# Patient Record
Sex: FEMALE | Race: WHITE | NOT HISPANIC OR LATINO | Employment: OTHER | URBAN - METROPOLITAN AREA
[De-identification: names, ages, dates, MRNs, and addresses within clinical notes are randomized per-mention and may not be internally consistent; named-entity substitution may affect disease eponyms.]

---

## 2017-01-11 DIAGNOSIS — E03.9 HYPOTHYROIDISM, UNSPECIFIED TYPE: ICD-10-CM

## 2017-01-11 RX ORDER — LEVOTHYROXINE SODIUM 100 UG/1
TABLET ORAL
Qty: 30 TABLET | Refills: 0 | Status: SHIPPED | OUTPATIENT
Start: 2017-01-11 | End: 2017-03-09 | Stop reason: SDUPTHER

## 2017-01-23 ENCOUNTER — TELEPHONE (OUTPATIENT)
Dept: OPHTHALMOLOGY | Facility: CLINIC | Age: 82
End: 2017-01-23

## 2017-02-25 ENCOUNTER — HOSPITAL ENCOUNTER (EMERGENCY)
Facility: HOSPITAL | Age: 82
Discharge: HOME | End: 2017-02-25
Attending: EMERGENCY MEDICINE | Admitting: EMERGENCY MEDICINE
Payer: MEDICARE

## 2017-02-25 VITALS
WEIGHT: 116 LBS | OXYGEN SATURATION: 100 % | RESPIRATION RATE: 18 BRPM | DIASTOLIC BLOOD PRESSURE: 81 MMHG | HEART RATE: 80 BPM | HEIGHT: 60 IN | SYSTOLIC BLOOD PRESSURE: 207 MMHG | TEMPERATURE: 98 F | BODY MASS INDEX: 22.78 KG/M2

## 2017-02-25 DIAGNOSIS — M25.571 ACUTE RIGHT ANKLE PAIN: Primary | ICD-10-CM
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
## 2017-02-25 DIAGNOSIS — Z91.81 HISTORY OF FALL: ICD-10-CM
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
## 2017-02-25 PROCEDURE — 99284 EMERGENCY DEPT VISIT MOD MDM: CPT | Mod: ,,, | Performed by: EMERGENCY MEDICINE

## 2017-02-25 PROCEDURE — 99283 EMERGENCY DEPT VISIT LOW MDM: CPT

## 2017-02-25 PROCEDURE — 25000003 PHARM REV CODE 250: Performed by: PHYSICIAN ASSISTANT

## 2017-02-25 RX ORDER — ACETAMINOPHEN 325 MG/1
650 TABLET ORAL
Status: COMPLETED | OUTPATIENT
Start: 2017-02-25 | End: 2017-02-25

## 2017-02-25 RX ADMIN — ACETAMINOPHEN 650 MG: 325 TABLET ORAL at 08:02

## 2017-02-25 NOTE — ED AVS SNAPSHOT
[facility]  [address]  [place] [address]               Suzan Villarreal   2017  8:17 PM   ED    Description:  Female : 1919   Department:  [facility]           Your Care was Coordinated By:     Provider Role From To    Armin Valdez III, MD Attending Provider 17 --    Jaja Cooper PA-C Physician Assistant 17 --      Reason for Visit     Ankle Pain           Diagnoses this Visit        Comments    Acute right ankle pain    -  Primary     Fall           ED Disposition     ED Disposition Condition Comment    Discharge             To Do List           Follow-up Information     Schedule an appointment as soon as possible for a visit with Aly Rivas MD.    Specialty:  Family Medicine    Contact information:     [facility]  [place] [address] [phone]        [facility]     [facility] -  Assistance  Registered nurses in the [facility] provide clinical advisement, health education, appointment booking, and other advisory services.  Call for this free service at [phone].             Medications           Message regarding Medications     Verify the changes and/or additions to your medication regime listed below are the same as discussed with your clinician today.  If any of these changes or additions are incorrect, please notify your healthcare provider.        These medications were administered today        Dose Freq    acetaminophen tablet 650 mg 650 mg ED 1 Time    Sig: Take 2 tablets (650 mg total) by mouth ED 1 Time.    Class: Normal    Route: Oral    Cosign for Ordering: Accepted by Armin Valdez III, MD on 2017  8:50 PM           Verify that the below list of medications is an accurate representation of the medications you are currently taking.  If none reported, the list may be blank. If incorrect, please contact your healthcare provider.  Carry this list with you in case of emergency.           Current Medications     acetaminophen (TYLENOL) 325 MG tablet Take 2 tablets (650 mg total) by mouth every 6 (six) hours as needed.    amlodipine (NORVASC) 10 MG tablet Take 1 tablet (10 mg total) by mouth once daily.    ANTIOX #11/OM3/DHA/EPA/LUT/BARBER (OCUVITE ADULT 50+ ORAL) Take 1 tablet by mouth once daily.    benazepril (LOTENSIN) 20 MG tablet Take 1 tablet (20 mg total) by mouth 2 (two) times daily.    calcium carbonate (OS-UZMA) 600 mg (1,500 mg) Tab Take 600 mg by mouth 2 (two) times daily with meals.    esomeprazole (NEXIUM) 40 MG capsule Take 1 capsule (40 mg total) by mouth before breakfast.    levothyroxine (SYNTHROID) 100 MCG tablet TAKE ONE TABLET BY MOUTH EVERY DAY BEFORE breakfast    metoprolol succinate (TOPROL-XL) 25 MG 24 hr tablet Take 1 tablet (25 mg total) by mouth once daily.    multivitamin capsule Take 1 capsule by mouth once daily.    SEREVENT DISKUS 50 mcg/dose diskus inhaler USE ONE PUFF BY MOUTH TWICE DAILY           Clinical Reference Information           Your Vitals Were     BP                   207/81 (BP Location: Right arm, Patient Position: Sitting)           Allergies as of 2/25/2017        Reactions    Sulfa (Sulfonamide Antibiotics) Swelling    Asa [Aspirin] Itching      Immunizations Administered on Date of Encounter - 2/25/2017     None      ED Micro, Lab, POCT     None      ED Imaging Orders     Start Ordered       Status Ordering Provider    02/25/17 2035 02/25/17 2034  X-Ray Foot Complete Right  1 time imaging      Final result     02/25/17 2035 02/25/17 2034  X-Ray Tibia Fibula 2 View Right  1 time imaging      Final result     02/25/17 2034 02/25/17 2034  X-Ray Ankle Complete Right  1 time imaging      Final result         Discharge Instructions       Follow up with your PCP. Take OTC tylenol as needed. Return to the ED for any new or worsening symptoms, including those listed below.      Ankle Sprain (Adult)  An  ankle sprain is a stretching or tearing of the ligaments that hold the ankle joint together. There are no broken bones.  An ankle sprain is a common injury for both children and adults. It happens when the ankle turns, twists, or rolls in an awkward way. This can be caused by a sports injury. Or it can happen from doing something as simple as stepping on an uneven surface.  Ligaments are made of tough connective tissue. Normally, ligaments stretch a certain amount and then go back to their normal place. A sprain happens when a ligament is forced to stretch more than the normal amount. A severe sprain can actually tear the ligaments. If you have a severe sprain, you may have felt or heard something like a pop when you were injured.  Ankle sprains are given a grade depending on whether they are mild, moderate, or severe:  · Grade 1 sprain. A mild sprain with minor stretching and damage to the ligament.  · Grade 2 sprain. A moderate sprain where the ligament is partly torn.  · Grade 3 sprain. The most severe kind of sprain. The ligament is completely torn.  Most sprains take about 4 to 6 weeks to heal. A severe sprain can take several months to recover.  Your healthcare provider may order X-rays to be sure you dont have a fracture, or broken bone.  The injured area will feel sore.  Swelling and pain may make it hard to walk. You may need crutches if walking is painful. Or your provider may have you use a cast boot or air splint. This will depend on the grade of ankle sprain that you have.    Home care  · For a Grade 1 sprain, use RICE (Rest, Ice, Compression, and Elevation):  · Rest your ankle. Dont walk on it.  · Ice should be used right away to help control swelling. Place an ice pack over the injured area for 20 minutes. Do this every 3 to 6 hours for the first 24 to 48 hours. Keep using ice packs to ease pain and swelling as needed. To make an ice pack, put ice cubes in a plastic bag that seals at the top. Wrap  the bag in a clean, thin towel or cloth. Never put ice or an ice pack directly on the skin. The ice pack can be put right on the cast, bandage, or splint. As the ice melts, be careful that the cast, bandage, or splint doesnt get wet. If you have a boot, open it to apply an ice pack, unless told otherwise by your provider.  · Compression devices help to control swelling. They also keep the ankle from moving and support your injured ankle. These devices include dressings, bandages, and wraps.  · Elevate or raise your ankle above the level of your heart when sitting or lying down. This is very important for the first 48 hours.  · Follow the RICE guidelines for a Grade 2 sprain. This type of sprain will take longer to heal. Your provider may have you wear a splint, cast, or brace to keep your ankle from moving.  ·  If you have a Grade 3 sprain, you are at risk for long-term ankle instability. In rare cases, surgery may be needed. Your provider may have you wear a short leg cast or a walking boot for 2 to 3 weeks.  · After 48 hours, it may be helpful to apply heat for 20 minutes several times a day. You can do this with a heating pad or warm compress. Or you may want to go back and forth between using ice and heat. Never apply heat directly to the skin. Always wrap the heating pad or warm compress in a clean, thin towel or cloth.  · You may use over-the-counter pain medicine (NSAIDS or nonsteroidal anti-inflammatory drugs) to control pain, unless another pain medicine was prescribed. Talk with your provider beforeusing these medicines if you have chronic liver or kidney disease, or have ever had a stomach ulcer or GI (gastrointestinal) bleeding.  · Follow any rehabilitation exercises your provider gives you. These can help you be more flexible and improve your balance and coordination. This is helpful in preventing long-term ankle problems.  Prevention  To help prevent ankle sprains, its important to have good  strength, balance, and flexibility. Be sure to:  · Always warm up before you exercise or do something very active  · Be careful when walking or running on uneven or cracked surfaces  · Wear shoes that are in good condition and fit well  · Listen to your bodys signals to slow down when you are in pain or tired  Follow-up care  Follow up with your healthcare provider, or as advised. Check for any warning signs listed below.  If your symptoms dont improve or they get worse, talk with your provider. You may need an X-ray.  When to seek medical advice  Call your healthcare provider right away if any of these occur:  · Fever of 100.4 F (38 C) or higher, or as directed  · The injury doesnt seem to be healing  · The swelling comes back  · The cast has a bad smell  · The plaster cast or splint gets wet or soft  · The fiberglass cast or splint gets wet and does not dry for 24 hours  · The pain or swelling increases, or redness appears  · Your toes become cold, blue, numb, or tingly  · The skin is discolored (looks blue, purple, or gray), has blisters, or is irritated  · You re-injure your ankle  Date Last Reviewed: 11/20/2015  © 0574-8196 [employer]. [address] [place] [address], [place] [address]. All rights reserved. This information is not intended as a substitute for professional medical care. Always follow your healthcare professional's instructions.          Your Scheduled Appointments     Mar 02, 2017  2:00 PM CST   Established Patient Visit with AFSANEH Rodriguez MD   [place] [facility] ([place])    [address]  [place] [address] [phone]            Mar 09, 2017  1:00 PM CST   Follow Up/Office Visit with Aly Rivas MD   [place] [facility] ([place])    [address]  [place] [address] [phone]            Apr 28, 2017  3:00 PM CDT   Established Patient Visit with MD Billy Vasquez - Rheumatology ([place] )    [address]  [facility] [address]   [phone]              [facility] Sign-Up     Activating your [facility] account is as easy as 1-2-3!     1) Visit [email or web address], select Sign Up Now, enter this activation code and your date of birth, then select Next.  Activation code not generated  Current Patient Portal Status: Account disabled      2) Create a username and password to use when you visit [facility] in the future and select a security question in case you lose your password and select Next.    3) Enter your e-mail address and click Sign Up!    Additional Information  If you have questions, please e-mail [email or web address] or call [phone] to talk to our [facility] staff. Remember, [facility] is NOT to be used for urgent needs. For medical emergencies, dial 911.          [facility] complies with applicable Federal civil rights laws and does not discriminate on the basis of race, color, national origin, age, disability, or sex.        Language Assistance Services     ATTENTION: Language assistance services are available, free of charge. Please call [phone].      ATENCIÓN: Si habla español, tiene a nava disposición servicios gratuitos de asistencia lingüística. Llame al [phone].     CHÚ Ý: N?u b?n nói Ti?ng Vi?t, có các d?ch v? h? tr? ngôn ng? mi?n phí dành cho b?n. G?i s? [phone].

## 2017-02-26 NOTE — ED PROVIDER NOTES
Encounter Date: 2/25/2017    SCRIBE #1 NOTE: I, Torrey Matt, am scribing for, and in the presence of,  Armin Valdez MD. I have scribed the following portions of the note - the APC attestation.       History     Chief Complaint   Patient presents with    Ankle Pain     fell, trip and fall.  C/O R ankle pain and swelling     Review of patient's allergies indicates:   Allergen Reactions    Sulfa (sulfonamide antibiotics) Swelling    Asa [aspirin] Itching     HPI  Past Medical History:   Diagnosis Date    Allergy     Cholelithiasis without obstruction     Chronic insomnia     COPD (chronic obstructive pulmonary disease)     DJD (degenerative joint disease), lumbar     GERD (gastroesophageal reflux disease)     HLD (hyperlipidemia)     HTN (hypertension)     Hypothyroid     OP (osteoporosis)     Osteoarthritis     S/P hysterectomy      Past Surgical History:   Procedure Laterality Date    CHOLECYSTECTOMY      HIP SURGERY  8-2013    right    HYSTERECTOMY      KNEE ARTHROSCOPY Right 7-6-15    TK     History reviewed. No pertinent family history.  Social History   Substance Use Topics    Smoking status: Never Smoker    Smokeless tobacco: Never Used    Alcohol use No     Review of Systems    Physical Exam   Initial Vitals   BP Pulse Resp Temp SpO2   02/25/17 1756 02/25/17 1756 02/25/17 1756 02/25/17 1756 02/25/17 1756   207/81 80 18 97.6 °F (36.4 °C) 100 %     Physical Exam    ED Course   Procedures  Labs Reviewed - No data to display          Medical Decision Making:   History:   Old Medical Records: I decided to obtain old medical records.  Clinical Tests:   Radiological Study: Ordered and Reviewed            Scribe Attestation:   Scribe #1: I performed the above scribed service and the documentation accurately describes the services I performed. I attest to the accuracy of the note.    Attending Attestation:     Physician Attestation Statement for NP/PA:   I discussed this assessment and plan of  this patient with the NP/PA, but I did not personally examine the patient. The face to face encounter was performed by the NP/PA.    Other NP/PA Attestation Additions:    History of Present Illness: Ankle and leg pain         Physician Attestation for Scribe:  Physician Attestation Statement for Scribe #1: I, Armin Valdez MD, reviewed documentation, as scribed by Torrey Matt in my presence, and it is both accurate and complete.                 ED Course     Clinical Impression:   Diagnoses of Fall, Fall, and Fall were pertinent to this visit.          Armin Valdez III, MD  02/26/17 0893

## 2017-02-26 NOTE — ED TRIAGE NOTES
Suzan Villarreal, a 89 y.o. female presents to the ED  with right ankle pain. Pt states that she fell today while walking with walker and feels that her ankle may be broken or sprained. Pt denies SOB, chest pain, NDVF.       Chief Complaint   Patient presents with    Ankle Pain     fell, trip and fall.  C/O R ankle pain and swelling     Review of patient's allergies indicates:   Allergen Reactions    Sulfa (sulfonamide antibiotics) Swelling    Asa [aspirin] Itching     Past Medical History:   Diagnosis Date    Allergy     Cholelithiasis without obstruction     Chronic insomnia     COPD (chronic obstructive pulmonary disease)     DJD (degenerative joint disease), lumbar     GERD (gastroesophageal reflux disease)     HLD (hyperlipidemia)     HTN (hypertension)     Hypothyroid     OP (osteoporosis)     Osteoarthritis     S/P hysterectomy        LOC: Patient name and date of birth verified.  The patient is awake, alert and aware of environment with an appropriate affect, the patient is oriented x 3 and speaking appropriately.  Pt in NAD.    APPEARANCE: Patient resting comfortably and in no acute distress, patient is clean and well groomed, patient's clothing is properly fastened.  SKIN: The skin is warm and dry, color consistent with ethnicity, patient has normal skin turgor and moist mucus membranes, skin intact, no breakdown or brusing noted.  MUSCULOSKELETAL: Patient c/o pain to right ankle, swelling to medial aspect of ankle.   RESPIRATORY: Airway is open and patent, respirations are spontaneous, patient has a normal effort and rate, no accessory muscle use noted.  CARDIAC: Patient has a normal rate and rhythm, no periphreal edema noted, capillary refill < 3 seconds.  ABDOMEN: Soft and non tender to palpation, no distention noted. Bowel sounds present in all four quadrants.  NEUROLOGIC: Eyes open spontaneously, behavior appropriate to situation, follows commands, facial expression symmetrical, bilateral  hand grasp equal and even, purposeful motor response noted, normal sensation in all extremities when touched with a finger.

## 2017-02-26 NOTE — DISCHARGE INSTRUCTIONS
Follow up with your PCP. Take OTC tylenol as needed. Return to the ED for any new or worsening symptoms, including those listed below.      Ankle Sprain (Adult)  An ankle sprain is a stretching or tearing of the ligaments that hold the ankle joint together. There are no broken bones.  An ankle sprain is a common injury for both children and adults. It happens when the ankle turns, twists, or rolls in an awkward way. This can be caused by a sports injury. Or it can happen from doing something as simple as stepping on an uneven surface.  Ligaments are made of tough connective tissue. Normally, ligaments stretch a certain amount and then go back to their normal place. A sprain happens when a ligament is forced to stretch more than the normal amount. A severe sprain can actually tear the ligaments. If you have a severe sprain, you may have felt or heard something like a pop when you were injured.  Ankle sprains are given a grade depending on whether they are mild, moderate, or severe:  · Grade 1 sprain. A mild sprain with minor stretching and damage to the ligament.  · Grade 2 sprain. A moderate sprain where the ligament is partly torn.  · Grade 3 sprain. The most severe kind of sprain. The ligament is completely torn.  Most sprains take about 4 to 6 weeks to heal. A severe sprain can take several months to recover.  Your healthcare provider may order X-rays to be sure you dont have a fracture, or broken bone.  The injured area will feel sore.  Swelling and pain may make it hard to walk. You may need crutches if walking is painful. Or your provider may have you use a cast boot or air splint. This will depend on the grade of ankle sprain that you have.    Home care  · For a Grade 1 sprain, use RICE (Rest, Ice, Compression, and Elevation):  · Rest your ankle. Dont walk on it.  · Ice should be used right away to help control swelling. Place an ice pack over the injured area for 20 minutes. Do this every 3 to 6 hours for  the first 24 to 48 hours. Keep using ice packs to ease pain and swelling as needed. To make an ice pack, put ice cubes in a plastic bag that seals at the top. Wrap the bag in a clean, thin towel or cloth. Never put ice or an ice pack directly on the skin. The ice pack can be put right on the cast, bandage, or splint. As the ice melts, be careful that the cast, bandage, or splint doesnt get wet. If you have a boot, open it to apply an ice pack, unless told otherwise by your provider.  · Compression devices help to control swelling. They also keep the ankle from moving and support your injured ankle. These devices include dressings, bandages, and wraps.  · Elevate or raise your ankle above the level of your heart when sitting or lying down. This is very important for the first 48 hours.  · Follow the RICE guidelines for a Grade 2 sprain. This type of sprain will take longer to heal. Your provider may have you wear a splint, cast, or brace to keep your ankle from moving.  ·  If you have a Grade 3 sprain, you are at risk for long-term ankle instability. In rare cases, surgery may be needed. Your provider may have you wear a short leg cast or a walking boot for 2 to 3 weeks.  · After 48 hours, it may be helpful to apply heat for 20 minutes several times a day. You can do this with a heating pad or warm compress. Or you may want to go back and forth between using ice and heat. Never apply heat directly to the skin. Always wrap the heating pad or warm compress in a clean, thin towel or cloth.  · You may use over-the-counter pain medicine (NSAIDS or nonsteroidal anti-inflammatory drugs) to control pain, unless another pain medicine was prescribed. Talk with your provider beforeusing these medicines if you have chronic liver or kidney disease, or have ever had a stomach ulcer or GI (gastrointestinal) bleeding.  · Follow any rehabilitation exercises your provider gives you. These can help you be more flexible and improve  your balance and coordination. This is helpful in preventing long-term ankle problems.  Prevention  To help prevent ankle sprains, its important to have good strength, balance, and flexibility. Be sure to:  · Always warm up before you exercise or do something very active  · Be careful when walking or running on uneven or cracked surfaces  · Wear shoes that are in good condition and fit well  · Listen to your bodys signals to slow down when you are in pain or tired  Follow-up care  Follow up with your healthcare provider, or as advised. Check for any warning signs listed below.  If your symptoms dont improve or they get worse, talk with your provider. You may need an X-ray.  When to seek medical advice  Call your healthcare provider right away if any of these occur:  · Fever of 100.4 F (38 C) or higher, or as directed  · The injury doesnt seem to be healing  · The swelling comes back  · The cast has a bad smell  · The plaster cast or splint gets wet or soft  · The fiberglass cast or splint gets wet and does not dry for 24 hours  · The pain or swelling increases, or redness appears  · Your toes become cold, blue, numb, or tingly  · The skin is discolored (looks blue, purple, or gray), has blisters, or is irritated  · You re-injure your ankle  Date Last Reviewed: 11/20/2015  © 5716-5549 [employer]. [address] [place] [address]. All rights reserved. This information is not intended as a substitute for professional medical care. Always follow your healthcare professional's instructions.

## 2017-02-26 NOTE — ED PROVIDER NOTES
"Encounter Date: 2/25/2017       History     Chief Complaint   Patient presents with    Ankle Pain     fell, trip and fall.  C/O R ankle pain and swelling     Review of patient's allergies indicates:   Allergen Reactions    Sulfa (sulfonamide antibiotics) Swelling    Asa [aspirin] Itching     HPI Comments: 89-year-old white female with past medical history of hypertension, hyperlipidemia, hypothyroidism, COPD, osteoporosis, osteoarthritis, GERD presents to the ED complaining of right ankle pain after a fall around 5:00 this afternoon.  She was using her walker in her house when her right foot got stuck on the carpet causing her to trip and fall forward twisting her right ankle.  She denies any head trauma or loss of consciousness.  Fall was witnessed by the patient's daughter.  At rest, she reports her ankle pain is 2/10 that will worsen to 10/10 "sharp" with any weightbearing.  She is not on any blood thinners.  She has not taken any over-the-counter pain medication prior to arrival.  She has had a right total hip replacement and a right knee replacement.  She denies fever, chills, chest pain, shortness breath, abdominal pain, numbness, weakness, paresthesias, dysuria, hematuria, back pain, neck pain, headache.  She denies tobacco, alcohol, or drug use.    The history is provided by the patient.     Past Medical History:   Diagnosis Date    Allergy     Cholelithiasis without obstruction     Chronic insomnia     COPD (chronic obstructive pulmonary disease)     DJD (degenerative joint disease), lumbar     GERD (gastroesophageal reflux disease)     HLD (hyperlipidemia)     HTN (hypertension)     Hypothyroid     OP (osteoporosis)     Osteoarthritis     S/P hysterectomy      Past Surgical History:   Procedure Laterality Date    CHOLECYSTECTOMY      HIP SURGERY  8-2013    right    HYSTERECTOMY      KNEE ARTHROSCOPY Right 7-6-15    TK     History reviewed. No pertinent family history.  Social History "   Substance Use Topics    Smoking status: Never Smoker    Smokeless tobacco: Never Used    Alcohol use No     Review of Systems   Constitutional: Negative for chills and fever.   HENT: Negative for congestion, rhinorrhea and sore throat.    Eyes: Negative for photophobia and visual disturbance.   Respiratory: Negative for shortness of breath.    Cardiovascular: Negative for chest pain.   Gastrointestinal: Negative for abdominal pain, nausea and vomiting.   Genitourinary: Negative for dysuria and hematuria.   Musculoskeletal: Positive for arthralgias, gait problem and joint swelling. Negative for back pain, neck pain and neck stiffness.   Skin: Negative for rash and wound.   Neurological: Negative for dizziness, syncope, weakness, light-headedness, numbness and headaches.   Psychiatric/Behavioral: Negative for confusion.       Physical Exam   Initial Vitals   BP Pulse Resp Temp SpO2   02/25/17 1756 02/25/17 1756 02/25/17 1756 02/25/17 1756 02/25/17 1756   207/81 80 18 97.6 °F (36.4 °C) 100 %     Physical Exam    Nursing note and vitals reviewed.  Constitutional: She appears well-developed and well-nourished. She is not diaphoretic. No distress.   HENT:   Head: Normocephalic and atraumatic.   Neck: Normal range of motion. Neck supple.   Cardiovascular: Normal rate, regular rhythm and normal heart sounds. Exam reveals no gallop and no friction rub.    No murmur heard.  Pulmonary/Chest: Breath sounds normal. She has no wheezes. She has no rhonchi. She has no rales.   Abdominal: Soft. Bowel sounds are normal. There is no tenderness. There is no rebound and no guarding.   Musculoskeletal:        Right hip: She exhibits normal range of motion, normal strength, no tenderness and no bony tenderness.        Right knee: She exhibits normal range of motion, no swelling and no bony tenderness. No tenderness found.        Right ankle: She exhibits decreased range of motion and swelling. She exhibits no ecchymosis, no  deformity, no laceration and normal pulse. Tenderness. Lateral malleolus and medial malleolus tenderness found.        Cervical back: She exhibits no tenderness and no bony tenderness.        Thoracic back: She exhibits no tenderness and no bony tenderness.        Lumbar back: She exhibits no tenderness and no bony tenderness.        Right upper leg: She exhibits no tenderness and no bony tenderness.        Right lower leg: She exhibits no tenderness and no bony tenderness.        Right foot: There is tenderness and bony tenderness. There is no swelling and normal capillary refill.   R pedal pulse 1+   Neurological: She is alert and oriented to person, place, and time. No sensory deficit.   Skin: Skin is warm and dry. No rash noted. No erythema.   Psychiatric: She has a normal mood and affect.         ED Course   Procedures  Labs Reviewed - No data to display     Imaging Results         X-Ray Ankle Complete Right (Final result) Result time:  02/25/17 21:11:25    Final result by Fatoumata Hill MD (02/25/17 21:11:25)    Impression:      1.  No acute displaced fracture or dislocation of the ankle.        Electronically signed by: FATOUMATA HILL MD  Date:     02/25/17  Time:    21:11     Narrative:    Ankle complete 3 view right    Clinical history: Fall    Comparison: 1/12/2016    Findings:    There is osteopenia.  Degenerative changes are noted of the ankle however the ankle mortise is maintained.  No acute displaced fracture or dislocation of the ankle.  Degenerative changes are noted of the hindfoot.  There is vascular calcification.  No radiopaque foreign body.  No significant edema.            X-Ray Foot Complete Right (Final result) Result time:  02/25/17 21:12:56    Final result by Fatoumata Hill MD (02/25/17 21:12:56)    Impression:      1.  No convincing evidence of acute displaced fracture or dislocation of the foot noting limitations above.      Electronically signed by: FATOUMATA HILL MD  Date:      02/25/17  Time:    21:12     Narrative:    Foot complete 3 view right    Clinical history: Fall    Comparison: 1/7/2013    Findings:  There is diffuse osteopenia.  Degenerative changes are noted of the foot, primarily involving the first metatarsophalangeal joint.  Allowing for osteopenia and degenerative changes, no convincing evidence of acute displaced fracture or dislocation of the foot.  No radiopaque foreign body.  There is vascular calcification.            X-Ray Tibia Fibula 2 View Right (Final result) Result time:  02/25/17 21:14:19    Final result by Fatoumata Hill MD (02/25/17 21:14:19)    Impression:      1.  No acute displaced fracture or dislocation of the tibia or fibula as visualized.      Electronically signed by: FATOUMATA HILL MD  Date:     02/25/17  Time:    21:14     Narrative:    Tib-fib 2 view right    Clinical history: Fall    Comparison: 8/20/2013    Findings:  2 views.    Partially visualized right knee arthroplasty noted without loosening or failure, placed in the interval since the previous comparative examination however present on the femur radiograph 12/29/2015.  There is generalized osteopenia.  No convincing evidence of acute displaced fracture or dislocation of the visualized portions of the tibia or fibula.  No radiopaque foreign body.  There is vascular calcification.                 Medical Decision Making:   History:   Old Medical Records: I decided to obtain old medical records.  Independently Interpreted Test(s):   I have ordered and independently interpreted X-rays - see summary below.       <> Summary of X-Ray Reading(s): Ankle, foot, and tib/fib xray: nad  Clinical Tests:   Radiological Study: Ordered       APC / Resident Notes:   89-year-old white female with past medical history of hypertension, hyperlipidemia, hypothyroidism, COPD, osteoporosis, osteoarthritis, GERD presents to the ED complaining of right ankle pain after a fall around 5:00 this afternoon.  Vital  signs stable.  Regular rate and rhythm without murmurs.  Lungs are clear to auscultation bilaterally without wheezes.  Abdomen is soft and nontender to palpation.  There is no midline C, T, or L-spine tenderness.  No bony tenderness of the right hip or right knee.  Decreased active and passive range of motion of the right ankle secondary to pain.  Minimal swelling.  No deformities.  There is bony tenderness noted to the medial and lateral right ankle.  Right pedal pulse 1+.  Differential diagnosis includes but isn't limited to fracture, dislocation, sprain.  Will obtain x-ray for further evaluation.    Xray of the R ankle, foot, and tib/fib do not show any acute fractures or dislocations.    I do not feel that she needs any further labs or imaging at this time.  Stable for discharge.    R ankle placed in ACE wrap prior to discharge. She was instructed on RICE therapy.  She was discharged without any new prescriptions.  She will take OTC tylenol as needed for pain.  She will follow up with her PCP.  All of the patient's questions were answered.  I reviewed the patient's chart and imaging and discussed the case with my supervising physician.            Attending Attestation:     Physician Attestation Statement for NP/PA:   I discussed this assessment and plan of this patient with the NP/PA, but I did not personally examine the patient. The face to face encounter was performed by the NP/PA.    Other NP/PA Attestation Additions:    History of Present Illness: Fall, leg pain                   ED Course     Clinical Impression:   The primary encounter diagnosis was Acute right ankle pain. A diagnosis of Fall was also pertinent to this visit.    Disposition:   Disposition: Discharged  Condition: Stable       Jaja Cooper PA-C  02/26/17 0039       Armin Valdez III, MD  02/26/17 0849

## 2017-03-09 ENCOUNTER — OFFICE VISIT (OUTPATIENT)
Dept: FAMILY MEDICINE | Facility: CLINIC | Age: 82
End: 2017-03-09
Payer: MEDICARE

## 2017-03-09 VITALS
HEIGHT: 60 IN | DIASTOLIC BLOOD PRESSURE: 64 MMHG | BODY MASS INDEX: 22.76 KG/M2 | OXYGEN SATURATION: 95 % | SYSTOLIC BLOOD PRESSURE: 102 MMHG | WEIGHT: 115.94 LBS | HEART RATE: 81 BPM

## 2017-03-09 DIAGNOSIS — J44.9 CHRONIC OBSTRUCTIVE PULMONARY DISEASE, UNSPECIFIED COPD TYPE: ICD-10-CM

## 2017-03-09 DIAGNOSIS — I10 ESSENTIAL HYPERTENSION: ICD-10-CM

## 2017-03-09 DIAGNOSIS — M79.671 RIGHT FOOT PAIN: Primary | ICD-10-CM

## 2017-03-09 DIAGNOSIS — E03.9 HYPOTHYROIDISM, UNSPECIFIED TYPE: ICD-10-CM

## 2017-03-09 DIAGNOSIS — Z23 NEED FOR INFLUENZA VACCINATION: ICD-10-CM

## 2017-03-09 PROCEDURE — 1126F AMNT PAIN NOTED NONE PRSNT: CPT | Mod: S$GLB,,, | Performed by: FAMILY MEDICINE

## 2017-03-09 PROCEDURE — 90662 IIV NO PRSV INCREASED AG IM: CPT | Mod: S$GLB,,, | Performed by: FAMILY MEDICINE

## 2017-03-09 PROCEDURE — 1159F MED LIST DOCD IN RCRD: CPT | Mod: S$GLB,,, | Performed by: FAMILY MEDICINE

## 2017-03-09 PROCEDURE — 1160F RVW MEDS BY RX/DR IN RCRD: CPT | Mod: S$GLB,,, | Performed by: FAMILY MEDICINE

## 2017-03-09 PROCEDURE — G0008 ADMIN INFLUENZA VIRUS VAC: HCPCS | Mod: S$GLB,,, | Performed by: FAMILY MEDICINE

## 2017-03-09 PROCEDURE — 99999 PR PBB SHADOW E&M-EST. PATIENT-LVL III: CPT | Mod: PBBFAC,,, | Performed by: FAMILY MEDICINE

## 2017-03-09 PROCEDURE — 99214 OFFICE O/P EST MOD 30 MIN: CPT | Mod: 25,S$GLB,, | Performed by: FAMILY MEDICINE

## 2017-03-09 PROCEDURE — 1157F ADVNC CARE PLAN IN RCRD: CPT | Mod: S$GLB,,, | Performed by: FAMILY MEDICINE

## 2017-03-09 RX ORDER — LEVOTHYROXINE SODIUM 100 UG/1
100 TABLET ORAL
Qty: 30 TABLET | Refills: 0 | Status: SHIPPED | OUTPATIENT
Start: 2017-03-09 | End: 2017-05-12 | Stop reason: SDUPTHER

## 2017-03-09 RX ORDER — HYDROCODONE BITARTRATE AND ACETAMINOPHEN 5; 300 MG/1; MG/1
1 TABLET ORAL
COMMUNITY
End: 2017-03-28

## 2017-03-09 NOTE — MR AVS SNAPSHOT
[facility]   [place]  [place] [address]  Phone: [phone]  Fax: [phone]                  Suzan Villarreal   3/9/2017 1:00 PM   Office Visit    Description:  Female : 1919   Provider:  Aly Rivas MD   Department:  [facility]           Reason for Visit     Follow-up     Foot Pain           Diagnoses this Visit        Comments    Right foot pain    -  Primary     Essential hypertension         Need for influenza vaccination         Hypothyroidism, unspecified type         Chronic obstructive pulmonary disease, unspecified COPD type                To Do List           Future Appointments        Provider Department Dept Phone    3/18/2017 8:45 AM [facility], [facility] [phone]    3/30/2017 9:30 AM AFSANEH Rodriguez MD [place] - Ophthalmology [phone]    2017 3:00 PM Brice Rodriguez MD [facility] - Rheumatology [phone]      Goals (5 Years of Data)     None      Follow-Up and Disposition     Return in about 4 months (around 2017), or if symptoms worsen or fail to improve.       These Medications        Disp Refills Start End    salmeterol (SEREVENT DISKUS) 50 mcg/dose diskus inhaler 60 each 8 3/9/2017     Inhale 1 puff into the lungs 2 (two) times daily. Controller - Inhalation    Pharmacy: [facility] [address] Ph #: [phone]       levothyroxine (SYNTHROID) 100 MCG tablet 30 tablet 0 3/9/2017     Take 1 tablet (100 mcg total) by mouth before breakfast. - Oral    Pharmacy: [facility] [address] Ph #: [phone]       Notes to Pharmacy: This prescription was filled today(2017). Any refills authorized will be placed on file.      [facility]     [facility] -  Assistance  Registered nurses in the [facility] provide clinical advisement, health education, appointment booking, and other  advisory services.  Call for this free service at [phone].             Medications           Message regarding Medications     Verify the changes and/or additions to your medication regime listed below are the same as discussed with your clinician today.  If any of these changes or additions are incorrect, please notify your healthcare provider.        CHANGE how you are taking these medications     Start Taking Instead of    salmeterol (SEREVENT DISKUS) 50 mcg/dose diskus inhaler SEREVENT DISKUS 50 mcg/dose diskus inhaler    Dosage:  Inhale 1 puff into the lungs 2 (two) times daily. Controller Dosage:  USE ONE PUFF BY MOUTH TWICE DAILY    Reason for Change:  Reorder     levothyroxine (SYNTHROID) 100 MCG tablet levothyroxine (SYNTHROID) 100 MCG tablet    Dosage:  Take 1 tablet (100 mcg total) by mouth before breakfast. Dosage:  TAKE ONE TABLET BY MOUTH EVERY DAY BEFORE breakfast    Reason for Change:  Reorder            Verify that the below list of medications is an accurate representation of the medications you are currently taking.  If none reported, the list may be blank. If incorrect, please contact your healthcare provider. Carry this list with you in case of emergency.           Current Medications     acetaminophen (TYLENOL) 325 MG tablet Take 2 tablets (650 mg total) by mouth every 6 (six) hours as needed.    amlodipine (NORVASC) 10 MG tablet Take 1 tablet (10 mg total) by mouth once daily.    ANTIOX #11/OM3/DHA/EPA/LUT/BARBER (OCUVITE ADULT 50+ ORAL) Take 1 tablet by mouth once daily.    benazepril (LOTENSIN) 20 MG tablet Take 1 tablet (20 mg total) by mouth 2 (two) times daily.    calcium carbonate (OS-UZMA) 600 mg (1,500 mg) Tab Take 600 mg by mouth 2 (two) times daily with meals.    esomeprazole (NEXIUM) 40 MG capsule Take 1 capsule (40 mg total) by mouth before breakfast.    hydrocodone-acetaminophen (VICODIN) 5-300 mg Tab Take 1 tablet by mouth every 4 to 6 hours as needed.    levothyroxine  (SYNTHROID) 100 MCG tablet Take 1 tablet (100 mcg total) by mouth before breakfast.    metoprolol succinate (TOPROL-XL) 25 MG 24 hr tablet Take 1 tablet (25 mg total) by mouth once daily.    multivitamin capsule Take 1 capsule by mouth once daily.    salmeterol (SEREVENT DISKUS) 50 mcg/dose diskus inhaler Inhale 1 puff into the lungs 2 (two) times daily. Controller           Clinical Reference Information           Your Vitals Were     BP Pulse Height Weight SpO2 BMI    102/64 (BP Location: Right arm, Patient Position: Sitting, BP Method: Manual) 81 5' (1.524 m) 52.6 kg (115 lb 15.4 oz) 95% 22.65 kg/m2      Blood Pressure          Most Recent Value    BP  102/64      Allergies as of 3/9/2017     Sulfa (Sulfonamide Antibiotics)    Asa [Aspirin]      Immunizations Administered on Date of Encounter - 3/9/2017     Name Date Dose VIS Date Route    Influenza - High Dose  Incomplete 0.5 mL 8/7/2015 Intramuscular      Orders Placed During Today's Visit      Normal Orders This Visit    Influenza - High Dose (65+) (PF) (IM)     Future Labs/Procedures Expected by Expires    CBC auto differential  3/9/2017 3/9/2018    Comprehensive metabolic panel  3/9/2017 6/7/2017    TSH  3/9/2017 6/7/2017      Language Assistance Services     ATTENTION: Language assistance services are available, free of charge. Please call [phone].      ATENCIÓN: Si habla español, tiene a nava disposición servicios gratuitos de asistencia lingüística. Llame al [phone].     CHÚ Ý: N?u b?n nói Ti?ng Vi?t, có các d?ch v? h? tr? ngôn ng? mi?n phí dành cho b?n. G?i s? [phone].         [facility] complies with applicable Federal civil rights laws and does not discriminate on the basis of race, color, national origin, age, disability, or sex.

## 2017-03-09 NOTE — PROGRESS NOTES
Subjective:       Patient ID: Suzan Villarreal is a 89 y.o. female.    Chief Complaint: Follow-up and Foot Pain    HPI Comments: 89 years old female who came to the clinic with right foot swelling after foot sprain for the last 2 weeks.  Patient was evaluated by podiatric service who recommends immobilization.  Blood pressure today is stable.  No chest pain palpitations orthopnea or PND.  Last TSH was slightly low we are planning to repeat thyroid testing for next week.  Patient denies any recent flareups for COPD.      Foot Pain   Associated symptoms include arthralgias. Pertinent negatives include no chest pain.     Review of Systems   Constitutional: Negative.    HENT: Negative.    Eyes: Negative.    Respiratory: Negative.    Cardiovascular: Negative.  Negative for chest pain, palpitations and leg swelling.   Gastrointestinal: Negative.    Endocrine: Negative for cold intolerance, heat intolerance, polydipsia, polyphagia and polyuria.   Genitourinary: Negative.    Musculoskeletal: Positive for arthralgias.   Skin: Negative.    Neurological: Negative.    Psychiatric/Behavioral: Negative.        Objective:      Physical Exam   Constitutional: She is oriented to person, place, and time. She appears well-developed and well-nourished. No distress.   HENT:   Head: Normocephalic and atraumatic.   Right Ear: External ear normal.   Left Ear: External ear normal.   Nose: Nose normal.   Mouth/Throat: Oropharynx is clear and moist. No oropharyngeal exudate.   Eyes: Conjunctivae and EOM are normal. Pupils are equal, round, and reactive to light. Right eye exhibits no discharge. Left eye exhibits no discharge. No scleral icterus.   Neck: Normal range of motion. Neck supple. No JVD present. No tracheal deviation present. No thyromegaly present.   Cardiovascular: Normal rate, regular rhythm, normal heart sounds and intact distal pulses.  Exam reveals no gallop and no friction rub.    No murmur heard.  Pulmonary/Chest: Effort normal  and breath sounds normal. No stridor. No respiratory distress. She has no wheezes. She has no rales. She exhibits no tenderness.   Abdominal: Soft. Bowel sounds are normal. She exhibits no distension and no mass. There is no tenderness. There is no rebound and no guarding.   Musculoskeletal: She exhibits no edema.        Right foot: There is decreased range of motion, tenderness and swelling.   Lymphadenopathy:     She has no cervical adenopathy.   Neurological: She is alert and oriented to person, place, and time. She has normal reflexes. No cranial nerve deficit. She exhibits normal muscle tone. Coordination abnormal.   Skin: Skin is warm and dry. No rash noted. She is not diaphoretic. No erythema. No pallor.   Psychiatric: She has a normal mood and affect. Her behavior is normal. Judgment and thought content normal.       Assessment:       1. Right foot pain    2. Essential hypertension    3. Need for influenza vaccination    4. Hypothyroidism, unspecified type    5. Chronic obstructive pulmonary disease, unspecified COPD type        Plan:         Suzan was seen today for follow-up and foot pain.    Diagnoses and all orders for this visit:    Right foot pain    Essential hypertension  -     Comprehensive metabolic panel; Future  -     TSH; Future  -     CBC auto differential; Future    Need for influenza vaccination  -     Influenza - High Dose (65+) (PF) (IM)    Hypothyroidism, unspecified type  -     TSH; Future  -     levothyroxine (SYNTHROID) 100 MCG tablet; Take 1 tablet (100 mcg total) by mouth before breakfast.    Chronic obstructive pulmonary disease, unspecified COPD type  -     salmeterol (SEREVENT DISKUS) 50 mcg/dose diskus inhaler; Inhale 1 puff into the lungs 2 (two) times daily. Controller    Continue monitoring blood pressure at home, low sodium diet.

## 2017-03-13 DIAGNOSIS — I10 ESSENTIAL HYPERTENSION: ICD-10-CM

## 2017-03-13 RX ORDER — BENAZEPRIL HYDROCHLORIDE 20 MG/1
TABLET ORAL
Qty: 60 TABLET | Refills: 0 | Status: SHIPPED | OUTPATIENT
Start: 2017-03-13 | End: 2017-05-12 | Stop reason: SDUPTHER

## 2017-03-13 RX ORDER — AMLODIPINE BESYLATE 10 MG/1
TABLET ORAL
Qty: 30 TABLET | Refills: 0 | Status: SHIPPED | OUTPATIENT
Start: 2017-03-13 | End: 2017-05-12 | Stop reason: SDUPTHER

## 2017-03-25 ENCOUNTER — LAB VISIT (OUTPATIENT)
Dept: LAB | Facility: HOSPITAL | Age: 82
End: 2017-03-25
Attending: FAMILY MEDICINE
Payer: MEDICARE

## 2017-03-25 DIAGNOSIS — E03.9 HYPOTHYROIDISM, UNSPECIFIED TYPE: ICD-10-CM

## 2017-03-25 DIAGNOSIS — I10 ESSENTIAL HYPERTENSION: ICD-10-CM

## 2017-03-25 LAB
ALBUMIN SERPL BCP-MCNC: 3.7 G/DL
ALP SERPL-CCNC: 91 U/L
ALT SERPL W/O P-5'-P-CCNC: 12 U/L
ANION GAP SERPL CALC-SCNC: 12 MMOL/L
AST SERPL-CCNC: 24 U/L
BASOPHILS # BLD AUTO: 0.03 K/UL
BASOPHILS NFR BLD: 0.4 %
BILIRUB SERPL-MCNC: 0.7 MG/DL
BUN SERPL-MCNC: 11 MG/DL
CALCIUM SERPL-MCNC: 9.3 MG/DL
CHLORIDE SERPL-SCNC: 100 MMOL/L
CO2 SERPL-SCNC: 24 MMOL/L
CREAT SERPL-MCNC: 0.7 MG/DL
DIFFERENTIAL METHOD: NORMAL
EOSINOPHIL # BLD AUTO: 0.2 K/UL
EOSINOPHIL NFR BLD: 2.2 %
ERYTHROCYTE [DISTWIDTH] IN BLOOD BY AUTOMATED COUNT: 13.5 %
EST. GFR  (AFRICAN AMERICAN): >60 ML/MIN/1.73 M^2
EST. GFR  (NON AFRICAN AMERICAN): >60 ML/MIN/1.73 M^2
GLUCOSE SERPL-MCNC: 85 MG/DL
HCT VFR BLD AUTO: 41.5 %
HGB BLD-MCNC: 13.8 G/DL
LYMPHOCYTES # BLD AUTO: 1.7 K/UL
LYMPHOCYTES NFR BLD: 25.1 %
MCH RBC QN AUTO: 31 PG
MCHC RBC AUTO-ENTMCNC: 33.3 %
MCV RBC AUTO: 93 FL
MONOCYTES # BLD AUTO: 0.6 K/UL
MONOCYTES NFR BLD: 8.7 %
NEUTROPHILS # BLD AUTO: 4.4 K/UL
NEUTROPHILS NFR BLD: 63.5 %
PLATELET # BLD AUTO: 297 K/UL
PMV BLD AUTO: 9.9 FL
POTASSIUM SERPL-SCNC: 4.7 MMOL/L
PROT SERPL-MCNC: 6.9 G/DL
RBC # BLD AUTO: 4.45 M/UL
SODIUM SERPL-SCNC: 136 MMOL/L
TSH SERPL DL<=0.005 MIU/L-ACNC: 0.54 UIU/ML
WBC # BLD AUTO: 6.93 K/UL

## 2017-03-25 PROCEDURE — 84443 ASSAY THYROID STIM HORMONE: CPT

## 2017-03-25 PROCEDURE — 80053 COMPREHEN METABOLIC PANEL: CPT

## 2017-03-25 PROCEDURE — 36415 COLL VENOUS BLD VENIPUNCTURE: CPT | Mod: PO

## 2017-03-25 PROCEDURE — 85025 COMPLETE CBC W/AUTO DIFF WBC: CPT

## 2017-03-28 ENCOUNTER — OFFICE VISIT (OUTPATIENT)
Dept: RHEUMATOLOGY | Facility: CLINIC | Age: 82
End: 2017-03-28
Payer: MEDICARE

## 2017-03-28 VITALS
BODY MASS INDEX: 23.16 KG/M2 | HEIGHT: 60 IN | WEIGHT: 118 LBS | DIASTOLIC BLOOD PRESSURE: 68 MMHG | HEART RATE: 87 BPM | SYSTOLIC BLOOD PRESSURE: 111 MMHG

## 2017-03-28 DIAGNOSIS — M85.80 OSTEOPENIA: ICD-10-CM

## 2017-03-28 DIAGNOSIS — M12.811 ROTATOR CUFF TEAR ARTHROPATHY, RIGHT: Primary | ICD-10-CM

## 2017-03-28 DIAGNOSIS — M75.101 ROTATOR CUFF TEAR ARTHROPATHY, RIGHT: Primary | ICD-10-CM

## 2017-03-28 PROCEDURE — 1160F RVW MEDS BY RX/DR IN RCRD: CPT | Mod: S$GLB,,, | Performed by: INTERNAL MEDICINE

## 2017-03-28 PROCEDURE — 99999 PR PBB SHADOW E&M-EST. PATIENT-LVL III: CPT | Mod: PBBFAC,,, | Performed by: INTERNAL MEDICINE

## 2017-03-28 PROCEDURE — 1157F ADVNC CARE PLAN IN RCRD: CPT | Mod: S$GLB,,, | Performed by: INTERNAL MEDICINE

## 2017-03-28 PROCEDURE — 20610 DRAIN/INJ JOINT/BURSA W/O US: CPT | Mod: RT,S$GLB,, | Performed by: INTERNAL MEDICINE

## 2017-03-28 PROCEDURE — 1125F AMNT PAIN NOTED PAIN PRSNT: CPT | Mod: S$GLB,,, | Performed by: INTERNAL MEDICINE

## 2017-03-28 PROCEDURE — 99214 OFFICE O/P EST MOD 30 MIN: CPT | Mod: 25,S$GLB,, | Performed by: INTERNAL MEDICINE

## 2017-03-28 PROCEDURE — 1159F MED LIST DOCD IN RCRD: CPT | Mod: S$GLB,,, | Performed by: INTERNAL MEDICINE

## 2017-03-28 RX ORDER — TRIAMCINOLONE ACETONIDE 40 MG/ML
40 INJECTION, SUSPENSION INTRA-ARTICULAR; INTRAMUSCULAR
Status: DISCONTINUED | OUTPATIENT
Start: 2017-03-28 | End: 2017-03-28 | Stop reason: HOSPADM

## 2017-03-28 RX ADMIN — TRIAMCINOLONE ACETONIDE 40 MG: 40 INJECTION, SUSPENSION INTRA-ARTICULAR; INTRAMUSCULAR at 12:03

## 2017-03-28 ASSESSMENT — ROUTINE ASSESSMENT OF PATIENT INDEX DATA (RAPID3)
TOTAL RAPID3 SCORE: 4.89
FATIGUE SCORE: 1
WHEN YOU AWAKENED IN THE MORNING OVER THE LAST WEEK, PLEASE INDICATE THE AMOUNT OF TIME IT TAKES UNTIL YOU ARE AS LIMBER AS YOU WILL BE FOR THE DAY: 10 MINS
PAIN SCORE: 5
AM STIFFNESS SCORE: 1, YES
MDHAQ FUNCTION SCORE: 1.1
PATIENT GLOBAL ASSESSMENT SCORE: 6
PSYCHOLOGICAL DISTRESS SCORE: 1.1

## 2017-03-28 NOTE — PROCEDURES
Large Joint Aspiration/Injection  Date/Time: 3/28/2017 12:05 PM  Performed by: REID SANZ  Authorized by: REID SANZ     Consent Done?:  Yes (Verbal)  Indications:  Pain  Procedure site marked: Yes    Timeout: Prior to procedure the correct patient, procedure, and site was verified      Location:  Shoulder  Site:  R glenohumeral  Ultrasonic Guidance for needle placement: No  Needle size:  25 G  Approach:  Posterior  Medications:  40 mg triamcinolone acetonide 40 mg/mL    Additional Comments: Patient tolerated the procedure well without complications.

## 2017-03-28 NOTE — PROGRESS NOTES
"I have personally taken the history and examined the patient and agree with the resident,s note as stated above  Right shoulder pain, only brief relief with previous injections. No help with diclofenac gel. Last zolendronic acid 4/28/16. Last DXA 5/4/15    Exam; limited rom right shoulder no effusion    Right rotator cuff tear arthropathy, "Allred shoulder"  Osteopenia/osteoporosis    * After verbal consent and cleansing with Chloraprep the right glenohumeral joint injected via the posterior approach with Kenalog 40mg with 1 ml 1 % lidocaine. Patient tolerated procedure well.  DXA > 5/4/17 then decide on zolendronic acid  RTC 6 months prn  "

## 2017-03-28 NOTE — PROGRESS NOTES
Subjective:       Patient ID: Suzan Villarreal is a 89 y.o. female.    Chief Complaint: No chief complaint on file.    HPI     Suzan Villarreal is a 89 y.o. female with a PMHx of osteoporosis and osteopenia who presents for follow up.  She reports that she is doing well at the moment.  She primarily complains about her R shoulder pain, which her previous shot of kenalog provided comfort for approximately one month, but returned back to its baseline.  In addition, she notes that the voltaren gel that she was prescribed also provided minimal to no affect for her shoulder pain.  She otherwise reports no significant changes in regards to her chronic conditions.  She notes exacerbations of her pain especially during wet weather, and would have diffuse body stiffness in the AM especially during inclement weather, which would last for approximately 30 minutes until she felt to her baseline.  She did suffer a mechanical fall in 2/2017, where she went to the ED.  Images at the time revealed no significant fractures, and she was placed in a walking boot.      Review of Systems   Constitutional: Negative for diaphoresis and fatigue.   Respiratory: Negative for cough, shortness of breath and wheezing.    Cardiovascular: Negative for chest pain and palpitations.   Gastrointestinal: Negative for abdominal distention, abdominal pain, constipation, diarrhea, nausea and vomiting.   Genitourinary: Negative for difficulty urinating and dysuria.   Musculoskeletal: Positive for arthralgias (mostly R shoulder ). Negative for myalgias.   Skin: Negative for pallor and rash.   Neurological: Negative for weakness and headaches.   Psychiatric/Behavioral: Negative for confusion and decreased concentration.         Objective:   /68  Pulse 87  Ht 5' (1.524 m)  Wt 53.5 kg (118 lb)  BMI 23.05 kg/m2     Physical Exam   Vitals reviewed.  Constitutional: She is oriented to person, place, and time and well-developed, well-nourished, and in no  distress. No distress.   HENT:   Head: Normocephalic and atraumatic.   Mouth/Throat: No oropharyngeal exudate.   Eyes: Pupils are equal, round, and reactive to light.   Cardiovascular: Normal rate, regular rhythm and normal heart sounds.    No murmur heard.  Pulmonary/Chest: Effort normal and breath sounds normal. No respiratory distress.   Abdominal: Soft. Bowel sounds are normal.   Neurological: She is alert and oriented to person, place, and time. No cranial nerve deficit.   Skin: Skin is warm and dry. She is not diaphoretic. No erythema.     Psychiatric: Affect normal.       The patient is tender to palpation of the shoulder    She has swelling of the shoulder and knee    The patient has an enlarged shoulder and knee     Shoulder Exam   Tenderness Location: glenohumeral, subacromion and acromioclavicular joint     Range of Motion   Active Abduction: 30 abnormal   Passive Abduction: 30 abnormal   Extension: abnormal   Forward Flexion: 20 abnormal   Forward Elevation: 10 abnormal  Adduction: 10 abnormal  External Rotation 0 degrees: 20 abnormal   Internal Rotation 0 degrees: L5     Crepitus: positive  Swelling: positive      Assessment:       1. Rotator cuff tear arthropathy, right    2. Osteopenia            Plan:       Diagnoses and all orders for this visit:    Rotator cuff tear arthropathy, right  -     Large Joint Aspiration/Injection  - Discussed with patient treatment options.  With obtained verbal consent, patient opted for joint injection.  Please see procedure note for further details.  - Will discontinue Voltaren gel if it does not provide relief     Osteopenia        - Previous imaging was performed in 2015, is due to DEXA this year.  If DEXA reveals significant abnormalities from recent changes, will consider zoledronic acid.      Hua Kessler MD  Internal Medicine PGY-1  [phone]

## 2017-03-30 ENCOUNTER — OFFICE VISIT (OUTPATIENT)
Dept: OPHTHALMOLOGY | Facility: CLINIC | Age: 82
End: 2017-03-30
Payer: MEDICARE

## 2017-03-30 DIAGNOSIS — H35.3134 NONEXUDATIVE AGE-RELATED MACULAR DEGENERATION, BILATERAL, ADVANCED ATROPHIC WITH SUBFOVEAL INVOLVEMENT: Primary | ICD-10-CM

## 2017-03-30 DIAGNOSIS — H43.813 POSTERIOR VITREOUS DETACHMENT, BOTH EYES: ICD-10-CM

## 2017-03-30 DIAGNOSIS — H35.033 HYPERTENSIVE RETINOPATHY OF BOTH EYES: ICD-10-CM

## 2017-03-30 DIAGNOSIS — H35.3130 BILATERAL NONEXUDATIVE AGE-RELATED MACULAR DEGENERATION: ICD-10-CM

## 2017-03-30 PROCEDURE — 99999 PR PBB SHADOW E&M-EST. PATIENT-LVL III: CPT | Mod: PBBFAC,,, | Performed by: OPHTHALMOLOGY

## 2017-03-30 PROCEDURE — 92226 PR SPECIAL EYE EXAM, SUBSEQUENT: CPT | Mod: 50,S$GLB,, | Performed by: OPHTHALMOLOGY

## 2017-03-30 PROCEDURE — 92014 COMPRE OPH EXAM EST PT 1/>: CPT | Mod: S$GLB,,, | Performed by: OPHTHALMOLOGY

## 2017-03-30 NOTE — PROGRESS NOTES
Pt sts reading vision decline since last visit last Low vision appointment   was a waste could not get her to see any clearer.Use to see black clouds in eyes but does not see them anymore. Has little improvement in glasses. Wondering if coming in to see eye  In necessary any more since she can not see anyways and no hope to regain vision.    OCT - RPE atrophy OU    A/P    1. Dry AMD OU  AREDS/AG    2. PCIOL OU    3. PVD OU    4. HTN Ret OU - BP control      Low vision/lighthouse      Return PRN

## 2017-03-30 NOTE — MR AVS SNAPSHOT
[facility]   [facility]  [place] [address]  Phone: [phone]  Fax: [phone]                  Suzan Villarreal   3/30/2017 9:30 AM   Office Visit    Description:  Female : 1919   Provider:  AFSANEH Rodriguez MD   Department:  [facility]           Reason for Visit     Macular Degeneration           Diagnoses this Visit        Comments    Nonexudative age-related macular degeneration, bilateral, advanced atrophic with subfoveal involvement    -  Primary     Bilateral nonexudative age-related macular degeneration         Hypertensive retinopathy of both eyes         Posterior vitreous detachment, both eyes                To Do List           Goals (5 Years of Data)     None      Follow-Up and Disposition     Return if symptoms worsen or fail to improve.      [facility] On Call     [facility] On Call Nurse Care Line -  Assistance  Unless otherwise directed by your provider, please contact [facility], our nurse care line that is available for  assistance.     Registered nurses in the [facility] On Call Center provide: appointment scheduling, clinical advisement, health education, and other advisory services.  Call: [phone] (toll free)               Medications           Message regarding Medications     Verify the changes and/or additions to your medication regime listed below are the same as discussed with your clinician today.  If any of these changes or additions are incorrect, please notify your healthcare provider.             Verify that the below list of medications is an accurate representation of the medications you are currently taking.  If none reported, the list may be blank. If incorrect, please contact your healthcare provider. Carry this list with you in case of emergency.           Current Medications     acetaminophen (TYLENOL) 325 MG tablet Take 2 tablets (650 mg total) by mouth every 6 (six) hours as needed.    amlodipine  (NORVASC) 10 MG tablet TAKE ONE TABLET BY MOUTH EVERY DAY    ANTIOX #11/OM3/DHA/EPA/LUT/BARBER (OCUVITE ADULT 50+ ORAL) Take 1 tablet by mouth once daily.    benazepril (LOTENSIN) 20 MG tablet TAKE ONE TABLET BY MOUTH TWICE DAILY    calcium carbonate (OS-UZMA) 600 mg (1,500 mg) Tab Take 600 mg by mouth 2 (two) times daily with meals.    esomeprazole (NEXIUM) 40 MG capsule Take 1 capsule (40 mg total) by mouth before breakfast.    levothyroxine (SYNTHROID) 100 MCG tablet Take 1 tablet (100 mcg total) by mouth before breakfast.    metoprolol succinate (TOPROL-XL) 25 MG 24 hr tablet Take 1 tablet (25 mg total) by mouth once daily.    multivitamin capsule Take 1 capsule by mouth once daily.    salmeterol (SEREVENT DISKUS) 50 mcg/dose diskus inhaler Inhale 1 puff into the lungs 2 (two) times daily. Controller           Clinical Reference Information           Allergies as of 3/30/2017     Sulfa (Sulfonamide Antibiotics)    Asa [Aspirin]      Immunizations Administered on Date of Encounter - 3/30/2017     None      Language Assistance Services     ATTENTION: Language assistance services are available, free of charge. Please call [phone].      ATENCIÓN: Si graham bear, tiene a nava disposición servicios gratuitos de asistencia lingüística. Llame al [phone].     CHÚ Ý: N?u b?n nói Ti?ng Vi?t, có các d?ch v? h? tr? ngôn ng? mi?n phí dành cho b?n. G?i s? [phone].         [facility] complies with applicable Federal civil rights laws and does not discriminate on the basis of race, color, national origin, age, disability, or sex.

## 2017-04-12 DIAGNOSIS — I10 ESSENTIAL HYPERTENSION: ICD-10-CM

## 2017-04-12 RX ORDER — METOPROLOL SUCCINATE 25 MG/1
TABLET, EXTENDED RELEASE ORAL
Qty: 30 TABLET | Refills: 2 | Status: SHIPPED | OUTPATIENT
Start: 2017-04-12 | End: 2017-07-14 | Stop reason: SDUPTHER

## 2017-05-12 DIAGNOSIS — E03.9 HYPOTHYROIDISM, UNSPECIFIED TYPE: ICD-10-CM

## 2017-05-12 DIAGNOSIS — I10 ESSENTIAL HYPERTENSION: ICD-10-CM

## 2017-05-12 RX ORDER — LEVOTHYROXINE SODIUM 100 UG/1
TABLET ORAL
Qty: 30 TABLET | Refills: 0 | Status: SHIPPED | OUTPATIENT
Start: 2017-05-12 | End: 2017-06-14 | Stop reason: SDUPTHER

## 2017-05-12 RX ORDER — BENAZEPRIL HYDROCHLORIDE 20 MG/1
TABLET ORAL
Qty: 60 TABLET | Refills: 0 | Status: SHIPPED | OUTPATIENT
Start: 2017-05-12 | End: 2017-06-07 | Stop reason: SDUPTHER

## 2017-05-12 RX ORDER — AMLODIPINE BESYLATE 10 MG/1
TABLET ORAL
Qty: 30 TABLET | Refills: 0 | Status: SHIPPED | OUTPATIENT
Start: 2017-05-12 | End: 2017-06-07 | Stop reason: SDUPTHER

## 2017-06-07 DIAGNOSIS — I10 ESSENTIAL HYPERTENSION: ICD-10-CM

## 2017-06-08 RX ORDER — BENAZEPRIL HYDROCHLORIDE 20 MG/1
TABLET ORAL
Qty: 60 TABLET | Refills: 0 | Status: SHIPPED | OUTPATIENT
Start: 2017-06-08 | End: 2017-07-13 | Stop reason: SDUPTHER

## 2017-06-08 RX ORDER — AMLODIPINE BESYLATE 10 MG/1
TABLET ORAL
Qty: 30 TABLET | Refills: 0 | Status: SHIPPED | OUTPATIENT
Start: 2017-06-08 | End: 2017-07-13 | Stop reason: SDUPTHER

## 2017-06-14 DIAGNOSIS — E03.9 HYPOTHYROIDISM, UNSPECIFIED TYPE: ICD-10-CM

## 2017-06-14 RX ORDER — LEVOTHYROXINE SODIUM 100 UG/1
TABLET ORAL
Qty: 30 TABLET | Refills: 1 | Status: SHIPPED | OUTPATIENT
Start: 2017-06-14 | End: 2017-07-14 | Stop reason: SDUPTHER

## 2017-07-13 DIAGNOSIS — I10 ESSENTIAL HYPERTENSION: ICD-10-CM

## 2017-07-13 RX ORDER — AMLODIPINE BESYLATE 10 MG/1
TABLET ORAL
Qty: 30 TABLET | Refills: 1 | Status: SHIPPED | OUTPATIENT
Start: 2017-07-13 | End: 2017-08-16 | Stop reason: SDUPTHER

## 2017-07-13 RX ORDER — BENAZEPRIL HYDROCHLORIDE 20 MG/1
TABLET ORAL
Qty: 60 TABLET | Refills: 1 | Status: SHIPPED | OUTPATIENT
Start: 2017-07-13 | End: 2017-08-16 | Stop reason: SDUPTHER

## 2017-07-14 DIAGNOSIS — E03.9 HYPOTHYROIDISM, UNSPECIFIED TYPE: ICD-10-CM

## 2017-07-14 DIAGNOSIS — I10 ESSENTIAL HYPERTENSION: ICD-10-CM

## 2017-07-14 RX ORDER — METOPROLOL SUCCINATE 25 MG/1
TABLET, EXTENDED RELEASE ORAL
Qty: 30 TABLET | Refills: 1 | Status: SHIPPED | OUTPATIENT
Start: 2017-07-14 | End: 2017-09-15 | Stop reason: SDUPTHER

## 2017-07-14 RX ORDER — LEVOTHYROXINE SODIUM 100 UG/1
TABLET ORAL
Qty: 30 TABLET | Refills: 1 | Status: SHIPPED | OUTPATIENT
Start: 2017-07-14 | End: 2017-09-15 | Stop reason: SDUPTHER

## 2017-08-16 DIAGNOSIS — I10 ESSENTIAL HYPERTENSION: ICD-10-CM

## 2017-08-16 RX ORDER — AMLODIPINE BESYLATE 10 MG/1
TABLET ORAL
Qty: 30 TABLET | Refills: 0 | Status: SHIPPED | OUTPATIENT
Start: 2017-08-16 | End: 2017-10-17 | Stop reason: SDUPTHER

## 2017-08-16 RX ORDER — BENAZEPRIL HYDROCHLORIDE 20 MG/1
TABLET ORAL
Qty: 60 TABLET | Refills: 0 | Status: SHIPPED | OUTPATIENT
Start: 2017-08-16 | End: 2017-10-17 | Stop reason: SDUPTHER

## 2017-09-07 ENCOUNTER — OFFICE VISIT (OUTPATIENT)
Dept: FAMILY MEDICINE | Facility: CLINIC | Age: 82
End: 2017-09-07
Payer: MEDICARE

## 2017-09-07 VITALS
SYSTOLIC BLOOD PRESSURE: 110 MMHG | HEIGHT: 61 IN | WEIGHT: 126.56 LBS | DIASTOLIC BLOOD PRESSURE: 70 MMHG | BODY MASS INDEX: 23.9 KG/M2 | HEART RATE: 90 BPM | OXYGEN SATURATION: 95 %

## 2017-09-07 DIAGNOSIS — Z23 NEED FOR VACCINATION AGAINST STREPTOCOCCUS PNEUMONIAE: ICD-10-CM

## 2017-09-07 DIAGNOSIS — R26.9 ABNORMALITY OF GAIT: ICD-10-CM

## 2017-09-07 DIAGNOSIS — L98.9 SKIN LESION: ICD-10-CM

## 2017-09-07 DIAGNOSIS — M85.80 OSTEOPENIA, UNSPECIFIED LOCATION: ICD-10-CM

## 2017-09-07 DIAGNOSIS — Z78.0 ASYMPTOMATIC MENOPAUSE: ICD-10-CM

## 2017-09-07 DIAGNOSIS — I10 ESSENTIAL HYPERTENSION: Primary | ICD-10-CM

## 2017-09-07 PROCEDURE — 99499 UNLISTED E&M SERVICE: CPT | Mod: S$GLB,,, | Performed by: FAMILY MEDICINE

## 2017-09-07 PROCEDURE — 99214 OFFICE O/P EST MOD 30 MIN: CPT | Mod: S$GLB,,, | Performed by: FAMILY MEDICINE

## 2017-09-07 PROCEDURE — 90670 PCV13 VACCINE IM: CPT | Mod: S$GLB,,, | Performed by: FAMILY MEDICINE

## 2017-09-07 PROCEDURE — 3008F BODY MASS INDEX DOCD: CPT | Mod: S$GLB,,, | Performed by: FAMILY MEDICINE

## 2017-09-07 PROCEDURE — 1157F ADVNC CARE PLAN IN RCRD: CPT | Mod: S$GLB,,, | Performed by: FAMILY MEDICINE

## 2017-09-07 PROCEDURE — 99999 PR PBB SHADOW E&M-EST. PATIENT-LVL IV: CPT | Mod: PBBFAC,,, | Performed by: FAMILY MEDICINE

## 2017-09-07 PROCEDURE — 1159F MED LIST DOCD IN RCRD: CPT | Mod: S$GLB,,, | Performed by: FAMILY MEDICINE

## 2017-09-07 PROCEDURE — 1126F AMNT PAIN NOTED NONE PRSNT: CPT | Mod: S$GLB,,, | Performed by: FAMILY MEDICINE

## 2017-09-07 PROCEDURE — G0009 ADMIN PNEUMOCOCCAL VACCINE: HCPCS | Mod: S$GLB,,, | Performed by: FAMILY MEDICINE

## 2017-09-07 RX ORDER — AMOXICILLIN 500 MG/1
500 CAPSULE ORAL 4 TIMES DAILY
COMMUNITY
End: 2017-10-30

## 2017-09-07 NOTE — PROGRESS NOTES
Subjective:       Patient ID: Suzan Villarreal is a 89 y.o. female.    Chief Complaint: Follow-up; Hypertension; and Thyroid Problem    89 years old female came to the clinic for blood pressure check.  Blood pressure today stable.  No chest pain palpitations orthopnea or PND.  Patient was not recently checked for hypothyroidism.  Patient with nose lesion for the last couple of months.  Patient to for her bone density.  She is also requesting evaluation for possible motorized wheelchair.  Patient with increased recent falls associated with unsteady gait.  Patient currently using a walker.      Hypertension   Pertinent negatives include no chest pain or palpitations. Hypertensive end-organ damage includes a thyroid problem.   Thyroid Problem   Patient reports no palpitations.     Review of Systems   Constitutional: Negative.    HENT: Positive for hearing loss.    Eyes: Negative.    Respiratory: Negative.    Cardiovascular: Negative.  Negative for chest pain, palpitations and leg swelling.   Gastrointestinal: Negative.    Genitourinary: Negative.    Musculoskeletal: Positive for gait problem.   Skin: Negative.    Psychiatric/Behavioral: Negative.        Objective:      Physical Exam   Constitutional: She is oriented to person, place, and time. She appears well-developed and well-nourished. No distress.   HENT:   Head: Normocephalic and atraumatic.   Right Ear: External ear normal.   Left Ear: External ear normal.   Nose: Nose normal.   Mouth/Throat: Oropharynx is clear and moist. No oropharyngeal exudate.   Eyes: Conjunctivae and EOM are normal. Pupils are equal, round, and reactive to light. Right eye exhibits no discharge. Left eye exhibits no discharge. No scleral icterus.   Neck: Normal range of motion. Neck supple. No JVD present. No tracheal deviation present. No thyromegaly present.   Cardiovascular: Normal rate, regular rhythm, normal heart sounds and intact distal pulses.  Exam reveals no gallop and no friction  rub.    No murmur heard.  Pulmonary/Chest: Effort normal and breath sounds normal. No stridor. No respiratory distress. She has no wheezes. She has no rales. She exhibits no tenderness.   Abdominal: Soft. Bowel sounds are normal. She exhibits no distension and no mass. There is no tenderness. There is no rebound and no guarding.   Musculoskeletal: Normal range of motion. She exhibits no edema or tenderness.   Lymphadenopathy:     She has no cervical adenopathy.   Neurological: She is alert and oriented to person, place, and time. She has normal reflexes. No cranial nerve deficit. She exhibits normal muscle tone. Coordination and gait abnormal.   Skin: Skin is warm and dry. Lesion (Nose area) noted. No rash noted. She is not diaphoretic. No erythema. No pallor.   Psychiatric: She has a normal mood and affect. Her behavior is normal. Judgment and thought content normal.       Assessment:       1. Essential hypertension    2. Need for vaccination against Streptococcus pneumoniae    3. Asymptomatic menopause    4. Osteopenia, unspecified location    5. Skin lesion    6. Abnormality of gait        Plan:         Suzan was seen today for follow-up, hypertension and thyroid problem.    Diagnoses and all orders for this visit:    Essential hypertension  -     Comprehensive metabolic panel; Future  -     TSH; Future  -     Lipid panel; Future  -     CBC auto differential; Future    Need for vaccination against Streptococcus pneumoniae  -     Pneumococcal Conjugate Vaccine (13 Valent) (IM)    Asymptomatic menopause  -     DXA Bone Density Spine And Hip; Future    Osteopenia, unspecified location  -     DXA Bone Density Spine And Hip; Future    Skin lesion  -     Ambulatory referral to Dermatology    Abnormality of gait  -     Ambulatory referral to Physical Medicine Rehab    Continue monitoring blood pressure at home, low sodium diet.   Fall precautions.  Physical medicine evaluation for possible motorized wheelchair.

## 2017-09-09 ENCOUNTER — LAB VISIT (OUTPATIENT)
Dept: LAB | Facility: HOSPITAL | Age: 82
End: 2017-09-09
Attending: FAMILY MEDICINE
Payer: MEDICARE

## 2017-09-09 DIAGNOSIS — I10 ESSENTIAL HYPERTENSION: ICD-10-CM

## 2017-09-09 LAB
ALBUMIN SERPL BCP-MCNC: 3.6 G/DL
ALP SERPL-CCNC: 64 U/L
ALT SERPL W/O P-5'-P-CCNC: 10 U/L
ANION GAP SERPL CALC-SCNC: 9 MMOL/L
AST SERPL-CCNC: 21 U/L
BASOPHILS # BLD AUTO: 0.03 K/UL
BASOPHILS NFR BLD: 0.4 %
BILIRUB SERPL-MCNC: 0.5 MG/DL
BUN SERPL-MCNC: 9 MG/DL
CALCIUM SERPL-MCNC: 9.2 MG/DL
CHLORIDE SERPL-SCNC: 103 MMOL/L
CHOLEST SERPL-MCNC: 211 MG/DL
CHOLEST/HDLC SERPL: 4.2 {RATIO}
CO2 SERPL-SCNC: 24 MMOL/L
CREAT SERPL-MCNC: 0.7 MG/DL
DIFFERENTIAL METHOD: ABNORMAL
EOSINOPHIL # BLD AUTO: 0.1 K/UL
EOSINOPHIL NFR BLD: 1.2 %
ERYTHROCYTE [DISTWIDTH] IN BLOOD BY AUTOMATED COUNT: 13 %
EST. GFR  (AFRICAN AMERICAN): >60 ML/MIN/1.73 M^2
EST. GFR  (NON AFRICAN AMERICAN): >60 ML/MIN/1.73 M^2
GLUCOSE SERPL-MCNC: 87 MG/DL
HCT VFR BLD AUTO: 40.3 %
HDLC SERPL-MCNC: 50 MG/DL
HDLC SERPL: 23.7 %
HGB BLD-MCNC: 13.6 G/DL
LDLC SERPL CALC-MCNC: 140.8 MG/DL
LYMPHOCYTES # BLD AUTO: 1.5 K/UL
LYMPHOCYTES NFR BLD: 22.4 %
MCH RBC QN AUTO: 31.1 PG
MCHC RBC AUTO-ENTMCNC: 33.7 G/DL
MCV RBC AUTO: 92 FL
MONOCYTES # BLD AUTO: 0.7 K/UL
MONOCYTES NFR BLD: 9.6 %
NEUTROPHILS # BLD AUTO: 4.5 K/UL
NEUTROPHILS NFR BLD: 66.3 %
NONHDLC SERPL-MCNC: 161 MG/DL
PLATELET # BLD AUTO: 276 K/UL
PMV BLD AUTO: 9.3 FL
POTASSIUM SERPL-SCNC: 4.3 MMOL/L
PROT SERPL-MCNC: 6.8 G/DL
RBC # BLD AUTO: 4.38 M/UL
SODIUM SERPL-SCNC: 136 MMOL/L
TRIGL SERPL-MCNC: 101 MG/DL
TSH SERPL DL<=0.005 MIU/L-ACNC: 1.13 UIU/ML
WBC # BLD AUTO: 6.86 K/UL

## 2017-09-09 PROCEDURE — 85025 COMPLETE CBC W/AUTO DIFF WBC: CPT

## 2017-09-09 PROCEDURE — 80053 COMPREHEN METABOLIC PANEL: CPT

## 2017-09-09 PROCEDURE — 84443 ASSAY THYROID STIM HORMONE: CPT

## 2017-09-09 PROCEDURE — 36415 COLL VENOUS BLD VENIPUNCTURE: CPT | Mod: PO

## 2017-09-09 PROCEDURE — 80061 LIPID PANEL: CPT

## 2017-09-14 ENCOUNTER — INITIAL CONSULT (OUTPATIENT)
Dept: DERMATOLOGY | Facility: CLINIC | Age: 82
End: 2017-09-14
Payer: MEDICARE

## 2017-09-14 DIAGNOSIS — D48.5 NEOPLASM OF UNCERTAIN BEHAVIOR OF SKIN: Primary | ICD-10-CM

## 2017-09-14 DIAGNOSIS — Z85.820 PERSONAL HISTORY OF MALIGNANT MELANOMA OF SKIN: ICD-10-CM

## 2017-09-14 DIAGNOSIS — L82.1 SEBORRHEIC KERATOSES: ICD-10-CM

## 2017-09-14 DIAGNOSIS — L82.0 INFLAMED SEBORRHEIC KERATOSIS: ICD-10-CM

## 2017-09-14 PROCEDURE — 99202 OFFICE O/P NEW SF 15 MIN: CPT | Mod: 25,S$GLB,, | Performed by: DERMATOLOGY

## 2017-09-14 PROCEDURE — 1157F ADVNC CARE PLAN IN RCRD: CPT | Mod: S$GLB,,, | Performed by: DERMATOLOGY

## 2017-09-14 PROCEDURE — 1159F MED LIST DOCD IN RCRD: CPT | Mod: S$GLB,,, | Performed by: DERMATOLOGY

## 2017-09-14 PROCEDURE — 1126F AMNT PAIN NOTED NONE PRSNT: CPT | Mod: S$GLB,,, | Performed by: DERMATOLOGY

## 2017-09-14 PROCEDURE — 3008F BODY MASS INDEX DOCD: CPT | Mod: S$GLB,,, | Performed by: DERMATOLOGY

## 2017-09-14 PROCEDURE — 99999 PR PBB SHADOW E&M-EST. PATIENT-LVL III: CPT | Mod: PBBFAC,,, | Performed by: DERMATOLOGY

## 2017-09-14 PROCEDURE — 11100 PR BIOPSY OF SKIN LESION: CPT | Mod: S$GLB,,, | Performed by: DERMATOLOGY

## 2017-09-14 PROCEDURE — 88305 TISSUE EXAM BY PATHOLOGIST: CPT | Performed by: PATHOLOGY

## 2017-09-14 NOTE — LETTER
September 17, 2017      Aly Rivas MD  [address] [facility] [address]           [facility]  [address]  [place] [address]  Phone: [phone]  Fax: [phone]          Patient: Suzan Villarreal   MR Number: [id number]   YOB: 1919   Date of Visit: 9/14/2017       Dear Dr. Aly Rivas:    Thank you for referring Suzan Villarreal to me for evaluation. Attached you will find relevant portions of my assessment and plan of care.    If you have questions, please do not hesitate to call me. I look forward to following Suzan Villarreal along with you.    Sincerely,        Enclosure  CC:  No Recipients    If you would like to receive this communication electronically, please contact [email or web address] or [phone] to request more information on [employer] [employer] access.    For providers and/or their staff who would like to refer a patient to [facility], please contact us through our one-stop-shop provider referral line, [facility], at [phone].    If you feel you have received this communication in error or would no longer like to receive these types of communications, please e-mail [email or web address]

## 2017-09-14 NOTE — PROGRESS NOTES
Subjective:       Patient ID:  Suzan Villarreal is a 89 y.o. female who presents for   Chief Complaint   Patient presents with    Lesion     on nose x unk not healing      Lesion  - Initial  Affected locations: nose  Duration: unsure exactly how long the lesion has been present.  Signs / symptoms: asymptomatic  Timing: constant  Aggravated by: nothing  Relieving factors/Treatments tried: OTC antibiotic cream      Derm Hx: melanoma on arms? 25 y ago   Past Medical History:   Diagnosis Date    Allergy     Cholelithiasis without obstruction     Chronic insomnia     COPD (chronic obstructive pulmonary disease)     DJD (degenerative joint disease), lumbar     GERD (gastroesophageal reflux disease)     HLD (hyperlipidemia)     HTN (hypertension)     Hypothyroid     OP (osteoporosis)     Osteoarthritis     S/P hysterectomy      Review of Systems   Constitutional: Negative for fever, chills and fatigue.   Skin: Negative for tendency to form keloidal scars.   Hematologic/Lymphatic: Does not bruise/bleed easily.        Objective:    Physical Exam   Constitutional: She appears well-developed and well-nourished. No distress.   HENT:   Nose:       Neurological: She is alert and oriented to person, place, and time. She is not disoriented.   Psychiatric: She has a normal mood and affect.   Skin:   Areas Examined (abnormalities noted in diagram):   Head / Face Inspection Performed  Neck Inspection Performed  RUE Inspected  LUE Inspection Performed                  Diagram Legend     Erythematous scaling macule/papule c/w actinic keratosis       Vascular papule c/w angioma      Pigmented verrucoid papule/plaque c/w seborrheic keratosis      Yellow umbilicated papule c/w sebaceous hyperplasia      Irregularly shaped tan macule c/w lentigo     1-2 mm smooth white papules consistent with Milia      Movable subcutaneous cyst with punctum c/w epidermal inclusion cyst      Subcutaneous movable cyst c/w pilar cyst      Firm pink  to brown papule c/w dermatofibroma      Pedunculated fleshy papule(s) c/w skin tag(s)      Evenly pigmented macule c/w junctional nevus     Mildly variegated pigmented, slightly irregular-bordered macule c/w mildly atypical nevus      Flesh colored to evenly pigmented papule c/w intradermal nevus       Pink pearly papule/plaque c/w basal cell carcinoma      Erythematous hyperkeratotic cursted plaque c/w SCC      Surgical scar with no sign of skin cancer recurrence      Open and closed comedones      Inflammatory papules and pustules      Verrucoid papule consistent consistent with wart     Erythematous eczematous patches and plaques     Dystrophic onycholytic nail with subungual debris c/w onychomycosis     Umbilicated papule    Erythematous-base heme-crusted tan verrucoid plaque consistent with inflamed seborrheic keratosis     Erythematous Silvery Scaling Plaque c/w Psoriasis     See annotation      Assessment / Plan:      Pathology Orders:      Normal Orders This Visit    Tissue Specimen To Pathology, Dermatology     Questions:    Directional Terms:  Other(comment)    Clinical information:  r/o BCC vs SCC vs other    Specific Site:  L nasal tip        Neoplasm of uncertain behavior of skin  -     Tissue Specimen To Pathology, Dermatology  Shave biopsy procedure note:    Shave biopsy performed after verbal consent including risk of infection, scar, recurrence, need for additional treatment of site. Area prepped with alcohol, anesthetized with approximately 1.0cc of 1% lidocaine with epinephrine. Lesional tissue shaved with razor blade. Hemostasis achieved with application of aluminum chloride followed by hyfrecation. No complications. Dressing applied. Wound care explained.    Seborrheic keratoses/ISK  These are benign inherited growths without a malignant potential. Reassurance given to patient. No treatment is necessary.     Personal history of malignant melanoma of skin  Will plan on more thorough skin exam at  follow up since pt has h/o melanoma on R arm           Return for pending Pathology.

## 2017-09-15 DIAGNOSIS — I10 ESSENTIAL HYPERTENSION: ICD-10-CM

## 2017-09-15 DIAGNOSIS — E03.9 HYPOTHYROIDISM, UNSPECIFIED TYPE: ICD-10-CM

## 2017-09-15 RX ORDER — LEVOTHYROXINE SODIUM 100 UG/1
TABLET ORAL
Qty: 30 TABLET | Refills: 0 | Status: SHIPPED | OUTPATIENT
Start: 2017-09-15 | End: 2017-11-16 | Stop reason: SDUPTHER

## 2017-09-15 RX ORDER — METOPROLOL SUCCINATE 25 MG/1
TABLET, EXTENDED RELEASE ORAL
Qty: 30 TABLET | Refills: 0 | Status: SHIPPED | OUTPATIENT
Start: 2017-09-15 | End: 2017-11-16 | Stop reason: SDUPTHER

## 2017-10-02 ENCOUNTER — TELEPHONE (OUTPATIENT)
Dept: DERMATOLOGY | Facility: CLINIC | Age: 82
End: 2017-10-02

## 2017-10-02 NOTE — TELEPHONE ENCOUNTER
Returned daughter's call. Left message to return call.    ----- Message from Rubén Montez MA sent at 9/29/2017  4:45 PM CDT -----  Thuy NIELSEN Staff  Caller: Unspecified (Today, 11:12 AM)         Call with results of biop. Call michaela De Leon at [phone].

## 2017-10-03 ENCOUNTER — TELEPHONE (OUTPATIENT)
Dept: DERMATOLOGY | Facility: CLINIC | Age: 82
End: 2017-10-03

## 2017-10-03 NOTE — TELEPHONE ENCOUNTER
Spoke with pt's daughter gave bx results stated she will give us a call back to scheduled an appt has to check work schedule.      ----- Message from Rubén Montez MA sent at 10/3/2017  3:39 PM CDT -----  Obie NIELSEN Staff  Caller: Sandra Muniz (Today,  1:56 PM)         Pt's daughter returning missed staff call, please follow up at extension [phone] or [phone]

## 2017-10-04 ENCOUNTER — TELEPHONE (OUTPATIENT)
Dept: DERMATOLOGY | Facility: CLINIC | Age: 82
End: 2017-10-04

## 2017-10-17 DIAGNOSIS — I10 ESSENTIAL HYPERTENSION: ICD-10-CM

## 2017-10-17 RX ORDER — BENAZEPRIL HYDROCHLORIDE 20 MG/1
TABLET ORAL
Qty: 60 TABLET | Refills: 0 | Status: SHIPPED | OUTPATIENT
Start: 2017-10-17 | End: 2017-11-16 | Stop reason: SDUPTHER

## 2017-10-17 RX ORDER — AMLODIPINE BESYLATE 10 MG/1
TABLET ORAL
Qty: 30 TABLET | Refills: 0 | Status: SHIPPED | OUTPATIENT
Start: 2017-10-17 | End: 2017-11-16 | Stop reason: SDUPTHER

## 2017-10-30 ENCOUNTER — OFFICE VISIT (OUTPATIENT)
Dept: FAMILY MEDICINE | Facility: CLINIC | Age: 82
End: 2017-10-30
Payer: MEDICARE

## 2017-10-30 VITALS
WEIGHT: 130.75 LBS | DIASTOLIC BLOOD PRESSURE: 70 MMHG | HEIGHT: 61 IN | HEART RATE: 75 BPM | SYSTOLIC BLOOD PRESSURE: 158 MMHG | BODY MASS INDEX: 24.69 KG/M2 | OXYGEN SATURATION: 95 %

## 2017-10-30 DIAGNOSIS — J32.9 SINOBRONCHITIS: Primary | ICD-10-CM

## 2017-10-30 DIAGNOSIS — J40 SINOBRONCHITIS: Primary | ICD-10-CM

## 2017-10-30 PROCEDURE — 99999 PR PBB SHADOW E&M-EST. PATIENT-LVL III: CPT | Mod: PBBFAC,,, | Performed by: NURSE PRACTITIONER

## 2017-10-30 PROCEDURE — 99213 OFFICE O/P EST LOW 20 MIN: CPT | Mod: S$GLB,,, | Performed by: NURSE PRACTITIONER

## 2017-10-30 RX ORDER — AZITHROMYCIN 250 MG/1
TABLET, FILM COATED ORAL
Qty: 6 TABLET | Refills: 0 | Status: SHIPPED | OUTPATIENT
Start: 2017-10-30 | End: 2017-11-04

## 2017-10-30 RX ORDER — HYDROCODONE BITARTRATE AND HOMATROPINE METHYLBROMIDE ORAL SOLUTION 5; 1.5 MG/5ML; MG/5ML
5 LIQUID ORAL EVERY 6 HOURS PRN
Qty: 120 ML | Refills: 0 | Status: SHIPPED | OUTPATIENT
Start: 2017-10-30 | End: 2017-12-18

## 2017-11-02 ENCOUNTER — HOSPITAL ENCOUNTER (OUTPATIENT)
Dept: RADIOLOGY | Facility: CLINIC | Age: 82
Discharge: HOME | End: 2017-11-02
Attending: FAMILY MEDICINE
Payer: MEDICARE

## 2017-11-02 DIAGNOSIS — M85.80 OSTEOPENIA, UNSPECIFIED LOCATION: ICD-10-CM

## 2017-11-02 DIAGNOSIS — Z78.0 ASYMPTOMATIC MENOPAUSE: ICD-10-CM

## 2017-11-02 PROCEDURE — 77080 DXA BONE DENSITY AXIAL: CPT | Mod: TC

## 2017-11-02 PROCEDURE — 77080 DXA BONE DENSITY AXIAL: CPT | Mod: 26,,, | Performed by: INTERNAL MEDICINE

## 2017-11-10 ENCOUNTER — PROCEDURE VISIT (OUTPATIENT)
Dept: DERMATOLOGY | Facility: CLINIC | Age: 82
End: 2017-11-10
Payer: MEDICARE

## 2017-11-10 DIAGNOSIS — C44.311 BASAL CELL CARCINOMA OF NOSE: Primary | ICD-10-CM

## 2017-11-10 PROCEDURE — 99212 OFFICE O/P EST SF 10 MIN: CPT | Mod: S$GLB,,, | Performed by: DERMATOLOGY

## 2017-11-10 RX ORDER — FLUOROURACIL 5 MG/G
CREAM TOPICAL DAILY
Qty: 30 G | Refills: 1 | Status: SHIPPED | OUTPATIENT
Start: 2017-11-10

## 2017-11-10 NOTE — PROGRESS NOTES
Subjective:       Patient ID:  Suzan Villarreal is a 89 y.o. female who presents for   Chief Complaint   Patient presents with    Skin Cancer     HPI  89 yo F presents today with family member for discussion of results and mgmt options    Derm Hx: melanoma on arms? 25 years ago  Past Medical History:   Diagnosis Date    Allergy     Cholelithiasis without obstruction     Chronic insomnia     COPD (chronic obstructive pulmonary disease)     DJD (degenerative joint disease), lumbar     GERD (gastroesophageal reflux disease)     HLD (hyperlipidemia)     HTN (hypertension)     Hypothyroid     OP (osteoporosis)     Osteoarthritis     S/P hysterectomy      Review of Systems   Constitutional: Negative for fever and chills.   Skin: Negative for tendency to form keloidal scars.   Hematologic/Lymphatic: Bruises/bleeds easily.        Objective:    Physical Exam   Skin:   Areas Examined (abnormalities noted in diagram):   Head / Face Inspection Performed  Neck Inspection Performed           Photo prior to biopsy in Sept 2017         Diagram Legend      See annotation      Assessment / Plan:        Basal cell carcinoma of nose  Discussed mgmt options with patient and family member including observation, Mohs, topical chemotherapy  They would like to try Carac or Efudex   -     fluoruracil (CARAC) 0.5 % cream; Apply topically once daily. Nasal tip  Dispense: 30 g; Refill: 1  If Carac not covered, will try Efudex  They will send photo 2 weeks after starting topical treatment           Return for Pt will send msg via [facility] 2 weeks after starting treatment.

## 2017-11-16 DIAGNOSIS — E03.9 HYPOTHYROIDISM, UNSPECIFIED TYPE: ICD-10-CM

## 2017-11-16 DIAGNOSIS — I10 ESSENTIAL HYPERTENSION: ICD-10-CM

## 2017-11-16 RX ORDER — AMLODIPINE BESYLATE 10 MG/1
TABLET ORAL
Qty: 30 TABLET | Refills: 0 | Status: SHIPPED | OUTPATIENT
Start: 2017-11-16 | End: 2017-12-19 | Stop reason: SDUPTHER

## 2017-11-16 RX ORDER — BENAZEPRIL HYDROCHLORIDE 20 MG/1
TABLET ORAL
Qty: 60 TABLET | Refills: 0 | Status: SHIPPED | OUTPATIENT
Start: 2017-11-16 | End: 2017-12-19 | Stop reason: SDUPTHER

## 2017-11-17 ENCOUNTER — PATIENT MESSAGE (OUTPATIENT)
Dept: FAMILY MEDICINE | Facility: CLINIC | Age: 82
End: 2017-11-17

## 2017-11-17 RX ORDER — METOPROLOL SUCCINATE 25 MG/1
TABLET, EXTENDED RELEASE ORAL
Qty: 30 TABLET | Refills: 0 | Status: SHIPPED | OUTPATIENT
Start: 2017-11-17 | End: 2017-12-19 | Stop reason: SDUPTHER

## 2017-11-17 RX ORDER — LEVOTHYROXINE SODIUM 100 UG/1
TABLET ORAL
Qty: 30 TABLET | Refills: 0 | Status: SHIPPED | OUTPATIENT
Start: 2017-11-17 | End: 2017-12-19 | Stop reason: SDUPTHER

## 2017-11-27 ENCOUNTER — PATIENT MESSAGE (OUTPATIENT)
Dept: DERMATOLOGY | Facility: CLINIC | Age: 82
End: 2017-11-27

## 2017-11-29 ENCOUNTER — PATIENT MESSAGE (OUTPATIENT)
Dept: DERMATOLOGY | Facility: CLINIC | Age: 82
End: 2017-11-29

## 2017-12-12 ENCOUNTER — PATIENT MESSAGE (OUTPATIENT)
Dept: FAMILY MEDICINE | Facility: CLINIC | Age: 82
End: 2017-12-12

## 2017-12-12 ENCOUNTER — PATIENT MESSAGE (OUTPATIENT)
Dept: DERMATOLOGY | Facility: CLINIC | Age: 82
End: 2017-12-12

## 2017-12-13 ENCOUNTER — PATIENT MESSAGE (OUTPATIENT)
Dept: FAMILY MEDICINE | Facility: CLINIC | Age: 82
End: 2017-12-13

## 2017-12-15 ENCOUNTER — PATIENT MESSAGE (OUTPATIENT)
Dept: FAMILY MEDICINE | Facility: CLINIC | Age: 82
End: 2017-12-15

## 2017-12-18 ENCOUNTER — HOSPITAL ENCOUNTER (OUTPATIENT)
Dept: RADIOLOGY | Facility: HOSPITAL | Age: 82
Discharge: HOME | End: 2017-12-18
Attending: FAMILY MEDICINE
Payer: MEDICARE

## 2017-12-18 ENCOUNTER — OFFICE VISIT (OUTPATIENT)
Dept: FAMILY MEDICINE | Facility: CLINIC | Age: 82
End: 2017-12-18
Payer: MEDICARE

## 2017-12-18 VITALS
WEIGHT: 130.31 LBS | HEART RATE: 81 BPM | DIASTOLIC BLOOD PRESSURE: 70 MMHG | TEMPERATURE: 99 F | SYSTOLIC BLOOD PRESSURE: 140 MMHG | BODY MASS INDEX: 24.6 KG/M2 | OXYGEN SATURATION: 98 % | HEIGHT: 61 IN

## 2017-12-18 DIAGNOSIS — K21.9 GASTROESOPHAGEAL REFLUX DISEASE, ESOPHAGITIS PRESENCE NOT SPECIFIED: ICD-10-CM

## 2017-12-18 DIAGNOSIS — M79.671 RIGHT FOOT PAIN: Primary | ICD-10-CM

## 2017-12-18 DIAGNOSIS — K58.0 IRRITABLE BOWEL SYNDROME WITH DIARRHEA: ICD-10-CM

## 2017-12-18 DIAGNOSIS — M79.671 RIGHT FOOT PAIN: ICD-10-CM

## 2017-12-18 PROCEDURE — 73630 X-RAY EXAM OF FOOT: CPT | Mod: TC,RT

## 2017-12-18 PROCEDURE — 73610 X-RAY EXAM OF ANKLE: CPT | Mod: TC,RT

## 2017-12-18 PROCEDURE — 99999 PR PBB SHADOW E&M-EST. PATIENT-LVL IV: CPT | Mod: PBBFAC,,, | Performed by: FAMILY MEDICINE

## 2017-12-18 PROCEDURE — 99214 OFFICE O/P EST MOD 30 MIN: CPT | Mod: S$GLB,,, | Performed by: FAMILY MEDICINE

## 2017-12-18 PROCEDURE — 73610 X-RAY EXAM OF ANKLE: CPT | Mod: 26,RT,, | Performed by: RADIOLOGY

## 2017-12-18 PROCEDURE — 99499 UNLISTED E&M SERVICE: CPT | Mod: S$GLB,,, | Performed by: FAMILY MEDICINE

## 2017-12-18 PROCEDURE — 73630 X-RAY EXAM OF FOOT: CPT | Mod: 26,RT,, | Performed by: RADIOLOGY

## 2017-12-18 RX ORDER — ESOMEPRAZOLE MAGNESIUM 40 MG/1
40 CAPSULE, DELAYED RELEASE ORAL
Qty: 90 CAPSULE | Refills: 1 | Status: SHIPPED | OUTPATIENT
Start: 2017-12-18 | End: 2018-12-18

## 2017-12-18 NOTE — PROGRESS NOTES
Subjective:       Patient ID: Suzan Villarreal is a 89 y.o. female.    Chief Complaint: Abdominal Pain (started on 12/15/17) and Foot Pain (Right foot)    Suzan is a 89 y.o. female who presents today with abdominal pain and falls. She was sent to [facility] by her PCP in September but apparently refused at that time. Her abdominal pain was apparently epigastric and started last week. Her daughter gave her nexium over the weekend and that appeared to help her symptoms over the weekend. She reports that her symptoms have since resolved. She normally has a BM every morning. She reports a bristol stool scale type 4 stool. She has diarrhea that seems to be associated with her food. She also reports occasionally that she has some abdominal pain followed by diarrhea.     She fell one week ago and hurt her right foot. She denies any LOC. No loss of bowel or bladder. She tripped while trying to move two of her walkers at the same time.       Abdominal Pain   This is a new problem. The current episode started in the past 7 days. The onset quality is undetermined. The problem occurs daily. The problem has been gradually improving. The pain is located in the epigastric region. The abdominal pain does not radiate. Associated symptoms include nausea. Pertinent negatives include no constipation, diarrhea, fever, frequency, hematochezia, melena or vomiting. Exacerbated by: uncertain. She has tried proton pump inhibitors for the symptoms. The treatment provided moderate relief.   Fall   The accident occurred 5 to 7 days ago. The fall occurred while walking. She fell from a height of 3 to 5 ft. She landed on concrete. The point of impact was the buttocks. The pain is present in the right foot. Associated symptoms include abdominal pain and nausea. Pertinent negatives include no fever, loss of consciousness, numbness, tingling, visual change or vomiting. She has tried nothing for the symptoms.     Review of Systems   Constitutional: Negative for  fever.   Gastrointestinal: Positive for abdominal pain and nausea. Negative for constipation, diarrhea, hematochezia, melena and vomiting.   Genitourinary: Negative for frequency.   Neurological: Negative for tingling, loss of consciousness and numbness.       Objective:     Vitals:    12/18/17 1606   BP: (!) 140/70   Pulse: 81   Temp: 98.6 °F (37 °C)        Physical Exam   Constitutional: She appears well-developed.   HENT:   Head: Normocephalic and atraumatic.   Cardiovascular: Normal rate and regular rhythm.    Pulmonary/Chest: Effort normal and breath sounds normal.   Abdominal: Soft. Bowel sounds are normal. She exhibits no distension. There is no tenderness. There is no rebound and no guarding.   Musculoskeletal: She exhibits edema and tenderness.        Feet:    Right foot with TTP and swelling noted  ROM appears to be intact.  Capillary refill is <2  Pulses intact   Neurological: She is alert.   Skin: Skin is warm. No erythema.   Psychiatric: She has a normal mood and affect. Her behavior is normal.   Nursing note and vitals reviewed.      Assessment:       1. Right foot pain    2. Gastroesophageal reflux disease, esophagitis presence not specified    3. Irritable bowel syndrome with diarrhea        Plan:       Patient appears to have a history of falls, previous referral by PCP was reviewed, patient apparently initially refused but daughter has now scheduled an appointment with PMR for unstable gait/falls. This appointment is in January. XR foot ordered. Nexium refilled. Probiotic given.     Right foot pain  XR foot and ankle  R.I.C.E  -     X-Ray Foot Complete Right; Future; Expected date: 12/18/2017  -     X-Ray Ankle Complete 3 View Right; Future; Expected date: 12/18/2017    Gastroesophageal reflux disease, esophagitis presence not specified  Abdominal pain likely related to reflux  Food diary and BM diary counseled for patient  Handout given  Daughter is very worried about an ulcer, will check h.  Pylori  Refill nexium today  Long-term risks of PPI therapy briefly reviewed with the patient, including increased risk of pneumonia, osteoporosis, low Vitamin B12, low Vitamin D, low magnesium, and C-diff infection. GERD precautions reviewed and educational material given.   -     H. PYLORI ANTIBODY, IGG; Future; Expected date: 12/18/2017  -     esomeprazole (NEXIUM) 40 MG capsule; Take 1 capsule (40 mg total) by mouth before breakfast.  Dispense: 90 capsule; Refill: 1    Irritable bowel syndrome with diarrhea  Appears to have bouts of abdominal pain with diarrhea that then resolves the abdominal pain. Given age, will try daily probiotic initially and then PCP can reassess response to treatment and desire to try other medications.   -     Bifidobacterium animalis 6 mg (5 billion cell) Cap; Take 1 capsule by mouth once daily.  Dispense: 30 capsule; Refill: 11      25 minutes spent with patient, of which >50% was spent on counseling and coordination of care.     Warning signs discussed, patient to call with any further issues or worsening of symptoms.

## 2017-12-18 NOTE — PATIENT INSTRUCTIONS
Keep a Food and BM diary  Try OTC probiotic  Follow up as needed with Dr. Sams  Keep appointment with PMR physician    Tips to Control Acid Reflux    To control acid reflux, youll need to make some basic diet and lifestyle changes. The simple steps outlined below may be all youll need to ease discomfort.  Watch what you eat  · Avoid fatty foods and spicy foods.  · Eat fewer acidic foods, such as citrus and tomato-based foods. These can increase symptoms.  · Limit drinking alcohol, caffeine, and fizzy beverages. All increase acid reflux.  · Try limiting chocolate, peppermint, and spearmint. These can worsen acid reflux in some people.  Watch when you eat  · Avoid lying down for 3 hours after eating.  · Do not snack before going to bed.  Raise your head  Raising your head and upper body by 4 to 6 inches helps limit reflux when youre lying down. Put blocks under the head of your bed frame to raise it.  Other changes  · Lose weight, if you need to  · Dont exercise near bedtime  · Avoid tight-fitting clothes  · Limit aspirin and ibuprofen  · Stop smoking   Date Last Reviewed: 7/1/2016  © 2379-6495 [employer]. [address] [place] [address]. All rights reserved. This information is not intended as a substitute for professional medical care. Always follow your healthcare professional's instructions.        R.I.C.E.    R.I.C.E. stands for Rest, Ice, Compression, and Elevation. Doing these things helps limit pain and swelling after an injury. R.I.C.E. also helps injuries heal faster. Use R.I.C.E. for sprains, strains, and severe bruises or bumps. Follow the tips on this handout and begin R.I.C.E. as soon as possible after an injury.  ? Rest  Pain is your bodys way of telling you to rest an injured area. Whether you have hurt an elbow, hand, foot, or knee, limiting its use will prevent further injury and help you heal.  ? Ice  Applying ice right after an injury helps prevent swelling and  reduce pain. Dont place ice directly on your skin.  · Wrap a cold pack or bag of ice in a thin cloth. Place it over the injured area.  · Ice for 10 minutes every 3 hours. Dont ice for more than 20 minutes at a time.  ? Compression  Putting pressure (compression) on an injury helps prevent swelling and provides support.  · Wrap the injured area firmly with an elastic bandage. If your hand or foot tingles, becomes discolored, or feels cold to the touch, the bandage may be too tight. Rewrap it more loosely.  · If your bandage becomes too loose, rewrap it.  · Do not wear an elastic bandage overnight.  ? Elevation  Keeping an injury elevated helps reduce swelling, pain, and throbbing. Elevation is most effective when the injury is kept elevated higher than the heart.     Call your healthcare provider if you notice any of the following:  · Fingers or toes feel numb, are cold to the touch, or change color  · Skin looks shiny or tight  · Pain, swelling, or bruising worsens and is not improved with elevation   Date Last Reviewed: 9/3/2015  © 3827-7623 [employer]. [address] [place] [address]. All rights reserved. This information is not intended as a substitute for professional medical care. Always follow your healthcare professional's instructions.      Long-term risks of PPI therapy have been reviewed with the patient, including increased risk of pneumonia, osteoporosis, low Vitamin B12, low Vitamin D, low magnesium, and C-diff infection. GERD precautions reviewed and educational material given

## 2017-12-19 DIAGNOSIS — E03.9 HYPOTHYROIDISM, UNSPECIFIED TYPE: ICD-10-CM

## 2017-12-19 DIAGNOSIS — I10 ESSENTIAL HYPERTENSION: ICD-10-CM

## 2017-12-19 RX ORDER — LEVOTHYROXINE SODIUM 100 UG/1
TABLET ORAL
Qty: 30 TABLET | Refills: 0 | Status: SHIPPED | OUTPATIENT
Start: 2017-12-19 | End: 2018-01-22 | Stop reason: SDUPTHER

## 2017-12-19 RX ORDER — AMLODIPINE BESYLATE 10 MG/1
TABLET ORAL
Qty: 30 TABLET | Refills: 0 | Status: SHIPPED | OUTPATIENT
Start: 2017-12-19 | End: 2018-01-22 | Stop reason: SDUPTHER

## 2017-12-19 RX ORDER — BENAZEPRIL HYDROCHLORIDE 20 MG/1
TABLET ORAL
Qty: 60 TABLET | Refills: 0 | Status: SHIPPED | OUTPATIENT
Start: 2017-12-19 | End: 2018-01-22 | Stop reason: SDUPTHER

## 2017-12-19 RX ORDER — METOPROLOL SUCCINATE 25 MG/1
TABLET, EXTENDED RELEASE ORAL
Qty: 30 TABLET | Refills: 0 | Status: SHIPPED | OUTPATIENT
Start: 2017-12-19 | End: 2018-01-22 | Stop reason: SDUPTHER

## 2018-01-03 ENCOUNTER — PATIENT MESSAGE (OUTPATIENT)
Dept: DERMATOLOGY | Facility: CLINIC | Age: 83
End: 2018-01-03

## 2018-01-03 ENCOUNTER — PATIENT MESSAGE (OUTPATIENT)
Dept: FAMILY MEDICINE | Facility: CLINIC | Age: 83
End: 2018-01-03

## 2018-01-22 DIAGNOSIS — E03.9 HYPOTHYROIDISM, UNSPECIFIED TYPE: ICD-10-CM

## 2018-01-22 DIAGNOSIS — I10 ESSENTIAL HYPERTENSION: ICD-10-CM

## 2018-01-22 RX ORDER — METOPROLOL SUCCINATE 25 MG/1
TABLET, EXTENDED RELEASE ORAL
Qty: 30 TABLET | Refills: 0 | Status: SHIPPED | OUTPATIENT
Start: 2018-01-22 | End: 2018-02-22 | Stop reason: SDUPTHER

## 2018-01-22 RX ORDER — LEVOTHYROXINE SODIUM 100 UG/1
TABLET ORAL
Qty: 30 TABLET | Refills: 0 | Status: SHIPPED | OUTPATIENT
Start: 2018-01-22 | End: 2018-02-22 | Stop reason: SDUPTHER

## 2018-01-22 RX ORDER — BENAZEPRIL HYDROCHLORIDE 20 MG/1
TABLET ORAL
Qty: 60 TABLET | Refills: 0 | Status: SHIPPED | OUTPATIENT
Start: 2018-01-22 | End: 2018-02-22 | Stop reason: SDUPTHER

## 2018-01-22 RX ORDER — AMLODIPINE BESYLATE 10 MG/1
TABLET ORAL
Qty: 30 TABLET | Refills: 0 | Status: SHIPPED | OUTPATIENT
Start: 2018-01-22 | End: 2018-02-22 | Stop reason: SDUPTHER

## 2018-02-16 ENCOUNTER — INITIAL CONSULT (OUTPATIENT)
Dept: PHYSICAL MEDICINE AND REHAB | Facility: CLINIC | Age: 83
End: 2018-02-16
Payer: MEDICARE

## 2018-02-16 VITALS
DIASTOLIC BLOOD PRESSURE: 72 MMHG | HEIGHT: 61 IN | HEART RATE: 81 BPM | SYSTOLIC BLOOD PRESSURE: 135 MMHG | WEIGHT: 130 LBS | BODY MASS INDEX: 24.55 KG/M2

## 2018-02-16 DIAGNOSIS — M12.811 ROTATOR CUFF TEAR ARTHROPATHY OF RIGHT SHOULDER: ICD-10-CM

## 2018-02-16 DIAGNOSIS — Z96.651 STATUS POST TOTAL RIGHT KNEE REPLACEMENT: ICD-10-CM

## 2018-02-16 DIAGNOSIS — I70.211 ATHEROSCLER OF NATIVE ARTERY OF RIGHT LEG WITH INTERMIT CLAUDICATION: ICD-10-CM

## 2018-02-16 DIAGNOSIS — R26.89 BALANCE PROBLEM: ICD-10-CM

## 2018-02-16 DIAGNOSIS — M75.101 ROTATOR CUFF TEAR ARTHROPATHY OF RIGHT SHOULDER: ICD-10-CM

## 2018-02-16 DIAGNOSIS — I73.9 PVD (PERIPHERAL VASCULAR DISEASE): ICD-10-CM

## 2018-02-16 DIAGNOSIS — R26.81 GAIT INSTABILITY: Primary | ICD-10-CM

## 2018-02-16 DIAGNOSIS — G62.9 NEUROPATHY: ICD-10-CM
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
## 2018-02-16 DIAGNOSIS — J44.9 CHRONIC OBSTRUCTIVE PULMONARY DISEASE, UNSPECIFIED COPD TYPE: Chronic | ICD-10-CM

## 2018-02-16 DIAGNOSIS — M47.26 OSTEOARTHRITIS OF SPINE WITH RADICULOPATHY, LUMBAR REGION: ICD-10-CM

## 2018-02-16 DIAGNOSIS — M19.011 PRIMARY OSTEOARTHRITIS OF RIGHT SHOULDER: ICD-10-CM

## 2018-02-16 DIAGNOSIS — R06.02 SOBOE (SHORTNESS OF BREATH ON EXERTION): ICD-10-CM

## 2018-02-16 DIAGNOSIS — Z87.81 HISTORY OF FRACTURE OF HIP: ICD-10-CM

## 2018-02-16 DIAGNOSIS — R20.0 RIGHT LEG NUMBNESS: ICD-10-CM
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
## 2018-02-16 PROCEDURE — 99204 OFFICE O/P NEW MOD 45 MIN: CPT | Mod: S$GLB,,, | Performed by: PHYSICAL MEDICINE & REHABILITATION

## 2018-02-16 PROCEDURE — 3008F BODY MASS INDEX DOCD: CPT | Mod: S$GLB,,, | Performed by: PHYSICAL MEDICINE & REHABILITATION

## 2018-02-16 PROCEDURE — 1159F MED LIST DOCD IN RCRD: CPT | Mod: S$GLB,,, | Performed by: PHYSICAL MEDICINE & REHABILITATION

## 2018-02-16 PROCEDURE — 1126F AMNT PAIN NOTED NONE PRSNT: CPT | Mod: S$GLB,,, | Performed by: PHYSICAL MEDICINE & REHABILITATION

## 2018-02-16 PROCEDURE — 99499 UNLISTED E&M SERVICE: CPT | Mod: S$GLB,,, | Performed by: PHYSICAL MEDICINE & REHABILITATION

## 2018-02-16 PROCEDURE — 99999 PR PBB SHADOW E&M-EST. PATIENT-LVL III: CPT | Mod: PBBFAC,,, | Performed by: PHYSICAL MEDICINE & REHABILITATION

## 2018-02-16 NOTE — PROGRESS NOTES
"Subjective:       Patient ID: Suzan Villarreal is a 89 y.o. female.    Chief Complaint: mobility evaluation    HPI   Mrs. Villarreal, is 89 y/o  Rt female who is coming first time for face to face clinic visit for mobility evaluation.  She is requesting a Power wheel chair.   Refer by PCP, Dr.Cortez Renae.   She is coming with her daughter who is her primary caregiver and is giving most of the history.   Patient has a significant gait instability, walks with RT ( aluminum one),   falls all the time, at least once a week, or 3-4x/month. She states that once starts walking backwards, she walks/crawling " as shrimp", she will fall down.   She has a balance problem, and leg weakness therefore she falls all the time.  No recent injury, but she has h/o Rt Hip fracture after a fall.   She has severe DJD of lumbar spine  With radiculopathy, and Rt leg numbness and weakness.   She has PVD that contributes to vascular claudications in RT leg, causing pain, numbness, and weakness.  She is using RW for walking, but her legs get weak, and she falls down.   She also has Rt shoulder pain, secondary to RTC and DJD.   In addition she has COPD, using inhalers but dose not uses oxygen, gets easily SOB/MOORE, therefore needs to stop walking, sit down.  She is able to walk  ft before she needs to sit down, sec. To SOB/MOORE.  She can stand >15 minutes, secondary to leg weakness.  She cannot write any longer, has poor ,secondary to severe hands arthritis.  Cannot propel manual WC, nor she can reach, handle scooter.  Therefore she would like to get power wheel chair.  She is here for face to face clinic visit for mobility evaluation.    Past Medical History:   Diagnosis Date    Allergy     Cholelithiasis without obstruction     Chronic insomnia     COPD (chronic obstructive pulmonary disease)     DJD (degenerative joint disease), lumbar     GERD (gastroesophageal reflux disease)     HLD (hyperlipidemia)     HTN (hypertension)     " Hypothyroid     OP (osteoporosis)     Osteoarthritis     S/P hysterectomy        Past Surgical History:   Procedure Laterality Date    CHOLECYSTECTOMY      HIP SURGERY  8-2013    right    HYSTERECTOMY      KNEE ARTHROSCOPY Right 7-6-15    TK       Family History   Problem Relation Age of Onset    Melanoma Neg Hx        Social History     Social History    Marital status:      Spouse name: N/A    Number of children: N/A    Years of education: N/A     Social History Main Topics    Smoking status: Never Smoker    Smokeless tobacco: Never Used    Alcohol use No    Drug use: No    Sexual activity: Not Currently     Other Topics Concern    None     Social History Narrative    None       Current Outpatient Prescriptions   Medication Sig Dispense Refill    acetaminophen (TYLENOL) 325 MG tablet Take 2 tablets (650 mg total) by mouth every 6 (six) hours as needed.      amLODIPine (NORVASC) 10 MG tablet TAKE 1 TABLET BY MOUTH EVERY DAY 30 tablet 0    ANTIOX #11/OM3/DHA/EPA/LUT/BARBER (OCUVITE ADULT 50+ ORAL) Take 1 tablet by mouth once daily.      benazepril (LOTENSIN) 20 MG tablet TAKE 1 TABLET BY MOUTH TWICE DAILY 60 tablet 0    Bifidobacterium animalis 6 mg (5 billion cell) Cap Take 1 capsule by mouth once daily. 30 capsule 11    calcium carbonate (OS-UZMA) 600 mg (1,500 mg) Tab Take 600 mg by mouth 2 (two) times daily with meals.      esomeprazole (NEXIUM) 40 MG capsule Take 1 capsule (40 mg total) by mouth before breakfast. 90 capsule 1    fluoruracil (CARAC) 0.5 % cream Apply topically once daily. Nasal tip 30 g 1    levothyroxine (SYNTHROID) 100 MCG tablet TAKE 1 TABLET BY MOUTH EVERY MORNING BEFORE BREAKFAST 30 tablet 0    metoprolol succinate (TOPROL-XL) 25 MG 24 hr tablet TAKE 1 TABLET BY MOUTH EVERY DAY 30 tablet 0    multivitamin capsule Take 1 capsule by mouth once daily.      salmeterol (SEREVENT DISKUS) 50 mcg/dose diskus inhaler Inhale 1 puff into the lungs 2 (two) times daily.  Controller 60 each 8     No current facility-administered medications for this visit.        Review of patient's allergies indicates:   Allergen Reactions    Sulfa (sulfonamide antibiotics) Swelling    Asa [aspirin] Itching         Review of Systems   Constitutional: Negative for appetite change, chills, fatigue, fever and unexpected weight change.   HENT: Negative for drooling, trouble swallowing and voice change.    Eyes: Negative for pain and visual disturbance.   Respiratory: Positive for shortness of breath. Negative for wheezing.    Cardiovascular: Negative for chest pain and palpitations.   Gastrointestinal: Negative for abdominal distention, abdominal pain, constipation and diarrhea.   Genitourinary: Negative for difficulty urinating.   Musculoskeletal: Positive for gait problem. Negative for arthralgias, back pain, joint swelling, myalgias and neck stiffness.               Skin: Negative for color change and rash.   Neurological: Positive for weakness and numbness (rt foot numbness ). Negative for dizziness, facial asymmetry, speech difficulty and light-headedness. Headaches:     Hematological: Negative for adenopathy.   Psychiatric/Behavioral: Negative for behavioral problems, confusion and sleep disturbance. The patient is not nervous/anxious.          Objective:      Physical Exam    GENERAL: The patient is alert, oriented, pleasant.  HEENT: PERRLA, hard of hearing.  NECK: supple, no masses,    CV: S1S2, RRR, no murmurs, rales.  LUNGS: CTA bilateral.   ABDOMEN: soft, non-tender, non distended, bowel sounds nl.  EXTREMITIES: no edema, +2 pulses DP, b/l  SKIN: no skin rash, no skin breakdowns.  MUSCULOSKELETAL:   Gait : NT, in manual WC, no RW.  Cervical spine: mildly decereased AROM in cervical spine,   Thoracic spine, + kyphosis ( sitting on WC).  Lumbar spine : NT 9 n manual WC).  Decreased active range of motion in all joints x4 extremities.   Cannot raise RT shoulder sec. To RTC tear,   Muscle  "strength 4/5 throughout x4 extremities, except in b/l  that is 3+/5.   All her bones "cruckle" with  MMT.  B.l hands with DJD deformities with Bouchards nodes.  No obvious  joint laxity throughout x4 extremities ( but was done carefully).  NEUROLOGIC: Cranial nerves II through XII intact.   Deep tendon reflexes is normal, +2 in the upper and decreased in lower extremities bilaterally.   Muscle tone is normal. + Muscle atrophy t/o in all 4 extr, including hands.  Sensory is intact to light touch and pinprick throughout x4 extremities.       Assessment:           1. Right leg numbness    2. Fall, sequela    3. Balance problem    4. Gait instability    5. History of fracture of hip    6. Status post total right knee replacement 7/6/2015    7. Rotator cuff tear arthropathy of right shoulder    8. Primary osteoarthritis of right shoulder    9. Osteoarthritis of spine with radiculopathy, lumbar region    10. Chronic obstructive pulmonary disease, unspecified COPD type    11. Neuropathy    12. SOBOE (shortness of breath on exertion)      Plan:       Gait instability  -     HME - OTHER    Fall, sequela  -     HME - OTHER    Balance problem  -     HME - OTHER    Right leg numbness  -     HME - OTHER    History of fracture of hip  -     HME - OTHER    Status post total right knee replacement 7/6/2015  -     HME - OTHER    Rotator cuff tear arthropathy of right shoulder  -     HME - OTHER    Primary osteoarthritis of right shoulder  -     HME - OTHER    Osteoarthritis of spine with radiculopathy, lumbar region  -     HME - OTHER    Chronic obstructive pulmonary disease, unspecified COPD type  -     HME - OTHER    Neuropathy  -     HME - OTHER    SOBOE (shortness of breath on exertion)  -     HME - OTHER    PVD (peripheral vascular disease)  -     HME - OTHER    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  - The patient has multifactorial gait impairment/ instability, due to advanced age, balance problem, severe DJD of Lumbar spine, knees, b/l hands, complicated with peripheral neuropathy, SOB/MOORE secondary to COPD.    - The patient is not able to ambulate safely to the kitchen or living room.  - The patient is unable to use a walker functional distances due to balance problem, b/l lower and upper extremity weakness ( secondary to severe DJD hands, cannot write, cannot dress and button shirts, has a poor ),  And SOB/MOORE, secondary to COPD.  - The patient is unable to use a manual wheelchair functional distances due to BUE weakness from RT RTC/ shoulder pain, and weakness, severe DJD hands, cannot write, cannot dress and button shirts, has a poor ), complicated by SOB/MOORE, secondary to COPD,and generalized weakness.    - The patient's cognition is intact & she should be able to use a power mobility device well at home.  - A scooter would not be appropriate due the patient's trouble clearing the ledge, UE weakness, and to maneuverability restrictions at home.   - The patient was given a prescription for a power wheelchair.  - This will allow the patient to go safely to the kitchen, dining room or living room for feeding & socialization.  - The patient is to return the Physical medicine/mobility clinic prn.     Total time spent face to face with patient was 45 minutes.   More than 50% of that time was spent in counseling on diagnosis , prognosis and treatment options.   I reviewed Primary care , and other specialty's notes to better coordinate patient's  care.   All questions were answered, and patient voiced understanding.

## 2018-02-19 ENCOUNTER — PES CALL (OUTPATIENT)
Dept: ADMINISTRATIVE | Facility: CLINIC | Age: 83
End: 2018-02-19

## 2018-02-22 DIAGNOSIS — I10 ESSENTIAL HYPERTENSION: ICD-10-CM

## 2018-02-22 DIAGNOSIS — E03.9 HYPOTHYROIDISM, UNSPECIFIED TYPE: ICD-10-CM

## 2018-02-22 RX ORDER — LEVOTHYROXINE SODIUM 100 UG/1
TABLET ORAL
Qty: 30 TABLET | Refills: 11 | Status: SHIPPED | OUTPATIENT
Start: 2018-02-22 | End: 2018-03-22 | Stop reason: SDUPTHER

## 2018-02-22 RX ORDER — METOPROLOL SUCCINATE 25 MG/1
TABLET, EXTENDED RELEASE ORAL
Qty: 30 TABLET | Refills: 11 | Status: SHIPPED | OUTPATIENT
Start: 2018-02-22 | End: 2018-03-22 | Stop reason: SDUPTHER

## 2018-02-22 RX ORDER — AMLODIPINE BESYLATE 10 MG/1
TABLET ORAL
Qty: 30 TABLET | Refills: 11 | Status: SHIPPED | OUTPATIENT
Start: 2018-02-22 | End: 2018-03-22 | Stop reason: SDUPTHER

## 2018-02-22 RX ORDER — BENAZEPRIL HYDROCHLORIDE 20 MG/1
TABLET ORAL
Qty: 60 TABLET | Refills: 11 | Status: SHIPPED | OUTPATIENT
Start: 2018-02-22 | End: 2018-03-22 | Stop reason: SDUPTHER

## 2018-03-14 ENCOUNTER — PATIENT MESSAGE (OUTPATIENT)
Dept: PHYSICAL MEDICINE AND REHAB | Facility: CLINIC | Age: 83
End: 2018-03-14

## 2018-03-22 ENCOUNTER — PATIENT MESSAGE (OUTPATIENT)
Dept: FAMILY MEDICINE | Facility: CLINIC | Age: 83
End: 2018-03-22

## 2018-03-22 DIAGNOSIS — E03.9 HYPOTHYROIDISM, UNSPECIFIED TYPE: ICD-10-CM

## 2018-03-22 DIAGNOSIS — I10 ESSENTIAL HYPERTENSION: ICD-10-CM

## 2018-03-23 RX ORDER — AMLODIPINE BESYLATE 10 MG/1
10 TABLET ORAL DAILY
Qty: 30 TABLET | Refills: 11 | Status: SHIPPED | OUTPATIENT
Start: 2018-03-23

## 2018-03-23 RX ORDER — BENAZEPRIL HYDROCHLORIDE 20 MG/1
20 TABLET ORAL 2 TIMES DAILY
Qty: 60 TABLET | Refills: 11 | Status: SHIPPED | OUTPATIENT
Start: 2018-03-23

## 2018-03-23 RX ORDER — METOPROLOL SUCCINATE 25 MG/1
25 TABLET, EXTENDED RELEASE ORAL DAILY
Qty: 30 TABLET | Refills: 11 | Status: SHIPPED | OUTPATIENT
Start: 2018-03-23

## 2018-03-23 RX ORDER — LEVOTHYROXINE SODIUM 100 UG/1
100 TABLET ORAL EVERY MORNING
Qty: 30 TABLET | Refills: 11 | Status: SHIPPED | OUTPATIENT
Start: 2018-03-23

## 2018-04-27 ENCOUNTER — PATIENT MESSAGE (OUTPATIENT)
Dept: FAMILY MEDICINE | Facility: CLINIC | Age: 83
End: 2018-04-27

## 2018-04-28 ENCOUNTER — HOSPITAL ENCOUNTER (INPATIENT)
Facility: HOSPITAL | Age: 83
LOS: 3 days | Discharge: HOME | DRG: 389 | End: 2018-05-01
Attending: EMERGENCY MEDICINE | Admitting: HOSPITALIST
Payer: MEDICARE

## 2018-04-28 DIAGNOSIS — R10.9 ABDOMINAL PAIN: ICD-10-CM

## 2018-04-28 DIAGNOSIS — E87.1 HYPONATREMIA: ICD-10-CM

## 2018-04-28 DIAGNOSIS — K56.7 ILEUS: Primary | ICD-10-CM

## 2018-04-28 DIAGNOSIS — R10.10 UPPER ABDOMINAL PAIN: ICD-10-CM

## 2018-04-28 DIAGNOSIS — E03.9 HYPOTHYROIDISM, UNSPECIFIED TYPE: Chronic | ICD-10-CM

## 2018-04-28 DIAGNOSIS — R10.13 EPIGASTRIC ABDOMINAL PAIN: ICD-10-CM

## 2018-04-28 DIAGNOSIS — I10 ESSENTIAL HYPERTENSION: Chronic | ICD-10-CM

## 2018-04-28 LAB
ALBUMIN SERPL BCP-MCNC: 3.2 G/DL
ALP SERPL-CCNC: 54 U/L
ALT SERPL W/O P-5'-P-CCNC: 10 U/L
ANION GAP SERPL CALC-SCNC: 10 MMOL/L
AST SERPL-CCNC: 20 U/L
BASOPHILS # BLD AUTO: 0.02 K/UL
BASOPHILS NFR BLD: 0.3 %
BILIRUB SERPL-MCNC: 0.5 MG/DL
BUN SERPL-MCNC: 14 MG/DL
CALCIUM SERPL-MCNC: 8.6 MG/DL
CHLORIDE SERPL-SCNC: 95 MMOL/L
CO2 SERPL-SCNC: 22 MMOL/L
CREAT SERPL-MCNC: 0.8 MG/DL
DIFFERENTIAL METHOD: ABNORMAL
EOSINOPHIL # BLD AUTO: 0 K/UL
EOSINOPHIL NFR BLD: 0.5 %
ERYTHROCYTE [DISTWIDTH] IN BLOOD BY AUTOMATED COUNT: 14 %
EST. GFR  (AFRICAN AMERICAN): >60 ML/MIN/1.73 M^2
EST. GFR  (NON AFRICAN AMERICAN): >60 ML/MIN/1.73 M^2
GLUCOSE SERPL-MCNC: 103 MG/DL
HCT VFR BLD AUTO: 38.3 %
HGB BLD-MCNC: 13.4 G/DL
IMM GRANULOCYTES # BLD AUTO: 0.03 K/UL
IMM GRANULOCYTES NFR BLD AUTO: 0.5 %
INR PPP: 1
LIPASE SERPL-CCNC: 31 U/L
LYMPHOCYTES # BLD AUTO: 1.4 K/UL
LYMPHOCYTES NFR BLD: 23.1 %
MAGNESIUM SERPL-MCNC: 1.9 MG/DL
MCH RBC QN AUTO: 30.5 PG
MCHC RBC AUTO-ENTMCNC: 35 G/DL
MCV RBC AUTO: 87 FL
MONOCYTES # BLD AUTO: 1 K/UL
MONOCYTES NFR BLD: 16.7 %
NEUTROPHILS # BLD AUTO: 3.6 K/UL
NEUTROPHILS NFR BLD: 58.9 %
NRBC BLD-RTO: 0 /100 WBC
PLATELET # BLD AUTO: 326 K/UL
PMV BLD AUTO: 9.1 FL
POTASSIUM SERPL-SCNC: 4.2 MMOL/L
PROT SERPL-MCNC: 5.9 G/DL
PROTHROMBIN TIME: 10.9 SEC
RBC # BLD AUTO: 4.39 M/UL
SODIUM SERPL-SCNC: 127 MMOL/L
TROPONIN I SERPL DL<=0.01 NG/ML-MCNC: 0.01 NG/ML
WBC # BLD AUTO: 6.11 K/UL

## 2018-04-28 PROCEDURE — 96361 HYDRATE IV INFUSION ADD-ON: CPT

## 2018-04-28 PROCEDURE — 96374 THER/PROPH/DIAG INJ IV PUSH: CPT

## 2018-04-28 PROCEDURE — 80053 COMPREHEN METABOLIC PANEL: CPT

## 2018-04-28 PROCEDURE — 99285 EMERGENCY DEPT VISIT HI MDM: CPT | Mod: 25

## 2018-04-28 PROCEDURE — 93005 ELECTROCARDIOGRAM TRACING: CPT

## 2018-04-28 PROCEDURE — S0028 INJECTION, FAMOTIDINE, 20 MG: HCPCS | Performed by: EMERGENCY MEDICINE

## 2018-04-28 PROCEDURE — 83690 ASSAY OF LIPASE: CPT

## 2018-04-28 PROCEDURE — 85025 COMPLETE CBC W/AUTO DIFF WBC: CPT

## 2018-04-28 PROCEDURE — 83735 ASSAY OF MAGNESIUM: CPT

## 2018-04-28 PROCEDURE — 99285 EMERGENCY DEPT VISIT HI MDM: CPT | Mod: ,,, | Performed by: EMERGENCY MEDICINE

## 2018-04-28 PROCEDURE — 25000003 PHARM REV CODE 250: Performed by: EMERGENCY MEDICINE

## 2018-04-28 PROCEDURE — 85610 PROTHROMBIN TIME: CPT

## 2018-04-28 PROCEDURE — 84484 ASSAY OF TROPONIN QUANT: CPT

## 2018-04-28 PROCEDURE — 12000002 HC ACUTE/MED SURGE SEMI-PRIVATE ROOM

## 2018-04-28 PROCEDURE — 93010 ELECTROCARDIOGRAM REPORT: CPT | Mod: ,,, | Performed by: INTERNAL MEDICINE

## 2018-04-28 RX ORDER — FAMOTIDINE 10 MG/ML
20 INJECTION INTRAVENOUS
Status: COMPLETED | OUTPATIENT
Start: 2018-04-28 | End: 2018-04-28

## 2018-04-28 RX ADMIN — SODIUM CHLORIDE 1000 ML: 0.9 INJECTION, SOLUTION INTRAVENOUS at 11:04

## 2018-04-28 RX ADMIN — FAMOTIDINE 20 MG: 10 INJECTION INTRAVENOUS at 11:04

## 2018-04-29 PROBLEM — K56.7 ILEUS: Status: ACTIVE | Noted: 2018-04-29

## 2018-04-29 LAB
BACTERIA #/AREA URNS AUTO: NORMAL /HPF
BILIRUB UR QL STRIP: NEGATIVE
CLARITY UR REFRACT.AUTO: CLEAR
COLOR UR AUTO: YELLOW
GLUCOSE UR QL STRIP: NEGATIVE
HGB UR QL STRIP: ABNORMAL
KETONES UR QL STRIP: NEGATIVE
LEUKOCYTE ESTERASE UR QL STRIP: ABNORMAL
MICROSCOPIC COMMENT: NORMAL
NITRITE UR QL STRIP: NEGATIVE
PH UR STRIP: 5 [PH] (ref 5–8)
PROT UR QL STRIP: NEGATIVE
RBC #/AREA URNS AUTO: 0 /HPF (ref 0–4)
SP GR UR STRIP: 1.01 (ref 1–1.03)
URN SPEC COLLECT METH UR: ABNORMAL
UROBILINOGEN UR STRIP-ACNC: NEGATIVE EU/DL
WBC #/AREA URNS AUTO: 2 /HPF (ref 0–5)

## 2018-04-29 PROCEDURE — G0378 HOSPITAL OBSERVATION PER HR: HCPCS

## 2018-04-29 PROCEDURE — 63600175 PHARM REV CODE 636 W HCPCS: Performed by: PHYSICIAN ASSISTANT

## 2018-04-29 PROCEDURE — 25500020 PHARM REV CODE 255: Performed by: EMERGENCY MEDICINE

## 2018-04-29 PROCEDURE — 81001 URINALYSIS AUTO W/SCOPE: CPT

## 2018-04-29 PROCEDURE — 25000003 PHARM REV CODE 250: Performed by: PHYSICIAN ASSISTANT

## 2018-04-29 PROCEDURE — 11000001 HC ACUTE MED/SURG PRIVATE ROOM

## 2018-04-29 PROCEDURE — 94761 N-INVAS EAR/PLS OXIMETRY MLT: CPT

## 2018-04-29 PROCEDURE — 99220 PR INITIAL OBSERVATION CARE,LEVL III: CPT | Mod: ,,, | Performed by: PHYSICIAN ASSISTANT

## 2018-04-29 RX ORDER — SODIUM CHLORIDE 9 MG/ML
INJECTION, SOLUTION INTRAVENOUS CONTINUOUS
Status: DISCONTINUED | OUTPATIENT
Start: 2018-04-29 | End: 2018-04-29

## 2018-04-29 RX ORDER — DICYCLOMINE HYDROCHLORIDE 20 MG/1
20 TABLET ORAL 4 TIMES DAILY PRN
Status: DISCONTINUED | OUTPATIENT
Start: 2018-04-29 | End: 2018-05-01 | Stop reason: HOSPADM

## 2018-04-29 RX ORDER — L. ACIDOPHILUS/L.BULGARICUS 100MM CELL
1 GRANULES IN PACKET (EA) ORAL 2 TIMES DAILY
Status: DISCONTINUED | OUTPATIENT
Start: 2018-04-29 | End: 2018-05-01 | Stop reason: HOSPADM

## 2018-04-29 RX ORDER — KETOROLAC TROMETHAMINE 30 MG/ML
15 INJECTION, SOLUTION INTRAMUSCULAR; INTRAVENOUS EVERY 6 HOURS PRN
Status: DISCONTINUED | OUTPATIENT
Start: 2018-04-29 | End: 2018-05-01 | Stop reason: HOSPADM

## 2018-04-29 RX ORDER — METOPROLOL SUCCINATE 25 MG/1
25 TABLET, EXTENDED RELEASE ORAL DAILY
Status: DISCONTINUED | OUTPATIENT
Start: 2018-04-29 | End: 2018-05-01 | Stop reason: HOSPADM

## 2018-04-29 RX ORDER — POLYETHYLENE GLYCOL 3350 17 G/17G
17 POWDER, FOR SOLUTION ORAL 2 TIMES DAILY
Status: DISCONTINUED | OUTPATIENT
Start: 2018-04-29 | End: 2018-05-01

## 2018-04-29 RX ORDER — PROMETHAZINE HYDROCHLORIDE 12.5 MG/1
25 TABLET ORAL EVERY 6 HOURS PRN
Status: DISCONTINUED | OUTPATIENT
Start: 2018-04-29 | End: 2018-05-01 | Stop reason: HOSPADM

## 2018-04-29 RX ORDER — GLUCAGON 1 MG
1 KIT INJECTION
Status: DISCONTINUED | OUTPATIENT
Start: 2018-04-29 | End: 2018-05-01 | Stop reason: HOSPADM

## 2018-04-29 RX ORDER — AMOXICILLIN 250 MG
1 CAPSULE ORAL 2 TIMES DAILY
Status: DISCONTINUED | OUTPATIENT
Start: 2018-04-29 | End: 2018-05-01 | Stop reason: HOSPADM

## 2018-04-29 RX ORDER — ACETAMINOPHEN 325 MG/1
650 TABLET ORAL EVERY 4 HOURS PRN
Status: DISCONTINUED | OUTPATIENT
Start: 2018-04-29 | End: 2018-05-01 | Stop reason: HOSPADM

## 2018-04-29 RX ORDER — IBUPROFEN 200 MG
24 TABLET ORAL
Status: DISCONTINUED | OUTPATIENT
Start: 2018-04-29 | End: 2018-05-01 | Stop reason: HOSPADM

## 2018-04-29 RX ORDER — AMLODIPINE BESYLATE 10 MG/1
10 TABLET ORAL DAILY
Status: DISCONTINUED | OUTPATIENT
Start: 2018-04-29 | End: 2018-05-01 | Stop reason: HOSPADM

## 2018-04-29 RX ORDER — SODIUM CHLORIDE 0.9 % (FLUSH) 0.9 %
5 SYRINGE (ML) INJECTION
Status: DISCONTINUED | OUTPATIENT
Start: 2018-04-29 | End: 2018-05-01 | Stop reason: HOSPADM

## 2018-04-29 RX ORDER — ONDANSETRON 8 MG/1
8 TABLET, ORALLY DISINTEGRATING ORAL EVERY 8 HOURS PRN
Status: DISCONTINUED | OUTPATIENT
Start: 2018-04-29 | End: 2018-05-01 | Stop reason: HOSPADM

## 2018-04-29 RX ORDER — BENAZEPRIL HYDROCHLORIDE 20 MG/1
20 TABLET ORAL 2 TIMES DAILY
Status: DISCONTINUED | OUTPATIENT
Start: 2018-04-29 | End: 2018-05-01 | Stop reason: HOSPADM

## 2018-04-29 RX ORDER — IBUPROFEN 200 MG
16 TABLET ORAL
Status: DISCONTINUED | OUTPATIENT
Start: 2018-04-29 | End: 2018-05-01 | Stop reason: HOSPADM

## 2018-04-29 RX ORDER — RAMELTEON 8 MG/1
8 TABLET ORAL NIGHTLY PRN
Status: DISCONTINUED | OUTPATIENT
Start: 2018-04-29 | End: 2018-05-01 | Stop reason: HOSPADM

## 2018-04-29 RX ORDER — IPRATROPIUM BROMIDE AND ALBUTEROL SULFATE 2.5; .5 MG/3ML; MG/3ML
3 SOLUTION RESPIRATORY (INHALATION) EVERY 4 HOURS PRN
Status: DISCONTINUED | OUTPATIENT
Start: 2018-04-29 | End: 2018-05-01 | Stop reason: HOSPADM

## 2018-04-29 RX ORDER — LEVOTHYROXINE SODIUM 100 UG/1
100 TABLET ORAL EVERY MORNING
Status: DISCONTINUED | OUTPATIENT
Start: 2018-04-29 | End: 2018-05-01 | Stop reason: HOSPADM

## 2018-04-29 RX ORDER — PANTOPRAZOLE SODIUM 40 MG/1
40 TABLET, DELAYED RELEASE ORAL DAILY
Status: DISCONTINUED | OUTPATIENT
Start: 2018-04-29 | End: 2018-05-01 | Stop reason: HOSPADM

## 2018-04-29 RX ADMIN — STANDARDIZED SENNA CONCENTRATE AND DOCUSATE SODIUM 1 TABLET: 8.6; 5 TABLET, FILM COATED ORAL at 10:04

## 2018-04-29 RX ADMIN — POLYETHYLENE GLYCOL 3350 17 G: 17 POWDER, FOR SOLUTION ORAL at 06:04

## 2018-04-29 RX ADMIN — BENAZEPRIL HYDROCHLORIDE 20 MG: 20 TABLET, FILM COATED ORAL at 08:04

## 2018-04-29 RX ADMIN — LACTOBACILLUS ACIDOPHILUS / LACTOBACILLUS BULGARICUS 1 EACH: 100 MILLION CFU STRENGTH GRANULES at 08:04

## 2018-04-29 RX ADMIN — LEVOTHYROXINE SODIUM 100 MCG: 100 TABLET ORAL at 06:04

## 2018-04-29 RX ADMIN — IOHEXOL 75 ML: 350 INJECTION, SOLUTION INTRAVENOUS at 12:04

## 2018-04-29 RX ADMIN — KETOROLAC TROMETHAMINE 15 MG: 30 INJECTION, SOLUTION INTRAMUSCULAR at 02:04

## 2018-04-29 RX ADMIN — PANTOPRAZOLE SODIUM 40 MG: 40 TABLET, DELAYED RELEASE ORAL at 10:04

## 2018-04-29 RX ADMIN — BENAZEPRIL HYDROCHLORIDE 20 MG: 20 TABLET, FILM COATED ORAL at 10:04

## 2018-04-29 RX ADMIN — AMLODIPINE BESYLATE 10 MG: 10 TABLET ORAL at 10:04

## 2018-04-29 RX ADMIN — SODIUM CHLORIDE: 0.9 INJECTION, SOLUTION INTRAVENOUS at 06:04

## 2018-04-29 RX ADMIN — KETOROLAC TROMETHAMINE 15 MG: 30 INJECTION, SOLUTION INTRAMUSCULAR at 08:04

## 2018-04-29 RX ADMIN — METOPROLOL SUCCINATE 25 MG: 25 TABLET, EXTENDED RELEASE ORAL at 10:04

## 2018-04-29 RX ADMIN — STANDARDIZED SENNA CONCENTRATE AND DOCUSATE SODIUM 1 TABLET: 8.6; 5 TABLET, FILM COATED ORAL at 08:04

## 2018-04-29 NOTE — NURSING
Pt arrived to unit via stretcher from ED.  Pt aaox3, vss, no s/s of distress noted.  Pt denies pain at this time.  Bed locked and in lowest position.  Call light within reach.

## 2018-04-29 NOTE — HPI
Patient is a 89 year old lady with a h/o HTN, HLD, GERD, and COPD.  Family at bedside assists with HPI.  She presents with abdominal pain x5 days.  She has a h/o cholecystectomy and appendectomy.  Pain starts in her mid-epigastrium and radiates downward and across her abdomen.  Pain comes in waves and she is currently pain free.  She states passing gas helps the pain.  She has been constipated lately, but did have a small, hard BM this morning.  Denies blood in stool, melena.  Daughter states that her appetite has been poor and she has been weaker.  She complains of some nausea and one episode of emesis on Tuesday.  Patient denies chest pain, SOB, dizziness, palpitations, fever/chills.

## 2018-04-29 NOTE — ED TRIAGE NOTES
Pt states that she has been having abdominal pain for the past week. Per daughter pt was visiting family in [place] the past week and has been having these symptoms since last Monday. Pt states that she has epigastric pain and pain that radiates all over her stomach. Pt denies N/V/D. PT with small BM this AM but none other this week. Pt also states she has decrease in appetite and feels weak.

## 2018-04-29 NOTE — ASSESSMENT & PLAN NOTE
- CT abdomen/pelvis showed multiple dilated loops of small bowel throughout the abdomen and pelvis without definite transition point, suggestive of partial small bowel obstruction or ileus.  - Appreciated general surgery's assistance.  They do not feel history, physical, and CT scan are consistent with SBO.  No surgical intervention at this time.  - Will start patient on clear liquid diet and bowel regimen.  - Supportive care.

## 2018-04-29 NOTE — H&P
[facility] Medicine  History & Physical    Patient Name: Suzan Villarreal  MRN: [id number]  Admission Date: 4/28/2018  Attending Physician: Jeffery Spivey Jr., *   Primary Care Provider: Aly Rivas MD    [facility] Medicine Team: [facility] MED D Kassy Jasso PA-C     Patient information was obtained from patient, relative(s), past medical records and ER records.     Subjective:     Principal Problem:Ileus    Chief Complaint:   Chief Complaint   Patient presents with    Abdominal Pain     pt reports abd pain since monday which was also her last regular BM; today patient states she had a small loose one, denies bloody or black in color    Extremity Weakness     pt reports for the past week        HPI: Patient is a 89 year old lady with a h/o HTN, HLD, GERD, and COPD.  Family at bedside assists with HPI.  She presents with abdominal pain x5 days.  She has a h/o cholecystectomy and appendectomy.  Pain starts in her mid-epigastrium and radiates downward and across her abdomen.  Pain comes in waves and she is currently pain free.  She states passing gas helps the pain.  She has been constipated lately, but did have a small, hard BM this morning.  Denies blood in stool, melena.  Daughter states that her appetite has been poor and she has been weaker.  She complains of some nausea and one episode of emesis on Tuesday.  Patient denies chest pain, SOB, dizziness, palpitations, fever/chills.    Past Medical History:   Diagnosis Date    Allergy     Cholelithiasis without obstruction     Chronic insomnia     COPD (chronic obstructive pulmonary disease)     DJD (degenerative joint disease), lumbar     GERD (gastroesophageal reflux disease)     HLD (hyperlipidemia)     HTN (hypertension)     Hypothyroid     OP (osteoporosis)     Osteoarthritis     S/P hysterectomy        Past Surgical History:   Procedure Laterality Date    APPENDECTOMY      CHOLECYSTECTOMY      HIP  SURGERY  8-2013    right    HYSTERECTOMY      KNEE ARTHROSCOPY Right 7-6-15    TK       Review of patient's allergies indicates:   Allergen Reactions    Sulfa (sulfonamide antibiotics) Swelling    Asa [aspirin] Itching       No current facility-administered medications on file prior to encounter.      Current Outpatient Prescriptions on File Prior to Encounter   Medication Sig    acetaminophen (TYLENOL) 325 MG tablet Take 2 tablets (650 mg total) by mouth every 6 (six) hours as needed.    amLODIPine (NORVASC) 10 MG tablet Take 1 tablet (10 mg total) by mouth once daily.    ANTIOX #11/OM3/DHA/EPA/LUT/BARBER (OCUVITE ADULT 50+ ORAL) Take 1 tablet by mouth once daily.    benazepril (LOTENSIN) 20 MG tablet Take 1 tablet (20 mg total) by mouth 2 (two) times daily.    Bifidobacterium animalis 6 mg (5 billion cell) Cap Take 1 capsule by mouth once daily.    calcium carbonate (OS-UZMA) 600 mg (1,500 mg) Tab Take 600 mg by mouth 2 (two) times daily with meals.    esomeprazole (NEXIUM) 40 MG capsule Take 1 capsule (40 mg total) by mouth before breakfast.    fluoruracil (CARAC) 0.5 % cream Apply topically once daily. Nasal tip    levothyroxine (SYNTHROID) 100 MCG tablet Take 1 tablet (100 mcg total) by mouth every morning.    metoprolol succinate (TOPROL-XL) 25 MG 24 hr tablet Take 1 tablet (25 mg total) by mouth once daily.    multivitamin capsule Take 1 capsule by mouth once daily.    salmeterol (SEREVENT DISKUS) 50 mcg/dose diskus inhaler Inhale 1 puff into the lungs 2 (two) times daily. Controller     Family History     None        Social History Main Topics    Smoking status: Never Smoker    Smokeless tobacco: Never Used    Alcohol use No    Drug use: No    Sexual activity: Not Currently     Review of Systems   Constitutional: Positive for appetite change. Negative for activity change, chills, fatigue, fever and unexpected weight change.   HENT: Negative for congestion, rhinorrhea, sore throat, trouble  swallowing and voice change.    Eyes: Negative for visual disturbance.   Respiratory: Negative for cough, choking, chest tightness, shortness of breath and wheezing.    Cardiovascular: Negative for chest pain, palpitations and leg swelling.   Gastrointestinal: Positive for abdominal pain, constipation and vomiting. Negative for abdominal distention, anal bleeding, blood in stool, diarrhea and nausea.   Endocrine: Negative for cold intolerance, heat intolerance, polydipsia and polyuria.   Genitourinary: Negative for dysuria, flank pain, frequency, hematuria and urgency.   Musculoskeletal: Negative for arthralgias, back pain, joint swelling and myalgias.   Skin: Negative for color change and rash.   Neurological: Positive for weakness. Negative for dizziness, seizures, syncope, facial asymmetry, speech difficulty, light-headedness, numbness and headaches.   Hematological: Negative for adenopathy. Does not bruise/bleed easily.   Psychiatric/Behavioral: Negative for agitation, confusion, hallucinations and suicidal ideas.     Objective:     Vital Signs (Most Recent):  Temp: 97.6 °F (36.4 °C) (04/28/18 2128)  Pulse: 76 (04/29/18 0501)  Resp: 17 (04/29/18 0501)  BP: (!) 155/87 (04/29/18 0501)  SpO2: (!) 94 % (04/29/18 0501) Vital Signs (24h Range):  Temp:  [97.6 °F (36.4 °C)] 97.6 °F (36.4 °C)  Pulse:  [74-86] 76  Resp:  [14-20] 17  SpO2:  [93 %-97 %] 94 %  BP: (119-155)/(60-87) 155/87     Weight: 56.2 kg (123 lb 14 oz)  Body mass index is 22.66 kg/m².    Physical Exam   Constitutional: She is oriented to person, place, and time. She appears well-developed and well-nourished. No distress.   HENT:   Head: Normocephalic and atraumatic.   Neck: Neck supple. Carotid bruit is not present. No thyromegaly present.   Cardiovascular: Normal rate and regular rhythm.  Exam reveals no gallop.    No murmur heard.  Pulmonary/Chest: Effort normal and breath sounds normal. No respiratory distress. She has no wheezes.   Abdominal: Soft.  Bowel sounds are normal. She exhibits distension (Mild). She exhibits no mass. There is no splenomegaly or hepatomegaly. There is no tenderness.   Musculoskeletal: Normal range of motion. She exhibits no edema or deformity.   Neurological: She is alert and oriented to person, place, and time. No cranial nerve deficit or sensory deficit.   Skin: Skin is warm and dry. No rash noted.   Psychiatric: She has a normal mood and affect.           Significant Labs:   CBC:   Recent Labs  Lab 04/28/18 2252   WBC 6.11   HGB 13.4   HCT 38.3        CMP:   Recent Labs  Lab 04/28/18 2252   *   K 4.2   CL 95   CO2 22*      BUN 14   CREATININE 0.8   CALCIUM 8.6*   PROT 5.9*   ALBUMIN 3.2*   BILITOT 0.5   ALKPHOS 54*   AST 20   ALT 10   ANIONGAP 10   EGFRNONAA >60.0     Lactic Acid: No results for input(s): LACTATE in the last 48 hours.  Lipase:   Recent Labs  Lab 04/28/18  2252   LIPASE 31     Urine Studies:   Recent Labs  Lab 04/29/18  0144   COLORU Yellow   APPEARANCEUA Clear   PHUR 5.0   SPECGRAV 1.015   PROTEINUA Negative   GLUCUA Negative   KETONESU Negative   BILIRUBINUA Negative   OCCULTUA 1+*   NITRITE Negative   UROBILINOGEN Negative   LEUKOCYTESUR 1+*   RBCUA 0   WBCUA 2   BACTERIA Occasional       Significant Imaging: I have reviewed all pertinent imaging results/findings within the past 24 hours.    Assessment/Plan:     * Ileus    - CT abdomen/pelvis showed multiple dilated loops of small bowel throughout the abdomen and pelvis without definite transition point, suggestive of partial small bowel obstruction or ileus.  - Appreciated general surgery's assistance.  They do not feel history, physical, and CT scan are consistent with SBO.  No surgical intervention at this time.  - Will start patient on clear liquid diet and bowel regimen.  - Supportive care.        Hyponatremia    - Na 127, down from 136 in September 2017.  - IVFs.  Will check osmolality.          Hypothyroidism    - Continue  levothyroxine 100mcg daily.          GERD (gastroesophageal reflux disease)    - Protonix.          COPD (chronic obstructive pulmonary disease)    - Continue salmeterol diskus, PRN Duonebs.          HTN (hypertension)    - Continue amlodipine 10mg daily, benazepril 20mg BID, metoprolol succinate 25mg daily.            VTE Risk Mitigation         Ordered     Place sequential compression device  Until discontinued      04/29/18 0528     Place LINWOOD hose  Until discontinued      04/29/18 0528     IP VTE HIGH RISK PATIENT  Once      04/29/18 0528             Kassy Jasso PA-C  Department of Hospital Medicine   [facility]

## 2018-04-29 NOTE — PROGRESS NOTES
Dr. Heredia notified of pt's systolic blood pressure in 180's after retaking manually. IV fluids discontinued. Will continue to monitor closely.

## 2018-04-29 NOTE — CONSULTS
Surgery [facility] and P  Attending: Justin  Resident: Alvarado   CC: Abdominal pain    HPI: Suzan Villarreal is a pleasant 89 y.o. with intermittent abdominal pain since Monday.    She had been worked up for abdominal pain in December by PCP, who felt it represented IBS.    These episodes are now more intense and frequent.  She and her daughter describe epigastric pain, with radiation to chest or lower abdomen.  Pain lasts about 10 seconds at a time, and then subsides.    She has a poor appetite.  Had emesis x1 last Tuesday, and some nausea for the next day, but none since then.  Has been passing gas this whole time, which alleviates her pain.  Had 2 small BM this morning.       Past Medical History:   Diagnosis Date    Allergy     Cholelithiasis without obstruction     Chronic insomnia     COPD (chronic obstructive pulmonary disease)     DJD (degenerative joint disease), lumbar     GERD (gastroesophageal reflux disease)     HLD (hyperlipidemia)     HTN (hypertension)     Hypothyroid     OP (osteoporosis)     Osteoarthritis     S/P hysterectomy        Past Surgical History:   Procedure Laterality Date    APPENDECTOMY      CHOLECYSTECTOMY      HIP SURGERY  8-2013    right    HYSTERECTOMY      KNEE ARTHROSCOPY Right 7-6-15    TK       Family History   Problem Relation Age of Onset    Melanoma Neg Hx        Social History     Social History    Marital status:      Spouse name: N/A    Number of children: N/A    Years of education: N/A     Occupational History    Not on file.     Social History Main Topics    Smoking status: Never Smoker    Smokeless tobacco: Never Used    Alcohol use No    Drug use: No    Sexual activity: Not Currently     Other Topics Concern    Not on file     Social History Narrative    No narrative on file       No current facility-administered medications on file prior to encounter.      Current Outpatient Prescriptions on File Prior to Encounter   Medication Sig Dispense  Refill    acetaminophen (TYLENOL) 325 MG tablet Take 2 tablets (650 mg total) by mouth every 6 (six) hours as needed.      amLODIPine (NORVASC) 10 MG tablet Take 1 tablet (10 mg total) by mouth once daily. 30 tablet 11    ANTIOX #11/OM3/DHA/EPA/LUT/BARBER (OCUVITE ADULT 50+ ORAL) Take 1 tablet by mouth once daily.      benazepril (LOTENSIN) 20 MG tablet Take 1 tablet (20 mg total) by mouth 2 (two) times daily. 60 tablet 11    Bifidobacterium animalis 6 mg (5 billion cell) Cap Take 1 capsule by mouth once daily. 30 capsule 11    calcium carbonate (OS-UZMA) 600 mg (1,500 mg) Tab Take 600 mg by mouth 2 (two) times daily with meals.      esomeprazole (NEXIUM) 40 MG capsule Take 1 capsule (40 mg total) by mouth before breakfast. 90 capsule 1    fluoruracil (CARAC) 0.5 % cream Apply topically once daily. Nasal tip 30 g 1    levothyroxine (SYNTHROID) 100 MCG tablet Take 1 tablet (100 mcg total) by mouth every morning. 30 tablet 11    metoprolol succinate (TOPROL-XL) 25 MG 24 hr tablet Take 1 tablet (25 mg total) by mouth once daily. 30 tablet 11    multivitamin capsule Take 1 capsule by mouth once daily.      salmeterol (SEREVENT DISKUS) 50 mcg/dose diskus inhaler Inhale 1 puff into the lungs 2 (two) times daily. Controller 60 each 8       Review of patient's allergies indicates:   Allergen Reactions    Sulfa (sulfonamide antibiotics) Swelling    Asa [aspirin] Itching       ROS: Constitutional: no fever or chills, pain controlled   Respiratory: no cough or shortness of breath   Cardiovascular: no chest pain or palpitations   Genitourinary: no hematuria or dysuria   Hematologic/Lymphatic: no easy bruising or lymphadenopathy   Musculoskeletal: no arthralgias or myalgias   Neurological: no seizures or tremors     Phys:  Vitals:    04/28/18 2128 04/28/18 2244 04/28/18 2301 04/28/18 2321   BP: 120/61 136/86 119/60 (!) 141/61   Pulse: 85 86 78 74   Resp: 18   14   Temp: 97.6 °F (36.4 °C)      TempSrc: Oral      SpO2:  "96% 96% 95% 96%   Weight: 56.2 kg (123 lb 14 oz)      Height: 5' 2" (1.575 m)        Phys:   Gen: NAD   HEENT: NCAT, trachea midline  CV: RRR, no m/r/g   Pulm: Unlabored  Abd: Soft, mildly distended, nttp. No rebound, guarding.  Well healed hysterectomy scar  Extremities: no cyanosis or edema, or clubbing  Skin: Skin color, texture, turgor normal. No rashes or lesions    CT: Somewhat dilated loops of small bowel, but no gastric distension.  Stool and gas in rectum     A/P 89 year old woman with chronic, spasmic abdominal pain    History and physical not consistent with SBO, and neither is CT scan (no gastric distension)  No surgical intervention required  Consider bowel regimen  She may be offered either PO challenge and outpatient follow up with GI or medicine consult    Alexander Childers MD  General Surgery, PGY-4  [phone]     "

## 2018-04-29 NOTE — H&P
[facility] Hospital Medicine  History & Physical    Patient Name: Suzan Villarreal  MRN: [id number]  Admission Date: 4/28/2018  Attending Physician: Jeffery Spivey Jr., *   Primary Care Provider: Aly Rivas MD    [facility] Medicine Team: [facility]OSP MED D Kassy Jasso PA-C     Patient information was obtained from patient, relative(s), past medical records and ER records.     Subjective:     Principal Problem:Ileus    Chief Complaint:   Chief Complaint   Patient presents with    Abdominal Pain     pt reports abd pain since monday which was also her last regular BM; today patient states she had a small loose one, denies bloody or black in color    Extremity Weakness     pt reports for the past week        HPI: Patient is a 89 year old lady with a h/o HTN, HLD, GERD, and COPD.  Family at bedside assists with HPI.  She presents with abdominal pain x5 days.  She has a h/o cholecystectomy and appendectomy.  Pain starts in her mid-epigastrium and radiates downward and across her abdomen.  Pain comes in waves and she is currently pain free.  She states passing gas helps the pain.  She has been constipated lately, but did have a small, hard BM this morning.  Denies blood in stool, melena.  Daughter states that her appetite has been poor and she has been weaker.  She complains of some nausea and one episode of emesis on Tuesday.  Patient denies chest pain, SOB, dizziness, palpitations, fever/chills.    Past Medical History:   Diagnosis Date    Allergy     Cholelithiasis without obstruction     Chronic insomnia     COPD (chronic obstructive pulmonary disease)     DJD (degenerative joint disease), lumbar     GERD (gastroesophageal reflux disease)     HLD (hyperlipidemia)     HTN (hypertension)     Hypothyroid     OP (osteoporosis)     Osteoarthritis     S/P hysterectomy        Past Surgical History:   Procedure Laterality Date    APPENDECTOMY      CHOLECYSTECTOMY      HIP  SURGERY  8-2013    right    HYSTERECTOMY      KNEE ARTHROSCOPY Right 7-6-15    TK       Review of patient's allergies indicates:   Allergen Reactions    Sulfa (sulfonamide antibiotics) Swelling    Asa [aspirin] Itching       No current facility-administered medications on file prior to encounter.      Current Outpatient Prescriptions on File Prior to Encounter   Medication Sig    acetaminophen (TYLENOL) 325 MG tablet Take 2 tablets (650 mg total) by mouth every 6 (six) hours as needed.    amLODIPine (NORVASC) 10 MG tablet Take 1 tablet (10 mg total) by mouth once daily.    ANTIOX #11/OM3/DHA/EPA/LUT/BARBER (OCUVITE ADULT 50+ ORAL) Take 1 tablet by mouth once daily.    benazepril (LOTENSIN) 20 MG tablet Take 1 tablet (20 mg total) by mouth 2 (two) times daily.    Bifidobacterium animalis 6 mg (5 billion cell) Cap Take 1 capsule by mouth once daily.    calcium carbonate (OS-UZMA) 600 mg (1,500 mg) Tab Take 600 mg by mouth 2 (two) times daily with meals.    esomeprazole (NEXIUM) 40 MG capsule Take 1 capsule (40 mg total) by mouth before breakfast.    fluoruracil (CARAC) 0.5 % cream Apply topically once daily. Nasal tip    levothyroxine (SYNTHROID) 100 MCG tablet Take 1 tablet (100 mcg total) by mouth every morning.    metoprolol succinate (TOPROL-XL) 25 MG 24 hr tablet Take 1 tablet (25 mg total) by mouth once daily.    multivitamin capsule Take 1 capsule by mouth once daily.    salmeterol (SEREVENT DISKUS) 50 mcg/dose diskus inhaler Inhale 1 puff into the lungs 2 (two) times daily. Controller     Family History     None        Social History Main Topics    Smoking status: Never Smoker    Smokeless tobacco: Never Used    Alcohol use No    Drug use: No    Sexual activity: Not Currently     Review of Systems   Constitutional: Positive for appetite change. Negative for activity change, chills, fatigue, fever and unexpected weight change.   HENT: Negative for congestion, rhinorrhea, sore throat, trouble  swallowing and voice change.    Eyes: Negative for visual disturbance.   Respiratory: Negative for cough, choking, chest tightness, shortness of breath and wheezing.    Cardiovascular: Negative for chest pain, palpitations and leg swelling.   Gastrointestinal: Positive for abdominal pain, constipation and vomiting. Negative for abdominal distention, anal bleeding, blood in stool, diarrhea and nausea.   Endocrine: Negative for cold intolerance, heat intolerance, polydipsia and polyuria.   Genitourinary: Negative for dysuria, flank pain, frequency, hematuria and urgency.   Musculoskeletal: Negative for arthralgias, back pain, joint swelling and myalgias.   Skin: Negative for color change and rash.   Neurological: Positive for weakness. Negative for dizziness, seizures, syncope, facial asymmetry, speech difficulty, light-headedness, numbness and headaches.   Hematological: Negative for adenopathy. Does not bruise/bleed easily.   Psychiatric/Behavioral: Negative for agitation, confusion, hallucinations and suicidal ideas.     Objective:     Vital Signs (Most Recent):  Temp: 97.6 °F (36.4 °C) (04/28/18 2128)  Pulse: 76 (04/29/18 0501)  Resp: 17 (04/29/18 0501)  BP: (!) 155/87 (04/29/18 0501)  SpO2: (!) 94 % (04/29/18 0501) Vital Signs (24h Range):  Temp:  [97.6 °F (36.4 °C)] 97.6 °F (36.4 °C)  Pulse:  [74-86] 76  Resp:  [14-20] 17  SpO2:  [93 %-97 %] 94 %  BP: (119-155)/(60-87) 155/87     Weight: 56.2 kg (123 lb 14 oz)  Body mass index is 22.66 kg/m².    Physical Exam   Constitutional: She is oriented to person, place, and time. She appears well-developed and well-nourished. No distress.   HENT:   Head: Normocephalic and atraumatic.   Neck: Neck supple. Carotid bruit is not present. No thyromegaly present.   Cardiovascular: Normal rate and regular rhythm.  Exam reveals no gallop.    No murmur heard.  Pulmonary/Chest: Effort normal and breath sounds normal. No respiratory distress. She has no wheezes.   Abdominal: Soft.  Bowel sounds are normal. She exhibits distension (Mild). She exhibits no mass. There is no splenomegaly or hepatomegaly. There is no tenderness.   Musculoskeletal: Normal range of motion. She exhibits no edema or deformity.   Neurological: She is alert and oriented to person, place, and time. No cranial nerve deficit or sensory deficit.   Skin: Skin is warm and dry. No rash noted.   Psychiatric: She has a normal mood and affect.           Significant Labs:   CBC:   Recent Labs  Lab 04/28/18  2252   WBC 6.11   HGB 13.4   HCT 38.3        CMP:   Recent Labs  Lab 04/28/18  2252   *   K 4.2   CL 95   CO2 22*      BUN 14   CREATININE 0.8   CALCIUM 8.6*   PROT 5.9*   ALBUMIN 3.2*   BILITOT 0.5   ALKPHOS 54*   AST 20   ALT 10   ANIONGAP 10   EGFRNONAA >60.0     Lactic Acid: No results for input(s): LACTATE in the last 48 hours.  Lipase:   Recent Labs  Lab 04/28/18  2252   LIPASE 31     Urine Studies:   Recent Labs  Lab 04/29/18  0144   COLORU Yellow   APPEARANCEUA Clear   PHUR 5.0   SPECGRAV 1.015   PROTEINUA Negative   GLUCUA Negative   KETONESU Negative   BILIRUBINUA Negative   OCCULTUA 1+*   NITRITE Negative   UROBILINOGEN Negative   LEUKOCYTESUR 1+*   RBCUA 0   WBCUA 2   BACTERIA Occasional       Significant Imaging: I have reviewed all pertinent imaging results/findings within the past 24 hours.    Assessment/Plan:     * Ileus      - Appreciated general surgery's assistance.  They do not feel history, physical, and CT scan are consistent with SBO.  No surgical intervention at this time.  - Will start patient on clear liquid diet and bowel regimen.  - Supportive care.        Hypothyroidism    - Continue levothyroxine 100mcg daily.          GERD (gastroesophageal reflux disease)    - Protonix.          COPD (chronic obstructive pulmonary disease)    - Continue salmeterol diskus, PRN Duonebs.          HTN (hypertension)    - Continue amlodipine 10mg daily, benazepril 20mg BID, metoprolol succinate 25mg  daily.            VTE Risk Mitigation         Ordered     Place sequential compression device  Until discontinued      04/29/18 0528     Place LINWOOD hose  Until discontinued      04/29/18 0528     IP VTE HIGH RISK PATIENT  Once      04/29/18 0528             Kassy Jasso PA-C  Department of Hospital Medicine   [facility]

## 2018-04-29 NOTE — PLAN OF CARE
Problem: Patient Care Overview  Goal: Plan of Care Review  Outcome: Ongoing (interventions implemented as appropriate)  Pt aaox3, vss, no s/s of distress noted.  Pt denies pain at this time.  Safety precautions maintained, non skid socks and fall risk band applied.  IVF infusing.  Call light within reach.

## 2018-04-29 NOTE — ED NOTES
Pt aware room is ready and will be going to OBS room 10. Pt offers no complaints at this time. Will continue to monitor

## 2018-04-29 NOTE — ED PROVIDER NOTES
Encounter Date: 4/28/2018    SCRIBE #1 NOTE: I, Herman Singh, am scribing for, and in the presence of,  Dr. Spivey. I have scribed the following portions of the note - Other sections scribed: HPI, ROS, PE.       History     Chief Complaint   Patient presents with    Abdominal Pain     pt reports abd pain since monday which was also her last regular BM; today patient states she had a small loose one, denies bloody or black in color    Extremity Weakness     pt reports for the past week     Time seen by provider: 10:34 PM    This is a 89 y.o. female with co-morbidities including hypertension, hyperlipidemia, COPD, history of cholecystectomy, appendectomy, who presents to the Emergency Department with complaint of abdominal pain for the last 6 days Patient states that the pain starts in her mid epigastrium and radiates across her upper abdomen bilaterally, described as an intermittent sharp pain that lasts for a few seconds at a time. Patient states the pain is worse when she is walking, but denies any triggers for her pain. Patient also complains of generalized weakness, subjective fever, reports 1 episode of non-bloody emesis which was today. Patient states that she has been constipated recently, but reports having a small, firm bowel movement this morning. Patient denies any diarrhea. Patient states having similar symptoms several months ago.      Patient denies sore throat, chest pain, palpitations, shortness of breath, cough, dizziness, lightheadedness, dysuria, any illicit drug use, smoking, EtOH use.       The history is provided by the patient and medical records.     Review of patient's allergies indicates:   Allergen Reactions    Sulfa (sulfonamide antibiotics) Swelling    Asa [aspirin] Itching     Past Medical History:   Diagnosis Date    Allergy     Cholelithiasis without obstruction     Chronic insomnia     COPD (chronic obstructive pulmonary disease)     DJD (degenerative joint disease), lumbar      GERD (gastroesophageal reflux disease)     HLD (hyperlipidemia)     HTN (hypertension)     Hypothyroid     OP (osteoporosis)     Osteoarthritis     S/P hysterectomy      Past Surgical History:   Procedure Laterality Date    CHOLECYSTECTOMY      HIP SURGERY  8-2013    right    HYSTERECTOMY      KNEE ARTHROSCOPY Right 7-6-15    TK     Family History   Problem Relation Age of Onset    Melanoma Neg Hx      Social History   Substance Use Topics    Smoking status: Never Smoker    Smokeless tobacco: Never Used    Alcohol use No     Review of Systems   Constitutional: Negative for chills.        Patient complains of feeling feverish.    HENT: Negative for sore throat.    Respiratory: Negative for shortness of breath.    Cardiovascular: Negative for chest pain.   Gastrointestinal: Positive for abdominal pain, diarrhea, nausea and vomiting.   Genitourinary: Negative for dysuria.   Musculoskeletal: Negative for back pain.   Skin: Negative for rash.   Neurological: Positive for weakness.   Hematological: Does not bruise/bleed easily.       Physical Exam     Initial Vitals [04/28/18 2128]   BP Pulse Resp Temp SpO2   120/61 85 18 97.6 °F (36.4 °C) 96 %      MAP       80.67         Physical Exam    Nursing note and vitals reviewed.  Constitutional: She appears well-developed and well-nourished.   Thin, nontoxic appearing, hard of hearing   HENT:   Head: Normocephalic and atraumatic.   Mouth/Throat: Mucous membranes are dry.   Eyes: Pupils are equal, round, and reactive to light.   Neck: Normal range of motion. Neck supple.   Cardiovascular: Normal rate, regular rhythm, normal heart sounds and intact distal pulses.   Pulmonary/Chest: Breath sounds normal. No respiratory distress. She has no wheezes. She has no rhonchi. She has no rales. She exhibits no tenderness.   Abdominal: Soft. Bowel sounds are normal. She exhibits no distension and no mass. There is no tenderness. There is no rebound and no guarding.    Musculoskeletal: She exhibits no edema or tenderness.   Arthritic change limiting movement of right shoulder.    Neurological: She is alert and oriented to person, place, and time.   Skin: Skin is warm and dry. Capillary refill takes less than 2 seconds.         ED Course   Procedures  Labs Reviewed - No data to display  EKG Readings: (Independently Interpreted)   Sinus rhythm, rate of 78 no acute ST elevation or ST depression, Q-waves noted in inferior leads, when compared to previous, no marked change, abnormal EKG.  Adequate tracing.           Medical Decision Making:   History:   Old Medical Records: I decided to obtain old medical records.  Initial Assessment:   Patient with abdominal pain without guarding or rebound or any clinical findings suggestive of surgical abdomen; however, due to patient's age, it was felt that further workup was indicated. Patient was treated with IV fluids. CT of the abdomen, with no guarding rebound clinical findings suggestive of surgical findings. CT of the abdomen demonstrated ileus versus early small bowel obstruction. Patient seen by General Surgery who did not feel surgical intervention was indicated. Due to the patient's age, she was admitted for observation and hydration for ileus of the small bowel.   Independently Interpreted Test(s):   I have ordered and independently interpreted EKG Reading(s) - see prior notes  Clinical Tests:   Lab Tests: Ordered and Reviewed  Radiological Study: Ordered and Reviewed  Medical Tests: Ordered and Reviewed  ED Management:  0210. Discussed with General Surgery.     0405. Discussed with Internal Medicine.             Scribe Attestation:   Scribe #1: I performed the above scribed service and the documentation accurately describes the services I performed. I attest to the accuracy of the note.               Clinical Impression:   The primary encounter diagnosis was Ileus. Diagnoses of Abdominal pain, Upper abdominal pain, Epigastric  abdominal pain, Essential hypertension, Hypothyroidism, unspecified type, and Hyponatremia were also pertinent to this visit.                           Jeffery Spivey Jr., MD  05/02/18 [id number]

## 2018-04-29 NOTE — ED NOTES
Patient received at this time with no complaints.  Report from Jesus ZARAGOZA.    Family not currently in the department.    Pain:  Denies pain at present.    Psychosocial:  Patient is calm and cooperative.  Patients insight and judgement are appropriate to situation.  Appears clean, well maintained, with clothing appropriate to environment.  No evidence of delusions, hallucinations, or psychosis.    Neuro:  Eyes open spontaneously.  Awake, alert, oriented x 4.  Speech clear and appropriate.  Tolerating saliva secretions well.  Able to follow commands, demonstrating ability to actively and appropriately communicate within context of current conversation.  Symmetrical facial muscles.  Moving all extremities well with no noted weakness.  Movement is purposeful.      Airway:  Bilateral chest rise and fall.  RR regular and non-labored.  Air entry patent and clear x 5 lobes of the lungs.  No crepitus or subcutaneous emphysema noted on palpation.      Circulatory:  Skin warm, dry, and pink.  Apical and radial pulses strong and regular.  Capillary refill/skin blanching less than 3 seconds to distal of 4 extremities.    Abdomen:  Abdomen soft and non-distended per patient.  Positive hypoactive BS in upper and lower right abdominal pain.  Normo-active bowel sounds x entire left side of abdomen.      Urinary:  No complaints reported.    Extremities:  No redness, heat, swelling, deformity, or pain.    Skin:  Intact with no bruising/discolorations noted.    IV sites x 2 saline locked.  Bilateral arms.  No signs or symptoms of redness, heat, and /or swelling.    Care plan reviewed with patient.  Placed in hospital gown.

## 2018-04-30 LAB
ALBUMIN SERPL BCP-MCNC: 2.8 G/DL
ALP SERPL-CCNC: 47 U/L
ALT SERPL W/O P-5'-P-CCNC: 7 U/L
ANION GAP SERPL CALC-SCNC: 10 MMOL/L
AST SERPL-CCNC: 17 U/L
BASOPHILS # BLD AUTO: 0.03 K/UL
BASOPHILS NFR BLD: 0.6 %
BILIRUB SERPL-MCNC: 0.5 MG/DL
BUN SERPL-MCNC: 11 MG/DL
CALCIUM SERPL-MCNC: 8 MG/DL
CHLORIDE SERPL-SCNC: 100 MMOL/L
CO2 SERPL-SCNC: 19 MMOL/L
CREAT SERPL-MCNC: 0.6 MG/DL
DIFFERENTIAL METHOD: ABNORMAL
EOSINOPHIL # BLD AUTO: 0 K/UL
EOSINOPHIL NFR BLD: 0.8 %
ERYTHROCYTE [DISTWIDTH] IN BLOOD BY AUTOMATED COUNT: 14.3 %
EST. GFR  (AFRICAN AMERICAN): >60 ML/MIN/1.73 M^2
EST. GFR  (NON AFRICAN AMERICAN): >60 ML/MIN/1.73 M^2
ESTIMATED AVG GLUCOSE: 94 MG/DL
GLUCOSE SERPL-MCNC: 80 MG/DL
HBA1C MFR BLD HPLC: 4.9 %
HCT VFR BLD AUTO: 38.9 %
HGB BLD-MCNC: 13 G/DL
IMM GRANULOCYTES # BLD AUTO: 0.03 K/UL
IMM GRANULOCYTES NFR BLD AUTO: 0.6 %
LYMPHOCYTES # BLD AUTO: 1.3 K/UL
LYMPHOCYTES NFR BLD: 24.9 %
MAGNESIUM SERPL-MCNC: 1.7 MG/DL
MCH RBC QN AUTO: 30.3 PG
MCHC RBC AUTO-ENTMCNC: 33.4 G/DL
MCV RBC AUTO: 91 FL
MONOCYTES # BLD AUTO: 0.8 K/UL
MONOCYTES NFR BLD: 15.5 %
NEUTROPHILS # BLD AUTO: 2.9 K/UL
NEUTROPHILS NFR BLD: 57.6 %
NRBC BLD-RTO: 0 /100 WBC
OSMOLALITY SERPL: 265 MOSM/KG
PHOSPHATE SERPL-MCNC: 3.2 MG/DL
PLATELET # BLD AUTO: 326 K/UL
PMV BLD AUTO: 9 FL
POTASSIUM SERPL-SCNC: 3.7 MMOL/L
PROT SERPL-MCNC: 4.9 G/DL
RBC # BLD AUTO: 4.29 M/UL
SODIUM SERPL-SCNC: 129 MMOL/L
T4 FREE SERPL-MCNC: 1.12 NG/DL
TSH SERPL DL<=0.005 MIU/L-ACNC: 9.29 UIU/ML
WBC # BLD AUTO: 5.02 K/UL

## 2018-04-30 PROCEDURE — 83735 ASSAY OF MAGNESIUM: CPT

## 2018-04-30 PROCEDURE — 25000003 PHARM REV CODE 250: Performed by: PHYSICIAN ASSISTANT

## 2018-04-30 PROCEDURE — 99225 PR SUBSEQUENT OBSERVATION CARE,LEVEL II: CPT | Mod: ,,, | Performed by: INTERNAL MEDICINE

## 2018-04-30 PROCEDURE — 84439 ASSAY OF FREE THYROXINE: CPT

## 2018-04-30 PROCEDURE — 84100 ASSAY OF PHOSPHORUS: CPT

## 2018-04-30 PROCEDURE — 80053 COMPREHEN METABOLIC PANEL: CPT

## 2018-04-30 PROCEDURE — 83930 ASSAY OF BLOOD OSMOLALITY: CPT

## 2018-04-30 PROCEDURE — 11000001 HC ACUTE MED/SURG PRIVATE ROOM

## 2018-04-30 PROCEDURE — 84443 ASSAY THYROID STIM HORMONE: CPT

## 2018-04-30 PROCEDURE — 85025 COMPLETE CBC W/AUTO DIFF WBC: CPT

## 2018-04-30 PROCEDURE — G0378 HOSPITAL OBSERVATION PER HR: HCPCS

## 2018-04-30 PROCEDURE — 36415 COLL VENOUS BLD VENIPUNCTURE: CPT

## 2018-04-30 PROCEDURE — 25000242 PHARM REV CODE 250 ALT 637 W/ HCPCS: Performed by: PHYSICIAN ASSISTANT

## 2018-04-30 PROCEDURE — 83036 HEMOGLOBIN GLYCOSYLATED A1C: CPT

## 2018-04-30 RX ADMIN — PANTOPRAZOLE SODIUM 40 MG: 40 TABLET, DELAYED RELEASE ORAL at 09:04

## 2018-04-30 RX ADMIN — METOPROLOL SUCCINATE 25 MG: 25 TABLET, EXTENDED RELEASE ORAL at 09:04

## 2018-04-30 RX ADMIN — ACETAMINOPHEN 650 MG: 325 TABLET ORAL at 06:04

## 2018-04-30 RX ADMIN — ACETAMINOPHEN 650 MG: 325 TABLET ORAL at 11:04

## 2018-04-30 RX ADMIN — BENAZEPRIL HYDROCHLORIDE 20 MG: 20 TABLET, FILM COATED ORAL at 08:04

## 2018-04-30 RX ADMIN — LEVOTHYROXINE SODIUM 100 MCG: 100 TABLET ORAL at 07:04

## 2018-04-30 RX ADMIN — AMLODIPINE BESYLATE 10 MG: 10 TABLET ORAL at 09:04

## 2018-04-30 RX ADMIN — DICYCLOMINE HYDROCHLORIDE 20 MG: 20 TABLET ORAL at 08:04

## 2018-04-30 RX ADMIN — LACTOBACILLUS ACIDOPHILUS / LACTOBACILLUS BULGARICUS 1 EACH: 100 MILLION CFU STRENGTH GRANULES at 09:04

## 2018-04-30 RX ADMIN — BENAZEPRIL HYDROCHLORIDE 20 MG: 20 TABLET, FILM COATED ORAL at 09:04

## 2018-04-30 NOTE — MEDICAL/APP STUDENT
Hospital Medicine  Progress note    Team: [facility] D Jesus Heredia MD  Admit Date: 4/28/2018  GIFTY 5/1/2018  Code status: Full Code    Principal Problem:  Ileus    Interval hx:  Patient is complaining of abdominal pain.     ROS     Respiratory: no cough or shortness of breath  Cardiovascular: no chest pain or palpitations  Gastrointestinal: no nausea or vomiting, or change in bowel habits. Positive for abdominal pain   Behavioral/Psych: no depression or anxiety      PEx  Temp:  [96.4 °F (35.8 °C)-98.1 °F (36.7 °C)]   Pulse:  [68-82]   Resp:  [16-18]   BP: (144-184)/(70-88)   SpO2:  [92 %-97 %]     Intake/Output Summary (Last 24 hours) at 04/30/18 1240  Last data filed at 04/30/18 0100   Gross per 24 hour   Intake                0 ml   Output              175 ml   Net             -175 ml       General Appearance: no acute distress   Heart: regular rate and rhythm  Respiratory: Normal respiratory effort, no crackles   Abdomen: Soft; bowel sounds active, mild diffuse tenderness.   Skin: intact. IV sites ok  Neurologic:  No focal numbness or weakness  Mental status: Alert, oriented x 4, affect appropriate       Recent Labs  Lab 04/28/18 2252 04/30/18  0450   WBC 6.11 5.02   HGB 13.4 13.0   HCT 38.3 38.9    326       Recent Labs  Lab 04/28/18 2252 04/30/18  0450   * 129*   K 4.2 3.7   CL 95 100   CO2 22* 19*   BUN 14 11   CREATININE 0.8 0.6    80   CALCIUM 8.6* 8.0*   MG 1.9 1.7   PHOS  --  3.2   LIPASE 31  --        Recent Labs  Lab 04/28/18 2252 04/30/18  0450   ALKPHOS 54* 47*   ALT 10 7*   AST 20 17   ALBUMIN 3.2* 2.8*   PROT 5.9* 4.9*   BILITOT 0.5 0.5   INR 1.0  --       No results for input(s): POCTGLUCOSE in the last 168 hours.  Recent Labs      04/28/18 2252   TROPONINI  0.015       Scheduled Meds:   amLODIPine  10 mg Oral Daily    benazepril  20 mg Oral BID    lactobacillus acidophilus & bulgar  1 packet Oral BID    levothyroxine  100 mcg Oral QAM    metoprolol succinate  25  mg Oral Daily    pantoprazole  40 mg Oral Daily    polyethylene glycol  17 g Oral BID    salmeterol  1 puff Inhalation BID    senna-docusate 8.6-50 mg  1 tablet Oral BID     Continuous Infusions:  As Needed:  acetaminophen, albuterol-ipratropium 2.5mg-0.5mg/3mL, dextrose 50%, dextrose 50%, dicyclomine, glucagon (human recombinant), glucose, glucose, ketorolac, ondansetron, promethazine, ramelteon, sodium chloride 0.9%    Active Hospital Problems    Diagnosis  POA    *Ileus [K56.7]  Yes    Hypothyroidism [E03.9]  Yes     Chronic    Hyponatremia [E87.1]  Yes    HTN (hypertension) [I10]  Yes     Chronic    COPD (chronic obstructive pulmonary disease) [J44.9]  Yes     Chronic    GERD (gastroesophageal reflux disease) [K21.9]  Yes     Chronic      Resolved Hospital Problems    Diagnosis Date Resolved POA   No resolved problems to display.       Overview  Patient is a 89 year old lady with a h/o HTN, HLD, GERD, and COPD admitted for ileus.       Hospital Course:  CT abdomen/pelvis showed multiple dilated loops of small bowel throughout the abdomen and pelvis without definite transition point, suggestive of partial small bowel obstruction or ileus. General Surgery was consulted. They did not feel history, physical, and CT scan were consistent with SBO and did not recommend surgical intervention at this time. Patient was started on a clear liquid diet and bowel regimen.     Assessment and Plan for Problems addressed today:  * Ileus     -Continue clear liquid diet and bowel regimen  -KUB on 4/30shows scattered bowel gas in multiple loops of bowel, predominantly small bowel.  May be a small amount of gas in the rectum.  No focal dilatation.  -Consulting GI       Hyponatremia     -Serum Osmolality 265  -Improved with IVF   -Monitoring        Hypothyroidism     - Continue levothyroxine 100mcg daily.       GERD (gastroesophageal reflux disease)     - Continue Protonix.       COPD (chronic obstructive pulmonary disease)      - Continue salmeterol diskus, PRN Duonebs.       HTN (hypertension)     - Continue amlodipine 10mg daily, benazepril 20mg BID, metoprolol succinate 25mg daily.           DVT PPx:     Anticoagulants   Medication Route Frequency     Discharge plan and follow up  Home    Provider  MD Luisa Umana Attestation: I personally scribed for Jesus Heredia MD on 04/30/2018 at 12:51 PM. Electronically signed by luisa Peralta on 04/30/2018 at 12:51 PM.

## 2018-04-30 NOTE — PLAN OF CARE
Aly Rivas MD     Payor: [employer] MEDICARE / Plan: [employer] MEDICARE HMO / Product Type: Capitation /      Extended Emergency Contact Information  Primary Emergency Contact: Wiley Beck  Address: [address] [place] [address] [facility]  Home Phone: [phone]  Mobile Phone: [phone]  Relation: Daughter        04/30/18 1308   Discharge Assessment   Assessment Type Discharge Planning Assessment   Confirmed/corrected address and phone number on facesheet? Yes   Assessment information obtained from? Patient;Caregiver;Medical Record   Expected Length of Stay (days) 2   Communicated expected length of stay with patient/caregiver yes   Prior to hospitilization cognitive status: Alert/Oriented   Prior to hospitalization functional status: Needs Assistance   Current cognitive status: Alert/Oriented   Current Functional Status: Needs Assistance   Lives With child(princess), adult   Able to Return to Prior Arrangements yes   Is patient able to care for self after discharge? Unable to determine at this time (comments)   Patient's perception of discharge disposition home or selfcare   Readmission Within The Last 30 Days no previous admission in last 30 days   Patient currently being followed by outpatient case management? No   Patient currently receives any other outside agency services? No   Do you have any problems affording any of your prescribed medications? No   Is the patient taking medications as prescribed? yes   Does the patient have transportation home? Yes   Transportation Available family or friend will provide   Does the patient receive services at the [facility]? No   Discharge Plan A Home with family;Home Health   Discharge Plan B Home with family   Patient/Family In Agreement With Plan yes   Does the patient have transportation to healthcare appointments? Yes

## 2018-04-30 NOTE — PLAN OF CARE
Problem: Patient Care Overview  Goal: Plan of Care Review  Outcome: Ongoing (interventions implemented as appropriate)  Patient has remained free of falls this shift. She is AAOx4 and VS's have been stable. Patient has two liquid, brown bowel movements this shift. She needs to be very careful because she cannot stand up by herself. If she is leaning against something (bed) she can hold herself upright.

## 2018-04-30 NOTE — PLAN OF CARE
Problem: Patient Care Overview  Goal: Plan of Care Review  Outcome: Ongoing (interventions implemented as appropriate)  Patient AAOx4. Safety maintained. Bed locked in low with 2 side rails up. Call light within reach. No complaints of pain.

## 2018-04-30 NOTE — PLAN OF CARE
Problem: Patient Care Overview  Goal: Plan of Care Review  Outcome: Ongoing (interventions implemented as appropriate)  Plan of care reviewed with patient. No distress noted at this time. Pt tolerating clear liquids. No nausea or vomiting noted. Pt received Toradol x1 this shift. Fall precautions maintained. Will continue to monitor closely.

## 2018-04-30 NOTE — PROGRESS NOTES
Hospital Medicine  Progress note    I personally performed the history, physical exam and medical decision making: and confirmed the accuracy of the information in the transcribed note.     Team: [facility] [place] [facility] D Jesus Heredia MD  Admit Date: 4/28/2018  GIFTY 5/1/2018  Code status: Full Code     Principal Problem:  Ileus     Interval hx:  Patient is complaining of abdominal pain.      ROS      Respiratory: no cough or shortness of breath  Cardiovascular: no chest pain or palpitations  Gastrointestinal: no nausea or vomiting, or change in bowel habits. Positive for abdominal pain   Behavioral/Psych: no depression or anxiety        PEx  Temp:  [96.4 °F (35.8 °C)-98.1 °F (36.7 °C)]   Pulse:  [68-82]   Resp:  [16-18]   BP: (144-184)/(70-88)   SpO2:  [92 %-97 %]      Intake/Output Summary (Last 24 hours) at 04/30/18 1240  Last data filed at 04/30/18 0100    Gross per 24 hour   Intake                0 ml   Output              175 ml   Net             -175 ml         General Appearance: no acute distress   Heart: regular rate and rhythm  Respiratory: Normal respiratory effort, no crackles   Abdomen: Soft; bowel sounds active, mild diffuse tenderness.   Skin: intact. IV sites ok  Neurologic:  No focal numbness or weakness  Mental status: Alert, oriented x 4, affect appropriate         Recent Labs  Lab 04/28/18 2252 04/30/18  0450   WBC 6.11 5.02   HGB 13.4 13.0   HCT 38.3 38.9    326         Recent Labs  Lab 04/28/18 2252 04/30/18  0450   * 129*   K 4.2 3.7   CL 95 100   CO2 22* 19*   BUN 14 11   CREATININE 0.8 0.6    80   CALCIUM 8.6* 8.0*   MG 1.9 1.7   PHOS  --  3.2   LIPASE 31  --          Recent Labs  Lab 04/28/18 2252 04/30/18  0450   ALKPHOS 54* 47*   ALT 10 7*   AST 20 17   ALBUMIN 3.2* 2.8*   PROT 5.9* 4.9*   BILITOT 0.5 0.5   INR 1.0  --       No results for input(s): POCTGLUCOSE in the last 168 hours.      Recent Labs      04/28/18 2252   TROPONINI  0.015         Scheduled Meds:    amLODIPine  10 mg Oral Daily    benazepril  20 mg Oral BID    lactobacillus acidophilus & bulgar  1 packet Oral BID    levothyroxine  100 mcg Oral QAM    metoprolol succinate  25 mg Oral Daily    pantoprazole  40 mg Oral Daily    polyethylene glycol  17 g Oral BID    salmeterol  1 puff Inhalation BID    senna-docusate 8.6-50 mg  1 tablet Oral BID      Continuous Infusions:  As Needed:  acetaminophen, albuterol-ipratropium 2.5mg-0.5mg/3mL, dextrose 50%, dextrose 50%, dicyclomine, glucagon (human recombinant), glucose, glucose, ketorolac, ondansetron, promethazine, ramelteon, sodium chloride 0.9%            Active Hospital Problems     Diagnosis   POA    *Ileus [K56.7]   Yes    Hypothyroidism [E03.9]   Yes       Chronic    Hyponatremia [E87.1]   Yes    HTN (hypertension) [I10]   Yes       Chronic    COPD (chronic obstructive pulmonary disease) [J44.9]   Yes       Chronic    GERD (gastroesophageal reflux disease) [K21.9]   Yes       Chronic       Resolved Hospital Problems     Diagnosis Date Resolved POA   No resolved problems to display.         Overview  Patient is a 89 year old lady with a h/o HTN, HLD, GERD, and COPD admitted for ileus.        Hospital Course:  CT abdomen/pelvis showed multiple dilated loops of small bowel throughout the abdomen and pelvis without definite transition point, suggestive of partial small bowel obstruction or ileus. General Surgery was consulted. They did not feel history, physical, and CT scan were consistent with SBO and did not recommend surgical intervention at this time. Patient was started on a clear liquid diet and bowel regimen.      Assessment and Plan for Problems addressed today:      * Ileus     -Continue clear liquid diet and bowel regimen  -KUB on 4/30shows scattered bowel gas in multiple loops of bowel, predominantly small bowel.  May be a small amount of gas in the rectum.  No focal dilatation.  -Consulting GI       Hyponatremia     -Serum Osmolality  265  -Improved with IVF   -Monitoring        Hypothyroidism     - Continue levothyroxine 100mcg daily.       GERD (gastroesophageal reflux disease)     - Continue Protonix.       COPD (chronic obstructive pulmonary disease)     - Continue salmeterol diskus, PRN Duonebs.       HTN (hypertension)     - Continue amlodipine 10mg daily, benazepril 20mg BID, metoprolol succinate 25mg daily.             DVT PPx:      Anticoagulants   Medication Route Frequency      Discharge plan and follow up  Home     Provider  Jesus Heredia MD     Scribe Attestation: I personally scribed for Jesus Heredia MD on 04/30/2018 at 12:51 PM. Electronically signed by luisa Peralta on 04/30/2018 at 12:51 PM.

## 2018-05-01 VITALS
SYSTOLIC BLOOD PRESSURE: 135 MMHG | TEMPERATURE: 97 F | OXYGEN SATURATION: 95 % | WEIGHT: 129.19 LBS | HEART RATE: 69 BPM | DIASTOLIC BLOOD PRESSURE: 65 MMHG | HEIGHT: 62 IN | BODY MASS INDEX: 23.77 KG/M2 | RESPIRATION RATE: 16 BRPM

## 2018-05-01 LAB
ALBUMIN SERPL BCP-MCNC: 2.8 G/DL
ALP SERPL-CCNC: 50 U/L
ALT SERPL W/O P-5'-P-CCNC: 8 U/L
ANION GAP SERPL CALC-SCNC: 9 MMOL/L
AST SERPL-CCNC: 20 U/L
BASOPHILS # BLD AUTO: 0.04 K/UL
BASOPHILS NFR BLD: 0.7 %
BILIRUB SERPL-MCNC: 0.4 MG/DL
BUN SERPL-MCNC: 7 MG/DL
CALCIUM SERPL-MCNC: 8.1 MG/DL
CHLORIDE SERPL-SCNC: 103 MMOL/L
CO2 SERPL-SCNC: 19 MMOL/L
CREAT SERPL-MCNC: 0.6 MG/DL
DIFFERENTIAL METHOD: ABNORMAL
EOSINOPHIL # BLD AUTO: 0.1 K/UL
EOSINOPHIL NFR BLD: 1.3 %
ERYTHROCYTE [DISTWIDTH] IN BLOOD BY AUTOMATED COUNT: 14.6 %
EST. GFR  (AFRICAN AMERICAN): >60 ML/MIN/1.73 M^2
EST. GFR  (NON AFRICAN AMERICAN): >60 ML/MIN/1.73 M^2
GLUCOSE SERPL-MCNC: 74 MG/DL
HCT VFR BLD AUTO: 37.3 %
HGB BLD-MCNC: 12.4 G/DL
IMM GRANULOCYTES # BLD AUTO: 0.05 K/UL
IMM GRANULOCYTES NFR BLD AUTO: 0.9 %
LYMPHOCYTES # BLD AUTO: 1.4 K/UL
LYMPHOCYTES NFR BLD: 26.4 %
MCH RBC QN AUTO: 30.7 PG
MCHC RBC AUTO-ENTMCNC: 33.2 G/DL
MCV RBC AUTO: 92 FL
MONOCYTES # BLD AUTO: 0.8 K/UL
MONOCYTES NFR BLD: 15.5 %
NEUTROPHILS # BLD AUTO: 3 K/UL
NEUTROPHILS NFR BLD: 55.2 %
NRBC BLD-RTO: 0 /100 WBC
PLATELET # BLD AUTO: 300 K/UL
PMV BLD AUTO: 8.7 FL
POTASSIUM SERPL-SCNC: 3.5 MMOL/L
PROT SERPL-MCNC: 4.9 G/DL
RBC # BLD AUTO: 4.04 M/UL
SODIUM SERPL-SCNC: 131 MMOL/L
WBC # BLD AUTO: 5.35 K/UL

## 2018-05-01 PROCEDURE — 36415 COLL VENOUS BLD VENIPUNCTURE: CPT

## 2018-05-01 PROCEDURE — 63600175 PHARM REV CODE 636 W HCPCS: Performed by: INTERNAL MEDICINE

## 2018-05-01 PROCEDURE — 99239 HOSP IP/OBS DSCHRG MGMT >30: CPT | Mod: ,,, | Performed by: INTERNAL MEDICINE

## 2018-05-01 PROCEDURE — 85025 COMPLETE CBC W/AUTO DIFF WBC: CPT

## 2018-05-01 PROCEDURE — 80053 COMPREHEN METABOLIC PANEL: CPT

## 2018-05-01 PROCEDURE — 11000001 HC ACUTE MED/SURG PRIVATE ROOM

## 2018-05-01 PROCEDURE — 25000003 PHARM REV CODE 250: Performed by: PHYSICIAN ASSISTANT

## 2018-05-01 RX ORDER — DOCUSATE SODIUM 100 MG/1
100 CAPSULE, LIQUID FILLED ORAL DAILY
Refills: 0 | COMMUNITY
Start: 2018-05-01

## 2018-05-01 RX ORDER — METOCLOPRAMIDE HYDROCHLORIDE 5 MG/ML
5 INJECTION INTRAMUSCULAR; INTRAVENOUS EVERY 8 HOURS
Status: DISCONTINUED | OUTPATIENT
Start: 2018-05-01 | End: 2018-05-01 | Stop reason: HOSPADM

## 2018-05-01 RX ORDER — METOCLOPRAMIDE 5 MG/1
5 TABLET ORAL 2 TIMES DAILY PRN
Qty: 30 TABLET | Refills: 0 | Status: SHIPPED | OUTPATIENT
Start: 2018-05-01

## 2018-05-01 RX ADMIN — STANDARDIZED SENNA CONCENTRATE AND DOCUSATE SODIUM 1 TABLET: 8.6; 5 TABLET, FILM COATED ORAL at 09:05

## 2018-05-01 RX ADMIN — METOPROLOL SUCCINATE 25 MG: 25 TABLET, EXTENDED RELEASE ORAL at 09:05

## 2018-05-01 RX ADMIN — METOCLOPRAMIDE 5 MG: 5 INJECTION, SOLUTION INTRAMUSCULAR; INTRAVENOUS at 03:05

## 2018-05-01 RX ADMIN — LEVOTHYROXINE SODIUM 100 MCG: 100 TABLET ORAL at 06:05

## 2018-05-01 RX ADMIN — ACETAMINOPHEN 650 MG: 325 TABLET ORAL at 08:05

## 2018-05-01 RX ADMIN — PANTOPRAZOLE SODIUM 40 MG: 40 TABLET, DELAYED RELEASE ORAL at 09:05

## 2018-05-01 RX ADMIN — AMLODIPINE BESYLATE 10 MG: 10 TABLET ORAL at 09:05

## 2018-05-01 RX ADMIN — LACTOBACILLUS ACIDOPHILUS / LACTOBACILLUS BULGARICUS 1 EACH: 100 MILLION CFU STRENGTH GRANULES at 09:05

## 2018-05-01 RX ADMIN — BENAZEPRIL HYDROCHLORIDE 20 MG: 20 TABLET, FILM COATED ORAL at 09:05

## 2018-05-01 NOTE — PROGRESS NOTES
Discharge instructions provided to and gone over with patient and daughter. Understanding verbalized. IV removed, tip intact. Transport pending.

## 2018-05-01 NOTE — DISCHARGE SUMMARY
"Discharge Summary  Hospital Medicine    Patient Name: Suzan Villarreal  MRN: [id number]  Attending Provider on Discharge: Rosa M Josue MD  Hospital Medicine Team: [facility] [place] [facility]  Date of Admission:  4/28/2018     Date of Discharge:  5/1/2018  6:31 PM  Code status: Full Code    Active Hospital Problems    Diagnosis  POA    Hypothyroidism [E03.9]  Yes     Chronic    Hyponatremia [E87.1]  Yes    HTN (hypertension) [I10]  Yes     Chronic    COPD (chronic obstructive pulmonary disease) [J44.9]  Yes     Chronic    GERD (gastroesophageal reflux disease) [K21.9]  Yes     Chronic      Resolved Hospital Problems    Diagnosis Date Resolved POA    *Ileus [K56.7] 05/03/2018 Yes        HPI: "89 year old female with a h/o HTN, HLD, GERD, and COPD presents with abdominal pain x5 days.  She has a h/o cholecystectomy and appendectomy.  Pain starts in her mid-epigastrium and radiates downward and across her abdomen.  Pain comes in waves and she is currently pain free in ED.  She states passing gas helps the pain.  She has been constipated lately, but did have a small, hard BM this morning.  Denies blood in stool, melena.  Daughter states that her appetite has been poor and she has been weaker.  She complains of some nausea and one episode of emesis on Tuesday.  Patient denies chest pain, SOB, dizziness, palpitations, fever/chills."      Hospital Course:  CT abdomen/pelvis showed multiple dilated loops of small bowel throughout the abdomen and pelvis without definite transition point, suggestive of partial small bowel obstruction or ileus. General Surgery was consulted. They did not feel history, physical, and CT scan were consistent with SBO and did not recommend surgical intervention at this time. Patient was started on a clear liquid diet and bowel regimen.      Ileus, resolved  -continue bowel regimen  - Diet advanced to full liquid and tolerated  - Started IV Reglan 5mg q8hrs   - KUB on 5/1 shows no significant bowel " dilatation.   - Discharged with PRN Reglan to be administered by her daughter for mild GI symptoms.     Hyponatremia  -Improved with IVF   -Monitor     Hypothyroidism  - Continue levothyroxine 100mcg daily.     GERD (gastroesophageal reflux disease)  - Continue Protonix.     COPD (chronic obstructive pulmonary disease)   - Continue salmeterol diskus, PRN Duonebs.     HTN (hypertension)   - Continue amlodipine 10mg daily, benazepril 20mg BID, metoprolol succinate 25mg daily.         Recent Labs  Lab 04/28/18 2252 04/30/18 0450 05/01/18  0552   WBC 6.11 5.02 5.35   HGB 13.4 13.0 12.4   HCT 38.3 38.9 37.3    326 300       Recent Labs  Lab 04/28/18 2252 04/30/18 0450 05/01/18  0552   * 129* 131*   K 4.2 3.7 3.5   CL 95 100 103   CO2 22* 19* 19*   BUN 14 11 7*   CREATININE 0.8 0.6 0.6    80 74   CALCIUM 8.6* 8.0* 8.1*   MG 1.9 1.7  --    PHOS  --  3.2  --    LIPASE 31  --   --        Recent Labs  Lab 04/28/18 2252 04/30/18 0450 05/01/18  0552   ALKPHOS 54* 47* 50*   ALT 10 7* 8*   AST 20 17 20   ALBUMIN 3.2* 2.8* 2.8*   PROT 5.9* 4.9* 4.9*   BILITOT 0.5 0.5 0.4   INR 1.0  --   --       Recent Labs      04/28/18 2252   TROPONINI  0.015        Procedures: none    Consultants: General Surgery      Medications:  Reconciled Home Medications:      Medication List      START taking these medications    docusate sodium 100 MG capsule  Commonly known as:  COLACE  Take 1 capsule (100 mg total) by mouth once daily.     inulin-sorbitol 2 gram Chew  Take 1-2 tablets by mouth 2 (two) times daily.     metoclopramide HCl 5 MG tablet  Commonly known as:  REGLAN  Take 1 tablet (5 mg total) by mouth 2 (two) times daily as needed (nausea/vomiting). Do not take if severe abdominal pain present        CONTINUE taking these medications    acetaminophen 325 MG tablet  Commonly known as:  TYLENOL  Take 2 tablets (650 mg total) by mouth every 6 (six) hours as needed.     amLODIPine 10 MG tablet  Commonly known as:   NORVASC  Take 1 tablet (10 mg total) by mouth once daily.     benazepril 20 MG tablet  Commonly known as:  LOTENSIN  Take 1 tablet (20 mg total) by mouth 2 (two) times daily.     Bifidobacterium animalis 6 mg (5 billion cell) Cap  Take 1 capsule by mouth once daily.     calcium carbonate 600 mg calcium (1,500 mg) Tab  Commonly known as:  OS-UZMA  Take 600 mg by mouth 2 (two) times daily with meals.     esomeprazole 40 MG capsule  Commonly known as:  NEXIUM  Take 1 capsule (40 mg total) by mouth before breakfast.     fluoruracil 0.5 % cream  Commonly known as:  CARAC  Apply topically once daily. Nasal tip     levothyroxine 100 MCG tablet  Commonly known as:  SYNTHROID  Take 1 tablet (100 mcg total) by mouth every morning.     metoprolol succinate 25 MG 24 hr tablet  Commonly known as:  TOPROL-XL  Take 1 tablet (25 mg total) by mouth once daily.     multivitamin capsule  Take 1 capsule by mouth once daily.     OCUVITE ADULT 50+ ORAL  Take 1 tablet by mouth once daily.     salmeterol 50 mcg/dose diskus inhaler  Commonly known as:  SEREVENT DISKUS  Inhale 1 puff into the lungs 2 (two) times daily. Controller            Discharge Instructions:    Discharge Procedure Orders  Diet Adult Regular     Activity as tolerated     Notify your health care provider if you experience any of the following:  temperature >100.4     Notify your health care provider if you experience any of the following:  persistent nausea and vomiting or diarrhea     Notify your health care provider if you experience any of the following:  difficulty breathing or increased cough     Notify your health care provider if you experience any of the following:  persistent dizziness, light-headedness, or visual disturbances     Notify your health care provider if you experience any of the following:  increased confusion or weakness         Discharge Condition: stable    Disposition: Home or Self Care    Indwelling Lines/Drains at time of discharge:  none    Tests pending at the time of discharge: none      Time spent on the discharge of the patient including review of hospital course with the patient, reviewing discharge medications and arranging follow-up care: 30 minutes.    Discharge examination Patient was seen and examined on 5/1/2018 and determined to be suitable for discharge.    Discharge plan and follow up:  Follow-up Information     Aly Rivas MD. Schedule an appointment as soon as possible for a visit in 3 days.    Specialty:  Family Medicine  Why:  For discharge from hospital follow up  Contact information:  [address]  [place] [id number]  [phone]                 Future Appointments  Date Time Provider Department [place]   5/15/2018 4:00 PM Aly Rivas MD [facility]       Provider  Rosa M Josue MD  Department of Hospital Medicine  [facility]

## 2018-05-01 NOTE — PLAN OF CARE
Problem: Patient Care Overview  Goal: Plan of Care Review  Outcome: Ongoing (interventions implemented as appropriate)  Patient has remained free of falls this shift. She is AAOx4 and VS's have been stable. Her blood pressure does seem to bounce around a bit. She is still having very loose stools that are creamy in consistency and light brown in color. She blames the stools on being on a clear liquid diet.

## 2018-05-01 NOTE — MEDICAL/APP STUDENT
Hospital Medicine  Progress Note    Patient Name: Suzan Villarreal  MRN: [id number]  [place] [facility] [place] [facility] [place] Rosa M Josue MD  Admit Date: 4/28/2018  GIFTY 5/3/2018  Code status: Full Code    Principal Problem:  Ileus    Interval history:  Patient with no events overnight, no new complaints. Patient hungry.     Review of Systems   Constitutional: Negative for fever.   Gastrointestinal: Positive for diarrhea.       Physical Exam:  Temp:  [96.3 °F (35.7 °C)-97.6 °F (36.4 °C)]   Pulse:  [71-77]   Resp:  [16-18]   BP: (134-188)/(67-84)   SpO2:  [93 %-96 %]      Temp: 96.3 °F (35.7 °C) (05/01/18 0829)  Pulse: 73 (05/01/18 0829)  Resp: 16 (05/01/18 0829)  BP: (!) 162/70 (05/01/18 0829)  SpO2: (!) 94 % (05/01/18 0829)    Intake/Output Summary (Last 24 hours) at 05/01/18 1058  Last data filed at 05/01/18 0800   Gross per 24 hour   Intake                0 ml   Output              300 ml   Net             -300 ml     Weight: 58.6 kg (129 lb 3 oz)  Body mass index is 23.63 kg/m².    Physical Exam   Constitutional: No distress.   HENT:   Head: Normocephalic.   Eyes: Conjunctivae and lids are normal.   Neck: Neck supple.   Cardiovascular: S1 normal and S2 normal.    Pulmonary/Chest: Effort normal and breath sounds normal.   Abdominal: Soft. Bowel sounds are normal. There is no tenderness.   Musculoskeletal: She exhibits no edema.   Neurological: She is not disoriented.   Skin: Skin is warm and dry. No cyanosis. Nails show no clubbing.   Psychiatric: Mood and affect normal.       Significant Labs:    Recent Labs  Lab 04/28/18 2252 04/30/18 0450 05/01/18  0552   WBC 6.11 5.02 5.35   HGB 13.4 13.0 12.4   HCT 38.3 38.9 37.3    326 300       Recent Labs  Lab 04/28/18 2252 04/30/18 0450 05/01/18  0552   * 129* 131*   K 4.2 3.7 3.5   CL 95 100 103   CO2 22* 19* 19*   BUN 14 11 7*   CREATININE 0.8 0.6 0.6    80 74   CALCIUM 8.6* 8.0* 8.1*   MG 1.9 1.7  --    PHOS  --  3.2  --    ALKPHOS 54* 47* 50*   ALT 10 7* 8*   AST 20  "17 20   ALBUMIN 3.2* 2.8* 2.8*   PROT 5.9* 4.9* 4.9*   BILITOT 0.5 0.5 0.4   INR 1.0  --   --    LIPASE 31  --   --      No results for input(s): POCTGLUCOSE in the last 168 hours.  A1C:   Recent Labs  Lab 04/30/18  0450   HGBA1C 4.9       Recent Labs  Lab 04/28/18  2252   TROPONINI 0.015       TSH:   Recent Labs  Lab 04/30/18  1328   TSH 9.287*     Inpatient Medications prescribed for management of current Problems:   Scheduled Meds:    amLODIPine  10 mg Oral Daily    benazepril  20 mg Oral BID    lactobacillus acidophilus & bulgar  1 packet Oral BID    levothyroxine  100 mcg Oral QAM    metoclopramide HCl  5 mg Intravenous Q8H    metoprolol succinate  25 mg Oral Daily    pantoprazole  40 mg Oral Daily    salmeterol  1 puff Inhalation BID    senna-docusate 8.6-50 mg  1 tablet Oral BID     Continuous Infusions:   As Needed: acetaminophen, albuterol-ipratropium 2.5mg-0.5mg/3mL, dextrose 50%, dextrose 50%, dicyclomine, glucagon (human recombinant), glucose, glucose, ketorolac, ondansetron, promethazine, ramelteon, sodium chloride 0.9%    Active Hospital Problems    Diagnosis  POA    *Ileus [K56.7]  Yes    Hypothyroidism [E03.9]  Yes     Chronic    Hyponatremia [E87.1]  Yes    HTN (hypertension) [I10]  Yes     Chronic    COPD (chronic obstructive pulmonary disease) [J44.9]  Yes     Chronic    GERD (gastroesophageal reflux disease) [K21.9]  Yes     Chronic      Resolved Hospital Problems    Diagnosis Date Resolved POA   No resolved problems to display.       Overview: "Patient is a 89 year old lady with a h/o HTN, HLD, GERD, and COPD admitted for ileus."        Hospital Course:  CT abdomen/pelvis showed multiple dilated loops of small bowel throughout the abdomen and pelvis without definite transition point, suggestive of partial small bowel obstruction or ileus. General Surgery was consulted. They did not feel history, physical, and CT scan were consistent with SBO and did not recommend surgical " intervention at this time. Patient was started on a clear liquid diet and bowel regimen.     Assessment and Plan for Problems addressed today:    Ileus  -continue bowel regimen   -Diet advanced to full liquid  -Started IV Reglan 5mg q8hrs   -KUB on 5/1 shows no significant bowel dilatation.     Hyponatremia  -Improved with IVF   -Monitoring      Hypothyroidism  - Continue levothyroxine 100mcg daily.     GERD (gastroesophageal reflux disease)  - Continue Protonix.     COPD (chronic obstructive pulmonary disease)   - Continue salmeterol diskus, PRN Duonebs.     HTN (hypertension)   - Continue amlodipine 10mg daily, benazepril 20mg BID, metoprolol succinate 25mg daily.    DVT Prophylaxis:   Anticoagulants   Medication Route Frequency         Discharge plan and follow up  Home or Self Care      Provider  Rosa M Josue MD  [facility] [place] [facility]   Department of Hospital Medicine    Scribe Attestation: I personally scribed for Rosa M Josue MD on 05/01/2018 at 11:11 AM. Electronically signed by luisa Peralta on 05/01/2018 at 11:11 AM.

## 2018-05-01 NOTE — PLAN OF CARE
Problem: Patient Care Overview  Goal: Plan of Care Review  Outcome: Ongoing (interventions implemented as appropriate)  Patient AAOx4. Safety maintained. Bed locked in low with 2 side rails up. Call light within reach. PRN pain medications administered for complaint of 10/10 bilateral lower extremity pain.

## 2018-05-02 ENCOUNTER — PATIENT MESSAGE (OUTPATIENT)
Dept: FAMILY MEDICINE | Facility: CLINIC | Age: 83
End: 2018-05-02

## 2018-05-03 PROBLEM — K56.7 ILEUS: Status: RESOLVED | Noted: 2018-04-29 | Resolved: 2018-05-03

## 2018-05-03 NOTE — PLAN OF CARE
05/03/18 1341   Final Note   Assessment Type Final Discharge Note   Discharge Disposition Home   Hospital Follow Up  Appt(s) scheduled? Yes   Discharge plans and expectations educations in teach back method with documentation complete? Yes   Right Care Referral Info   Post Acute Recommendation No Care

## 2018-05-10 ENCOUNTER — HOSPITAL ENCOUNTER (EMERGENCY)
Facility: HOSPITAL | Age: 83
Discharge: HOME | End: 2018-05-10
Attending: EMERGENCY MEDICINE
Payer: MEDICARE

## 2018-05-10 VITALS
SYSTOLIC BLOOD PRESSURE: 178 MMHG | OXYGEN SATURATION: 95 % | WEIGHT: 129 LBS | TEMPERATURE: 98 F | BODY MASS INDEX: 24.35 KG/M2 | RESPIRATION RATE: 18 BRPM | HEART RATE: 81 BPM | DIASTOLIC BLOOD PRESSURE: 87 MMHG | HEIGHT: 61 IN

## 2018-05-10 DIAGNOSIS — R52 PAIN: ICD-10-CM

## 2018-05-10 DIAGNOSIS — L03.115 CELLULITIS OF RIGHT LOWER EXTREMITY: Primary | ICD-10-CM

## 2018-05-10 LAB
ALBUMIN SERPL BCP-MCNC: 3.1 G/DL
ALP SERPL-CCNC: 74 U/L
ALT SERPL W/O P-5'-P-CCNC: 10 U/L
ANION GAP SERPL CALC-SCNC: 9 MMOL/L
AST SERPL-CCNC: 21 U/L
BASOPHILS # BLD AUTO: 0.05 K/UL
BASOPHILS NFR BLD: 0.5 %
BILIRUB SERPL-MCNC: 0.7 MG/DL
BUN SERPL-MCNC: 7 MG/DL
CALCIUM SERPL-MCNC: 8.6 MG/DL
CHLORIDE SERPL-SCNC: 98 MMOL/L
CO2 SERPL-SCNC: 27 MMOL/L
CREAT SERPL-MCNC: 0.6 MG/DL
CRP SERPL-MCNC: 10.9 MG/L
DIFFERENTIAL METHOD: ABNORMAL
EOSINOPHIL # BLD AUTO: 0 K/UL
EOSINOPHIL NFR BLD: 0.4 %
ERYTHROCYTE [DISTWIDTH] IN BLOOD BY AUTOMATED COUNT: 15.8 %
ERYTHROCYTE [SEDIMENTATION RATE] IN BLOOD BY WESTERGREN METHOD: 3 MM/HR
EST. GFR  (AFRICAN AMERICAN): >60 ML/MIN/1.73 M^2
EST. GFR  (NON AFRICAN AMERICAN): >60 ML/MIN/1.73 M^2
GLUCOSE SERPL-MCNC: 90 MG/DL
HCT VFR BLD AUTO: 37.2 %
HGB BLD-MCNC: 12.7 G/DL
IMM GRANULOCYTES # BLD AUTO: 0.04 K/UL
IMM GRANULOCYTES NFR BLD AUTO: 0.4 %
LYMPHOCYTES # BLD AUTO: 1.7 K/UL
LYMPHOCYTES NFR BLD: 16.2 %
MCH RBC QN AUTO: 30.5 PG
MCHC RBC AUTO-ENTMCNC: 34.1 G/DL
MCV RBC AUTO: 89 FL
MONOCYTES # BLD AUTO: 0.8 K/UL
MONOCYTES NFR BLD: 7.6 %
NEUTROPHILS # BLD AUTO: 8 K/UL
NEUTROPHILS NFR BLD: 74.9 %
NRBC BLD-RTO: 0 /100 WBC
PLATELET # BLD AUTO: 325 K/UL
PMV BLD AUTO: 8.8 FL
POTASSIUM SERPL-SCNC: 3.6 MMOL/L
PROT SERPL-MCNC: 5.8 G/DL
RBC # BLD AUTO: 4.16 M/UL
SODIUM SERPL-SCNC: 134 MMOL/L
URATE SERPL-MCNC: 1.7 MG/DL
WBC # BLD AUTO: 10.61 K/UL

## 2018-05-10 PROCEDURE — 86140 C-REACTIVE PROTEIN: CPT

## 2018-05-10 PROCEDURE — 85025 COMPLETE CBC W/AUTO DIFF WBC: CPT

## 2018-05-10 PROCEDURE — 99283 EMERGENCY DEPT VISIT LOW MDM: CPT | Mod: ,,, | Performed by: EMERGENCY MEDICINE

## 2018-05-10 PROCEDURE — 96366 THER/PROPH/DIAG IV INF ADDON: CPT

## 2018-05-10 PROCEDURE — 84550 ASSAY OF BLOOD/URIC ACID: CPT

## 2018-05-10 PROCEDURE — 80053 COMPREHEN METABOLIC PANEL: CPT

## 2018-05-10 PROCEDURE — 96365 THER/PROPH/DIAG IV INF INIT: CPT

## 2018-05-10 PROCEDURE — 63600175 PHARM REV CODE 636 W HCPCS: Performed by: EMERGENCY MEDICINE

## 2018-05-10 PROCEDURE — 25000003 PHARM REV CODE 250: Performed by: EMERGENCY MEDICINE

## 2018-05-10 PROCEDURE — 85651 RBC SED RATE NONAUTOMATED: CPT

## 2018-05-10 PROCEDURE — 99284 EMERGENCY DEPT VISIT MOD MDM: CPT | Mod: 25

## 2018-05-10 RX ORDER — TRAMADOL HYDROCHLORIDE 50 MG/1
50 TABLET ORAL
Status: COMPLETED | OUTPATIENT
Start: 2018-05-10 | End: 2018-05-10

## 2018-05-10 RX ORDER — VANCOMYCIN/0.9 % SOD CHLORIDE 1 G/100 ML
1000 PLASTIC BAG, INJECTION (ML) INTRAVENOUS
Status: COMPLETED | OUTPATIENT
Start: 2018-05-10 | End: 2018-05-10

## 2018-05-10 RX ORDER — CLINDAMYCIN HYDROCHLORIDE 300 MG/1
300 CAPSULE ORAL 3 TIMES DAILY
Qty: 30 CAPSULE | Refills: 0 | Status: SHIPPED | OUTPATIENT
Start: 2018-05-10 | End: 2018-05-15

## 2018-05-10 RX ORDER — TRAMADOL HYDROCHLORIDE 50 MG/1
50 TABLET ORAL EVERY 6 HOURS PRN
Qty: 12 TABLET | Refills: 0 | Status: SHIPPED | OUTPATIENT
Start: 2018-05-10 | End: 2018-05-15

## 2018-05-10 RX ADMIN — VANCOMYCIN HYDROCHLORIDE 1 G: 5 INJECTION, POWDER, LYOPHILIZED, FOR SOLUTION INTRAVENOUS at 08:05

## 2018-05-10 RX ADMIN — TRAMADOL HYDROCHLORIDE 50 MG: 50 TABLET, FILM COATED ORAL at 09:05

## 2018-05-11 NOTE — ED NOTES
LOC: The patient is awake, alert, aware of environment with an appropriate affect. Oriented x4, speaking appropriately  APPEARANCE: Pt resting comfortably, in no acute distress, pt is clean and well groomed, clothing properly fastened  SKIN:The skin is warm and dry, color consistent with ethnicity, patient has normal skin turgor and moist mucus membranes. Blister noted on right great toe.   MUSCULOSKELETAL: Patient moving all extremities spontaneously, swelling and cool to touch on right lower foot. No obvious deformities or swelling in upper extremities or left lower extremity.   NEUROLOGIC: PERRLA, facial expression is symmetrical, patient moving all extremities spontaneously, normal sensation in all extremities when touched with a finger.  Follows all commands appropriately  HEAD/FACE: The head is rounded; normocephalic and symmetrical.   RESPIRATORY:Airway is open and patent, respirations are spontaneous, patient has a normal effort and rate, no accessory muscle use noted.  CARDIAC: Normal rate and rhythm, no peripheral edema noted, capillary refill < 3 seconds, bilateral radial pulses 2+.  ABDOMEN: S/ND/NT, normoactive bowel sounds present in all four quadrants. Normal stool pattern. Jugular veins are non-distended.

## 2018-05-11 NOTE — ED TRIAGE NOTES
"Pt arrives to the ED via personal transport with CC of right foot pain. Pt's daughter stated pt was discharged from the hospital Thursday but while the pt was in the hospital, the pain has increased and notified the nurses, but nothing was done for the foot pain. Pt stated, "when I stand, it feels like someone is stomping on it". Pt denies falling, hitting, or stomping the foot on anything. Pt is alert and oriented and in no acute distress at this time.   "

## 2018-05-11 NOTE — ED PROVIDER NOTES
Encounter Date: 5/10/2018    SCRIBE #1 NOTE: I, Tanner Morris, am scribing for, and in the presence of,  Dr. Thomas . I have scribed the entire note.       History     Chief Complaint   Patient presents with    Cellulitis     also reports abd pain.      Time patient was seen by the provider: 7:54 PM    The patient is a 89 y.o. female with co-morbidities including: HTN, hyperlipidemia, COPD, GERD, and osteoarthritis who presents to the ED with a complaint of swelling in her right great toe. Per her daughter, pt's toe has gotten worse over the past 2-3 days. She denies falls or any trama to her foot. She has decreased ambulation and she also c/o intermittent nausea and abdominal pain.         The history is provided by the patient.     Review of patient's allergies indicates:   Allergen Reactions    Sulfa (sulfonamide antibiotics) Swelling    Asa [aspirin] Itching     Past Medical History:   Diagnosis Date    Allergy     Cholelithiasis without obstruction     Chronic insomnia     COPD (chronic obstructive pulmonary disease)     DJD (degenerative joint disease), lumbar     GERD (gastroesophageal reflux disease)     HLD (hyperlipidemia)     HTN (hypertension)     Hypothyroid     OP (osteoporosis)     Osteoarthritis     S/P hysterectomy      Past Surgical History:   Procedure Laterality Date    APPENDECTOMY      CHOLECYSTECTOMY      HIP SURGERY  8-2013    right    HYSTERECTOMY      KNEE ARTHROSCOPY Right 7-6-15    TK     Family History   Problem Relation Age of Onset    Melanoma Neg Hx      Social History   Substance Use Topics    Smoking status: Never Smoker    Smokeless tobacco: Never Used    Alcohol use No     Review of Systems   Constitutional: Negative for fever.   HENT: Negative for sore throat.    Respiratory: Negative for shortness of breath.    Cardiovascular: Negative for chest pain.   Gastrointestinal: Positive for abdominal pain and nausea (Resolved ).   Genitourinary: Negative for  dysuria.   Musculoskeletal: Negative for back pain.   Skin: Negative for rash.        + swelling in her right great toe.    Neurological: Negative for weakness.   Hematological: Does not bruise/bleed easily.       Physical Exam     Initial Vitals [05/10/18 1830]   BP Pulse Resp Temp SpO2   (!) 177/74 87 18 97.8 °F (36.6 °C) 95 %      MAP       108.33         Physical Exam    Nursing note and vitals reviewed.  Constitutional: She appears well-developed and well-nourished.   HENT:   Head: Normocephalic and atraumatic.   Eyes: Conjunctivae and EOM are normal. Pupils are equal, round, and reactive to light.   Neck: Normal range of motion. Neck supple. No tracheal deviation present.   Cardiovascular: Normal rate, regular rhythm, normal heart sounds and intact distal pulses.   Pulmonary/Chest: Breath sounds normal. No respiratory distress. She has no wheezes. She has no rhonchi. She has no rales. She exhibits no tenderness.   Abdominal: Soft. Bowel sounds are normal. She exhibits no distension and no mass. There is no tenderness. There is no rebound and no guarding.   Musculoskeletal: Normal range of motion. She exhibits no edema or tenderness.   Mild right foot swelling with some associated erythema. No induration. No fluctuance.  There appears to be a small little blister on the tip of her right great toe. There is still ROM, flexion and extension of the ankle. No warmth. No sign of spreading of infection of upper or lower extremity.    Neurological: She is alert and oriented to person, place, and time. She has normal strength. No cranial nerve deficit or sensory deficit.   Skin: Skin is warm and dry. Capillary refill takes less than 2 seconds.   Psychiatric: She has a normal mood and affect. Her behavior is normal. Judgment and thought content normal.         ED Course   Procedures  Labs Reviewed   CBC W/ AUTO DIFFERENTIAL - Abnormal; Notable for the following:        Result Value    RDW 15.8 (*)     MPV 8.8 (*)      Gran # (ANC) 8.0 (*)     Gran% 74.9 (*)     Lymph% 16.2 (*)     All other components within normal limits   COMPREHENSIVE METABOLIC PANEL - Abnormal; Notable for the following:     Sodium 134 (*)     BUN, Bld 7 (*)     Calcium 8.6 (*)     Total Protein 5.8 (*)     Albumin 3.1 (*)     All other components within normal limits   C-REACTIVE PROTEIN - Abnormal; Notable for the following:     CRP 10.9 (*)     All other components within normal limits   URIC ACID - Abnormal; Notable for the following:     Uric Acid 1.7 (*)     All other components within normal limits   SEDIMENTATION RATE, MANUAL             Medical Decision Making:   History:   Old Medical Records: I decided to obtain old medical records.  Clinical Tests:   Lab Tests: Ordered and Reviewed  Radiological Study: Ordered and Reviewed  ED Management:  Patient reports improved symptoms after antibiotics and meds.  Patient has not progressed within normal limits, including a normal white blood cell count, no signs of a left shift.  The patient had a slightly elevated CRP which is very nonspecific.  At this point the patient be started on p.o. antibiotics, and was given instructions to follow up closely with her primary care physician for further assessment evaluation.  The daughter was also instructed to make sure the patient return to the ER immediately for any acute active worsening symptoms and verbalized understanding of this medical plan.            Scribe Attestation:   Scribe #1: I performed the above scribed service and the documentation accurately describes the services I performed. I attest to the accuracy of the note.               Clinical Impression:   The primary encounter diagnosis was Cellulitis of right lower extremity. A diagnosis of Pain was also pertinent to this visit.                           Zackery Thomas MD  05/10/18 0413

## 2018-05-13 ENCOUNTER — HOSPITAL ENCOUNTER (EMERGENCY)
Facility: HOSPITAL | Age: 83
Discharge: HOME | End: 2018-05-13
Attending: EMERGENCY MEDICINE | Admitting: SURGERY
Payer: MEDICARE

## 2018-05-13 VITALS
OXYGEN SATURATION: 91 % | HEART RATE: 81 BPM | WEIGHT: 115 LBS | TEMPERATURE: 98 F | SYSTOLIC BLOOD PRESSURE: 144 MMHG | DIASTOLIC BLOOD PRESSURE: 67 MMHG | HEIGHT: 60 IN | BODY MASS INDEX: 22.58 KG/M2 | RESPIRATION RATE: 14 BRPM

## 2018-05-13 DIAGNOSIS — I70.25 ISCHEMIC FOOT ULCER DUE TO ATHEROSCLEROSIS OF NATIVE ARTERY OF LIMB: Primary | ICD-10-CM

## 2018-05-13 DIAGNOSIS — L97.509 ISCHEMIC FOOT ULCER DUE TO ATHEROSCLEROSIS OF NATIVE ARTERY OF LIMB: Primary | ICD-10-CM

## 2018-05-13 LAB
ALBUMIN SERPL BCP-MCNC: 2.7 G/DL
ALP SERPL-CCNC: 89 U/L
ALT SERPL W/O P-5'-P-CCNC: 13 U/L
ANION GAP SERPL CALC-SCNC: 10 MMOL/L
AST SERPL-CCNC: 25 U/L
BASOPHILS # BLD AUTO: 0.04 K/UL
BASOPHILS NFR BLD: 0.5 %
BILIRUB SERPL-MCNC: 0.7 MG/DL
BUN SERPL-MCNC: 6 MG/DL
CALCIUM SERPL-MCNC: 7.6 MG/DL
CHLORIDE SERPL-SCNC: 97 MMOL/L
CO2 SERPL-SCNC: 24 MMOL/L
CREAT SERPL-MCNC: 0.5 MG/DL
DIFFERENTIAL METHOD: ABNORMAL
EOSINOPHIL # BLD AUTO: 0.1 K/UL
EOSINOPHIL NFR BLD: 0.7 %
ERYTHROCYTE [DISTWIDTH] IN BLOOD BY AUTOMATED COUNT: 15.5 %
EST. GFR  (AFRICAN AMERICAN): >60 ML/MIN/1.73 M^2
EST. GFR  (NON AFRICAN AMERICAN): >60 ML/MIN/1.73 M^2
GLUCOSE SERPL-MCNC: 75 MG/DL
HCT VFR BLD AUTO: 34 %
HGB BLD-MCNC: 11.5 G/DL
IMM GRANULOCYTES # BLD AUTO: 0.02 K/UL
IMM GRANULOCYTES NFR BLD AUTO: 0.2 %
LACTATE SERPL-SCNC: 1.3 MMOL/L
LYMPHOCYTES # BLD AUTO: 0.8 K/UL
LYMPHOCYTES NFR BLD: 9.3 %
MCH RBC QN AUTO: 30.9 PG
MCHC RBC AUTO-ENTMCNC: 33.8 G/DL
MCV RBC AUTO: 91 FL
MONOCYTES # BLD AUTO: 0.5 K/UL
MONOCYTES NFR BLD: 6.2 %
NEUTROPHILS # BLD AUTO: 7.3 K/UL
NEUTROPHILS NFR BLD: 83.1 %
NRBC BLD-RTO: 0 /100 WBC
PLATELET # BLD AUTO: 291 K/UL
PMV BLD AUTO: 8.4 FL
POTASSIUM SERPL-SCNC: 3.2 MMOL/L
PROT SERPL-MCNC: 5 G/DL
RBC # BLD AUTO: 3.72 M/UL
SODIUM SERPL-SCNC: 131 MMOL/L
WBC # BLD AUTO: 8.73 K/UL

## 2018-05-13 PROCEDURE — 99285 EMERGENCY DEPT VISIT HI MDM: CPT | Mod: ,,, | Performed by: PHYSICIAN ASSISTANT

## 2018-05-13 PROCEDURE — 85025 COMPLETE CBC W/AUTO DIFF WBC: CPT

## 2018-05-13 PROCEDURE — 96374 THER/PROPH/DIAG INJ IV PUSH: CPT

## 2018-05-13 PROCEDURE — 80053 COMPREHEN METABOLIC PANEL: CPT

## 2018-05-13 PROCEDURE — 83605 ASSAY OF LACTIC ACID: CPT

## 2018-05-13 PROCEDURE — 99284 EMERGENCY DEPT VISIT MOD MDM: CPT | Mod: 25

## 2018-05-13 PROCEDURE — 63600175 PHARM REV CODE 636 W HCPCS: Performed by: PHYSICIAN ASSISTANT

## 2018-05-13 RX ORDER — MORPHINE SULFATE 4 MG/ML
2 INJECTION, SOLUTION INTRAMUSCULAR; INTRAVENOUS
Status: COMPLETED | OUTPATIENT
Start: 2018-05-13 | End: 2018-05-13

## 2018-05-13 RX ORDER — OXYCODONE AND ACETAMINOPHEN 5; 325 MG/1; MG/1
1 TABLET ORAL EVERY 6 HOURS PRN
Qty: 20 TABLET | Refills: 0 | Status: ON HOLD | OUTPATIENT
Start: 2018-05-13 | End: 2018-05-18

## 2018-05-13 RX ORDER — VANCOMYCIN/0.9 % SOD CHLORIDE 1 G/100 ML
1000 PLASTIC BAG, INJECTION (ML) INTRAVENOUS
Status: DISCONTINUED | OUTPATIENT
Start: 2018-05-13 | End: 2018-05-13

## 2018-05-13 RX ORDER — OXYCODONE AND ACETAMINOPHEN 5; 325 MG/1; MG/1
1 TABLET ORAL EVERY 6 HOURS PRN
Qty: 20 TABLET | Refills: 0 | Status: SHIPPED | OUTPATIENT
Start: 2018-05-13 | End: 2018-05-13 | Stop reason: CLARIF

## 2018-05-13 RX ADMIN — MORPHINE SULFATE 2 MG: 4 INJECTION INTRAVENOUS at 09:05

## 2018-05-13 NOTE — DISCHARGE INSTRUCTIONS
Return to the ER immediately for any new or worsening symptoms.     Our goal in the emergency department is to always give you outstanding care and exceptional service. You may receive a survey by mail or e-mail in the next week regarding your experience in our ED. We would greatly appreciate your completing and returning the survey. Your feedback provides us with a way to recognize our staff who give very good care and it helps us learn how to improve when your experience was below our aspiration of excellence.

## 2018-05-13 NOTE — ED NOTES
Assisted patient to use bedpan to urinate. Isabel- care performed after and skin kept clean and dry.

## 2018-05-13 NOTE — ED NOTES
Patient lying in stretcher, appears to be resting comfortably and in no acute distress. Patient is clean and well groomed and clothing is properly fastened.    NEUROLOGICAL: The patient is awake, alert and oriented to person, time, place, and situation and speaking clearly and appropriately. Eyes open spontaneously, behavior appropriate to situation, follows commands, facial expression symmetrical, purposeful motor response noted, normal sensation in all extremities when touched with a finger. [place] both ears.  CARDIOVASCULAR: Patient has a normal rate and regular rhythm. Mild peripheral edema noted to RLE, capillary refill < 3 seconds. Unable to locate left DP, and right PT pulses by palpation or doppler. + left DP and right PT and bilateral poplitial pulses by doppler.  RESPIRATORY: Airway is open, respirations are spontaneous, patient has a normal effort and rate, no accessory muscle use noted.   GASTROINTESTINAL: Abdomen is soft and non-tender to palpation, no distention noted. Last BM 5/11/18  GENITOURINARY: Patient voids spontaneously without difficulty. Patient is incontinent.   MUSCULOSKELETAL: Patient moving all extremities spontaneously, no obvious swelling or deformities noted. Generalized weakness reported.  INTEGUMENTARY: Redness, coolness, tenderness to right foot. Intact blister to the tip of right great toe noted.   PSYCHOSOCIAL: Calm, pleasant, and cooperative. Family present at bedside.

## 2018-05-13 NOTE — ED TRIAGE NOTES
Patient identifiers for Suzan Villarreal 89 y.o. female checked and correct. Patient presents to the ED complaining of right foot pain, tenderness, swelling, and redness. She was seen in this ED last Thursday for same problem, was given IV Vancomycin, and sent home on oral antibiotic and Tramadol. Denies trauma to RLE.

## 2018-05-13 NOTE — CONSULTS
"[facility]  Vascular Surgery  Consult Note    Inpatient consult to Vascular Surgery  Consult performed by: TAMEKA BRIGGS  Consult ordered by: DIANA HANCOCK  Reason for consult: right foot pain  Assessment/Recommendations: Suzan Villarreal is a 89 y.o. with h/o HTN, HLD, GERD, and COPD presents with recurrent right foot pain. Has PT flow to the right foot. Unable to palpate/doppler DP on my exam.     RLE arterial US with patent PT to the ankle  Patient with progressive atherosclerotic disease; progressing to rest pain now with tissue loss  Patient offered an RLE angiogram, discussed benefits and risks.  Patient would like to defer procedure and treat her rest pain with pain medication at this time  Family at bedside in agreement with foregoing angiogram  Patient to follow up with Dr. Garcia as needed        Subjective:     Chief Complaint/Reason for Admission: r/o acute right lower limb ischemia    History of Present Illness: Suzan Villarreal is a 89 y.o. with h/o HTN, HLD, GERD, and COPD presents with recurrent right foot pain. She was recently discharged from the hospital (5/3/18) for abdominal pain with negative work-up. History obtained from patient and caregiver. They onset of right foot pain with an associated "black area on her right great toe" that started on Thursday (5/10/2018). They also report her right foot is chronically colder than her left foot, but today became even more cold. She was given IV vancomycin and d/c home on oral clindamycin    She is a non-smoker. Not on any anti-platelet or anti-coagulation. She reports an aspirin allergy.     No current facility-administered medications on file prior to encounter.      Current Outpatient Prescriptions on File Prior to Encounter   Medication Sig    acetaminophen (TYLENOL) 325 MG tablet Take 2 tablets (650 mg total) by mouth every 6 (six) hours as needed.    amLODIPine (NORVASC) 10 MG tablet Take 1 tablet (10 mg total) by mouth once " daily.    ANTIOX #11/OM3/DHA/EPA/LUT/BARBER (OCUVITE ADULT 50+ ORAL) Take 1 tablet by mouth once daily.    benazepril (LOTENSIN) 20 MG tablet Take 1 tablet (20 mg total) by mouth 2 (two) times daily.    Bifidobacterium animalis 6 mg (5 billion cell) Cap Take 1 capsule by mouth once daily.    calcium carbonate (OS-UZMA) 600 mg (1,500 mg) Tab Take 600 mg by mouth 2 (two) times daily with meals.    clindamycin (CLEOCIN) 300 MG capsule Take 1 capsule (300 mg total) by mouth 3 (three) times daily.    docusate sodium (COLACE) 100 MG capsule Take 1 capsule (100 mg total) by mouth once daily.    esomeprazole (NEXIUM) 40 MG capsule Take 1 capsule (40 mg total) by mouth before breakfast.    fluoruracil (CARAC) 0.5 % cream Apply topically once daily. Nasal tip    inulin-sorbitol 2 gram Chew Take 1-2 tablets by mouth 2 (two) times daily.    Lactobacillus acidoph-L.bulgar 1 million cell Chew Take 4 tablets by mouth 3 (three) times daily with meals.    levothyroxine (SYNTHROID) 100 MCG tablet Take 1 tablet (100 mcg total) by mouth every morning.    metoclopramide HCl (REGLAN) 5 MG tablet Take 1 tablet (5 mg total) by mouth 2 (two) times daily as needed (nausea/vomiting). Do not take if severe abdominal pain present    metoprolol succinate (TOPROL-XL) 25 MG 24 hr tablet Take 1 tablet (25 mg total) by mouth once daily.    multivitamin capsule Take 1 capsule by mouth once daily.    salmeterol (SEREVENT DISKUS) 50 mcg/dose diskus inhaler Inhale 1 puff into the lungs 2 (two) times daily. Controller    traMADol (ULTRAM) 50 mg tablet Take 1 tablet (50 mg total) by mouth every 6 (six) hours as needed.       Review of patient's allergies indicates:   Allergen Reactions    Sulfa (sulfonamide antibiotics) Swelling    Asa [aspirin] Itching       Past Medical History:   Diagnosis Date    Allergy     Cholelithiasis without obstruction     Chronic insomnia     COPD (chronic obstructive pulmonary disease)     DJD (degenerative  joint disease), lumbar     GERD (gastroesophageal reflux disease)     HLD (hyperlipidemia)     HTN (hypertension)     Hypothyroid     OP (osteoporosis)     Osteoarthritis     S/P hysterectomy      Past Surgical History:   Procedure Laterality Date    APPENDECTOMY      CHOLECYSTECTOMY      HIP SURGERY  8-2013    right    HYSTERECTOMY      KNEE ARTHROSCOPY Right 7-6-15    TK     Family History     None        Social History Main Topics    Smoking status: Never Smoker    Smokeless tobacco: Never Used    Alcohol use No    Drug use: No    Sexual activity: Not Currently     Review of Systems   Constitutional: Positive for activity change. Negative for chills and fever.   HENT: Negative for congestion.    Respiratory: Negative for cough, chest tightness and shortness of breath.    Cardiovascular: Negative for chest pain and palpitations.   Gastrointestinal: Negative for abdominal distention and abdominal pain.   Genitourinary: Negative for flank pain and hematuria.   Musculoskeletal: Positive for arthralgias, gait problem and joint swelling.   All other systems reviewed and are negative.    Objective:     Vital Signs (Most Recent):  Temp: 99.5 °F (37.5 °C) (05/13/18 0851)  Pulse: 86 (05/13/18 0851)  Resp: 16 (05/13/18 0851)  BP: (!) 179/77 (05/13/18 0851)  SpO2: (!) 93 % (05/13/18 0851) Vital Signs (24h Range):  Temp:  [99.5 °F (37.5 °C)] 99.5 °F (37.5 °C)  Pulse:  [86] 86  Resp:  [16] 16  SpO2:  [93 %] 93 %  BP: (179)/(77) 179/77     Weight: 52.2 kg (115 lb)  Body mass index is 22.46 kg/m².    No intake or output data in the 24 hours ending 05/13/18 [facility]    Physical Exam   Constitutional: She is oriented to person, place, and time.   Older, frail, but in no acute distress     Eyes: No scleral icterus.   Cardiovascular: Normal rate and regular rhythm.    Pulmonary/Chest: Effort normal. No respiratory distress. She has no wheezes.   Abdominal: Soft. She exhibits no distension. There is no tenderness.    Musculoskeletal: Normal range of motion.   Neurological: She is alert and oriented to person, place, and time.   Nursing note and vitals reviewed.  R femoral pulse 2+  Right PT palpable, right DP unable to palpate or doppler on my exam  Chronic arthritic / hammer toe changes with some associated erythema, edema, and tenderness, not fluid collections, has range of motion (extension/flexion), foot cooler than left  Right great toe with bullae and distal ischemia  Left foot warm and well perfused, palpable PT and biphasic DP    Significant Labs:  CBC:     Recent Labs  Lab 05/13/18  0939   WBC 8.73   RBC 3.72*   HGB 11.5*   HCT 34.0*      MCV 91   MCH 30.9   MCHC 33.8     CMP: No results for input(s): GLU, CALCIUM, ALBUMIN, PROT, NA, K, CO2, CL, BUN, CREATININE, ALKPHOS, ALT, AST, BILITOT in the last 48 hours.  Coagulation: No results for input(s): PT, INR, APTT in the last 48 hours.  Lactic Acid:     Recent Labs  Lab 05/13/18  0939   LACTATE 1.3       Significant Diagnostics:  I have reviewed all pertinent imaging results/findings within the past 24 hours.    Assessment/Plan:     Suzan Villarreal is a 89 y.o. with h/o HTN, HLD, GERD, and COPD presents with recurrent right foot pain for four days    Thank you for your consult.     Orlando Eugene MD  General Surgery  [facility]

## 2018-05-13 NOTE — ED PROVIDER NOTES
Encounter Date: 5/13/2018    SCRIBE #1 NOTE: I, Nataliya Gallardo, am scribing for, and in the presence of,  Dr. Foster. I have scribed the following portions of the note - the APC attestation.       History     Chief Complaint   Patient presents with    Cellulitis     89-year-old female with HTN, HLD presents to the ED with concerns of worsening infection to her foot.  Patient was seen in the ED 3 days ago for pain to the right great toe.  She was given a dose of IV antibiotics and discharged with oral antibiotics and pain medication.  Per daughter and patient, the redness and pain has worsened despite antibiotics.  Patient reports a subjective fever yesterday.  Patient is mobile and ambulatory at baseline.          Review of patient's allergies indicates:   Allergen Reactions    Sulfa (sulfonamide antibiotics) Swelling    Asa [aspirin] Itching     Past Medical History:   Diagnosis Date    Allergy     Cholelithiasis without obstruction     Chronic insomnia     COPD (chronic obstructive pulmonary disease)     DJD (degenerative joint disease), lumbar     GERD (gastroesophageal reflux disease)     HLD (hyperlipidemia)     HTN (hypertension)     Hypothyroid     OP (osteoporosis)     Osteoarthritis     S/P hysterectomy      Past Surgical History:   Procedure Laterality Date    APPENDECTOMY      CHOLECYSTECTOMY      HIP SURGERY  8-2013    right    HYSTERECTOMY      KNEE ARTHROSCOPY Right 7-6-15    TK     Family History   Problem Relation Age of Onset    Melanoma Neg Hx      Social History   Substance Use Topics    Smoking status: Never Smoker    Smokeless tobacco: Never Used    Alcohol use No     Review of Systems   Constitutional: Positive for fever.   Gastrointestinal: Positive for nausea.   Musculoskeletal:        +R foot pain   Skin: Positive for color change.   Neurological: Negative for weakness.       Physical Exam     Initial Vitals [05/13/18 0851]   BP Pulse Resp Temp SpO2   (!)  Single 179/77 86 16 99.5 °F (37.5 °C) (!) 93 %      MAP       111         Physical Exam    Nursing note and vitals reviewed.  Constitutional: She appears well-developed and well-nourished. She is not diaphoretic.  Non-toxic appearance. She does not appear ill. No distress.   HENT:   Head: Normocephalic and atraumatic.   Neck: Neck supple.   Cardiovascular: Normal rate and regular rhythm. Exam reveals no gallop and no friction rub.    No murmur heard.  Pulmonary/Chest: Effort normal and breath sounds normal. No accessory muscle usage. No tachypnea. No respiratory distress. She has no decreased breath sounds. She has no wheezes. She has no rhonchi. She has no rales.   Abdominal: She exhibits no distension.   Musculoskeletal: Normal range of motion.   R foot appears erythematous and is cool to touch. Mild swelling to foot. No Doppler signal present for R DP. Doppler signal is present for R PT pulse. 1+ Edema to BLE R>L. L DP, popliteal pulses present, foot warm to touch.   Small bulla to tip of R great toe. Diffuse tenderness to toe.    Neurological: She is alert.   Skin: Skin is warm and dry. No rash noted. No pallor.   Psychiatric: She has a normal mood and affect. Her behavior is normal.         ED Course   Procedures  Labs Reviewed   CBC W/ AUTO DIFFERENTIAL - Abnormal; Notable for the following:        Result Value    RBC 3.72 (*)     Hemoglobin 11.5 (*)     Hematocrit 34.0 (*)     RDW 15.5 (*)     MPV 8.4 (*)     Lymph # 0.8 (*)     Gran% 83.1 (*)     Lymph% 9.3 (*)     All other components within normal limits   COMPREHENSIVE METABOLIC PANEL - Abnormal; Notable for the following:     Sodium 131 (*)     Potassium 3.2 (*)     BUN, Bld 6 (*)     Calcium 7.6 (*)     Total Protein 5.0 (*)     Albumin 2.7 (*)     All other components within normal limits   LACTIC ACID, PLASMA             Medical Decision Making:   History:   Old Medical Records: I decided to obtain old medical records.  Differential Diagnosis:   My  differential diagnosis includes but is not limited to:  Cellulitis, abscess, ulcer, osteomyelitis, ischemia  Clinical Tests:   Lab Tests: Ordered and Reviewed  Radiological Study: Ordered and Reviewed       APC / Resident Notes:   89-year-old female presents with worsening pain and redness of the right foot despite antibiotic treatment.  Right foot appears erythematous and mildly swollen, cold to touch with no Doppler signal present DP pulse.  Small bulla to the tip of the great toe.    Discussed this case with vascular Neurology recommended arterial ultrasound.  Results as follows:   RLE arterial US with patent PT to the ankle.  Atherosclerotic disease.  No acute occlusion.    Vascular surgery discussed option of angiogram, but pt declined procedure at this time.  I personally discussed possible risks of deferring further testing and treatment.  Patient verbalizes understanding and will follow up with vascular surgery.  I will discharge patient with Percocet for pain.  Return precautions given. I have reviewed the patient's records and discussed this case with my supervising physician.           Scribe Attestation:   Scribe #1: I performed the above scribed service and the documentation accurately describes the services I performed. I attest to the accuracy of the note.    Attending Attestation:     Physician Attestation Statement for NP/PA:   I discussed this assessment and plan of this patient with the NP/PA, but I did not personally examine the patient. The face to face encounter was performed by the NP/PA.                     Clinical Impression:   The encounter diagnosis was Ischemic foot ulcer due to atherosclerosis of native artery of limb.    Disposition:   Disposition: Discharged  Condition: Stable                        Caitlin Almonte PA-C  05/13/18 1617       Fernando Foster MD  05/17/18 0604

## 2018-05-14 ENCOUNTER — OUTPATIENT CASE MANAGEMENT (OUTPATIENT)
Dept: ADMINISTRATIVE | Facility: OTHER | Age: 83
End: 2018-05-14

## 2018-05-14 NOTE — PROGRESS NOTES
The following patient has been assigned to Eliza Rutherford RN with Outpatient Complex Care Management for high risk screening.    Reason: High Risk : Recently discharged      Please contact OPCM at [phone] with any questions.    Thank you,    Lizette Sahu, [facility]  Outpatient Complex Care Mgmt  Ext [phone]

## 2018-05-15 ENCOUNTER — OFFICE VISIT (OUTPATIENT)
Dept: FAMILY MEDICINE | Facility: CLINIC | Age: 83
End: 2018-05-15
Payer: MEDICARE

## 2018-05-15 VITALS
DIASTOLIC BLOOD PRESSURE: 68 MMHG | HEIGHT: 61 IN | WEIGHT: 114.63 LBS | SYSTOLIC BLOOD PRESSURE: 120 MMHG | BODY MASS INDEX: 21.64 KG/M2 | HEART RATE: 76 BPM

## 2018-05-15 DIAGNOSIS — I73.9 PVD (PERIPHERAL VASCULAR DISEASE): ICD-10-CM

## 2018-05-15 DIAGNOSIS — K59.00 CONSTIPATION, UNSPECIFIED CONSTIPATION TYPE: Primary | ICD-10-CM

## 2018-05-15 PROCEDURE — 99499 UNLISTED E&M SERVICE: CPT | Mod: S$GLB,,, | Performed by: FAMILY MEDICINE

## 2018-05-15 PROCEDURE — 99214 OFFICE O/P EST MOD 30 MIN: CPT | Mod: S$GLB,,, | Performed by: FAMILY MEDICINE

## 2018-05-15 PROCEDURE — 99999 PR PBB SHADOW E&M-EST. PATIENT-LVL III: CPT | Mod: PBBFAC,,, | Performed by: FAMILY MEDICINE

## 2018-05-15 NOTE — PATIENT INSTRUCTIONS
Constipation (Adult)  Constipation means that you have bowel movements that are less frequent than usual. Stools often become very hard and difficult to pass.  Constipation is very common. At some point in life it affects almost everyone. Since everyone's bowel habits are different, what is constipation to one person may not be to another. Your healthcare provider may do tests to diagnose constipation. It depends on what he or she finds when evaluating you.    Symptoms of constipation include:  · Abdominal pain  · Bloating  · Vomiting  · Painful bowel movements  · Itching, swelling, bleeding, or pain around the anus  Causes  Constipation can have many causes. These include:  · Diet low in fiber  · Too much dairy  · Not drinking enough liquids  · Lack of exercise or physical activity. This is especially true for older adults.  · Changes in lifestyle or daily routine, including pregnancy, aging, work, and travel  · Frequent use or misuse of laxatives  · Ignoring the urge to have a bowel movement or delaying it until later  · Medicines, such as certain prescription pain medicines, iron supplements, antacids, certain antidepressants, and calcium supplements  · Diseases like irritable bowel syndrome, bowel obstructions, stroke, diabetes, thyroid disease, Parkinson disease, hemorrhoids, and colon cancer  Complications  Potential complications of constipation can include:  · Hemorrhoids  · Rectal bleeding from hemorrhoids or anal fissures (skin tears)  · Hernias  · Dependency on laxatives  · Chronic constipation  · Fecal impaction  · Bowel obstruction or perforation  Home care  All treatment should be done after talking with your healthcare provider. This is especially true if you have another medical problems, are taking prescription medicines, or are an older adult. Treatment most often involves lifestyle changes. You may also need medicines. Your healthcare provider will tell you which will work best for you. Follow  the advice below to help avoid this problem in the future.  Lifestyle changes  These lifestyle changes can help prevent constipation:  · Diet. Eat a high-fiber diet, with fresh fruit and vegetables, and reduce dairy intake, meats, and processed foods  · Fluids. It's important to get enough fluids each day. Drink plenty of water when you eat more fiber. If you are on diet that limits the amount of fluid you can have, talk about this with your healthcare provider.  · Regular exercise. Check with your healthcare provider first.  Medications  Take any medicines as directed. Some laxatives are safe to use only every now and then. Others can be taken on a regular basis. Talk with your doctor or pharmacist if you have questions.  Prescription pain medicines can cause constipation. If you are taking this kind of medicine, ask your healthcare provider if you should also take a stool softener.  Medicines you may take to treat constipation include:  · Fiber supplements  · Stool softeners  · Laxatives  · Enemas  · Rectal suppositories  Follow-up care  Follow up with your healthcare provider if symptoms don't get better in the next few days. You may need to have more tests or see a specialist.  Call 911  Call 911 if any of these occur:  · Trouble breathing  · Stiff, rigid abdomen that is severely painful to touch  · Confusion  · Fainting or loss of consciousness  · Rapid heart rate  · Chest pain  When to seek medical advice  Call your healthcare provider right away if any of these occur:  · Fever over 100.4°F (38°C)  · Failure to resume normal bowel movements  · Pain in your abdomen or back gets worse  · Nausea or vomiting  · Swelling in your abdomen  · Blood in the stool  · Black, tarry stool  · Involuntary weight loss  · Weakness  Date Last Reviewed: 12/30/2015  © 8191-8323 [employer]. [address] [place] [address], [place] [address]. All rights reserved. This information is not intended as a substitute for  professional medical care. Always follow your healthcare professional's instructions.

## 2018-05-15 NOTE — PROGRESS NOTES
Subjective:       Patient ID: Suzan Villarreal is a 89 y.o. female.    Chief Complaint: Hospital Follow Up; Constipation; and Peripheral Vascular Disease    89 years old female who came to the clinic with right first toe ischemic ulcer associated with peripheral arterial disease.  Patient with follow-up with vascular medicine to consider possible angiogram.  Patient taking Percocet for pain.  Patient unfortunately with persistent constipation for the last 5 days.  Patient taking Colace with no significant improvement.      Constipation   Pertinent negatives include no abdominal pain, diarrhea, nausea, rectal pain or vomiting.     Review of Systems   Constitutional: Negative.    HENT: Negative.    Eyes: Negative.    Respiratory: Negative.    Cardiovascular: Negative.    Gastrointestinal: Positive for constipation. Negative for abdominal distention, abdominal pain, anal bleeding, blood in stool, diarrhea, nausea, rectal pain and vomiting.   Genitourinary: Negative.    Musculoskeletal: Negative.    Skin: Negative.    Neurological: Negative.    Psychiatric/Behavioral: Negative.        Objective:      Physical Exam   Constitutional: She is oriented to person, place, and time. She appears well-developed and well-nourished. No distress.   HENT:   Head: Normocephalic and atraumatic.   Right Ear: External ear normal.   Left Ear: External ear normal.   Nose: Nose normal.   Mouth/Throat: Oropharynx is clear and moist. No oropharyngeal exudate.   Eyes: Conjunctivae and EOM are normal. Pupils are equal, round, and reactive to light. Right eye exhibits no discharge. Left eye exhibits no discharge. No scleral icterus.   Neck: Normal range of motion. Neck supple. No JVD present. No tracheal deviation present. No thyromegaly present.   Cardiovascular: Normal rate, regular rhythm, normal heart sounds and intact distal pulses.  Exam reveals no gallop and no friction rub.    No murmur heard.  Pulses:       Popliteal pulses are 1+ on the  right side, and 1+ on the left side.        Dorsalis pedis pulses are 1+ on the right side, and 1+ on the left side.        Posterior tibial pulses are 1+ on the right side, and 1+ on the left side.   Pulmonary/Chest: Effort normal and breath sounds normal. No stridor. No respiratory distress. She has no wheezes. She has no rales. She exhibits no tenderness.   Abdominal: Soft. Bowel sounds are normal. She exhibits no distension and no mass. There is no tenderness. There is no rebound and no guarding.   Musculoskeletal: Normal range of motion. She exhibits no edema or tenderness.   Lymphadenopathy:     She has no cervical adenopathy.   Neurological: She is alert and oriented to person, place, and time. She has normal reflexes. No cranial nerve deficit. She exhibits normal muscle tone. Coordination and gait abnormal.   Skin: Skin is warm and dry. No rash noted. She is not diaphoretic. No erythema. No pallor.   Psychiatric: She has a normal mood and affect. Her behavior is normal. Judgment and thought content normal.       Assessment:       1. Constipation, unspecified constipation type    2. PVD (peripheral vascular disease)        Plan:         Suzan was seen today for hospital follow up, constipation and peripheral vascular disease.    Diagnoses and all orders for this visit:    Constipation, unspecified constipation type  -     Ambulatory referral to Colorectal Surgery    PVD (peripheral vascular disease)     follow-up with vascular medicine tomorrow.  Colorectal evaluation as soon as possible.  If not improvement or symptoms get worse before the appointment go to the emergency room.

## 2018-05-16 ENCOUNTER — TELEPHONE (OUTPATIENT)
Dept: SURGERY | Facility: CLINIC | Age: 83
End: 2018-05-16

## 2018-05-16 ENCOUNTER — OFFICE VISIT (OUTPATIENT)
Dept: VASCULAR SURGERY | Facility: CLINIC | Age: 83
End: 2018-05-16
Payer: MEDICARE

## 2018-05-16 VITALS
BODY MASS INDEX: 22.51 KG/M2 | HEIGHT: 60 IN | TEMPERATURE: 99 F | SYSTOLIC BLOOD PRESSURE: 127 MMHG | HEART RATE: 85 BPM | RESPIRATION RATE: 20 BRPM | DIASTOLIC BLOOD PRESSURE: 58 MMHG | WEIGHT: 114.63 LBS

## 2018-05-16 DIAGNOSIS — I70.211 ATHEROSCLER OF NATIVE ARTERY OF RIGHT LEG WITH INTERMIT CLAUDICATION: ICD-10-CM

## 2018-05-16 DIAGNOSIS — I73.9 PVD (PERIPHERAL VASCULAR DISEASE): Primary | ICD-10-CM

## 2018-05-16 DIAGNOSIS — I73.9 PAD (PERIPHERAL ARTERY DISEASE): Primary | ICD-10-CM

## 2018-05-16 PROCEDURE — 99214 OFFICE O/P EST MOD 30 MIN: CPT | Mod: S$GLB,,, | Performed by: SURGERY

## 2018-05-16 PROCEDURE — 99499 UNLISTED E&M SERVICE: CPT | Mod: S$GLB,,, | Performed by: SURGERY

## 2018-05-16 PROCEDURE — 99999 PR PBB SHADOW E&M-EST. PATIENT-LVL III: CPT | Mod: PBBFAC,,, | Performed by: SURGERY

## 2018-05-16 RX ORDER — SODIUM CHLORIDE 9 MG/ML
INJECTION, SOLUTION INTRAVENOUS CONTINUOUS
Status: CANCELLED | OUTPATIENT
Start: 2018-05-16

## 2018-05-16 RX ORDER — LIDOCAINE HYDROCHLORIDE 10 MG/ML
1 INJECTION, SOLUTION EPIDURAL; INFILTRATION; INTRACAUDAL; PERINEURAL ONCE
Status: CANCELLED | OUTPATIENT
Start: 2018-05-16 | End: 2018-05-16

## 2018-05-16 NOTE — TELEPHONE ENCOUNTER
----- Message from Jazz Gay sent at 5/16/2018 10:04 AM CDT -----  Contact: self/ [phone]  Patient has a referral for Constipation, unspecified constipation type; Dr. Sams would like the patient to be seen as soon as possible the next day is 5/22/18 Dr. Sams would like for her to be seen sooner.

## 2018-05-17 ENCOUNTER — TELEPHONE (OUTPATIENT)
Dept: VASCULAR SURGERY | Facility: CLINIC | Age: 83
End: 2018-05-17

## 2018-05-17 ENCOUNTER — OUTPATIENT CASE MANAGEMENT (OUTPATIENT)
Dept: ADMINISTRATIVE | Facility: OTHER | Age: 83
End: 2018-05-17

## 2018-05-17 NOTE — PROGRESS NOTES
Talked to daughter Cristy Beck to perform initial assessment for OPCM.  Daughter asked that this RN call back on 5/21/2018 to decide whether or not she would like her mother to be enrolled in OPCM.  CAL Rutherford, OPCM-RN

## 2018-05-17 NOTE — PROGRESS NOTES
"Patient ID: Suzan Villarreal is a 89 y.o. female.    I. HISTORY     Chief Complaint: Follow-up (From ED)      HPI Suzan Villarreal is a 89 y.o. with h/o HTN, HLD, GERD, and COPD presents with recurrent right foot pain. She was recently discharged from the hospital (5/3/18) for abdominal pain with negative work-up. History obtained from patient and caregiver. They onset of right foot pain with an associated "black area on her right great toe" that started on Thursday (5/10/2018). They also report her right foot is chronically colder than her left foot, but today became even more cold. She was given IV vancomycin and d/c home on oral clindamycin     She is a non-smoker. Not on any anti-platelet or anti-coagulation. She reports an aspirin allergy.     Seen in the ED  and declined angiogram  Returns today to discuss options.   Daughter works at [employer] [place]    PE and ROS are unchanged from exam     ROS  Constitutional: Positive for activity change. Negative for chills and fever.   HENT: Negative for congestion.    Respiratory: Negative for cough, chest tightness and shortness of breath.    Cardiovascular: Negative for chest pain and palpitations.   Gastrointestinal: Negative for abdominal distention and abdominal pain.   Genitourinary: Negative for flank pain and hematuria.   Musculoskeletal: Positive for arthralgias, gait problem and joint swelling.   All other systems reviewed and are negative.    II. PHYSICAL EXAM   Right Arm BP - Sittin/63 (18 1511)  Left Arm BP - Sittin/58 (18 1511)  Pulse: 85  Temp: 98.6 °F (37 °C)  Body mass index is 22.39 kg/m².      Physical Exam  Constitutional: She is oriented to person, place, and time.   Older, frail, but in no acute distress     Eyes: No scleral icterus.   Cardiovascular: Normal rate and regular rhythm.    Pulmonary/Chest: Effort normal. No respiratory distress. She has no wheezes.   Abdominal: Soft. She exhibits no distension. There " is no tenderness.   Musculoskeletal: Normal range of motion.   Neurological: She is alert and oriented to person, place, and time.   Nursing note and vitals reviewed.  R femoral pulse 2+   right DP unable to palpate or doppler on my exam  Chronic arthritic / hammer toe changes with some associated erythema, edema, and tenderness, not fluid collections, has range of motion (extension/flexion), foot cooler than left  Right great toe with bullae and distal ischemia  Left foot warm and well perfused, palpable PT and biphasic DP  III. ASSESSMENT & PLAN (MEDICAL DECISION MAKING)     1. PVD (peripheral vascular disease)    2. 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 female with progressive rest pain and tissue loss of her right foot  Plan angio on Friday - Plavix load pre-op    I have explained the risks, benefits, medical treatments, and alternatives of the procedure in detail. Risks include but are not limited to bleeding, injury to vessel, kidney damage or failure, need for emergency surgery, limb loss or need for additional procedures or bypass surgery.  The patient voices understanding and all questions have been answered.  They agree to proceed as planned.    Mary Ann Garcia MD FACS [facility]  Vascular & Endovascular Surgery

## 2018-05-18 ENCOUNTER — SURGERY (OUTPATIENT)
Age: 83
End: 2018-05-18

## 2018-05-18 ENCOUNTER — ANESTHESIA EVENT (OUTPATIENT)
Dept: SURGERY | Facility: HOSPITAL | Age: 83
End: 2018-05-18
Payer: MEDICARE

## 2018-05-18 ENCOUNTER — HOSPITAL ENCOUNTER (OUTPATIENT)
Facility: HOSPITAL | Age: 83
Discharge: HOME | End: 2018-05-18
Attending: SURGERY | Admitting: SURGERY
Payer: MEDICARE

## 2018-05-18 ENCOUNTER — ANESTHESIA (OUTPATIENT)
Dept: SURGERY | Facility: HOSPITAL | Age: 83
End: 2018-05-18
Payer: MEDICARE

## 2018-05-18 VITALS
TEMPERATURE: 98 F | OXYGEN SATURATION: 98 % | HEART RATE: 84 BPM | SYSTOLIC BLOOD PRESSURE: 158 MMHG | HEIGHT: 60 IN | RESPIRATION RATE: 18 BRPM | DIASTOLIC BLOOD PRESSURE: 72 MMHG | WEIGHT: 115 LBS | BODY MASS INDEX: 22.58 KG/M2

## 2018-05-18 DIAGNOSIS — I73.9 PVD (PERIPHERAL VASCULAR DISEASE): ICD-10-CM

## 2018-05-18 DIAGNOSIS — I70.211 ATHEROSCLER OF NATIVE ARTERY OF RIGHT LEG WITH INTERMIT CLAUDICATION: ICD-10-CM

## 2018-05-18 PROCEDURE — 71000015 HC POSTOP RECOV 1ST HR: Performed by: SURGERY

## 2018-05-18 PROCEDURE — 37228 PR TIB/PER REVASC W/TLA: CPT | Mod: RT,,, | Performed by: SURGERY

## 2018-05-18 PROCEDURE — 37000008 HC ANESTHESIA 1ST 15 MINUTES: Performed by: SURGERY

## 2018-05-18 PROCEDURE — 63600175 PHARM REV CODE 636 W HCPCS: Performed by: NURSE ANESTHETIST, CERTIFIED REGISTERED

## 2018-05-18 PROCEDURE — 37226 PR FEM/POPL REVASC W/STENT: CPT | Mod: 51,RT,, | Performed by: SURGERY

## 2018-05-18 PROCEDURE — 25000003 PHARM REV CODE 250: Performed by: SURGERY

## 2018-05-18 PROCEDURE — C1769 GUIDE WIRE: HCPCS | Performed by: SURGERY

## 2018-05-18 PROCEDURE — 27201423 OPTIME MED/SURG SUP & DEVICES STERILE SUPPLY: Performed by: SURGERY

## 2018-05-18 PROCEDURE — D9220A PRA ANESTHESIA: Mod: ANES,,, | Performed by: ANESTHESIOLOGY

## 2018-05-18 PROCEDURE — 63600175 PHARM REV CODE 636 W HCPCS: Performed by: SURGERY

## 2018-05-18 PROCEDURE — 25500020 PHARM REV CODE 255: Performed by: SURGERY

## 2018-05-18 PROCEDURE — C1876 STENT, NON-COA/NON-COV W/DEL: HCPCS | Performed by: SURGERY

## 2018-05-18 PROCEDURE — 71000044 HC DOSC ROUTINE RECOVERY FIRST HOUR: Performed by: SURGERY

## 2018-05-18 PROCEDURE — 71000033 HC RECOVERY, INTIAL HOUR: Performed by: SURGERY

## 2018-05-18 PROCEDURE — 36000707: Performed by: SURGERY

## 2018-05-18 PROCEDURE — 75710 ARTERY X-RAYS ARM/LEG: CPT | Mod: 26,59,, | Performed by: SURGERY

## 2018-05-18 PROCEDURE — 37000009 HC ANESTHESIA EA ADD 15 MINS: Performed by: SURGERY

## 2018-05-18 PROCEDURE — C1760 CLOSURE DEV, VASC: HCPCS | Performed by: SURGERY

## 2018-05-18 PROCEDURE — C1894 INTRO/SHEATH, NON-LASER: HCPCS | Performed by: SURGERY

## 2018-05-18 PROCEDURE — 71000016 HC POSTOP RECOV ADDL HR: Performed by: SURGERY

## 2018-05-18 PROCEDURE — C1887 CATHETER, GUIDING: HCPCS | Performed by: SURGERY

## 2018-05-18 PROCEDURE — D9220A PRA ANESTHESIA: Mod: CRNA,,, | Performed by: NURSE ANESTHETIST, CERTIFIED REGISTERED

## 2018-05-18 PROCEDURE — 71000045 HC DOSC ROUTINE RECOVERY EA ADD'L HR: Performed by: SURGERY

## 2018-05-18 PROCEDURE — 36000706: Performed by: SURGERY

## 2018-05-18 PROCEDURE — 63600175 PHARM REV CODE 636 W HCPCS: Performed by: ANESTHESIOLOGY

## 2018-05-18 PROCEDURE — C1725 CATH, TRANSLUMIN NON-LASER: HCPCS | Performed by: SURGERY

## 2018-05-18 DEVICE — IMPLANTABLE DEVICE: Type: IMPLANTABLE DEVICE | Site: ARTERIAL | Status: FUNCTIONAL

## 2018-05-18 RX ORDER — OXYCODONE AND ACETAMINOPHEN 5; 325 MG/1; MG/1
1 TABLET ORAL EVERY 6 HOURS PRN
Qty: 35 TABLET | Refills: 0 | Status: ON HOLD | OUTPATIENT
Start: 2018-05-18 | End: 2018-06-05

## 2018-05-18 RX ORDER — ACETAMINOPHEN 10 MG/ML
INJECTION, SOLUTION INTRAVENOUS
Status: DISCONTINUED | OUTPATIENT
Start: 2018-05-18 | End: 2018-05-18

## 2018-05-18 RX ORDER — LIDOCAINE HYDROCHLORIDE 10 MG/ML
INJECTION, SOLUTION EPIDURAL; INFILTRATION; INTRACAUDAL; PERINEURAL
Status: DISCONTINUED | OUTPATIENT
Start: 2018-05-18 | End: 2018-05-18 | Stop reason: HOSPADM

## 2018-05-18 RX ORDER — SODIUM CHLORIDE 9 MG/ML
INJECTION, SOLUTION INTRAVENOUS CONTINUOUS
Status: DISCONTINUED | OUTPATIENT
Start: 2018-05-18 | End: 2018-05-18 | Stop reason: HOSPADM

## 2018-05-18 RX ORDER — HEPARIN SODIUM 1000 [USP'U]/ML
INJECTION, SOLUTION INTRAVENOUS; SUBCUTANEOUS
Status: DISCONTINUED | OUTPATIENT
Start: 2018-05-18 | End: 2018-05-18 | Stop reason: HOSPADM

## 2018-05-18 RX ORDER — FENTANYL CITRATE 50 UG/ML
25 INJECTION, SOLUTION INTRAMUSCULAR; INTRAVENOUS EVERY 5 MIN PRN
Status: COMPLETED | OUTPATIENT
Start: 2018-05-18 | End: 2018-05-18

## 2018-05-18 RX ORDER — HEPARIN SODIUM 1000 [USP'U]/ML
INJECTION, SOLUTION INTRAVENOUS; SUBCUTANEOUS
Status: DISCONTINUED | OUTPATIENT
Start: 2018-05-18 | End: 2018-05-18

## 2018-05-18 RX ORDER — CLOPIDOGREL BISULFATE 75 MG/1
300 TABLET ORAL ONCE
Status: COMPLETED | OUTPATIENT
Start: 2018-05-18 | End: 2018-05-18

## 2018-05-18 RX ORDER — LIDOCAINE HYDROCHLORIDE 10 MG/ML
1 INJECTION, SOLUTION EPIDURAL; INFILTRATION; INTRACAUDAL; PERINEURAL ONCE
Status: COMPLETED | OUTPATIENT
Start: 2018-05-18 | End: 2018-05-18

## 2018-05-18 RX ORDER — ONDANSETRON 8 MG/1
8 TABLET, ORALLY DISINTEGRATING ORAL EVERY 8 HOURS PRN
Status: DISCONTINUED | OUTPATIENT
Start: 2018-05-18 | End: 2018-05-18 | Stop reason: HOSPADM

## 2018-05-18 RX ORDER — IODIXANOL 320 MG/ML
INJECTION, SOLUTION INTRAVASCULAR
Status: DISCONTINUED | OUTPATIENT
Start: 2018-05-18 | End: 2018-05-18 | Stop reason: HOSPADM

## 2018-05-18 RX ORDER — PROPOFOL 10 MG/ML
VIAL (ML) INTRAVENOUS
Status: DISCONTINUED | OUTPATIENT
Start: 2018-05-18 | End: 2018-05-18

## 2018-05-18 RX ORDER — CLOPIDOGREL BISULFATE 75 MG/1
75 TABLET ORAL DAILY
Qty: 30 TABLET | Refills: 11 | Status: SHIPPED | OUTPATIENT
Start: 2018-05-18 | End: 2019-05-18

## 2018-05-18 RX ORDER — LIDOCAINE HYDROCHLORIDE 10 MG/ML
1 INJECTION, SOLUTION EPIDURAL; INFILTRATION; INTRACAUDAL; PERINEURAL ONCE
Status: DISCONTINUED | OUTPATIENT
Start: 2018-05-18 | End: 2018-05-18 | Stop reason: HOSPADM

## 2018-05-18 RX ORDER — CEFAZOLIN SODIUM 1 G/3ML
2 INJECTION, POWDER, FOR SOLUTION INTRAMUSCULAR; INTRAVENOUS
Status: DISCONTINUED | OUTPATIENT
Start: 2018-05-18 | End: 2018-05-18 | Stop reason: HOSPADM

## 2018-05-18 RX ORDER — SODIUM CHLORIDE 0.9 % (FLUSH) 0.9 %
3 SYRINGE (ML) INJECTION
Status: DISCONTINUED | OUTPATIENT
Start: 2018-05-18 | End: 2018-05-18 | Stop reason: HOSPADM

## 2018-05-18 RX ORDER — FENTANYL CITRATE 50 UG/ML
25 INJECTION, SOLUTION INTRAMUSCULAR; INTRAVENOUS EVERY 5 MIN PRN
Status: DISCONTINUED | OUTPATIENT
Start: 2018-05-18 | End: 2018-05-18 | Stop reason: HOSPADM

## 2018-05-18 RX ORDER — ROSUVASTATIN CALCIUM 20 MG/1
20 TABLET, COATED ORAL DAILY
Qty: 30 TABLET | Refills: 11 | Status: SHIPPED | OUTPATIENT
Start: 2018-05-18 | End: 2019-05-18

## 2018-05-18 RX ORDER — OXYCODONE HYDROCHLORIDE 5 MG/1
5 TABLET ORAL EVERY 4 HOURS PRN
Status: DISCONTINUED | OUTPATIENT
Start: 2018-05-18 | End: 2018-05-18 | Stop reason: HOSPADM

## 2018-05-18 RX ORDER — LIDOCAINE HCL/PF 100 MG/5ML
SYRINGE (ML) INTRAVENOUS
Status: DISCONTINUED | OUTPATIENT
Start: 2018-05-18 | End: 2018-05-18

## 2018-05-18 RX ADMIN — HEPARIN SODIUM 3000 UNITS: 1000 INJECTION, SOLUTION INTRAVENOUS; SUBCUTANEOUS at 11:05

## 2018-05-18 RX ADMIN — LIDOCAINE HYDROCHLORIDE 4 ML: 10 INJECTION, SOLUTION EPIDURAL; INFILTRATION; INTRACAUDAL; PERINEURAL at 11:05

## 2018-05-18 RX ADMIN — FENTANYL CITRATE 25 MCG: 50 INJECTION INTRAMUSCULAR; INTRAVENOUS at 12:05

## 2018-05-18 RX ADMIN — HEPARIN SODIUM 2000 UNITS: 1000 INJECTION, SOLUTION INTRAVENOUS; SUBCUTANEOUS at 12:05

## 2018-05-18 RX ADMIN — HEPARIN SODIUM 10000 UNITS: 1000 INJECTION, SOLUTION INTRAVENOUS; SUBCUTANEOUS at 11:05

## 2018-05-18 RX ADMIN — PROPOFOL 30 MG: 10 INJECTION, EMULSION INTRAVENOUS at 11:05

## 2018-05-18 RX ADMIN — PROPOFOL 10 MG: 10 INJECTION, EMULSION INTRAVENOUS at 10:05

## 2018-05-18 RX ADMIN — SODIUM CHLORIDE: 0.9 INJECTION, SOLUTION INTRAVENOUS at 10:05

## 2018-05-18 RX ADMIN — LIDOCAINE HYDROCHLORIDE 0.5 MG: 10 INJECTION, SOLUTION EPIDURAL; INFILTRATION; INTRACAUDAL; PERINEURAL at 10:05

## 2018-05-18 RX ADMIN — IODIXANOL 30 ML: 320 INJECTION, SOLUTION INTRAVASCULAR at 11:05

## 2018-05-18 RX ADMIN — PROPOFOL 20 MG: 10 INJECTION, EMULSION INTRAVENOUS at 11:05

## 2018-05-18 RX ADMIN — OXYCODONE HYDROCHLORIDE 5 MG: 5 TABLET ORAL at 02:05

## 2018-05-18 RX ADMIN — LIDOCAINE HYDROCHLORIDE 50 MG: 20 INJECTION, SOLUTION INTRAVENOUS at 10:05

## 2018-05-18 RX ADMIN — CEFAZOLIN 2 G: 330 INJECTION, POWDER, FOR SOLUTION INTRAMUSCULAR; INTRAVENOUS at 10:05

## 2018-05-18 RX ADMIN — CLOPIDOGREL 300 MG: 75 TABLET, FILM COATED ORAL at 10:05

## 2018-05-18 RX ADMIN — FENTANYL CITRATE 25 MCG: 50 INJECTION INTRAMUSCULAR; INTRAVENOUS at 02:05

## 2018-05-18 RX ADMIN — ACETAMINOPHEN 1000 MG: 10 INJECTION, SOLUTION INTRAVENOUS at 12:05

## 2018-05-18 RX ADMIN — PROPOFOL 20 MG: 10 INJECTION, EMULSION INTRAVENOUS at 10:05

## 2018-05-18 NOTE — H&P (VIEW-ONLY)
"Patient ID: Suzan Villarreal is a 89 y.o. female.    I. HISTORY     Chief Complaint: Follow-up (From ED)      HPI Suzan Villarreal is a 89 y.o. with h/o HTN, HLD, GERD, and COPD presents with recurrent right foot pain. She was recently discharged from the hospital (5/3/18) for abdominal pain with negative work-up. History obtained from patient and caregiver. They onset of right foot pain with an associated "black area on her right great toe" that started on Thursday (5/10/2018). They also report her right foot is chronically colder than her left foot, but today became even more cold. She was given IV vancomycin and d/c home on oral clindamycin     She is a non-smoker. Not on any anti-platelet or anti-coagulation. She reports an aspirin allergy.     Seen in the ED  and declined angiogram  Returns today to discuss options.   Daughter works at [employer] [place]    PE and ROS are unchanged from exam     ROS  Constitutional: Positive for activity change. Negative for chills and fever.   HENT: Negative for congestion.    Respiratory: Negative for cough, chest tightness and shortness of breath.    Cardiovascular: Negative for chest pain and palpitations.   Gastrointestinal: Negative for abdominal distention and abdominal pain.   Genitourinary: Negative for flank pain and hematuria.   Musculoskeletal: Positive for arthralgias, gait problem and joint swelling.   All other systems reviewed and are negative.    II. PHYSICAL EXAM   Right Arm BP - Sittin/63 (18 1511)  Left Arm BP - Sittin/58 (18 1511)  Pulse: 85  Temp: 98.6 °F (37 °C)  Body mass index is 22.39 kg/m².      Physical Exam  Constitutional: She is oriented to person, place, and time.   Older, frail, but in no acute distress     Eyes: No scleral icterus.   Cardiovascular: Normal rate and regular rhythm.    Pulmonary/Chest: Effort normal. No respiratory distress. She has no wheezes.   Abdominal: Soft. She exhibits no distension. There " is no tenderness.   Musculoskeletal: Normal range of motion.   Neurological: She is alert and oriented to person, place, and time.   Nursing note and vitals reviewed.  R femoral pulse 2+   right DP unable to palpate or doppler on my exam  Chronic arthritic / hammer toe changes with some associated erythema, edema, and tenderness, not fluid collections, has range of motion (extension/flexion), foot cooler than left  Right great toe with bullae and distal ischemia  Left foot warm and well perfused, palpable PT and biphasic DP  III. ASSESSMENT & PLAN (MEDICAL DECISION MAKING)     1. PVD (peripheral vascular disease)    2. 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 female with progressive rest pain and tissue loss of her right foot  Plan angio on Friday - Plavix load pre-op    I have explained the risks, benefits, medical treatments, and alternatives of the procedure in detail. Risks include but are not limited to bleeding, injury to vessel, kidney damage or failure, need for emergency surgery, limb loss or need for additional procedures or bypass surgery.  The patient voices understanding and all questions have been answered.  They agree to proceed as planned.    Mary Ann Garcia MD FACS [facility]  Vascular & Endovascular Surgery

## 2018-05-18 NOTE — DISCHARGE INSTRUCTIONS
Discharge Instructions for Carotid Artery Stenting  When you get home after your procedure, do the following:  · Monitor the injection site for bleeding. A small bruise is normal as is an occasional drop of blood at the site.  · Monitor the limb that was used for changes in temperature, color, numbness, tingling, or loss of function.  · Take your prescribed antiplatelet medicines as directed. These medicines will help prevent blood clots from forming on the stent. However, they may cause you to bruise more easily.  · Shower instead of taking tub baths for a few days. But, wait for your doctors OK to get the wound wet first.  · Avoid lifting anything over 10 pounds for a few days.  · Take it easy, but try to get back to your normal routine as much as possible.  · Ask your doctor about driving, returning to work, and other activities.  When to call your doctor  Call your doctor if you have any of the following problems:  · Problems at the incision site, such as swelling, redness, bleeding, warmth, leaking of fluids, or increasing pain  · A cold or painful leg or foot  · Severe headache  · Weakness or numbness in a leg or arm  · Difficulty talking, or difficulty finding the words to say what you want  · Changes in your vision  · Dizziness or imbalance  Go to the emergency room if your doctors office is closed.      Keeping appointments for follow-up care helps make your procedure a success.   Your follow-up  Within a month after the procedure, youll have a follow-up exam and tests. These tests may include an ultrasound and a brain function exam. Then youll be monitored with ultrasound or another imaging test every 6 months for 1 to 2 years. After that, youll be monitored at least every 12 months. You may also continue to take antiplatelet medication. In some cases, the carotid can narrow again. If this happens, it can often be treated again with balloon angioplasty.  Call 911  Call 911 if you have any of these  stroke symptoms:  · Paralysis or weakness on 1 side of the body  · Numbness or tingling on 1 side of the body  · Difficulty speaking  · Loss of vision in 1 eye  · Drooping on 1 side of the face  · Dizziness or imbalance  · Swelling or persistent pain in the groin   .

## 2018-05-18 NOTE — ANESTHESIA PREPROCEDURE EVALUATION
05/18/2018     Suzan Villarreal is a 89 y.o., female.  Past Medical History:   Diagnosis Date    Allergy     Cholelithiasis without obstruction     Chronic insomnia     COPD (chronic obstructive pulmonary disease)     DJD (degenerative joint disease), lumbar     GERD (gastroesophageal reflux disease)     HLD (hyperlipidemia)     HTN (hypertension)     Hypothyroid     OP (osteoporosis)     Osteoarthritis     S/P hysterectomy      Past Surgical History:   Procedure Laterality Date    APPENDECTOMY      CHOLECYSTECTOMY      HIP SURGERY  8-2013    right    HYSTERECTOMY      KNEE ARTHROSCOPY Right 7-6-15    TK     Pre-op Assessment    I have reviewed the Patient Summary Reports.     I have reviewed the Nursing Notes.   I have reviewed the Medications.     Review of Systems  Anesthesia Hx:   Denies Personal Hx of Anesthesia complications.   Social:  Denies Tobacco Use. Denies Alcohol Use.   Hematology/Oncology:         -- Anemia: -- Coag Disorders: Denies Bleeding Disorder:   --  Cancer in past history:    EENT/Dental:   Eyes: Visual Impairment Wears Glasses. Eye Disease: Macular degeneration  Ears General/Symptom(s) Hearing Impairment: hearing-aid left, hearing-aid right    Cardiovascular:   ECG has been reviewed.     Functional Capacity 3 METS, walks with cane in home, light housework; denies CP, SOB  Peripheral Arterial Disease  Hypertension    Pulmonary:  Denies Asthma.  Chronic Obstructive Pulmonary Disease (COPD): Inhaler use is maintenance inhaler PRN.  Denies Obstructive Sleep Apnea (KHURRAM)   Renal/:   Chronic Renal Disease  Denies Renal Symptoms/Infections/Stones    Hepatic/GI:  Hepatic/GI Symptoms:  Esophageal / Stomach Disorders Gerd Controlled by PRN antireflux medication.    Musculoskeletal:  Joint Disease:  Arthritis, Osteoarthritis  Bone Disorders: Osteoporosis  Lumbar Spine Disorders, Lumbar Disc Disease    Neurological:  Pain , location of right great toe  Osteoarthritis  Denies Seizure Disorder  Denies CVA - Cerebrovasular Accident    Endocrine:  Denies Diabetes  Thyroid Disease Hypothyroidism        Physical Exam  General:  Well nourished    Airway/Jaw/Neck:  Airway Findings: Mouth Opening: Small, but > 3cm Tongue: Normal  General Airway Assessment: Adult  Mallampati: III  TM Distance: 4 - 6 cm  Jaw/Neck Findings:     Neck ROM: Decreased Lateral Motion, to the right      Dental:  Dental Findings: upper partial dentures, molar caps   Chest/Lungs:  Chest/Lungs Findings: Clear to auscultation, Normal Respiratory Rate     Heart/Vascular:  Heart Findings: Rate: Normal  Rhythm: Regular Rhythm  Sounds: Normal        Mental Status:  Mental Status Findings:  Cooperative, Alert and Oriented          Recent labs, ekg reviewed   Pt asymptomatic from cv standpoint  ok to proceed with anesthesia  Pt is doing very well at age 89.        Anesthesia Plan  Type of Anesthesia, risks & benefits discussed:  Anesthesia Type:  MAC, general  Patient's Preference:   Intra-op Monitoring Plan:   Intra-op Monitoring Plan Comments:   Post Op Pain Control Plan:   Post Op Pain Control Plan Comments:   Induction:   IV  Beta Blocker:  Patient is on a Beta-Blocker and has received one dose within the past 24 hours (No further documentation required).       Informed Consent: Patient understands risks and agrees with Anesthesia plan.  Questions answered. Anesthesia consent signed with patient.  ASA Score: 3     Day of Surgery Review of History & Physical:    H&P update referred to the surgeon.         Ready For Surgery From Anesthesia Perspective.

## 2018-05-18 NOTE — TRANSFER OF CARE
Anesthesia Transfer of Care Note    Patient: Suzan Villarreal    Procedure(s) Performed: Procedure(s) (LRB):  Aortogram With Runoff Left CFA access (Right)  ANGIOPLASTY WITH STENT PLACEMENT (Right)  PTA-PERIPHERAL (Right)    Patient location: [facility]    Anesthesia Type: MAC    Transport from OR: Transported from OR on room air with adequate spontaneous ventilation    Post pain: adequate analgesia    Post assessment: no apparent anesthetic complications and tolerated procedure well    Post vital signs: stable    Level of consciousness: awake, alert and oriented    Nausea/Vomiting: no nausea/vomiting    Complications: none    Transfer of care protocol was followed      Last vitals:   Visit Vitals  BP (!) 177/74   Pulse 81   Temp 36.5 °C (97.7 °F) (Oral)   Resp 16   Ht 5' (1.524 m)   Wt 52.2 kg (115 lb)   SpO2 97%   BMI 22.46 kg/m²

## 2018-05-18 NOTE — OP NOTE
[facility]  Vascular Surgery  Operative Note    SUMMARY     Date of Procedure: 5/18/2018     Procedure: Right lower extremity angiogram    PTA+Stent R popliteal artery (4mm Rubio, 6x40 Lifestent)    PTA R AT 2x150    Aortogram    US guided L CFA access    Surgeon(s) and Role:     * Cory Hinojosa MD     * Mary Ann Garcia MD - Primary     * Inna Carmichael MD - Fellow    Assisting Surgeon: None    Pre-Operative Diagnosis: PVD (peripheral vascular disease) [I73.9]  Atheroscler of native artery of right leg with rest pain and tissue loss    Post-Operative Diagnosis:same    Anesthesia: Local MAC    Indication for operation: 89 yo F with R foot rest pain and distal ischemia with nonpalpable pulses on exam.    Description of the Findings of the Procedure: > 90% R popliteal stenosis, resolved with stent; Occluded mid AT, no pedal outflow      Complications: No    Estimated Blood Loss (EBL): 5 mL         Implants:   Implant Name Type Inv. Item Serial No.  Lot No. LRB No. Used   STENT LIFESTENT [id number] [id number] - [id number]   STENT LIFESTENT [id number] 135CM   [employer] [id number] Right 1       Specimens:   Specimen (12h ago through future)    None                  Condition: Good    Disposition: PACU - hemodynamically stable.      DATE OF PROCEDURE:  05/18/2018.      OPERATION IN DETAIL:  Informed consent was obtained and the patient was brought   to the Operating Room and placed in supine position.  Bilateral groins were   prepped and draped in sterile fashion.  The patient's left common femoral artery   was visualized with an ultrasound.  This was accessed with a micropuncture   needle followed by wire followed by placement of the J-wire followed by   placement of 5-[place] sheath.  A floppy angled Glidewire was advanced to distal   aorta with Omniflush catheter with aortogram performed.  This demonstrated   bilateral patent common iliac arteries and external iliac arteries.  The   Omniflush  catheter was used to select the right common iliac and wire and   catheter were advanced into the right distal external iliac artery with right   lower extremity angiogram performed.  This demonstrated patent common femoral   artery, calcified SFA, but patent and no evidence of high-grade stenosis.  The   patient did have a knee replacement in the past, which made visualization of her   popliteal very difficult; however, was noted to have greater than 95% stenosis   of her popliteal artery with a bulky calcific plaque.  Decision made to   intervene.  She was given 5000 units of IV heparin.  A stiff-angled Glidewire   was advanced into the SFA with a 5 x 90 sheath advanced up and over into the   distal SFA.  Further angiogram images were performed delineating the high-grade   stenosis of the popliteal.  The patient had very poor runoff with evidence of   proximal AT patent with abrupt cutoff in the mid calf and diminutive collaterals   from her peroneal artery with the proximal peroneal patent.  A 0.035   stiff-angled Glidewire and seeker catheter were attempted to be advanced across   the popliteal artery stenosis.  This was unable to be performed with the system.    This was followed by a 0.018 Glidewire Advantage wire, which were able to be   crossed across the popliteal stenosis with a seeker catheter.  Further angiogram   images were performed demonstrating again the high-grade popliteal stenosis.    This was treated initially with a 4 x 30 Rubio balloon.  Followup angiogram   demonstrated continued bulky calcification.  Thus, decision made to place a   stent.  A 5 x 40 LifeStent was deployed across this lesion and post-dilated with   4 x 30 Rubio balloon.  Followup angiogram demonstrated brisk antegrade flow   through the stent and no evidence of dissection.  Attention then turned to the   tibial disease.  The 0.018 Glidewire and seeker catheter were used to select the   AT.  We were able to advance the  wire and catheter to the distal AT with   angiogram of the distal AT and foot demonstrating no outflow of the AT to the   foot.  We balloon dilated the length of the AT with a 2 x 150 Rubio balloon.    Followup angiogram demonstrated continued proximal AT flow with no outflow past   the mid AT and the calf.  We again tried to advance wire into the patient's   foot with angiogram images performed again demonstrating no outflow in the   patient's foot.  We withdrew the seeker catheter into the proximal AT with   visualization of just small collaterals from the patient's  and no identified   other vessel to try to recannulate.  After multiple attempts, decision was made   to abort this.  The patient was also noted to have a small area of   extravasation in her distal AT.  An angiogram was performed from the sheath   demonstrating continued flow in the proximal AT.  Sheath was exchanged for a   short 5-[place]er sheath with Mynx device deployed with good hemostasis noted.    DICTATED BY:  Inna Carmichael D.O. (RES)          / [id number] livier(s)        AXEL  dd: 05/18/2018 13:20:26 (CDT)  td: 05/18/2018 14:07:01 (CDT)  Doc ID   #[id number]  Job ID #[id number]    CC:     [id number]

## 2018-05-18 NOTE — INTERVAL H&P NOTE
The patient has been examined and the H&P has been reviewed:    I concur with the findings and no changes have occurred since H&P was written.    Anesthesia/Surgery risks, benefits and alternative options discussed and understood by patient/family.          Active Hospital Problems    Diagnosis  POA    PVD (peripheral vascular disease) [I73.9]  Yes      Resolved Hospital Problems    Diagnosis Date Resolved POA   No resolved problems to display.

## 2018-05-21 ENCOUNTER — OUTPATIENT CASE MANAGEMENT (OUTPATIENT)
Dept: ADMINISTRATIVE | Facility: OTHER | Age: 83
End: 2018-05-21

## 2018-05-21 NOTE — PROGRESS NOTES
Talked to daughter Cristy Beck to perform initial assessment for OPCM.  Cristy stated that her plan is to move with her mother to [place] in June of 2018 and get her mother established with a PCP in [place].  With this said, Cristy has declined OPCM services for her mother.  Will dis-enroll patient.  Case closed.  CAL Rutherford, OPCM-RN

## 2018-05-22 NOTE — ANESTHESIA POSTPROCEDURE EVALUATION
Anesthesia Post Evaluation    Patient: Suzan Villarreal    Procedure(s) Performed: Procedure(s) (LRB):  Aortogram With Runoff Left CFA access (Right)  ANGIOPLASTY WITH STENT PLACEMENT (Right)  PTA-PERIPHERAL (Right)    Final Anesthesia Type: MAC  Patient location during evaluation: PACU  Patient participation: Yes- Able to Participate  Level of consciousness: awake and alert  Post-procedure vital signs: reviewed and stable  Pain management: adequate  Airway patency: patent  PONV status at discharge: No PONV  Anesthetic complications: no      Cardiovascular status: stable  Respiratory status: unassisted and spontaneous ventilation  Hydration status: euvolemic  Follow-up not needed.        Visit Vitals  BP (!) 158/72   Pulse 84   Temp 36.6 °C (97.8 °F) (Temporal)   Resp 18   Ht 5' (1.524 m)   Wt 52.2 kg (115 lb)   SpO2 98%   BMI 22.46 kg/m²       Pain/Lucía Score: No Data Recorded

## 2018-05-23 ENCOUNTER — HOSPITAL ENCOUNTER (INPATIENT)
Facility: HOSPITAL | Age: 83
LOS: 9 days | Discharge: NURSING, REHAB OR LONG-TERM CARE | DRG: 329 | End: 2018-06-01
Attending: EMERGENCY MEDICINE | Admitting: SURGERY
Payer: MEDICARE

## 2018-05-23 ENCOUNTER — ANESTHESIA (OUTPATIENT)
Dept: SURGERY | Facility: HOSPITAL | Age: 83
DRG: 329 | End: 2018-05-23
Payer: MEDICARE

## 2018-05-23 ENCOUNTER — ANESTHESIA EVENT (OUTPATIENT)
Dept: SURGERY | Facility: HOSPITAL | Age: 83
DRG: 329 | End: 2018-05-23
Payer: MEDICARE

## 2018-05-23 DIAGNOSIS — I10 ESSENTIAL HYPERTENSION: Chronic | ICD-10-CM

## 2018-05-23 DIAGNOSIS — K21.9 GASTROESOPHAGEAL REFLUX DISEASE, ESOPHAGITIS PRESENCE NOT SPECIFIED: Chronic | ICD-10-CM

## 2018-05-23 DIAGNOSIS — I73.9 PVD (PERIPHERAL VASCULAR DISEASE): ICD-10-CM

## 2018-05-23 DIAGNOSIS — I70.269 ATHEROSCLEROSIS OF ARTERY OF EXTREMITY WITH GANGRENE: ICD-10-CM

## 2018-05-23 DIAGNOSIS — R10.9 ABDOMINAL PAIN: ICD-10-CM

## 2018-05-23 DIAGNOSIS — K63.1 BOWEL PERFORATION: Primary | ICD-10-CM

## 2018-05-23 DIAGNOSIS — M19.90 OSTEOARTHRITIS, UNSPECIFIED OSTEOARTHRITIS TYPE, UNSPECIFIED SITE: ICD-10-CM

## 2018-05-23 LAB
ABO + RH BLD: NORMAL
ALBUMIN SERPL BCP-MCNC: 2.3 G/DL
ALBUMIN SERPL BCP-MCNC: 2.7 G/DL
ALLENS TEST: ABNORMAL
ALP SERPL-CCNC: 71 U/L
ALP SERPL-CCNC: 74 U/L
ALT SERPL W/O P-5'-P-CCNC: 20 U/L
ALT SERPL W/O P-5'-P-CCNC: 7 U/L
ANION GAP SERPL CALC-SCNC: 10 MMOL/L
ANION GAP SERPL CALC-SCNC: 11 MMOL/L
ANISOCYTOSIS BLD QL SMEAR: SLIGHT
APTT BLDCRRT: 23.2 SEC
AST SERPL-CCNC: 18 U/L
AST SERPL-CCNC: 60 U/L
BACTERIA #/AREA URNS AUTO: ABNORMAL /HPF
BASO STIPL BLD QL SMEAR: ABNORMAL
BASOPHILS # BLD AUTO: 0.01 K/UL
BASOPHILS # BLD AUTO: 0.02 K/UL
BASOPHILS NFR BLD: 0.2 %
BASOPHILS NFR BLD: 0.3 %
BILIRUB SERPL-MCNC: 0.6 MG/DL
BILIRUB SERPL-MCNC: 1 MG/DL
BILIRUB UR QL STRIP: NEGATIVE
BLD GP AB SCN CELLS X3 SERPL QL: NORMAL
BLD PROD TYP BPU: NORMAL
BLOOD UNIT EXPIRATION DATE: NORMAL
BLOOD UNIT TYPE CODE: 5100
BLOOD UNIT TYPE CODE: 5100
BLOOD UNIT TYPE CODE: 9500
BLOOD UNIT TYPE CODE: 9500
BLOOD UNIT TYPE: NORMAL
BNP SERPL-MCNC: 119 PG/ML
BUN SERPL-MCNC: 5 MG/DL
BUN SERPL-MCNC: 5 MG/DL (ref 6–30)
BUN SERPL-MCNC: 7 MG/DL
BURR CELLS BLD QL SMEAR: ABNORMAL
CALCIUM SERPL-MCNC: 6.9 MG/DL
CALCIUM SERPL-MCNC: 8.6 MG/DL
CHLORIDE SERPL-SCNC: 101 MMOL/L
CHLORIDE SERPL-SCNC: 102 MMOL/L
CHLORIDE SERPL-SCNC: 96 MMOL/L (ref 95–110)
CLARITY UR REFRACT.AUTO: CLEAR
CO2 SERPL-SCNC: 25 MMOL/L
CO2 SERPL-SCNC: 27 MMOL/L
CODING SYSTEM: NORMAL
COLOR UR AUTO: YELLOW
CREAT SERPL-MCNC: 0.4 MG/DL
CREAT SERPL-MCNC: 0.5 MG/DL (ref 0.5–1.4)
CREAT SERPL-MCNC: 0.6 MG/DL
DELSYS: ABNORMAL
DIFFERENTIAL METHOD: ABNORMAL
DIFFERENTIAL METHOD: ABNORMAL
DISPENSE STATUS: NORMAL
EOSINOPHIL # BLD AUTO: 0 K/UL
EOSINOPHIL # BLD AUTO: 0.1 K/UL
EOSINOPHIL NFR BLD: 0 %
EOSINOPHIL NFR BLD: 1.4 %
ERYTHROCYTE [DISTWIDTH] IN BLOOD BY AUTOMATED COUNT: 15.1 %
ERYTHROCYTE [DISTWIDTH] IN BLOOD BY AUTOMATED COUNT: 16.6 %
ERYTHROCYTE [SEDIMENTATION RATE] IN BLOOD BY WESTERGREN METHOD: 16 MM/H
EST. GFR  (AFRICAN AMERICAN): >60 ML/MIN/1.73 M^2
EST. GFR  (AFRICAN AMERICAN): >60 ML/MIN/1.73 M^2
EST. GFR  (NON AFRICAN AMERICAN): >60 ML/MIN/1.73 M^2
EST. GFR  (NON AFRICAN AMERICAN): >60 ML/MIN/1.73 M^2
ETCO2: 26
FIO2: 40
GLUCOSE SERPL-MCNC: 104 MG/DL (ref 70–110)
GLUCOSE SERPL-MCNC: 93 MG/DL (ref 70–110)
GLUCOSE SERPL-MCNC: 95 MG/DL (ref 70–110)
GLUCOSE SERPL-MCNC: 98 MG/DL
GLUCOSE SERPL-MCNC: 99 MG/DL
GLUCOSE UR QL STRIP: NEGATIVE
HCO3 UR-SCNC: 24.2 MMOL/L (ref 24–28)
HCO3 UR-SCNC: 24.2 MMOL/L (ref 24–28)
HCO3 UR-SCNC: 25.5 MMOL/L (ref 24–28)
HCT VFR BLD AUTO: 42.2 %
HCT VFR BLD AUTO: 47.7 %
HCT VFR BLD CALC: 28 %PCV (ref 36–54)
HCT VFR BLD CALC: 31 %PCV (ref 36–54)
HCT VFR BLD CALC: 36 %PCV (ref 36–54)
HGB BLD-MCNC: 13.8 G/DL
HGB BLD-MCNC: 16.4 G/DL
HGB UR QL STRIP: ABNORMAL
HYALINE CASTS UR QL AUTO: 1 /LPF
IMM GRANULOCYTES # BLD AUTO: 0.02 K/UL
IMM GRANULOCYTES # BLD AUTO: 0.03 K/UL
IMM GRANULOCYTES NFR BLD AUTO: 0.5 %
IMM GRANULOCYTES NFR BLD AUTO: 0.5 %
INR PPP: 1
KETONES UR QL STRIP: NEGATIVE
LACTATE SERPL-SCNC: 1.5 MMOL/L
LACTATE SERPL-SCNC: 1.5 MMOL/L
LACTATE SERPL-SCNC: 2.2 MMOL/L
LEUKOCYTE ESTERASE UR QL STRIP: ABNORMAL
LIPASE SERPL-CCNC: 10 U/L
LYMPHOCYTES # BLD AUTO: 0.8 K/UL
LYMPHOCYTES # BLD AUTO: 1.8 K/UL
LYMPHOCYTES NFR BLD: 19 %
LYMPHOCYTES NFR BLD: 26.5 %
MAGNESIUM SERPL-MCNC: 1.6 MG/DL
MAGNESIUM SERPL-MCNC: 1.7 MG/DL
MCH RBC QN AUTO: 30.1 PG
MCH RBC QN AUTO: 30.7 PG
MCHC RBC AUTO-ENTMCNC: 32.7 G/DL
MCHC RBC AUTO-ENTMCNC: 34.4 G/DL
MCV RBC AUTO: 88 FL
MCV RBC AUTO: 94 FL
MICROSCOPIC COMMENT: ABNORMAL
MODE: ABNORMAL
MONOCYTES # BLD AUTO: 0.2 K/UL
MONOCYTES # BLD AUTO: 0.3 K/UL
MONOCYTES NFR BLD: 4.1 %
MONOCYTES NFR BLD: 4.6 %
NEUTROPHILS # BLD AUTO: 3.3 K/UL
NEUTROPHILS # BLD AUTO: 4.5 K/UL
NEUTROPHILS NFR BLD: 67.2 %
NEUTROPHILS NFR BLD: 75.7 %
NITRITE UR QL STRIP: POSITIVE
NRBC BLD-RTO: 0 /100 WBC
NRBC BLD-RTO: 0 /100 WBC
PCO2 BLDA: 31.4 MMHG (ref 35–45)
PCO2 BLDA: 33.9 MMHG (ref 35–45)
PCO2 BLDA: 36.2 MMHG (ref 35–45)
PEEP: 5
PH SMN: 7.46 [PH] (ref 7.35–7.45)
PH SMN: 7.46 [PH] (ref 7.35–7.45)
PH SMN: 7.49 [PH] (ref 7.35–7.45)
PH UR STRIP: 7 [PH] (ref 5–8)
PLATELET # BLD AUTO: 417 K/UL
PLATELET # BLD AUTO: 532 K/UL
PMV BLD AUTO: 8.4 FL
PMV BLD AUTO: 8.6 FL
PO2 BLDA: 79 MMHG (ref 80–100)
PO2 BLDA: 80 MMHG (ref 80–100)
PO2 BLDA: 87 MMHG (ref 80–100)
POC BE: 0 MMOL/L
POC BE: 1 MMOL/L
POC BE: 2 MMOL/L
POC IONIZED CALCIUM: 0.84 MMOL/L (ref 1.06–1.42)
POC IONIZED CALCIUM: 0.94 MMOL/L (ref 1.06–1.42)
POC IONIZED CALCIUM: 1.15 MMOL/L (ref 1.06–1.42)
POC SATURATED O2: 96 % (ref 95–100)
POC SATURATED O2: 97 % (ref 95–100)
POC SATURATED O2: 97 % (ref 95–100)
POC TCO2 (MEASURED): 28 MMOL/L (ref 23–29)
POC TCO2: 25 MMOL/L (ref 23–27)
POC TCO2: 25 MMOL/L (ref 23–27)
POC TCO2: 27 MMOL/L (ref 23–27)
POIKILOCYTOSIS BLD QL SMEAR: SLIGHT
POLYCHROMASIA BLD QL SMEAR: ABNORMAL
POTASSIUM BLD-SCNC: 3 MMOL/L (ref 3.5–5.1)
POTASSIUM BLD-SCNC: 3.1 MMOL/L (ref 3.5–5.1)
POTASSIUM BLD-SCNC: 3.4 MMOL/L (ref 3.5–5.1)
POTASSIUM SERPL-SCNC: 3.1 MMOL/L
POTASSIUM SERPL-SCNC: 3.2 MMOL/L
PROT SERPL-MCNC: 4.3 G/DL
PROT SERPL-MCNC: 5.4 G/DL
PROT UR QL STRIP: NEGATIVE
PROTHROMBIN TIME: 10.8 SEC
RBC # BLD AUTO: 4.5 M/UL
RBC # BLD AUTO: 5.44 M/UL
RBC #/AREA URNS AUTO: 1 /HPF (ref 0–4)
SAMPLE: ABNORMAL
SITE: ABNORMAL
SODIUM BLD-SCNC: 136 MMOL/L (ref 136–145)
SODIUM BLD-SCNC: 137 MMOL/L (ref 136–145)
SODIUM BLD-SCNC: 138 MMOL/L (ref 136–145)
SODIUM SERPL-SCNC: 138 MMOL/L
SODIUM SERPL-SCNC: 138 MMOL/L
SP GR UR STRIP: 1.02 (ref 1–1.03)
SP02: 98
TRANS ERYTHROCYTES VOL PATIENT: NORMAL ML
TROPONIN I SERPL DL<=0.01 NG/ML-MCNC: 0.01 NG/ML
URN SPEC COLLECT METH UR: ABNORMAL
UROBILINOGEN UR STRIP-ACNC: 2 EU/DL
VT: 450
WBC # BLD AUTO: 4.31 K/UL
WBC # BLD AUTO: 6.63 K/UL
WBC #/AREA URNS AUTO: 18 /HPF (ref 0–5)

## 2018-05-23 PROCEDURE — 25500020 PHARM REV CODE 255: Performed by: EMERGENCY MEDICINE

## 2018-05-23 PROCEDURE — 20000000 HC ICU ROOM

## 2018-05-23 PROCEDURE — 25000003 PHARM REV CODE 250: Performed by: STUDENT IN AN ORGANIZED HEALTH CARE EDUCATION/TRAINING PROGRAM

## 2018-05-23 PROCEDURE — 27201423 OPTIME MED/SURG SUP & DEVICES STERILE SUPPLY: Performed by: SURGERY

## 2018-05-23 PROCEDURE — 99900035 HC TECH TIME PER 15 MIN (STAT)

## 2018-05-23 PROCEDURE — 63600175 PHARM REV CODE 636 W HCPCS: Performed by: STUDENT IN AN ORGANIZED HEALTH CARE EDUCATION/TRAINING PROGRAM

## 2018-05-23 PROCEDURE — 83690 ASSAY OF LIPASE: CPT

## 2018-05-23 PROCEDURE — 83605 ASSAY OF LACTIC ACID: CPT

## 2018-05-23 PROCEDURE — 82803 BLOOD GASES ANY COMBINATION: CPT

## 2018-05-23 PROCEDURE — 80053 COMPREHEN METABOLIC PANEL: CPT | Mod: 91

## 2018-05-23 PROCEDURE — 63600175 PHARM REV CODE 636 W HCPCS: Performed by: EMERGENCY MEDICINE

## 2018-05-23 PROCEDURE — 84484 ASSAY OF TROPONIN QUANT: CPT

## 2018-05-23 PROCEDURE — 94761 N-INVAS EAR/PLS OXIMETRY MLT: CPT

## 2018-05-23 PROCEDURE — 87077 CULTURE AEROBIC IDENTIFY: CPT

## 2018-05-23 PROCEDURE — 81001 URINALYSIS AUTO W/SCOPE: CPT

## 2018-05-23 PROCEDURE — 25000003 PHARM REV CODE 250: Performed by: NURSE ANESTHETIST, CERTIFIED REGISTERED

## 2018-05-23 PROCEDURE — 83880 ASSAY OF NATRIURETIC PEPTIDE: CPT

## 2018-05-23 PROCEDURE — 27000221 HC OXYGEN, UP TO 24 HOURS

## 2018-05-23 PROCEDURE — 71000033 HC RECOVERY, INTIAL HOUR: Performed by: SURGERY

## 2018-05-23 PROCEDURE — S0030 INJECTION, METRONIDAZOLE: HCPCS | Performed by: EMERGENCY MEDICINE

## 2018-05-23 PROCEDURE — C9113 INJ PANTOPRAZOLE SODIUM, VIA: HCPCS | Performed by: STUDENT IN AN ORGANIZED HEALTH CARE EDUCATION/TRAINING PROGRAM

## 2018-05-23 PROCEDURE — 71000039 HC RECOVERY, EACH ADD'L HOUR: Performed by: SURGERY

## 2018-05-23 PROCEDURE — 25000003 PHARM REV CODE 250: Performed by: EMERGENCY MEDICINE

## 2018-05-23 PROCEDURE — 99285 EMERGENCY DEPT VISIT HI MDM: CPT | Mod: 25

## 2018-05-23 PROCEDURE — 83735 ASSAY OF MAGNESIUM: CPT

## 2018-05-23 PROCEDURE — 99223 1ST HOSP IP/OBS HIGH 75: CPT | Mod: 57,AI,, | Performed by: SURGERY

## 2018-05-23 PROCEDURE — 37799 UNLISTED PX VASCULAR SURGERY: CPT

## 2018-05-23 PROCEDURE — 36000709 HC OR TIME LEV III EA ADD 15 MIN: Performed by: SURGERY

## 2018-05-23 PROCEDURE — 96375 TX/PRO/DX INJ NEW DRUG ADDON: CPT

## 2018-05-23 PROCEDURE — P9021 RED BLOOD CELLS UNIT: HCPCS

## 2018-05-23 PROCEDURE — 85025 COMPLETE CBC W/AUTO DIFF WBC: CPT | Mod: 91

## 2018-05-23 PROCEDURE — 83735 ASSAY OF MAGNESIUM: CPT | Mod: 91

## 2018-05-23 PROCEDURE — 94002 VENT MGMT INPAT INIT DAY: CPT

## 2018-05-23 PROCEDURE — 87088 URINE BACTERIA CULTURE: CPT

## 2018-05-23 PROCEDURE — 37000008 HC ANESTHESIA 1ST 15 MINUTES: Performed by: SURGERY

## 2018-05-23 PROCEDURE — 0DBL0ZZ EXCISION OF TRANSVERSE COLON, OPEN APPROACH: ICD-10-PCS | Performed by: SURGERY

## 2018-05-23 PROCEDURE — 87186 SC STD MICRODIL/AGAR DIL: CPT

## 2018-05-23 PROCEDURE — 88309 TISSUE EXAM BY PATHOLOGIST: CPT | Mod: 26,,, | Performed by: PATHOLOGY

## 2018-05-23 PROCEDURE — 36415 COLL VENOUS BLD VENIPUNCTURE: CPT

## 2018-05-23 PROCEDURE — 86850 RBC ANTIBODY SCREEN: CPT

## 2018-05-23 PROCEDURE — 85730 THROMBOPLASTIN TIME PARTIAL: CPT

## 2018-05-23 PROCEDURE — 49020 DRAINAGE ABDOM ABSCESS OPEN: CPT | Mod: 59,,, | Performed by: SURGERY

## 2018-05-23 PROCEDURE — 99900026 HC AIRWAY MAINTENANCE (STAT)

## 2018-05-23 PROCEDURE — 63600175 PHARM REV CODE 636 W HCPCS: Performed by: NURSE ANESTHETIST, CERTIFIED REGISTERED

## 2018-05-23 PROCEDURE — 96365 THER/PROPH/DIAG IV INF INIT: CPT

## 2018-05-23 PROCEDURE — 86920 COMPATIBILITY TEST SPIN: CPT

## 2018-05-23 PROCEDURE — 93010 ELECTROCARDIOGRAM REPORT: CPT | Mod: ,,, | Performed by: INTERNAL MEDICINE

## 2018-05-23 PROCEDURE — 27201037 HC PRESSURE MONITORING SET UP

## 2018-05-23 PROCEDURE — 0DTF0ZZ RESECTION OF RIGHT LARGE INTESTINE, OPEN APPROACH: ICD-10-PCS | Performed by: SURGERY

## 2018-05-23 PROCEDURE — 25000003 PHARM REV CODE 250

## 2018-05-23 PROCEDURE — 37000009 HC ANESTHESIA EA ADD 15 MINS: Performed by: SURGERY

## 2018-05-23 PROCEDURE — 80053 COMPREHEN METABOLIC PANEL: CPT

## 2018-05-23 PROCEDURE — 87086 URINE CULTURE/COLONY COUNT: CPT

## 2018-05-23 PROCEDURE — 36000708 HC OR TIME LEV III 1ST 15 MIN: Performed by: SURGERY

## 2018-05-23 PROCEDURE — 0W9J00Z DRAINAGE OF PELVIC CAVITY WITH DRAINAGE DEVICE, OPEN APPROACH: ICD-10-PCS | Performed by: SURGERY

## 2018-05-23 PROCEDURE — 0D1B0Z4 BYPASS ILEUM TO CUTANEOUS, OPEN APPROACH: ICD-10-PCS | Performed by: SURGERY

## 2018-05-23 PROCEDURE — 83605 ASSAY OF LACTIC ACID: CPT | Mod: 91

## 2018-05-23 PROCEDURE — D9220A PRA ANESTHESIA: Mod: ANES,,, | Performed by: ANESTHESIOLOGY

## 2018-05-23 PROCEDURE — 44143 PARTIAL REMOVAL OF COLON: CPT | Mod: ,,, | Performed by: SURGERY

## 2018-05-23 PROCEDURE — D9220A PRA ANESTHESIA: Mod: CRNA,,, | Performed by: NURSE ANESTHETIST, CERTIFIED REGISTERED

## 2018-05-23 PROCEDURE — 93005 ELECTROCARDIOGRAM TRACING: CPT

## 2018-05-23 PROCEDURE — 85610 PROTHROMBIN TIME: CPT

## 2018-05-23 PROCEDURE — 88309 TISSUE EXAM BY PATHOLOGIST: CPT | Performed by: PATHOLOGY

## 2018-05-23 RX ORDER — LEVOTHYROXINE SODIUM 100 UG/1
100 TABLET ORAL EVERY MORNING
Status: DISCONTINUED | OUTPATIENT
Start: 2018-05-23 | End: 2018-06-01 | Stop reason: HOSPADM

## 2018-05-23 RX ORDER — ROCURONIUM BROMIDE 10 MG/ML
INJECTION, SOLUTION INTRAVENOUS
Status: DISCONTINUED | OUTPATIENT
Start: 2018-05-23 | End: 2018-05-23

## 2018-05-23 RX ORDER — HYDROCODONE BITARTRATE AND ACETAMINOPHEN 500; 5 MG/1; MG/1
TABLET ORAL
Status: DISCONTINUED | OUTPATIENT
Start: 2018-05-23 | End: 2018-05-24

## 2018-05-23 RX ORDER — PANTOPRAZOLE SODIUM 40 MG/1
40 TABLET, DELAYED RELEASE ORAL DAILY
Status: DISCONTINUED | OUTPATIENT
Start: 2018-05-23 | End: 2018-05-23

## 2018-05-23 RX ORDER — NICARDIPINE HYDROCHLORIDE 0.2 MG/ML
1 INJECTION INTRAVENOUS CONTINUOUS
Status: DISCONTINUED | OUTPATIENT
Start: 2018-05-23 | End: 2018-05-25

## 2018-05-23 RX ORDER — HYDROMORPHONE HYDROCHLORIDE 1 MG/ML
1 INJECTION, SOLUTION INTRAMUSCULAR; INTRAVENOUS; SUBCUTANEOUS
Status: DISCONTINUED | OUTPATIENT
Start: 2018-05-23 | End: 2018-05-23

## 2018-05-23 RX ORDER — HEPARIN SODIUM 5000 [USP'U]/ML
5000 INJECTION, SOLUTION INTRAVENOUS; SUBCUTANEOUS EVERY 8 HOURS
Status: DISCONTINUED | OUTPATIENT
Start: 2018-05-24 | End: 2018-06-01 | Stop reason: HOSPADM

## 2018-05-23 RX ORDER — SODIUM CHLORIDE 0.9 % (FLUSH) 0.9 %
3 SYRINGE (ML) INJECTION
Status: DISCONTINUED | OUTPATIENT
Start: 2018-05-23 | End: 2018-06-01 | Stop reason: HOSPADM

## 2018-05-23 RX ORDER — POTASSIUM CHLORIDE 7.45 MG/ML
10 INJECTION INTRAVENOUS
Status: DISPENSED | OUTPATIENT
Start: 2018-05-23 | End: 2018-05-23

## 2018-05-23 RX ORDER — METOPROLOL TARTRATE 1 MG/ML
5 INJECTION, SOLUTION INTRAVENOUS EVERY 6 HOURS
Status: DISCONTINUED | OUTPATIENT
Start: 2018-05-23 | End: 2018-05-28

## 2018-05-23 RX ORDER — SODIUM CHLORIDE, SODIUM LACTATE, POTASSIUM CHLORIDE, CALCIUM CHLORIDE 600; 310; 30; 20 MG/100ML; MG/100ML; MG/100ML; MG/100ML
INJECTION, SOLUTION INTRAVENOUS CONTINUOUS
Status: DISCONTINUED | OUTPATIENT
Start: 2018-05-23 | End: 2018-05-26

## 2018-05-23 RX ORDER — VANCOMYCIN/0.9 % SOD CHLORIDE 1 G/100 ML
1000 PLASTIC BAG, INJECTION (ML) INTRAVENOUS
Status: COMPLETED | OUTPATIENT
Start: 2018-05-23 | End: 2018-05-23

## 2018-05-23 RX ORDER — SUCCINYLCHOLINE CHLORIDE 20 MG/ML
INJECTION INTRAMUSCULAR; INTRAVENOUS
Status: DISCONTINUED | OUTPATIENT
Start: 2018-05-23 | End: 2018-05-23

## 2018-05-23 RX ORDER — POTASSIUM CHLORIDE 20 MEQ/1
40 TABLET, EXTENDED RELEASE ORAL
Status: COMPLETED | OUTPATIENT
Start: 2018-05-23 | End: 2018-05-23

## 2018-05-23 RX ORDER — HEPARIN SODIUM 5000 [USP'U]/ML
5000 INJECTION, SOLUTION INTRAVENOUS; SUBCUTANEOUS EVERY 8 HOURS
Status: DISCONTINUED | OUTPATIENT
Start: 2018-05-23 | End: 2018-05-23

## 2018-05-23 RX ORDER — HYDROMORPHONE HYDROCHLORIDE 1 MG/ML
0.5 INJECTION, SOLUTION INTRAMUSCULAR; INTRAVENOUS; SUBCUTANEOUS
Status: DISCONTINUED | OUTPATIENT
Start: 2018-05-23 | End: 2018-05-24

## 2018-05-23 RX ORDER — LIDOCAINE HCL/PF 100 MG/5ML
SYRINGE (ML) INTRAVENOUS
Status: DISCONTINUED | OUTPATIENT
Start: 2018-05-23 | End: 2018-05-23

## 2018-05-23 RX ORDER — SODIUM CHLORIDE 9 MG/ML
INJECTION, SOLUTION INTRAVENOUS CONTINUOUS PRN
Status: DISCONTINUED | OUTPATIENT
Start: 2018-05-23 | End: 2018-05-23

## 2018-05-23 RX ORDER — MORPHINE SULFATE 4 MG/ML
2 INJECTION, SOLUTION INTRAMUSCULAR; INTRAVENOUS
Status: COMPLETED | OUTPATIENT
Start: 2018-05-23 | End: 2018-05-23

## 2018-05-23 RX ORDER — CHLORHEXIDINE GLUCONATE ORAL RINSE 1.2 MG/ML
15 SOLUTION DENTAL 2 TIMES DAILY
Status: DISCONTINUED | OUTPATIENT
Start: 2018-05-23 | End: 2018-05-25

## 2018-05-23 RX ORDER — FENTANYL CITRATE 50 UG/ML
INJECTION, SOLUTION INTRAMUSCULAR; INTRAVENOUS
Status: DISCONTINUED | OUTPATIENT
Start: 2018-05-23 | End: 2018-05-23

## 2018-05-23 RX ORDER — VANCOMYCIN/0.9 % SOD CHLORIDE 1 G/100 ML
1000 PLASTIC BAG, INJECTION (ML) INTRAVENOUS
Status: DISCONTINUED | OUTPATIENT
Start: 2018-05-24 | End: 2018-05-24

## 2018-05-23 RX ORDER — PROPOFOL 10 MG/ML
10 INJECTION, EMULSION INTRAVENOUS CONTINUOUS
Status: DISCONTINUED | OUTPATIENT
Start: 2018-05-23 | End: 2018-05-25

## 2018-05-23 RX ORDER — AMLODIPINE BESYLATE 10 MG/1
10 TABLET ORAL DAILY
Status: DISCONTINUED | OUTPATIENT
Start: 2018-05-23 | End: 2018-06-01 | Stop reason: HOSPADM

## 2018-05-23 RX ORDER — METRONIDAZOLE 500 MG/100ML
500 INJECTION, SOLUTION INTRAVENOUS
Status: COMPLETED | OUTPATIENT
Start: 2018-05-23 | End: 2018-05-23

## 2018-05-23 RX ORDER — ETOMIDATE 2 MG/ML
INJECTION INTRAVENOUS
Status: DISCONTINUED | OUTPATIENT
Start: 2018-05-23 | End: 2018-05-23

## 2018-05-23 RX ORDER — NICARDIPINE HYDROCHLORIDE 0.2 MG/ML
INJECTION INTRAVENOUS
Status: COMPLETED
Start: 2018-05-23 | End: 2018-05-23

## 2018-05-23 RX ADMIN — SUCCINYLCHOLINE CHLORIDE 100 MG: 20 INJECTION, SOLUTION INTRAMUSCULAR; INTRAVENOUS at 12:05

## 2018-05-23 RX ADMIN — METRONIDAZOLE 500 MG: 500 INJECTION, SOLUTION INTRAVENOUS at 11:05

## 2018-05-23 RX ADMIN — ROCURONIUM BROMIDE 30 MG: 10 INJECTION, SOLUTION INTRAVENOUS at 12:05

## 2018-05-23 RX ADMIN — FENTANYL CITRATE 25 MCG: 50 INJECTION, SOLUTION INTRAMUSCULAR; INTRAVENOUS at 12:05

## 2018-05-23 RX ADMIN — POTASSIUM CHLORIDE 10 MEQ: 10 INJECTION, SOLUTION INTRAVENOUS at 06:05

## 2018-05-23 RX ADMIN — ROCURONIUM BROMIDE 20 MG: 10 INJECTION, SOLUTION INTRAVENOUS at 12:05

## 2018-05-23 RX ADMIN — PANTOPRAZOLE SODIUM 40 MG: 40 INJECTION, POWDER, FOR SOLUTION INTRAVENOUS at 03:05

## 2018-05-23 RX ADMIN — NICARDIPINE HYDROCHLORIDE 1 MG/HR: 0.2 INJECTION, SOLUTION INTRAVENOUS at 02:05

## 2018-05-23 RX ADMIN — LIDOCAINE HYDROCHLORIDE 100 MG: 20 INJECTION, SOLUTION INTRAVENOUS at 12:05

## 2018-05-23 RX ADMIN — ROCURONIUM BROMIDE 20 MG: 10 INJECTION, SOLUTION INTRAVENOUS at 01:05

## 2018-05-23 RX ADMIN — POTASSIUM CHLORIDE 10 MEQ: 10 INJECTION, SOLUTION INTRAVENOUS at 07:05

## 2018-05-23 RX ADMIN — IOHEXOL 75 ML: 350 INJECTION, SOLUTION INTRAVENOUS at 09:05

## 2018-05-23 RX ADMIN — SODIUM CHLORIDE, SODIUM GLUCONATE, SODIUM ACETATE, POTASSIUM CHLORIDE, MAGNESIUM CHLORIDE, SODIUM PHOSPHATE, DIBASIC, AND POTASSIUM PHOSPHATE: .53; .5; .37; .037; .03; .012; .00082 INJECTION, SOLUTION INTRAVENOUS at 01:05

## 2018-05-23 RX ADMIN — MORPHINE SULFATE 2 MG: 4 INJECTION INTRAVENOUS at 10:05

## 2018-05-23 RX ADMIN — POTASSIUM CHLORIDE 10 MEQ: 10 INJECTION, SOLUTION INTRAVENOUS at 08:05

## 2018-05-23 RX ADMIN — CHLORHEXIDINE GLUCONATE 15 ML: 1.2 RINSE ORAL at 09:05

## 2018-05-23 RX ADMIN — VANCOMYCIN HYDROCHLORIDE 1 G: 10 INJECTION, POWDER, LYOPHILIZED, FOR SOLUTION INTRAVENOUS at 11:05

## 2018-05-23 RX ADMIN — PROPOFOL 10 MCG/KG/MIN: 10 INJECTION, EMULSION INTRAVENOUS at 02:05

## 2018-05-23 RX ADMIN — SODIUM CHLORIDE, SODIUM GLUCONATE, SODIUM ACETATE, POTASSIUM CHLORIDE, MAGNESIUM CHLORIDE, SODIUM PHOSPHATE, DIBASIC, AND POTASSIUM PHOSPHATE: .53; .5; .37; .037; .03; .012; .00082 INJECTION, SOLUTION INTRAVENOUS at 12:05

## 2018-05-23 RX ADMIN — SODIUM CHLORIDE, SODIUM LACTATE, POTASSIUM CHLORIDE, AND CALCIUM CHLORIDE 1000 ML: .6; .31; .03; .02 INJECTION, SOLUTION INTRAVENOUS at 08:05

## 2018-05-23 RX ADMIN — FENTANYL CITRATE 25 MCG: 50 INJECTION, SOLUTION INTRAMUSCULAR; INTRAVENOUS at 01:05

## 2018-05-23 RX ADMIN — SODIUM CHLORIDE, SODIUM LACTATE, POTASSIUM CHLORIDE, AND CALCIUM CHLORIDE: .6; .31; .03; .02 INJECTION, SOLUTION INTRAVENOUS at 02:05

## 2018-05-23 RX ADMIN — CEFTRIAXONE SODIUM 2 G: 2 INJECTION, POWDER, FOR SOLUTION INTRAMUSCULAR; INTRAVENOUS at 10:05

## 2018-05-23 RX ADMIN — ETOMIDATE 20 MG: 2 INJECTION, SOLUTION INTRAVENOUS at 12:05

## 2018-05-23 RX ADMIN — ROCURONIUM BROMIDE 30 MG: 10 INJECTION, SOLUTION INTRAVENOUS at 01:05

## 2018-05-23 RX ADMIN — SODIUM CHLORIDE: 0.9 INJECTION, SOLUTION INTRAVENOUS at 11:05

## 2018-05-23 RX ADMIN — POTASSIUM CHLORIDE 40 MEQ: 1500 TABLET, EXTENDED RELEASE ORAL at 09:05

## 2018-05-23 RX ADMIN — MAGNESIUM SULFATE HEPTAHYDRATE 3 G: 500 INJECTION, SOLUTION INTRAMUSCULAR; INTRAVENOUS at 07:05

## 2018-05-23 RX ADMIN — NICARDIPINE HYDROCHLORIDE 1 MG/HR: 0.2 INJECTION INTRAVENOUS at 02:05

## 2018-05-23 RX ADMIN — POTASSIUM CHLORIDE 10 MEQ: 10 INJECTION, SOLUTION INTRAVENOUS at 09:05

## 2018-05-23 RX ADMIN — SODIUM CHLORIDE 1000 ML: 0.9 INJECTION, SOLUTION INTRAVENOUS at 11:05

## 2018-05-23 RX ADMIN — PIPERACILLIN AND TAZOBACTAM 4.5 G: 4; .5 INJECTION, POWDER, LYOPHILIZED, FOR SOLUTION INTRAVENOUS; PARENTERAL at 03:05

## 2018-05-23 NOTE — ASSESSMENT & PLAN NOTE
89yo F with PMH of COPD (not on home O2), HTN, PAD (recent RLE angio, on plavix), HLD and GERD who presents to the SICU intubated and sedated s/p ex lap, right hemicolectomy and end ileostomy for cecal perforation related to obstructing colon mass at the hepatic flexure    Plan:    Neuro:   -Pain control: fentanyl while sedated  -Sedation with propofol while intubated  -Daily sedation vacations, plan to wean sedation in AM with goal of extubation     Pulmonary:   -Intubated  -Vent settings  Vent Mode: A/C  Oxygen Concentration (%):  [40-50] 40  Resp Rate Total:  [11.9 br/min-16 br/min] 16 br/min  Vt Set:  [450 mL-500 mL] 450 mL  PEEP/CPAP:  [5 cmH20] 5 cmH20  Pressure Support:  [10 cmH20] 10 cmH20  Mean Airway Pressure:  [9.3 cmH20-9.5 cmH20] 9.5 cmH20  - ABGs prn  ABG    Recent Labs  Lab 05/23/18  1326   PH 7.463*   PO2 80   PCO2 33.9*   HCO3 24.2   BE 0   -Vent associated PNA ppx: Chlorhexidine   -Daily SBT  -Wean vent as tolerated, plan to extubate in AM if patient passes SBT parameters    Cardiac:  -MAP goal >65  -HDS not requiring pressors  -H/o HTN, noted in PACU, Cardene gtt started, titrate for SBT <165  -Home amlodipine to restart in AM    Renal:   -Valladares in place  -UOP adequate, continue to monitor  -Bun/Cr 7/0.6 pre-op    Fluids/Electrolytes/Nutrition/GI:   -Nutritional status: NPO  -replace lytes PRN  -LR @ 125  -NG to [facility]    Hematology/Oncology:  - post-op labs ordered, will continue to follow, CBC q8  -Anticoagulation: SQ heparin, holding plavix, will restart per primary    Infectious Disease:   -Afebrile  - Will follow up post-op CBC  -Lactates ordered q8  -Abx: Vanc & flagyl post-op     Endocrine:  -Glucose goal of 120-180  -h/o hypothyrodism, home synthroid via NG in AM    Dispo:  -Continue care in the ICU setting  -ACS primary

## 2018-05-23 NOTE — H&P
[facility]  Critical Care - Surgery  History & Physical    Patient Name: Suzan Villarreal  MRN: [id number]  Admission Date: 5/23/2018  Code Status: Full Code  Attending Physician: No att. providers found   Primary Care Provider: Aly Rivas MD   Principal Problem: <principal problem not specified>    Subjective:     HPI:  Ms. Villarreal is a 89 yo F  With PMH significant for COPD, not on O2, HTN, HLD, PAD (recent RLE angio, on plavix), GERD who presented to the ED early this morning 5/23 following acute onset abdominal pain that woke her from sleep early this morning. Per reports, the pain began in the upper abdomen and progressively worsened prompting presentation. Of note, she does endorse intermittent low grade obstructive symptoms intermittently over the past month, which were relieved with bowel rest and stool softeners. CT obtained in the ED with evidence of free air prompted patient to be taken to OR for class A ex-lap. She now presents to the SICU intubated and sedated s/p ex lap, right hemicolectomy and end ileostomy for cecal perforation related to obstructing colon mass at the hepatic flexure.    Follow-up For: Procedure(s) (LRB):  EXPLORATORY-LAPAROTOMY (N/A)  DRAINAGE  COLECTOMY-RIGHT  ILEOSTOMY    Post-Operative Day: Day of Surgery     Past Medical History:   Diagnosis Date    Allergy     Cholelithiasis without obstruction     Chronic insomnia     COPD (chronic obstructive pulmonary disease)     DJD (degenerative joint disease), lumbar     GERD (gastroesophageal reflux disease)     HLD (hyperlipidemia)     HTN (hypertension)     Hypothyroid     OP (osteoporosis)     Osteoarthritis     S/P hysterectomy        Past Surgical History:   Procedure Laterality Date    APPENDECTOMY      CHOLECYSTECTOMY      HIP SURGERY  8-2013    right    HYSTERECTOMY      KNEE ARTHROSCOPY Right 7-6-15    TK       Review of patient's allergies indicates:   Allergen Reactions    Sulfa  (sulfonamide antibiotics) Swelling    Asa [aspirin] Itching       Family History     None        Social History Main Topics    Smoking status: Never Smoker    Smokeless tobacco: Never Used    Alcohol use No    Drug use: No    Sexual activity: Not Currently      Review of Systems   Unable to perform ROS: Dementia     Objective:     Vital Signs (Most Recent):  Temp: 96.6 °F (35.9 °C) (05/23/18 1351)  Pulse: 86 (05/23/18 1415)  Resp: 16 (05/23/18 1415)  BP: (!) 177/81 (05/23/18 1415)  SpO2: 98 % (05/23/18 1415) Vital Signs (24h Range):  Temp:  [96.6 °F (35.9 °C)-98.5 °F (36.9 °C)] 96.6 °F (35.9 °C)  Pulse:  [] 86  Resp:  [12-21] 16  SpO2:  [94 %-100 %] 98 %  BP: (143-203)/(61-96) 177/81     Weight: 52.2 kg (115 lb)  Body mass index is 19.74 kg/m².      Intake/Output Summary (Last 24 hours) at 05/23/18 1431  Last data filed at 05/23/18 1415   Gross per 24 hour   Intake             3193 ml   Output              775 ml   Net             2418 ml       Physical Exam   Constitutional: She is oriented to person, place, and time. She appears well-developed and well-nourished.   HENT:   Head: Normocephalic and atraumatic.   Nose: Nose normal.   NG in place   Eyes: Conjunctivae and EOM are normal. Pupils are equal, round, and reactive to light. No scleral icterus.   Neck: Normal range of motion. No thyromegaly present.   Cardiovascular: Normal rate and regular rhythm.  Exam reveals no gallop and no friction rub.    No murmur heard.  Pulmonary/Chest: Effort normal.   Intubated  Vent Mode: A/C  Oxygen Concentration (%):  (40-50) 40  Resp Rate Total:  (11.9 br/min-16 br/min) 16 br/min  Vt Set:  (450 mL-500 mL) 450 mL  PEEP/CPAP:  (5 cmH20) 5 cmH20  Pressure Support:  (10 cmH20) 10 cmH20  Mean Airway Pressure:  (9.3 cmH20-9.5 cmH20) 9.5 cmH20   Abdominal: Soft.   End ileostomy pink  Incision c/d/i   Musculoskeletal: Normal range of motion.   Lymphadenopathy:     She has no cervical adenopathy.   Neurological: She is  alert and oriented to person, place, and time.   Skin: Skin is warm and dry. No rash noted.       Vents:  Vent Mode: A/C (05/23/18 1410)  Ventilator Initiated: Yes (05/23/18 1350)  Set Rate: 16 bmp (05/23/18 1410)  Vt Set: 450 mL (05/23/18 1410)  Pressure Support: 10 cmH20 (05/23/18 1410)  PEEP/CPAP: 5 cmH20 (05/23/18 1410)  Oxygen Concentration (%): 40 (05/23/18 1415)  Peak Airway Pressure: 21.5 cmH2O (05/23/18 1410)  Total Ve: 7.31 mL (05/23/18 1410)  F/VT Ratio<105 (RSBI): (!) 36.2 (05/23/18 1410)    Lines/Drains/Airways     Drain                 Urethral Catheter 05/23/18 1216 Non-latex less than 1 day    Female External Urinary Catheter 05/23/18 0811 less than 1 day          Airway                 Airway - Non-Surgical 05/23/18 1210 Endotracheal Tube less than 1 day          Arterial Line                 Arterial Line 05/23/18 1225 Left Radial less than 1 day          Peripheral Intravenous Line                 Peripheral IV - Single Lumen Left Forearm -- days         Peripheral IV - Single Lumen 05/23/18 0732 Right Forearm less than 1 day         Peripheral IV - Single Lumen 05/23/18 1110 Left Forearm less than 1 day         Peripheral IV - Single Lumen 05/23/18 1217 Right Antecubital less than 1 day                Significant Labs:    CBC/Anemia Profile:    Recent Labs  Lab 05/23/18  0730 05/23/18  0907 05/23/18  1238 05/23/18  1326   WBC 6.63  --   --   --    HGB 13.8  --   --   --    HCT 42.2 36 31* 28*   *  --   --   --    MCV 94  --   --   --    RDW 16.6*  --   --   --         Chemistries:    Recent Labs  Lab 05/23/18  0827      K 3.1*      CO2 27   BUN 7*   CREATININE 0.6   CALCIUM 8.6*   ALBUMIN 2.7*   PROT 5.4*   BILITOT 0.6   ALKPHOS 74   ALT 7*   AST 18   MG 1.6       Coagulation:   Recent Labs  Lab 05/23/18  1114   INR 1.0   APTT 23.2     Lactic Acid:   Recent Labs  Lab 05/23/18  0741 05/23/18  1400   LACTATE 2.2 1.5       Significant Imaging: I have reviewed all pertinent imaging  results/findings within the past 24 hours.    Assessment/Plan:     Bowel perforation    89yo F with PMH of COPD (not on home O2), HTN, PAD (recent RLE angio, on plavix), HLD and GERD who presents to the SICU intubated and sedated s/p ex lap, right hemicolectomy and end ileostomy for cecal perforation related to obstructing colon mass at the hepatic flexure    Plan:    Neuro:   -Pain control: fentanyl while sedated  -Sedation with propofol while intubated  -Daily sedation vacations, plan to wean sedation in AM with goal of extubation     Pulmonary:   -Intubated  -Vent settings  Vent Mode: A/C  Oxygen Concentration (%):  [40-50] 40  Resp Rate Total:  [11.9 br/min-16 br/min] 16 br/min  Vt Set:  [450 mL-500 mL] 450 mL  PEEP/CPAP:  [5 cmH20] 5 cmH20  Pressure Support:  [10 cmH20] 10 cmH20  Mean Airway Pressure:  [9.3 cmH20-9.5 cmH20] 9.5 cmH20  - ABGs prn  ABG    Recent Labs  Lab 05/23/18  1326   PH 7.463*   PO2 80   PCO2 33.9*   HCO3 24.2   BE 0   -Vent associated PNA ppx: Chlorhexidine   -Daily SBT  -Wean vent as tolerated, plan to extubate in AM if patient passes SBT parameters    Cardiac:  -MAP goal >65  -HDS not requiring pressors  -H/o HTN, noted in PACU, Cardene gtt started, titrate for SBT <165  -Home amlodipine to restart in AM    Renal:   -Valladares in place  -UOP adequate, continue to monitor  -Bun/Cr 7/0.6 pre-op    Fluids/Electrolytes/Nutrition/GI:   -Nutritional status: NPO  -replace lytes PRN  -LR @ 125  -NG to LIWS    Hematology/Oncology:  - post-op labs ordered, will continue to follow, CBC q8  -Anticoagulation: SQ heparin, holding plavix, will restart per primary    Infectious Disease:   -Afebrile  - Will follow up post-op CBC  -Lactates ordered q8  -Abx: Vanc & zosyn post-op     Endocrine:  -Glucose goal of 120-180  -h/o hypothyrodism, home synthroid via NG in AM    Dispo:  -Continue care in the ICU setting  -ACS primary                Critical care was time spent personally by me on the following  activities: development of treatment plan with patient or surrogate and bedside caregivers, discussions with consultants, evaluation of patient's response to treatment, examination of patient, ordering and performing treatments and interventions, ordering and review of laboratory studies, ordering and review of radiographic studies, pulse oximetry, re-evaluation of patient's condition.  This critical care time did not overlap with that of any other provider or involve time for any procedures.     Zohreh Espinoza MD  Critical Care - Surgery  [facility]

## 2018-05-23 NOTE — PROGRESS NOTES
1348 Patient admitted to PACU and placwed on Esprit ventilator. Patient to stay intubated at this time as per SICU and Dr. Anderson/ It[facility]. Please refer to flowsheet for settings, changes and abg results.

## 2018-05-23 NOTE — SUBJECTIVE & OBJECTIVE
Follow-up For: Procedure(s) (LRB):  EXPLORATORY-LAPAROTOMY (N/A)  DRAINAGE  COLECTOMY-RIGHT  ILEOSTOMY    Post-Operative Day: Day of Surgery     Past Medical History:   Diagnosis Date    Allergy     Cholelithiasis without obstruction     Chronic insomnia     COPD (chronic obstructive pulmonary disease)     DJD (degenerative joint disease), lumbar     GERD (gastroesophageal reflux disease)     HLD (hyperlipidemia)     HTN (hypertension)     Hypothyroid     OP (osteoporosis)     Osteoarthritis     S/P hysterectomy        Past Surgical History:   Procedure Laterality Date    APPENDECTOMY      CHOLECYSTECTOMY      HIP SURGERY  8-2013    right    HYSTERECTOMY      KNEE ARTHROSCOPY Right 7-6-15    TK       Review of patient's allergies indicates:   Allergen Reactions    Sulfa (sulfonamide antibiotics) Swelling    Asa [aspirin] Itching       Family History     None        Social History Main Topics    Smoking status: Never Smoker    Smokeless tobacco: Never Used    Alcohol use No    Drug use: No    Sexual activity: Not Currently      Review of Systems   Unable to perform ROS: Dementia     Objective:     Vital Signs (Most Recent):  Temp: 96.6 °F (35.9 °C) (05/23/18 1351)  Pulse: 86 (05/23/18 1415)  Resp: 16 (05/23/18 1415)  BP: (!) 177/81 (05/23/18 1415)  SpO2: 98 % (05/23/18 1415) Vital Signs (24h Range):  Temp:  [96.6 °F (35.9 °C)-98.5 °F (36.9 °C)] 96.6 °F (35.9 °C)  Pulse:  [] 86  Resp:  [12-21] 16  SpO2:  [94 %-100 %] 98 %  BP: (143-203)/(61-96) 177/81     Weight: 52.2 kg (115 lb)  Body mass index is 19.74 kg/m².      Intake/Output Summary (Last 24 hours) at 05/23/18 1431  Last data filed at 05/23/18 1415   Gross per 24 hour   Intake             3193 ml   Output              775 ml   Net             2418 ml       Physical Exam   Constitutional: She is oriented to person, place, and time. She appears well-developed and well-nourished.   HENT:   Head: Normocephalic and atraumatic.   Nose:  Nose normal.   NG in place   Eyes: Conjunctivae and EOM are normal. Pupils are equal, round, and reactive to light. No scleral icterus.   Neck: Normal range of motion. No thyromegaly present.   Cardiovascular: Normal rate and regular rhythm.  Exam reveals no gallop and no friction rub.    No murmur heard.  Pulmonary/Chest: Effort normal.   Intubated  Vent Mode: A/C  Oxygen Concentration (%):  (40-50) 40  Resp Rate Total:  (11.9 br/min-16 br/min) 16 br/min  Vt Set:  (450 mL-500 mL) 450 mL  PEEP/CPAP:  (5 cmH20) 5 cmH20  Pressure Support:  (10 cmH20) 10 cmH20  Mean Airway Pressure:  (9.3 cmH20-9.5 cmH20) 9.5 cmH20   Abdominal: Soft.   End ileostomy pink  Incision c/d/i   Musculoskeletal: Normal range of motion.   Lymphadenopathy:     She has no cervical adenopathy.   Neurological: She is alert and oriented to person, place, and time.   Skin: Skin is warm and dry. No rash noted.       Vents:  Vent Mode: A/C (05/23/18 1410)  Ventilator Initiated: Yes (05/23/18 1350)  Set Rate: 16 bmp (05/23/18 1410)  Vt Set: 450 mL (05/23/18 1410)  Pressure Support: 10 cmH20 (05/23/18 1410)  PEEP/CPAP: 5 cmH20 (05/23/18 1410)  Oxygen Concentration (%): 40 (05/23/18 1415)  Peak Airway Pressure: 21.5 cmH2O (05/23/18 1410)  Total Ve: 7.31 mL (05/23/18 1410)  F/VT Ratio<105 (RSBI): (!) 36.2 (05/23/18 1410)    Lines/Drains/Airways     Drain                 Urethral Catheter 05/23/18 1216 Non-latex less than 1 day    Female External Urinary Catheter 05/23/18 0811 less than 1 day          Airway                 Airway - Non-Surgical 05/23/18 1210 Endotracheal Tube less than 1 day          Arterial Line                 Arterial Line 05/23/18 1225 Left Radial less than 1 day          Peripheral Intravenous Line                 Peripheral IV - Single Lumen Left Forearm -- days         Peripheral IV - Single Lumen 05/23/18 0732 Right Forearm less than 1 day         Peripheral IV - Single Lumen 05/23/18 1110 Left Forearm less than 1 day          Peripheral IV - Single Lumen 05/23/18 1217 Right Antecubital less than 1 day                Significant Labs:    CBC/Anemia Profile:    Recent Labs  Lab 05/23/18  0730 05/23/18  0907 05/23/18  1238 05/23/18  1326   WBC 6.63  --   --   --    HGB 13.8  --   --   --    HCT 42.2 36 31* 28*   *  --   --   --    MCV 94  --   --   --    RDW 16.6*  --   --   --         Chemistries:    Recent Labs  Lab 05/23/18  0827      K 3.1*      CO2 27   BUN 7*   CREATININE 0.6   CALCIUM 8.6*   ALBUMIN 2.7*   PROT 5.4*   BILITOT 0.6   ALKPHOS 74   ALT 7*   AST 18   MG 1.6       Coagulation:   Recent Labs  Lab 05/23/18  1114   INR 1.0   APTT 23.2     Lactic Acid:   Recent Labs  Lab 05/23/18  0741 05/23/18  1400   LACTATE 2.2 1.5       Significant Imaging: I have reviewed all pertinent imaging results/findings within the past 24 hours.

## 2018-05-23 NOTE — ED PROVIDER NOTES
Encounter Date: 5/23/2018    SCRIBE #1 NOTE: I, Usha Quesada, am scribing for, and in the presence of, Dr. Garcia .       History     Chief Complaint   Patient presents with    Abdominal Pain     right quadrant abdominal  pain - intermittent - given prn meds rx by md - pt unable to tolerate pain any longer. angiogram done friday -      Time seen by provider: 7:10 AM    This is a 89 y.o. female with medical conditions including HTN, HLD, COPD, OA, who presents with complaint of abdominal pain. The patient has history of obstruction with management and improvement. However, she began having constipation and pain again shortly after. She was seen by primary care and referred to colorectal for those symptoms. She not presents with acutely worsened pain since 5 AM this morning and constipation. The patient has been given fiber, dulcolax suppository, and stool softener. The patient complains of being cold and her daughter reports significant increase in urinary frequency but has no other urinary complaints.       The history is provided by the patient and a relative.     Review of patient's allergies indicates:   Allergen Reactions    Sulfa (sulfonamide antibiotics) Swelling    Asa [aspirin] Itching     Past Medical History:   Diagnosis Date    Allergy     Cholelithiasis without obstruction     Chronic insomnia     COPD (chronic obstructive pulmonary disease)     DJD (degenerative joint disease), lumbar     GERD (gastroesophageal reflux disease)     HLD (hyperlipidemia)     HTN (hypertension)     Hypothyroid     OP (osteoporosis)     Osteoarthritis     S/P hysterectomy      Past Surgical History:   Procedure Laterality Date    APPENDECTOMY      CHOLECYSTECTOMY      HIP SURGERY  8-2013    right    HYSTERECTOMY      KNEE ARTHROSCOPY Right 7-6-15    TK     Family History   Problem Relation Age of Onset    Melanoma Neg Hx      Social History   Substance Use Topics    Smoking status: Never Smoker     Smokeless tobacco: Never Used    Alcohol use No     Review of Systems   Constitutional: Positive for appetite change (decreased) and chills. Negative for fever.   HENT: Negative for facial swelling and nosebleeds.    Eyes: Negative for visual disturbance.   Respiratory: Negative for shortness of breath.    Cardiovascular: Negative for chest pain.   Gastrointestinal: Positive for abdominal distention, abdominal pain and constipation.   Genitourinary: Positive for frequency. Negative for dysuria.   Musculoskeletal: Negative for neck pain.   Skin: Negative for rash.   Neurological: Negative for speech difficulty.       Physical Exam     Initial Vitals [05/23/18 0659]   BP Pulse Resp Temp SpO2   (!) 183/83 100 20 97.6 °F (36.4 °C) (!) 94 %      MAP       116.33         Physical Exam    Nursing note and vitals reviewed.  Constitutional: She appears well-developed and well-nourished. She appears distressed.   Frail, elderly female. Chronically ill appearing.    HENT:   Head: Normocephalic and atraumatic.   Dry mucous membranes   Eyes: EOM are normal. Pupils are equal, round, and reactive to light.   Neck: Normal range of motion. Neck supple.   Cardiovascular: Regular rhythm. Tachycardia present.    Absent DP pulses bilaterally.    Pulmonary/Chest: Breath sounds normal. No respiratory distress.   Abdominal: She exhibits distension (Moderate ). There is generalized tenderness and tenderness in the right upper quadrant and epigastric area.   Tenderness diffuse but most significant to epigastric and right upper quadrant.    Genitourinary:   Genitourinary Comments: On rectal exam: good tone, no fecal mass noted on palpation   Musculoskeletal: Normal range of motion. She exhibits no edema (No extremity edema).   Neurological: She is alert and oriented to person, place, and time.   Answering questions appropriately. No focal weakness.   Skin: Skin is warm and dry.   Purple discoloration of right toes and plantar surface of the  foot. This is improved in appearence since last week per patient's daughter.          ED Course   Procedures  Labs Reviewed   CBC W/ AUTO DIFFERENTIAL - Abnormal; Notable for the following:        Result Value    RDW 16.6 (*)     Platelets 532 (*)     MPV 8.6 (*)     All other components within normal limits   B-TYPE NATRIURETIC PEPTIDE - Abnormal; Notable for the following:      (*)     All other components within normal limits   URINALYSIS, REFLEX TO URINE CULTURE - Abnormal; Notable for the following:     Occult Blood UA 1+ (*)     Nitrite, UA Positive (*)     Leukocytes, UA 2+ (*)     All other components within normal limits    Narrative:     Preferred Collection Type->Urine, Clean Catch   COMPREHENSIVE METABOLIC PANEL - Abnormal; Notable for the following:     Potassium 3.1 (*)     BUN, Bld 7 (*)     Calcium 8.6 (*)     Total Protein 5.4 (*)     Albumin 2.7 (*)     ALT 7 (*)     All other components within normal limits   URINALYSIS MICROSCOPIC - Abnormal; Notable for the following:     WBC, UA 18 (*)     Bacteria, UA Few (*)     All other components within normal limits    Narrative:     Preferred Collection Type->Urine, Clean Catch   ISTAT PROCEDURE - Abnormal; Notable for the following:     POC BUN 5 (*)     POC Potassium 3.0 (*)     All other components within normal limits   CULTURE, URINE   LACTIC ACID, PLASMA   TROPONIN I   MAGNESIUM   LIPASE             Medical Decision Making:   History:   Old Medical Records: I decided to obtain old medical records.  Old Records Summarized: records from clinic visits and records from previous admission(s).       <> Summary of Records: Patient with history of HTN, COPD, cholecystectomy, appendectomy, PVD. She was just admitted her on April 20th to May 1st for constipation. CT does show multiple dilated loops of small bowel suggestive of possible SBO vs ileus. She was managed as an ileus with improvement in symptoms. She followed up with family medicine a week  and a half ago for persistent constipation and referral to colorectal was made.   Initial Assessment:   89 yo f, extensive PMH as above, recent development of abd pain and constipation w admission as above, now returning for worsening pain since yesterday, primarily in upper abd.  No vomiting.  Continued problem of constipation, only taking colace 1x/day and took senna for the first time last night.  Also has been taking fiber cookies without relief.  No other laxatives.  On exam, uncomfortable, moderate abd distention and diffuse tenderness.  Rectal vault empty.    Differential Diagnosis:   Severe constipation vs continued ileus vs mesenteric ischemia vs SBO  ED Management:  Labs  Repeat CT  dispo uncertain pending results            Scribe Attestation:   Scribe #1: I performed the above scribed service and the documentation accurately describes the services I performed. I attest to the accuracy of the note.    Attending Attestation:         Attending Critical Care:   Critical Care Times:   ==============================================================  · Total Critical Care Time - exclusive of procedural time: 35 minutes.  ==============================================================  Critical care was necessary to treat or prevent imminent or life-threatening deterioration of the following conditions: perforated viscous.   Critical care was time spent personally by me on the following activities: obtaining history from patient or relative, examination of patient, review of x-rays / CT sent with the patient, review of old charts, ordering lab, x-rays, and/or EKG, development of treatment plan with patient or relative, ordering and performing treatments and interventions, evaluation of patient's response to treatment, discussion with consultants, re-evaluation of patient's conition and end of life discussions.       Attending ED Notes:   10:18 AM  Significant pneumoperitoneum on abd CT suggesting perforation,  unclear site of perf  Discussed results with pt/daughter, grave nature of this diagnosis, particularly given pt's age and comorbidities  General surgery consulted  Broad spectrum antiobiotics ordered  Will need to address GOC with pt/daughter    10:34 AM  D/w pt and daughter at the bedside - pt understands high risk of mortality, poor outcome with surgical intervention but wishes to proceed with surgery  Will admit to OR             Clinical Impression:   The primary encounter diagnosis was Bowel perforation. A diagnosis of Abdominal pain was also pertinent to this visit.      I, Dr. Kendra Garcia, personally performed the services described in this documentation. All medical record entries made by the scribe were at my direction and in my presence.  I have reviewed the chart and agree that the record reflects my personal performance and is accurate and complete. Kendra Garcia MD.  7:52 AM 05/24/2018                          Kendra Garcia MD  05/24/18 0826

## 2018-05-23 NOTE — ED NOTES
LOC: The patient is awake, alert and aware of environment with an appropriate affect, the patient is oriented x 3 and speaking loudly due to decreased hearing. Speech is clear.  APPEARANCE: Patient is mumbling , and clutching her upper abd. patient is clean and well groomed, patient's clothing is properly fastened.  SKIN: The skin is warm and dry, color consistent with ethnicity, patient has normal skin turgor and moist mucus membranes, skin intact, no breakdown , has one bruise to her upper left leg from a cath done on Friday.  MUSCULOSKELETAL: Patient moving all extremities spontaneously, no obvious swelling or deformities noted.  RESPIRATORY: Airway is open and patent, respirations are spontaneous, patient has a normal effort and rate, no accessory muscle use noted, bilateral breath sounds **.  CARDIAC: Patient has a normal rate and regular rhythm, no periphreal edema noted, capillary refill < 3 seconds.  ABDOMEN: Soft , pt is rubbing her upper abd and moaning due to pain. Slight  distention noted, hypoactive bowel sounds present in all four quadrants.  NEUROLOGIC:  facial expression is symmetrical, patient moving all extremities spontaneously, normal sensation in all extremities when touched with a finger.  Follows all commands appropriately.

## 2018-05-23 NOTE — ANESTHESIA PREPROCEDURE EVALUATION
[facility]  Anesthesia Pre-Operative Evaluation         Patient Name: Suzan Villarreal  YOB: 1919  MRN: [id number]    SUBJECTIVE:     Pre-operative evaluation for Procedure(s) (LRB):  EXPLORATORY-LAPAROTOMY (N/A)     05/23/2018    Suzan Villarreal is a 89 y.o. female w/ a significant PMHx of HTN, HLD, COPD, presenting with abdominal pain, CT in the ER shows bowel obstruction with free air. Has been NPO since midnight. Has had nausea, denies emesis.    Patient now presents for the above procedure(s).    LDA:   22g PIV Rt forearm    Prev airway: DL, Grade 1 view.    Drips: None documented.    Patient Active Problem List   Diagnosis    HTN (hypertension)    COPD (chronic obstructive pulmonary disease)    DJD (degenerative joint disease), lumbar    Cholelithiasis without obstruction    GERD (gastroesophageal reflux disease)    S/P hysterectomy    Osteoarthritis    Macular degeneration    Subacromial bursitis    Foot pain    Hypothyroidism    Hyponatremia    History of fracture of hip    Rotator cuff tear arthropathy    Right shoulder pain    Right leg numbness    Right knee pain    Closed displaced intertrochanteric fracture of right femur with routine healing    Nonexudative age-related macular degeneration, bilateral, advanced atrophic with subfoveal involvement    Posterior vitreous detachment, both eyes    Hypertensive retinopathy of both eyes    HLD (hyperlipidemia)    Status post right knee replacement    Knee joint replacement by other means    Status post total right knee replacement 7/6/2015    Osteoarthritis of right shoulder    Osteopenia    Senile osteoporosis    Fall    Balance problem    Gait instability    Neuropathy    SOBOE (shortness of breath on exertion)    PVD (peripheral vascular disease)    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      Current Outpatient Prescriptions on File Prior to Encounter   Medication Sig Dispense Refill    acetaminophen (TYLENOL) 325 MG tablet Take 2 tablets (650 mg total) by mouth every 6 (six) hours as needed.      amLODIPine (NORVASC) 10 MG tablet Take 1 tablet (10 mg total) by mouth once daily. 30 tablet 11    ANTIOX #11/OM3/DHA/EPA/LUT/BARBER (OCUVITE ADULT 50+ ORAL) Take 1 tablet by mouth once daily.      benazepril (LOTENSIN) 20 MG tablet Take 1 tablet (20 mg total) by mouth 2 (two) times daily. 60 tablet 11    Bifidobacterium animalis 6 mg (5 billion cell) Cap Take 1 capsule by mouth once daily. 30 capsule 11    calcium carbonate (OS-UZMA) 600 mg (1,500 mg) Tab Take 600 mg by mouth 2 (two) times daily with meals.      clopidogrel (PLAVIX) 75 mg tablet Take 1 tablet (75 mg total) by mouth once daily. 30 tablet 11    docusate sodium (COLACE) 100 MG capsule Take 1 capsule (100 mg total) by mouth once daily.  0    esomeprazole (NEXIUM) 40 MG capsule Take 1 capsule (40 mg total) by mouth before breakfast. 90 capsule 1    fluoruracil (CARAC) 0.5 % cream Apply topically once daily. Nasal tip 30 g 1    inulin-sorbitol 2 gram Chew Take 1-2 tablets by mouth 2 (two) times daily.  0    Lactobacillus acidoph-L.bulgar 1 million cell Chew Take 4 tablets by mouth 3 (three) times daily with meals. 40 tablet 0    levothyroxine (SYNTHROID) 100 MCG tablet Take 1 tablet (100 mcg total) by mouth every morning. 30 tablet 11    metoclopramide HCl (REGLAN) 5 MG tablet Take 1 tablet (5 mg total) by mouth 2 (two) times daily as needed (nausea/vomiting). Do not take if  severe abdominal pain present 30 tablet 0    metoprolol succinate (TOPROL-XL) 25 MG 24 hr tablet Take 1 tablet (25 mg total) by mouth once daily. 30 tablet 11    multivitamin capsule Take 1 capsule by mouth once daily.      oxyCODONE-acetaminophen (PERCOCET) 5-325 mg per tablet Take 1 tablet by mouth every 6 (six) hours as needed for Pain. No alcohol, no driving, no operating machinery, no working, no swimming, no additional Tylenol (acetaminophen) while taking this medication. 35 tablet 0    rosuvastatin (CRESTOR) 20 MG tablet Take 1 tablet (20 mg total) by mouth once daily. 30 tablet 11    salmeterol (SEREVENT DISKUS) 50 mcg/dose diskus inhaler Inhale 1 puff into the lungs 2 (two) times daily. Controller 60 each 8       Past Surgical History:   Procedure Laterality Date    APPENDECTOMY      CHOLECYSTECTOMY      HIP SURGERY  8-2013    right    HYSTERECTOMY      KNEE ARTHROSCOPY Right 7-6-15    TK       Social History     Social History    Marital status:      Spouse name: N/A    Number of children: N/A    Years of education: N/A     Occupational History    Not on file.     Social History Main Topics    Smoking status: Never Smoker    Smokeless tobacco: Never Used    Alcohol use No    Drug use: No    Sexual activity: Not Currently     Other Topics Concern    Not on file     Social History Narrative    No narrative on file       OBJECTIVE:     Vital Signs Range (Last 24H):  Temp:  [36.4 °C (97.6 °F)-36.9 °C (98.5 °F)]   Pulse:  []   Resp:  [18-21]   BP: (160-184)/(74-91)   SpO2:  [94 %-98 %]       CBC:   Recent Labs      05/23/18   0730  05/23/18   0907   WBC  6.63   --    RBC  4.50   --    HGB  13.8   --    HCT  42.2  36   PLT  532*   --    MCV  94   --    MCH  30.7   --    MCHC  32.7   --        CMP:   Recent Labs      05/23/18   0827   NA  138   K  3.1*   CL  101   CO2  27   BUN  7*   CREATININE  0.6   GLU  99   MG  1.6   CALCIUM  8.6*   ALBUMIN  2.7*   PROT  5.4*   ALKPHOS  74    ALT  7*   AST  18   BILITOT  0.6       INR:  No results for input(s): PT, INR, PROTIME, APTT in the last 72 hours.    Diagnostic Studies:   CT abdomen 18  Significant volume pneumoperitoneum throughout the abdomen without identified source.  Immediate surgical consultation is recommended.    Large volume stool within the ascending colon and cecum with fecalization of small bowel contents in the ileum.  A region of focal colonic narrowing is seen at the hepatic flexure, which may be secondary to malignancy/stricture versus nondistention.    Nonspecific right renal hypodensity demonstrating attenuation greater than expected for simple cyst.    EK18  Vent. Rate : 099 BPM     Atrial Rate : 099 BPM     P-R Int : 148 ms          QRS Dur : 080 ms      QT Int : 342 ms       P-R-T Axes : 039 -07 -16 degrees     QTc Int : 438 ms    Sinus rhythm with Premature atrial complexes  Nonspecific T wave abnormality    2D ECHO:  No results found for this or any previous visit.      ASSESSMENT/PLAN:       Anesthesia Evaluation    I have reviewed the Patient Summary Reports.    I have reviewed the Nursing Notes.   I have reviewed the Medications.     Review of Systems  Anesthesia Hx:   Denies Personal Hx of Anesthesia complications.   Social:  Denies Tobacco Use. Denies Alcohol Use.   Hematology/Oncology:         -- Anemia: -- Coag Disorders: Denies Bleeding Disorder:   --  Cancer in past history:    EENT/Dental:   Eyes: Visual Impairment Eye Disease: Macular degeneration  Ears General/Symptom(s) Hearing Impairment: hearing-aid left, hearing-aid right    Cardiovascular:   Hypertension PVD ECG has been reviewed. PVD on plavix     Functional Capacity 3 METS, walks with cane in home, light housework; denies CP, SOB  Peripheral Arterial Disease  Hypertension    Pulmonary:   COPD, mild  Denies Asthma.  Chronic Obstructive Pulmonary Disease (COPD): Inhaler use is maintenance inhaler PRN.  Denies Obstructive Sleep Apnea (KHURRAM)    Renal/:   Chronic Renal Disease  Denies Renal Symptoms/Infections/Stones    Hepatic/GI:   GERD Bowel obst  Hepatic/GI Symptoms:  Esophageal / Stomach Disorders Gerd Controlled by PRN antireflux medication.    Musculoskeletal:   Arthritis   Joint Disease:  Arthritis, Osteoarthritis  Bone Disorders: Osteoporosis  Lumbar Spine Disorders, Lumbar Disc Disease   Neurological:  Pain , location of right great toe  Osteoarthritis  Denies Seizure Disorder  Denies CVA - Cerebrovasular Accident    Endocrine:   Hypothyroidism  Denies Diabetes  Thyroid Disease Hypothyroidism        Physical Exam  General:  Well nourished    Airway/Jaw/Neck:  Airway Findings: Mouth Opening: Small, but > 3cm Tongue: Normal  General Airway Assessment: Adult  Mallampati: II  TM Distance: 4 - 6 cm  Jaw/Neck Findings:     Neck ROM: Decreased Lateral Motion, to the right      Dental:  Dental Findings: upper partial dentures, molar caps   Chest/Lungs:  Chest/Lungs Findings: Clear to auscultation, Normal Respiratory Rate     Heart/Vascular:  Heart Findings: Rate: Normal  Rhythm: Regular Rhythm  Sounds: Normal        Mental Status:  Mental Status Findings:  Cooperative, Alert and Oriented         Anesthesia Plan  Type of Anesthesia, risks & benefits discussed:  Anesthesia Type:  general  Patient's Preference:   Intra-op Monitoring Plan: standard ASA monitors  Intra-op Monitoring Plan Comments:   Post Op Pain Control Plan: per primary service following discharge from PACU  Post Op Pain Control Plan Comments:   Induction:   IV  Beta Blocker:  Patient is on a Beta-Blocker and has received one dose within the past 24 hours (No further documentation required).       Informed Consent: Patient representative understands risks and agrees with Anesthesia plan.  Questions answered. Anesthesia consent signed with patient representative.  ASA Score: 3  emergent   Day of Surgery Review of History & Physical:    H&P update referred to the surgeon.  H&P completed by  Anesthesiologist.   Anesthesia Plan Notes: Probable arterial line; possible central line if unable to obtain adequate IV acces   Replace electrolytes  RSI         Ready For Surgery From Anesthesia Perspective.

## 2018-05-23 NOTE — BRIEF OP NOTE
[facility]  Brief Operative Note    SUMMARY     Surgery Date: 5/23/2018     Surgeon(s) and Role:     * Armen Jean Baptiste MD - Resident - Assisting     * Tushar Anderson MD - Primary        Pre-op Diagnosis:  Bowel perforation [K63.1]    Post-op Diagnosis:  Post-Op Diagnosis Codes:     * Bowel perforation [K63.1]    Procedure(s) (LRB):  EXPLORATORY-LAPAROTOMY (N/A)  DRAINAGE  COLECTOMY-RIGHT  ILEOSTOMY    Anesthesia: General    Description of Procedure: Ex lap, Right hemicolectomy and hernán pouch for cecal perforation related to obstructing colon mass at the hepatic flexure.  Brooked end ileostomy.    Description of the findings of the procedure: As above.  Puss in pelvis    Estimated Blood Loss: 50cc         Specimens:   Specimen (12h ago through future)    Start     Ordered    05/23/18 1254  Specimen to Pathology - Surgery  Once     Comments:  1.  Terminal Ileum and Right Colon with obstructing transverse colon mass and cecal perforation      05/23/18 1359

## 2018-05-23 NOTE — HPI
Ms. Villarreal is a 89 yo F  With PMH significant for COPD, not on O2, HTN, HLD, PAD (recent RLE angio, on plavix), GERD who presented to the ED early this morning 5/23 following acute onset abdominal pain that woke her from sleep early this morning. Per reports, the pain began in the upper abdomen and progressively worsened prompting presentation. Of note, she does endorse intermittent low grade obstructive symptoms intermittently over the past month, which were relieved with bowel rest and stool softeners. CT obtained in the ED with evidence of free air prompted patient to be taken to OR for class A ex-lap. She now presents to the SICU intubated and sedated s/p ex lap, right hemicolectomy and end ileostomy for cecal perforation related to obstructing colon mass at the hepatic flexure.

## 2018-05-23 NOTE — ANESTHESIA POSTPROCEDURE EVALUATION
"Anesthesia Post Evaluation    Patient: Suzan S Phil    Procedure(s) Performed: Procedure(s) (LRB):  EXPLORATORY-LAPAROTOMY (N/A)  DRAINAGE  COLECTOMY-RIGHT  ILEOSTOMY    Final Anesthesia Type: general  Patient location during evaluation: PACU  Patient participation: No - Unable to Participate, Intubation  Level of consciousness: sedated  Post-procedure vital signs: reviewed and stable  Pain management: adequate  Airway patency: patent  PONV status at discharge: No PONV  Anesthetic complications: no      Cardiovascular status: blood pressure returned to baseline  Respiratory status: intubated  Hydration status: euvolemic  Follow-up not needed.        Visit Vitals  BP (!) 203/95   Pulse 83   Temp 35.9 °C (96.6 °F) (Axillary)   Resp 12   Ht 5' 4" (1.626 m)   Wt 52.2 kg (115 lb)   SpO2 100%   Breastfeeding? No   BMI 19.74 kg/m²       Pain/Lucía Score: Pain Rating Prior to Med Admin: 5 (5/23/2018 10:36 AM)      "

## 2018-05-23 NOTE — ED NOTES
Pt c/o being cold all the time. 2 Blankets placed on pt and her thick robe laid on top. Also c/o her mouth being dry, swabbed with water.

## 2018-05-23 NOTE — TRANSFER OF CARE
"Anesthesia Transfer of Care Note    Patient: Suzan S Phil    Procedure(s) Performed: Procedure(s) (LRB):  EXPLORATORY-LAPAROTOMY (N/A)  DRAINAGE  COLECTOMY-RIGHT  ILEOSTOMY    Patient location: PACU    Anesthesia Type: general    Transport from OR: Transported from OR intubated on 100% O2 by AMBU with adequate controlled ventilation. Upon arrival to PACU/ICU, patient attached to ventilator and auscultated to confirm bilateral breath sounds and adequate TV    Post pain: adequate analgesia    Post assessment: no apparent anesthetic complications    Post vital signs: stable    Level of consciousness: sedated    Nausea/Vomiting: no nausea/vomiting    Complications: none    Transfer of care protocol was followed      Last vitals:   Visit Vitals  BP (!) 143/61   Pulse 92   Temp 36.9 °C (98.5 °F)   Resp (!) 21   Ht 5' 4" (1.626 m)   Wt 52.2 kg (115 lb)   SpO2 97%   Breastfeeding? No   BMI 19.74 kg/m²     "

## 2018-05-23 NOTE — NURSING TRANSFER
Nursing Transfer Note      5/23/2018     Transfer to: [id number]    Transfer via bed    Transfer with RT at bedside, ventilations via ambu bag, continuous monitoring per RN, IV pump, vu catheter    Transported by RN    Medicines sent: none    Chart send with patient: yes    Notified: patient's daughter Haley    Patient reassessed at: 5/23/2018 3771

## 2018-05-23 NOTE — H&P
[facility]  General Surgery  History & Physical    Patient Name: Suzan Villarreal  MRN: [id number]  Admission Date: 5/23/2018  Attending Physician: Kendra Garcia MD   Primary Care Provider: Aly Rivas MD      Subjective:     Chief Complaint/Reason for Admission: Pneumoperitoneum     History of Present Illness:  Patient is a 89 y.o. female presents with sudden onset of abdominal pain that woke her from sleep at 5 am.  Pain started in upper abdomen  Pain has been getting worse. She has been dealing with low grade obstructive symptoms off and on for the past month.  This was relieved with bowel rest and stool softeners.  CT in the ER shows free air    Prior surgical hx as per below  Recent RLE angio for PAD    No hx of MI or Stroke  Recently started on plavix    PMH notable for COPD, not on O2, HTN, HLD, PAD, GERD        No current facility-administered medications on file prior to encounter.      Current Outpatient Prescriptions on File Prior to Encounter   Medication Sig    acetaminophen (TYLENOL) 325 MG tablet Take 2 tablets (650 mg total) by mouth every 6 (six) hours as needed.    amLODIPine (NORVASC) 10 MG tablet Take 1 tablet (10 mg total) by mouth once daily.    ANTIOX #11/OM3/DHA/EPA/LUT/BARBER (OCUVITE ADULT 50+ ORAL) Take 1 tablet by mouth once daily.    benazepril (LOTENSIN) 20 MG tablet Take 1 tablet (20 mg total) by mouth 2 (two) times daily.    Bifidobacterium animalis 6 mg (5 billion cell) Cap Take 1 capsule by mouth once daily.    calcium carbonate (OS-UZMA) 600 mg (1,500 mg) Tab Take 600 mg by mouth 2 (two) times daily with meals.    clopidogrel (PLAVIX) 75 mg tablet Take 1 tablet (75 mg total) by mouth once daily.    docusate sodium (COLACE) 100 MG capsule Take 1 capsule (100 mg total) by mouth once daily.    esomeprazole (NEXIUM) 40 MG capsule Take 1 capsule (40 mg total) by mouth before breakfast.    fluoruracil (CARAC) 0.5 % cream Apply topically once daily. Nasal  tip    inulin-sorbitol 2 gram Chew Take 1-2 tablets by mouth 2 (two) times daily.    Lactobacillus acidoph-L.bulgar 1 million cell Chew Take 4 tablets by mouth 3 (three) times daily with meals.    levothyroxine (SYNTHROID) 100 MCG tablet Take 1 tablet (100 mcg total) by mouth every morning.    metoclopramide HCl (REGLAN) 5 MG tablet Take 1 tablet (5 mg total) by mouth 2 (two) times daily as needed (nausea/vomiting). Do not take if severe abdominal pain present    metoprolol succinate (TOPROL-XL) 25 MG 24 hr tablet Take 1 tablet (25 mg total) by mouth once daily.    multivitamin capsule Take 1 capsule by mouth once daily.    oxyCODONE-acetaminophen (PERCOCET) 5-325 mg per tablet Take 1 tablet by mouth every 6 (six) hours as needed for Pain. No alcohol, no driving, no operating machinery, no working, no swimming, no additional Tylenol (acetaminophen) while taking this medication.    rosuvastatin (CRESTOR) 20 MG tablet Take 1 tablet (20 mg total) by mouth once daily.    salmeterol (SEREVENT DISKUS) 50 mcg/dose diskus inhaler Inhale 1 puff into the lungs 2 (two) times daily. Controller       Review of patient's allergies indicates:   Allergen Reactions    Sulfa (sulfonamide antibiotics) Swelling    Asa [aspirin] Itching       Past Medical History:   Diagnosis Date    Allergy     Cholelithiasis without obstruction     Chronic insomnia     COPD (chronic obstructive pulmonary disease)     DJD (degenerative joint disease), lumbar     GERD (gastroesophageal reflux disease)     HLD (hyperlipidemia)     HTN (hypertension)     Hypothyroid     OP (osteoporosis)     Osteoarthritis     S/P hysterectomy      Past Surgical History:   Procedure Laterality Date    APPENDECTOMY      CHOLECYSTECTOMY      HIP SURGERY  8-2013    right    HYSTERECTOMY      KNEE ARTHROSCOPY Right 7-6-15    TK     Family History     None        Social History Main Topics    Smoking status: Never Smoker    Smokeless tobacco:  Never Used    Alcohol use No    Drug use: No    Sexual activity: Not Currently     Review of Systems   Chronic constipation  Abd pain as above  Objective:     Vital Signs (Most Recent):  Temp: 98.5 °F (36.9 °C) (05/23/18 1044)  Pulse: 91 (05/23/18 1044)  Resp: (!) 21 (05/23/18 1044)  BP: (!) 175/81 (05/23/18 1044)  SpO2: 98 % (05/23/18 1044) Vital Signs (24h Range):  Temp:  [97.6 °F (36.4 °C)-98.5 °F (36.9 °C)] 98.5 °F (36.9 °C)  Pulse:  [] 91  Resp:  [18-21] 21  SpO2:  [94 %-98 %] 98 %  BP: (160-184)/(74-91) 175/81     Weight: 52.2 kg (115 lb)  Body mass index is 19.74 kg/m².    Physical Exam   Gen: NAD  CV: Irregular  Pulm: unlabored  GI: Distended, diffuse peritonitis with rebound,lower abd midline lap scar  Neuro: Non-focal  Ext: No edema  Skin: No rashes    Significant Labs:  CBC:   Recent Labs  Lab 05/23/18  0730 05/23/18  0907   WBC 6.63  --    RBC 4.50  --    HGB 13.8  --    HCT 42.2 36   *  --    MCV 94  --    MCH 30.7  --    MCHC 32.7  --      CMP:   Recent Labs  Lab 05/23/18  0827   GLU 99   CALCIUM 8.6*   ALBUMIN 2.7*   PROT 5.4*      K 3.1*   CO2 27      BUN 7*   CREATININE 0.6   ALKPHOS 74   ALT 7*   AST 18   BILITOT 0.6     Coagulation: No results for input(s): LABPROT, INR, APTT in the last 168 hours.  Cardiac markers:   Recent Labs  Lab 05/23/18  0827   TROPONINI 0.008       Significant Diagnostics:  CT: Free air.  Possible stricture of unknown etiology at hepatic flexure    Assessment/Plan:     Active Diagnoses:    Diagnosis Date Noted POA    Bowel perforation [K63.1] 05/23/2018 Yes      Problems Resolved During this Admission:    Diagnosis Date Noted Date Resolved POA     VTE Risk Mitigation         Ordered     Place sequential compression device  Until discontinued      05/23/18 1039     Place LINWOOD hose  Until discontinued      05/23/18 1039     IP VTE HIGH RISK PATIENT  Once      05/23/18 1039           Admit to Surgery  To OR emergently after long discussion about  pt's age and comorbid conditions.  She is at very high risk of morbidity and mortality but given volume of free air and peritonitis, non-operative mngmt would like be fatal  Abx given in OR  Type and Screen, 2 units PRBC on call to OR    Armen Jean Baptiste MD  General Surgery  [facility]

## 2018-05-24 LAB
ALBUMIN SERPL BCP-MCNC: 1.7 G/DL
ALBUMIN SERPL BCP-MCNC: 1.9 G/DL
ALLENS TEST: ABNORMAL
ALP SERPL-CCNC: 56 U/L
ALP SERPL-CCNC: 60 U/L
ALT SERPL W/O P-5'-P-CCNC: 15 U/L
ALT SERPL W/O P-5'-P-CCNC: 15 U/L
ANION GAP SERPL CALC-SCNC: 7 MMOL/L
ANION GAP SERPL CALC-SCNC: 8 MMOL/L
ANISOCYTOSIS BLD QL SMEAR: SLIGHT
AST SERPL-CCNC: 39 U/L
AST SERPL-CCNC: 41 U/L
BASOPHILS # BLD AUTO: 0.05 K/UL
BASOPHILS NFR BLD: 0.5 %
BILIRUB SERPL-MCNC: 1.3 MG/DL
BILIRUB SERPL-MCNC: 1.3 MG/DL
BUN SERPL-MCNC: 7 MG/DL
BUN SERPL-MCNC: 9 MG/DL
BURR CELLS BLD QL SMEAR: ABNORMAL
CALCIUM SERPL-MCNC: 6.5 MG/DL
CALCIUM SERPL-MCNC: 7.7 MG/DL
CHLORIDE SERPL-SCNC: 105 MMOL/L
CHLORIDE SERPL-SCNC: 109 MMOL/L
CO2 SERPL-SCNC: 19 MMOL/L
CO2 SERPL-SCNC: 23 MMOL/L
CREAT SERPL-MCNC: 0.4 MG/DL
CREAT SERPL-MCNC: 0.5 MG/DL
DELSYS: ABNORMAL
DIFFERENTIAL METHOD: ABNORMAL
EOSINOPHIL # BLD AUTO: 0 K/UL
EOSINOPHIL NFR BLD: 0 %
ERYTHROCYTE [DISTWIDTH] IN BLOOD BY AUTOMATED COUNT: 15.3 %
ERYTHROCYTE [SEDIMENTATION RATE] IN BLOOD BY WESTERGREN METHOD: 16 MM/H
EST. GFR  (AFRICAN AMERICAN): >60 ML/MIN/1.73 M^2
EST. GFR  (AFRICAN AMERICAN): >60 ML/MIN/1.73 M^2
EST. GFR  (NON AFRICAN AMERICAN): >60 ML/MIN/1.73 M^2
EST. GFR  (NON AFRICAN AMERICAN): >60 ML/MIN/1.73 M^2
FIO2: 40
FLOW: 60
GLUCOSE SERPL-MCNC: 116 MG/DL
GLUCOSE SERPL-MCNC: 120 MG/DL
HCO3 UR-SCNC: 24.3 MMOL/L (ref 24–28)
HCT VFR BLD AUTO: 41.5 %
HGB BLD-MCNC: 14.5 G/DL
IMM GRANULOCYTES # BLD AUTO: 0.06 K/UL
IMM GRANULOCYTES NFR BLD AUTO: 0.6 %
LACTATE SERPL-SCNC: 1.4 MMOL/L
LACTATE SERPL-SCNC: 3.5 MMOL/L
LYMPHOCYTES # BLD AUTO: 1 K/UL
LYMPHOCYTES NFR BLD: 11 %
MAGNESIUM SERPL-MCNC: 2 MG/DL
MAGNESIUM SERPL-MCNC: 2.2 MG/DL
MCH RBC QN AUTO: 30.2 PG
MCHC RBC AUTO-ENTMCNC: 34.9 G/DL
MCV RBC AUTO: 87 FL
MODE: ABNORMAL
MONOCYTES # BLD AUTO: 0.4 K/UL
MONOCYTES NFR BLD: 3.9 %
NEUTROPHILS # BLD AUTO: 7.9 K/UL
NEUTROPHILS NFR BLD: 84 %
NRBC BLD-RTO: 0 /100 WBC
PCO2 BLDA: 25 MMHG (ref 35–45)
PEEP: 5
PH SMN: 7.6 [PH] (ref 7.35–7.45)
PIP: 22
PLATELET # BLD AUTO: 339 K/UL
PLATELET BLD QL SMEAR: ABNORMAL
PMV BLD AUTO: 8.9 FL
PO2 BLDA: 80 MMHG (ref 80–100)
POC BE: 3 MMOL/L
POC SATURATED O2: 98 % (ref 95–100)
POC TCO2: 25 MMOL/L (ref 23–27)
POIKILOCYTOSIS BLD QL SMEAR: SLIGHT
POLYCHROMASIA BLD QL SMEAR: ABNORMAL
POTASSIUM SERPL-SCNC: 3.5 MMOL/L
POTASSIUM SERPL-SCNC: 3.7 MMOL/L
PROT SERPL-MCNC: 3.5 G/DL
PROT SERPL-MCNC: 4 G/DL
PS: 10
RBC # BLD AUTO: 4.8 M/UL
SAMPLE: ABNORMAL
SITE: ABNORMAL
SODIUM SERPL-SCNC: 135 MMOL/L
SODIUM SERPL-SCNC: 136 MMOL/L
SP02: 99
VT: 450
WBC # BLD AUTO: 9.39 K/UL

## 2018-05-24 PROCEDURE — 63600175 PHARM REV CODE 636 W HCPCS: Performed by: STUDENT IN AN ORGANIZED HEALTH CARE EDUCATION/TRAINING PROGRAM

## 2018-05-24 PROCEDURE — 85007 BL SMEAR W/DIFF WBC COUNT: CPT

## 2018-05-24 PROCEDURE — 99900026 HC AIRWAY MAINTENANCE (STAT)

## 2018-05-24 PROCEDURE — 85027 COMPLETE CBC AUTOMATED: CPT

## 2018-05-24 PROCEDURE — 83735 ASSAY OF MAGNESIUM: CPT

## 2018-05-24 PROCEDURE — 25000003 PHARM REV CODE 250: Performed by: STUDENT IN AN ORGANIZED HEALTH CARE EDUCATION/TRAINING PROGRAM

## 2018-05-24 PROCEDURE — 82803 BLOOD GASES ANY COMBINATION: CPT

## 2018-05-24 PROCEDURE — 20000000 HC ICU ROOM

## 2018-05-24 PROCEDURE — 80053 COMPREHEN METABOLIC PANEL: CPT

## 2018-05-24 PROCEDURE — 94799 UNLISTED PULMONARY SVC/PX: CPT

## 2018-05-24 PROCEDURE — 37799 UNLISTED PX VASCULAR SURGERY: CPT

## 2018-05-24 PROCEDURE — 99900035 HC TECH TIME PER 15 MIN (STAT)

## 2018-05-24 PROCEDURE — 85025 COMPLETE CBC W/AUTO DIFF WBC: CPT

## 2018-05-24 PROCEDURE — 83605 ASSAY OF LACTIC ACID: CPT | Mod: 91

## 2018-05-24 PROCEDURE — 94010 BREATHING CAPACITY TEST: CPT

## 2018-05-24 PROCEDURE — 94150 VITAL CAPACITY TEST: CPT

## 2018-05-24 PROCEDURE — 94003 VENT MGMT INPAT SUBQ DAY: CPT

## 2018-05-24 PROCEDURE — C9113 INJ PANTOPRAZOLE SODIUM, VIA: HCPCS | Performed by: STUDENT IN AN ORGANIZED HEALTH CARE EDUCATION/TRAINING PROGRAM

## 2018-05-24 PROCEDURE — 83605 ASSAY OF LACTIC ACID: CPT

## 2018-05-24 PROCEDURE — 99233 SBSQ HOSP IP/OBS HIGH 50: CPT | Mod: GC,,, | Performed by: ANESTHESIOLOGY

## 2018-05-24 PROCEDURE — 27000221 HC OXYGEN, UP TO 24 HOURS

## 2018-05-24 PROCEDURE — 94761 N-INVAS EAR/PLS OXIMETRY MLT: CPT

## 2018-05-24 RX ORDER — HYDROMORPHONE HYDROCHLORIDE 1 MG/ML
0.5 INJECTION, SOLUTION INTRAMUSCULAR; INTRAVENOUS; SUBCUTANEOUS
Status: DISCONTINUED | OUTPATIENT
Start: 2018-05-24 | End: 2018-06-01 | Stop reason: HOSPADM

## 2018-05-24 RX ADMIN — SODIUM CHLORIDE, SODIUM LACTATE, POTASSIUM CHLORIDE, AND CALCIUM CHLORIDE 1000 ML: .6; .31; .03; .02 INJECTION, SOLUTION INTRAVENOUS at 06:05

## 2018-05-24 RX ADMIN — CHLORHEXIDINE GLUCONATE 15 ML: 1.2 RINSE ORAL at 09:05

## 2018-05-24 RX ADMIN — LEVOTHYROXINE SODIUM 100 MCG: 100 TABLET ORAL at 06:05

## 2018-05-24 RX ADMIN — PIPERACILLIN AND TAZOBACTAM 4.5 G: 4; .5 INJECTION, POWDER, LYOPHILIZED, FOR SOLUTION INTRAVENOUS; PARENTERAL at 03:05

## 2018-05-24 RX ADMIN — PANTOPRAZOLE SODIUM 40 MG: 40 INJECTION, POWDER, FOR SOLUTION INTRAVENOUS at 12:05

## 2018-05-24 RX ADMIN — METOPROLOL TARTRATE 5 MG: 5 INJECTION INTRAVENOUS at 06:05

## 2018-05-24 RX ADMIN — AMLODIPINE BESYLATE 10 MG: 10 TABLET ORAL at 08:05

## 2018-05-24 RX ADMIN — SODIUM CHLORIDE, SODIUM LACTATE, POTASSIUM CHLORIDE, AND CALCIUM CHLORIDE: .6; .31; .03; .02 INJECTION, SOLUTION INTRAVENOUS at 06:05

## 2018-05-24 RX ADMIN — HEPARIN SODIUM 5000 UNITS: 5000 INJECTION, SOLUTION INTRAVENOUS; SUBCUTANEOUS at 03:05

## 2018-05-24 RX ADMIN — METOPROLOL TARTRATE 5 MG: 5 INJECTION INTRAVENOUS at 12:05

## 2018-05-24 RX ADMIN — PIPERACILLIN AND TAZOBACTAM 4.5 G: 4; .5 INJECTION, POWDER, LYOPHILIZED, FOR SOLUTION INTRAVENOUS; PARENTERAL at 11:05

## 2018-05-24 RX ADMIN — SODIUM CHLORIDE 750 MG: 9 INJECTION, SOLUTION INTRAVENOUS at 12:05

## 2018-05-24 RX ADMIN — HEPARIN SODIUM 5000 UNITS: 5000 INJECTION, SOLUTION INTRAVENOUS; SUBCUTANEOUS at 06:05

## 2018-05-24 RX ADMIN — HYDROMORPHONE HYDROCHLORIDE 0.5 MG: 1 INJECTION, SOLUTION INTRAMUSCULAR; INTRAVENOUS; SUBCUTANEOUS at 01:05

## 2018-05-24 RX ADMIN — CALCIUM GLUCONATE 1000 MG: 94 INJECTION, SOLUTION INTRAVENOUS at 12:05

## 2018-05-24 RX ADMIN — HYDROMORPHONE HYDROCHLORIDE 0.5 MG: 1 INJECTION, SOLUTION INTRAMUSCULAR; INTRAVENOUS; SUBCUTANEOUS at 09:05

## 2018-05-24 RX ADMIN — CHLORHEXIDINE GLUCONATE 15 ML: 1.2 RINSE ORAL at 08:05

## 2018-05-24 RX ADMIN — HEPARIN SODIUM 5000 UNITS: 5000 INJECTION, SOLUTION INTRAVENOUS; SUBCUTANEOUS at 09:05

## 2018-05-24 RX ADMIN — PIPERACILLIN AND TAZOBACTAM 4.5 G: 4; .5 INJECTION, POWDER, LYOPHILIZED, FOR SOLUTION INTRAVENOUS; PARENTERAL at 12:05

## 2018-05-24 RX ADMIN — PIPERACILLIN AND TAZOBACTAM 4.5 G: 4; .5 INJECTION, POWDER, LYOPHILIZED, FOR SOLUTION INTRAVENOUS; PARENTERAL at 06:05

## 2018-05-24 NOTE — SUBJECTIVE & OBJECTIVE
Interval History:  No events overnight.  UOP marginal despite 2 L crystalloid overnight.  Vent stable.     Medications:  Continuous Infusions:   lactated ringers 125 mL/hr at 05/24/18 0600    nicardipine Stopped (05/23/18 2300)    propofol 10 mcg/kg/min (05/24/18 0800)     Scheduled Meds:   amLODIPine  10 mg Oral Daily    chlorhexidine  15 mL Mouth/Throat BID    heparin (porcine)  5,000 Units Subcutaneous Q8H    lactated ringers  1,000 mL Intravenous Once    levothyroxine  100 mcg Oral QAM    metoprolol  5 mg Intravenous Q6H    pantoprazole 40 mg in dextrose 5 % 100 mL infusion (ready to mix system)  40 mg Intravenous Daily    piperacillin-tazobactam (ZOSYN) IVPB  4.5 g Intravenous Q8H    vancomycin (VANCOCIN) IVPB  750 mg Intravenous Q24H     PRN Meds:HYDROmorphone, sodium chloride 0.9%, sodium chloride 0.9%     Review of patient's allergies indicates:   Allergen Reactions    Sulfa (sulfonamide antibiotics) Swelling    Asa [aspirin] Itching     Objective:     Vital Signs (Most Recent):  Temp: 97.7 °F (36.5 °C) (05/24/18 0300)  Pulse: 90 (05/24/18 0724)  Resp: 16 (05/23/18 1806)  BP: (!) 153/67 (05/24/18 0700)  SpO2: (!) 93 % (05/24/18 0724) Vital Signs (24h Range):  Temp:  [96.6 °F (35.9 °C)-98.5 °F (36.9 °C)] 97.7 °F (36.5 °C)  Pulse:  [65-99] 90  Resp:  [12-21] 16  SpO2:  [93 %-100 %] 93 %  BP: (113-203)/(55-96) 153/67  Arterial Line BP: (108-162)/(52-80) 117/53     Weight: 52.2 kg (115 lb)  Body mass index is 19.74 kg/m².    Intake/Output - Last 3 Shifts       05/22 0700 - 05/23 0659 05/23 0700 - 05/24 0659 05/24 0700 - 05/25 0659    I.V. (mL/kg)  5585.4 (107)     Blood  450     IV Piggyback  1150     Total Intake(mL/kg)  7185.4 (137.7)     Urine (mL/kg/hr)  1380 (1.1) 35 (0.5)    Blood  50 (0)     Total Output   1430 35    Net   +5755.4 -35                 Physical Exam  Gen: NAD  CV: RRR  Pulm: Vent  GI: Soft, Ostomy is viable but edematous.  No output.  Midline CDI    Significant Labs:  CBC:    Recent Labs  Lab 05/24/18  0444   WBC 9.39   RBC 4.80   HGB 14.5   HCT 41.5      MCV 87   MCH 30.2   MCHC 34.9     CMP:   Recent Labs  Lab 05/24/18  0444   *   CALCIUM 7.7*   ALBUMIN 1.9*   PROT 4.0*   *   K 3.5   CO2 23      BUN 9*   CREATININE 0.5   ALKPHOS 60   ALT 15   AST 39   BILITOT 1.3*     Coagulation:   Recent Labs  Lab 05/23/18  1114   LABPROT 10.8   INR 1.0   APTT 23.2     ABGs:   Recent Labs  Lab 05/24/18  0455   PH 7.596*   PCO2 25.0*   PO2 80   HCO3 24.3   POCSATURATED 98   BE 3

## 2018-05-24 NOTE — PLAN OF CARE
Unable to complete discharge assessment due to patient is asleep. NGT LIWS. No family in room CM will call daughter.     [facility] business card & [facility] Packet left at BS.        05/24/18 8488   Discharge Assessment   Assessment Type Discharge Planning Assessment

## 2018-05-24 NOTE — PROGRESS NOTES
Notified Dr. Espinoza of pt's decreased urine output. MD to place new order. Will continue to closely monitor.

## 2018-05-24 NOTE — PLAN OF CARE
Previous hospitalization was for SBO.        05/24/18 1350   Readmission Questionnaire   At the time of your discharge, did someone talk to you about what your health problems were? Yes   At the time of discharge, did someone talk to you about what to watch out for regarding worsening of your health problem? Yes   At the time of discharge, did someone talk to you about what to do if you experienced worsening of your health problem? Yes   At the time of discharge, did someone talk to you about which medication to take when you left the hospital and which ones to stop taking? Yes   What do you believe caused you to be sick enough to be re-admitted? (unbearable abdomen pain)   Do you have problems taking your medications as prescribed? No   Do you have any problems affording any of  your prescribed medications? No   Do you have problems obtaining/receiving your medications? No   Does the patient have transportation to healthcare appointments? Yes   Lives With child(princess), adult  (Daughter)   Living Arrangements house   Does the patient have family/friends to help with healtcare needs after discharge? yes   Who are your caregiver(s) and their phone number(s)? (McCookWiley pham Daughter [phone]   )   Does your caregiver provide all the help you need? Yes

## 2018-05-24 NOTE — BRIEF OP NOTE
[facility]  Brief Operative Note     SUMMARY     Surgery Date: 5/18/2018     Surgeon(s) and Role:     * Cory Hinojosa MD     * Mary Ann Garcia MD - Primary     * Inna Carmichael MD - Fellow    Assisting Surgeon: None    Pre-op Diagnosis:  PVD (peripheral vascular disease) [I73.9]  Atheroscler of native artery of right leg with intermit claudication [I70.211]    Post-op Diagnosis:  Post-Op Diagnosis Codes:     * PVD (peripheral vascular disease) [I73.9]     * Atheroscler of native artery of right leg with intermit claudication [I70.211]    Procedure(s) (LRB):  Aortogram With Runoff Left CFA access (Right)  ANGIOPLASTY WITH STENT PLACEMENT (Right)  PTA-PERIPHERAL (Right)    Anesthesia: Local MAC    Description of the findings of the procedure: Popliteal stenosis treated. No pedal outflow beyond AT occlusion    Findings/Key Components: as above    Estimated Blood Loss:5mL         Specimens:   Specimen (12h ago through future)    None          Discharge Note    SUMMARY     Admit Date: 5/18/2018    Discharge Date and Time: 5/18/2018  4:35 PM    Hospital Course (synopsis of major diagnoses, care, treatment, and services provided during the course of the hospital stay): no complication     Final Diagnosis: Post-Op Diagnosis Codes:     * PVD (peripheral vascular disease) [I73.9]     * Atheroscler of native artery of right leg with intermit claudication [I70.211]    Disposition: Home or Self Care    Follow Up/Patient Instructions: Resume regular diet, follow-up in 2-3 weeks, resume regular activity in 2-3 days.    Resume medications per post-procedure med reconciliation.      Medications:  Reconciled Home Medications:      Medication List      START taking these medications    clopidogrel 75 mg tablet  Commonly known as:  PLAVIX  Take 1 tablet (75 mg total) by mouth once daily.     rosuvastatin 20 MG tablet  Commonly known as:  CRESTOR  Take 1 tablet (20 mg total) by mouth once daily.         CONTINUE taking these medications    acetaminophen 325 MG tablet  Commonly known as:  TYLENOL  Take 2 tablets (650 mg total) by mouth every 6 (six) hours as needed.     amLODIPine 10 MG tablet  Commonly known as:  NORVASC  Take 1 tablet (10 mg total) by mouth once daily.     benazepril 20 MG tablet  Commonly known as:  LOTENSIN  Take 1 tablet (20 mg total) by mouth 2 (two) times daily.     Bifidobacterium animalis 6 mg (5 billion cell) Cap  Take 1 capsule by mouth once daily.     calcium carbonate 600 mg calcium (1,500 mg) Tab  Commonly known as:  OS-UZMA  Take 600 mg by mouth 2 (two) times daily with meals.     docusate sodium 100 MG capsule  Commonly known as:  COLACE  Take 1 capsule (100 mg total) by mouth once daily.     esomeprazole 40 MG capsule  Commonly known as:  NEXIUM  Take 1 capsule (40 mg total) by mouth before breakfast.     fluoruracil 0.5 % cream  Commonly known as:  CARAC  Apply topically once daily. Nasal tip     inulin-sorbitol 2 gram Chew  Take 1-2 tablets by mouth 2 (two) times daily.     Lactobacillus acidoph-L.bulgar 1 million cell Chew  Take 4 tablets by mouth 3 (three) times daily with meals.     levothyroxine 100 MCG tablet  Commonly known as:  SYNTHROID  Take 1 tablet (100 mcg total) by mouth every morning.     metoclopramide HCl 5 MG tablet  Commonly known as:  REGLAN  Take 1 tablet (5 mg total) by mouth 2 (two) times daily as needed (nausea/vomiting). Do not take if severe abdominal pain present     metoprolol succinate 25 MG 24 hr tablet  Commonly known as:  TOPROL-XL  Take 1 tablet (25 mg total) by mouth once daily.     multivitamin capsule  Take 1 capsule by mouth once daily.     OCUVITE ADULT 50+ ORAL  Take 1 tablet by mouth once daily.     oxyCODONE-acetaminophen 5-325 mg per tablet  Commonly known as:  PERCOCET  Take 1 tablet by mouth every 6 (six) hours as needed for Pain. No alcohol, no driving, no operating machinery, no working, no swimming, no additional Tylenol  (acetaminophen) while taking this medication.     salmeterol 50 mcg/dose diskus inhaler  Commonly known as:  SEREVENT DISKUS  Inhale 1 puff into the lungs 2 (two) times daily. Controller            Discharge Procedure Orders  VAS US Ankle Brachial Indices Resting   Standing Status: Future  Standing Exp. Date: 05/18/19     VAS US Arterial Leg Right   Standing Status: Future  Standing Exp. Date: 05/18/19     Diet general     Activity as tolerated     Call MD for:  temperature >100.4     Call MD for:  difficulty breathing, headache or visual disturbances     Call MD for:  redness, tenderness, or signs of infection (pain, swelling, redness, odor or green/yellow discharge around incision site)     Remove dressing in 48 hours   Scheduling Instructions: After dressing removed (can do so on Sunday) keep incision clean and dry, ok to wash with soap and water. Avoid bathing for two weeks (no baths, hot tubs, swimming).    Paint right toe ulcer with betadiene every day.       Follow-up Information     Mary Ann Garcia MD In 2 weeks.    Specialties:  Vascular Surgery, General Surgery  Why:  with arterial US and GAMALIEL  Contact information:  Roger MITTAL  [facility] [id number] [phone]

## 2018-05-24 NOTE — PROGRESS NOTES
[facility]  Critical Care - Surgery  Progress Note    Patient Name: Suzan Villarreal  MRN: [id number]  Admission Date: 5/23/2018  Hospital Length of Stay: 1 days  Code Status: Full Code  Attending Provider: Tushar Anderson MD  Primary Care Provider: Aly Rivas MD   Principal Problem: <principal problem not specified>    Subjective:     Interval History/Significant Events: No acute events overnight, afebrile, vital signs stable. Received LR bolus overnight for decreasing UOP with appropriate response. Pain controlled. Vent settings stable.     Follow-up For: Procedure(s) (LRB):  EXPLORATORY-LAPAROTOMY (N/A)  DRAINAGE  COLECTOMY-RIGHT  ILEOSTOMY    Post-Operative Day: 1 Day Post-Op    Objective:     Vital Signs (Most Recent):  Temp: 97.7 °F (36.5 °C) (05/24/18 0300)  Pulse: 69 (05/24/18 0600)  Resp: 16 (05/23/18 1806)  BP: (!) 113/55 (05/24/18 0600)  SpO2: 98 % (05/24/18 0600) Vital Signs (24h Range):  Temp:  [96.6 °F (35.9 °C)-98.5 °F (36.9 °C)] 97.7 °F (36.5 °C)  Pulse:  [] 69  Resp:  [12-21] 16  SpO2:  [94 %-100 %] 98 %  BP: (113-203)/(55-96) 113/55  Arterial Line BP: (108-162)/(52-80) 117/53     Weight: 52.2 kg (115 lb)  Body mass index is 19.74 kg/m².      Intake/Output Summary (Last 24 hours) at 05/24/18 0622  Last data filed at 05/24/18 0600   Gross per 24 hour   Intake          7185.44 ml   Output             1430 ml   Net          5755.44 ml       Physical Exam   Constitutional: She is oriented to person, place, and time. She appears well-developed and well-nourished.   HENT:   Head: Normocephalic and atraumatic.   Nose: Nose normal.   NG in place   Eyes: Conjunctivae and EOM are normal. Pupils are equal, round, and reactive to light. No scleral icterus.   Neck: Normal range of motion. No thyromegaly present.   Cardiovascular: Normal rate and regular rhythm.  Exam reveals no gallop and no friction rub.    No murmur heard.  Pulmonary/Chest: Effort normal.   Intubated  Vent  Mode: A/C  Oxygen Concentration (%):  (40-50) 40  Resp Rate Total:  (11.9 br/min-16 br/min) 16 br/min  Vt Set:  (450 mL-500 mL) 450 mL  PEEP/CPAP:  (5 cmH20) 5 cmH20  Pressure Support:  (10 cmH20) 10 cmH20  Mean Airway Pressure:  (9.3 cmH20-9.5 cmH20) 9.5 cmH20   Abdominal: Soft.   End ileostomy pink  Incision c/d/i   Musculoskeletal: Normal range of motion.   Lymphadenopathy:     She has no cervical adenopathy.   Neurological: She is alert and oriented to person, place, and time.   Skin: Skin is warm and dry. No rash noted.       Vents:  Vent Mode: SIMV (05/24/18 0514)  Ventilator Initiated: Yes (05/23/18 1805)  Set Rate: 12 bmp (05/24/18 0514)  Vt Set: 450 mL (05/24/18 0514)  Pressure Support: 10 cmH20 (05/24/18 0514)  PEEP/CPAP: 5 cmH20 (05/24/18 0514)  Oxygen Concentration (%): 41 (05/24/18 0600)  Peak Airway Pressure: 22 cmH2O (05/24/18 0514)  Plateau Pressure: 0 cmH20 (05/24/18 0514)  Total Ve: 5.6 mL (05/24/18 0514)  F/VT Ratio<105 (RSBI): (!) 36.95 (05/23/18 1520)    Lines/Drains/Airways     Drain                 Urethral Catheter 05/23/18 1216 Non-latex less than 1 day          Airway                 Airway - Non-Surgical 05/23/18 1210 Endotracheal Tube less than 1 day          Arterial Line                 Arterial Line 05/23/18 1225 Left Radial less than 1 day          Peripheral Intravenous Line                 Peripheral IV - Single Lumen Left Forearm -- days         Peripheral IV - Single Lumen 05/23/18 0732 Right Forearm less than 1 day         Peripheral IV - Single Lumen 05/23/18 1110 Left Forearm less than 1 day         Peripheral IV - Single Lumen 05/23/18 1217 Right Antecubital less than 1 day                Significant Labs:    CBC/Anemia Profile:    Recent Labs  Lab 05/23/18  0730  05/23/18  1326 05/23/18  1443 05/24/18  0444   WBC 6.63  --   --  4.31 9.39   HGB 13.8  --   --  16.4* 14.5   HCT 42.2  < > 28* 47.7 41.5   *  --   --  417* 339   MCV 94  --   --  88 87   RDW 16.6*  --   --   15.1* 15.3*   < > = values in this interval not displayed.     Chemistries:    Recent Labs  Lab 05/23/18  1400 05/23/18 2247 05/24/18  0444    136 135*   K 3.2* 3.7 3.5    109 105   CO2 25 19* 23   BUN 5* 7* 9*   CREATININE 0.4* 0.4* 0.5   CALCIUM 6.9* 6.5* 7.7*   ALBUMIN 2.3* 1.7* 1.9*   PROT 4.3* 3.5* 4.0*   BILITOT 1.0 1.3* 1.3*   ALKPHOS 71 56 60   ALT 20 15 15   AST 60* 41* 39   MG 1.7 2.2 2.0       Coagulation:   Recent Labs  Lab 05/23/18  1114   INR 1.0   APTT 23.2     Lactic Acid:   Recent Labs  Lab 05/23/18  1400 05/23/18 2247 05/24/18  0444   LACTATE 1.5 1.5 1.4       Significant Imaging:  I have reviewed all pertinent imaging results/findings within the past 24 hours.    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    Plan:    Neuro:   -Pain control: fentanyl while sedated  -Sedation with propofol while intubated  -Daily sedation vacations, plan to wean sedation this morning with goal of extubation     Pulmonary:   -Intubated  -Vent settings  Vent Mode: SIMV  Oxygen Concentration (%):  [40-50] 41  Resp Rate Total:  [11.9 br/min-23 br/min] 12 br/min  Vt Set:  [450 mL-500 mL] 450 mL  PEEP/CPAP:  [5 cmH20] 5 cmH20  Pressure Support:  [10 cmH20] 10 cmH20  Mean Airway Pressure:  [8.6 lnY68-91 cmH20] 8.6 cmH20  - ABGs prn  ABG    Recent Labs  Lab 05/24/18  0455   PH 7.596*   PO2 80   PCO2 25.0*   HCO3 24.3   BE 3   -Vent associated PNA ppx: Chlorhexidine   -Daily SBT  -Wean vent as tolerated, plan to extubate this AM if patient passes SBT parameters    Cardiac:  -MAP goal >65  -HDS not requiring pressors  -H/o HTN, Cardene gtt started, titrate for SBT <165, currently off  -Home amlodipine to restart this AM    Renal:   -Valladares in  place  -UOP low but adequate overnight, 0.5cc/kg/hr  -Bun/Cr 9/0.5 consistent with 7/0.6 pre-op    Fluids/Electrolytes/Nutrition/GI:   -Nutritional status: NPO  -replace lytes PRN  -LR @ 125  -NG to LIWS    Hematology/Oncology:  - H/H 14.5/41.5 will continue to follow, CBC q8  -Anticoagulation: SQ heparin, holding plavix, will restart per primary    Infectious Disease:   -Afebrile  -WBC 9.39, will continue to trend   -Lactates ordered q8, last 1.4  -Abx: Vanc & zosyn post-op     Endocrine:  -Glucose goal of 120-180  -h/o hypothyrodism, home synthroid via NG today in AM    Dispo:  -Continue care in the ICU setting  -ACS primary             Critical care was time spent personally by me on the following activities: development of treatment plan with patient or surrogate and bedside caregivers, discussions with consultants, evaluation of patient's response to treatment, examination of patient, ordering and performing treatments and interventions, ordering and review of laboratory studies, ordering and review of radiographic studies, pulse oximetry, re-evaluation of patient's condition.  This critical care time did not overlap with that of any other provider or involve time for any procedures.     Zohreh Espinoza MD  Critical Care - Surgery  [facility]

## 2018-05-24 NOTE — PLAN OF CARE
Patient lives in a 1 story house w/her daughter. Her daughter has been living w/her x 6 years & is her care giver. Needs TBD. Currently: POD # 1: In ICU, on Nicardipine IV gtt, NPO/NGT LIWS, w/new ileostomy.    Daughter states: 1. she no longer wants Dr. Deng as her mothers' PCP, 2. They will be moving to [place], [place], on 6/22/18, w/plans for her to continue to be her care giver vs place in an Assisted Living Center in either [place], [place] vs [place] pending her medical needs. Daughter is a , & they will move to [facility], [address], [place] [address], where there is a 1 story parsonage dwelling they will live in. CM informed her PT/OT will evaluate the patient & give recommendations for safe discharge;CM & SW will get w/her to get her to decide what they would like. She verbalized her understanding.        05/24/18 1350   Discharge Assessment   Assessment Type Discharge Planning Assessment   Confirmed/corrected address and phone number on facesheet? Yes   Assessment information obtained from? Caregiver;Medical Record;Other  (Daughter)   Expected Length of Stay (days) (6+)   Communicated expected length of stay with patient/caregiver no  (Per MD)   Prior to hospitilization cognitive status: Alert/Oriented;No Deficits   Prior to hospitalization functional status: Independent;Assistive Equipment;Needs Assistance   Current cognitive status: Coma/Sedated/Intubated  (Asleep. Respirations are even/unlabored. )   Current Functional Status: Needs Assistance;Assistive Equipment   Facility Arrived From: (N/A)   Lives With child(princess), adult  (Daughter)   Able to Return to Prior Arrangements unable to determine at this time (comments)   Is patient able to care for self after discharge? Unable to determine at this time (comments)   Who are your caregiver(s) and their phone number(s)? (Wiley Beck Daughter [phone]   )   Patient's perception of discharge disposition home  or selfcare;home health;skilled nursing facility  (Plan TBD/? HH vs SNF. Will need PT/OT orders post-op. (Has used [facility] in the past & was happy w/their service.))   Readmission Within The Last 30 Days unable to assess   Patient currently being followed by outpatient case management? No   Patient currently receives any other outside agency services? No   Equipment Currently Used at Home bedside commode;shower chair;wheelchair;other (see comments)  (Uses bath stool, has shower chair available, Triangle rolling walker)   Do you have any problems affording any of your prescribed medications? No   Is the patient taking medications as prescribed? yes   Does the patient have transportation home? Yes   Transportation Available car;family or friend will provide   Dialysis Name and Scheduled days (N/A)   Does the patient receive services at the [facility]? No   Discharge Plan A Home with family;Home Health  (Needs TBD/? HH)   Discharge Plan B Skilled Nursing Facility  (As above/? SNF)   Patient/Family In Agreement With Plan unable to assess

## 2018-05-24 NOTE — SUBJECTIVE & OBJECTIVE
Interval History/Significant Events: No acute events overnight, afebrile, vital signs stable. Received LR bolus overnight for decreasing UOP with appropriate response. Pain controlled. Vent settings stable.     Follow-up For: Procedure(s) (LRB):  EXPLORATORY-LAPAROTOMY (N/A)  DRAINAGE  COLECTOMY-RIGHT  ILEOSTOMY    Post-Operative Day: 1 Day Post-Op    Objective:     Vital Signs (Most Recent):  Temp: 97.7 °F (36.5 °C) (05/24/18 0300)  Pulse: 69 (05/24/18 0600)  Resp: 16 (05/23/18 1806)  BP: (!) 113/55 (05/24/18 0600)  SpO2: 98 % (05/24/18 0600) Vital Signs (24h Range):  Temp:  [96.6 °F (35.9 °C)-98.5 °F (36.9 °C)] 97.7 °F (36.5 °C)  Pulse:  [] 69  Resp:  [12-21] 16  SpO2:  [94 %-100 %] 98 %  BP: (113-203)/(55-96) 113/55  Arterial Line BP: (108-162)/(52-80) 117/53     Weight: 52.2 kg (115 lb)  Body mass index is 19.74 kg/m².      Intake/Output Summary (Last 24 hours) at 05/24/18 0622  Last data filed at 05/24/18 0600   Gross per 24 hour   Intake          7185.44 ml   Output             1430 ml   Net          5755.44 ml       Physical Exam   Constitutional: She is oriented to person, place, and time. She appears well-developed and well-nourished.   HENT:   Head: Normocephalic and atraumatic.   Nose: Nose normal.   NG in place   Eyes: Conjunctivae and EOM are normal. Pupils are equal, round, and reactive to light. No scleral icterus.   Neck: Normal range of motion. No thyromegaly present.   Cardiovascular: Normal rate and regular rhythm.  Exam reveals no gallop and no friction rub.    No murmur heard.  Pulmonary/Chest: Effort normal.   Intubated  Vent Mode: A/C  Oxygen Concentration (%):  (40-50) 40  Resp Rate Total:  (11.9 br/min-16 br/min) 16 br/min  Vt Set:  (450 mL-500 mL) 450 mL  PEEP/CPAP:  (5 cmH20) 5 cmH20  Pressure Support:  (10 cmH20) 10 cmH20  Mean Airway Pressure:  (9.3 cmH20-9.5 cmH20) 9.5 cmH20   Abdominal: Soft.   End ileostomy pink  Incision c/d/i   Musculoskeletal: Normal range of motion.    Lymphadenopathy:     She has no cervical adenopathy.   Neurological: She is alert and oriented to person, place, and time.   Skin: Skin is warm and dry. No rash noted.       Vents:  Vent Mode: SIMV (05/24/18 0514)  Ventilator Initiated: Yes (05/23/18 1805)  Set Rate: 12 bmp (05/24/18 0514)  Vt Set: 450 mL (05/24/18 0514)  Pressure Support: 10 cmH20 (05/24/18 0514)  PEEP/CPAP: 5 cmH20 (05/24/18 0514)  Oxygen Concentration (%): 41 (05/24/18 0600)  Peak Airway Pressure: 22 cmH2O (05/24/18 0514)  Plateau Pressure: 0 cmH20 (05/24/18 0514)  Total Ve: 5.6 mL (05/24/18 0514)  F/VT Ratio<105 (RSBI): (!) 36.95 (05/23/18 1520)    Lines/Drains/Airways     Drain                 Urethral Catheter 05/23/18 1216 Non-latex less than 1 day          Airway                 Airway - Non-Surgical 05/23/18 1210 Endotracheal Tube less than 1 day          Arterial Line                 Arterial Line 05/23/18 1225 Left Radial less than 1 day          Peripheral Intravenous Line                 Peripheral IV - Single Lumen Left Forearm -- days         Peripheral IV - Single Lumen 05/23/18 0732 Right Forearm less than 1 day         Peripheral IV - Single Lumen 05/23/18 1110 Left Forearm less than 1 day         Peripheral IV - Single Lumen 05/23/18 1217 Right Antecubital less than 1 day                Significant Labs:    CBC/Anemia Profile:    Recent Labs  Lab 05/23/18  0730  05/23/18  1326 05/23/18  1443 05/24/18  0444   WBC 6.63  --   --  4.31 9.39   HGB 13.8  --   --  16.4* 14.5   HCT 42.2  < > 28* 47.7 41.5   *  --   --  417* 339   MCV 94  --   --  88 87   RDW 16.6*  --   --  15.1* 15.3*   < > = values in this interval not displayed.     Chemistries:    Recent Labs  Lab 05/23/18  1400 05/23/18  2247 05/24/18  0444    136 135*   K 3.2* 3.7 3.5    109 105   CO2 25 19* 23   BUN 5* 7* 9*   CREATININE 0.4* 0.4* 0.5   CALCIUM 6.9* 6.5* 7.7*   ALBUMIN 2.3* 1.7* 1.9*   PROT 4.3* 3.5* 4.0*   BILITOT 1.0 1.3* 1.3*   ALKPHOS 71 56  60   ALT 20 15 15   AST 60* 41* 39   MG 1.7 2.2 2.0       Coagulation:   Recent Labs  Lab 05/23/18  1114   INR 1.0   APTT 23.2     Lactic Acid:   Recent Labs  Lab 05/23/18  1400 05/23/18  2247 05/24/18  0444   LACTATE 1.5 1.5 1.4       Significant Imaging:  I have reviewed all pertinent imaging results/findings within the past 24 hours.

## 2018-05-24 NOTE — HPI
Patient is a 89 y.o. female presents with sudden onset of abdominal pain that woke her from sleep at 5 am.  Pain started in upper abdomen  Pain has been getting worse. She has been dealing with low grade obstructive symptoms off and on for the past month.  This was relieved with bowel rest and stool softeners.  CT in the ER shows free air     Prior surgical hx as per below  Recent RLE angio for PAD     No hx of MI or Stroke  Recently started on plavix     PMH notable for COPD, not on O2, HTN, HLD, PAD, GERD

## 2018-05-24 NOTE — OP NOTE
DATE OF PROCEDURE:  05/23/2018.    PREOPERATIVE DIAGNOSIS:  Pneumoperitoneum.    POSTOPERATIVE DIAGNOSES:  Pneumoperitoneum secondary to perforated cecum and   obstructing transverse colon mass.    PROCEDURES:  1.  Exploratory laparotomy.  2.  Drainage of pelvic abscess.  3.  Right hemicolectomy.  4.  End ileostomy.    SURGEON:  Tushar Anderson M.D.    ASSISTANT:  Ita.    ANESTHESIA:  General.    CLINICAL NOTE:  The patient is a 89-year-old female who presented to the   Emergency Department with pneumoperitoneum by CAT scan and peritonitis by   physical exam.  She is brought emergently to the Operating Room for exploration.    PROCEDURE NOTE:  Following induction of adequate general anesthesia, the   patient's abdomen was prepped and draped with ChloraPrep.  We made a generous   midline incision, dissected down to the linea alba and this was incised and we   freely enter the peritoneal cavity.  We explored and immediately found a large   pelvic abscess, which we drained widely and then we explored and noted the   patient's cecum and proximal right colon to be grossly ischemic with nga   perforation in the cecum.  We also demonstrated a completely obstructing lesion   in the proximal transverse colon.  Bimanual exam with adherence to the right   upper quadrant in periduodenal area.  We therefore mobilized the white line of   Toldt and gradually came around the attachments at the hepatic flexure and   obtained hemostasis of these passes as we took them down with clamps and 2-0   ligatures.  We were able to dissect the tumor away from the duodenum and   mobilized it into the wound.  We obtained a proximal distal margin and came   across the ileum and mid transverse colon with NATA staplers and then took down   the mesocolon and mesentery associated with these segments.  This was performed   with clamps and 2-0 ties and 2-0 ligatures over vascular pedicles.  We then   removed the specimen and sent it to  pathology.  I felt that it would be   imprudent in this unstable 89-year-old female with purulence perform an   anastomosis and therefore, I brought out the now terminal ileum through a   separate stab wound, creating an ileostomy in the right lower quadrant.  We then   irrigated thoroughly, obtained hemostasis with cautery and then we closed the   linea alba with #1 PDS to fascia and staples to skin and then we matured the   ileostomy in a Haydee fashion utilizing interrupted 3-0 Vicryls.  Sterile   dressings were then applied.  The procedure was terminated with the patient in   critical but stable admission.    ESTIMATED BLOOD LOSS:  50 mL.      MCT/JAQUAN  dd: 05/24/2018 06:54:31 (CDT)  td: 05/24/2018 08:00:35 (CDT)  Doc ID   #[id number]  Job ID #[id number]    CC:

## 2018-05-24 NOTE — PROGRESS NOTES
[facility]  General Surgery  Progress Note    Subjective:     History of Present Illness:  89 y/o F presented with cecal perforation    Post-Op Info:  Procedure(s) (LRB):  EXPLORATORY-LAPAROTOMY (N/A)  DRAINAGE  COLECTOMY-RIGHT  ILEOSTOMY   1 Day Post-Op     Interval History:  No events overnight.  UOP marginal despite 2 L crystalloid overnight.  Vent stable.     Medications:  Continuous Infusions:   lactated ringers 125 mL/hr at 05/24/18 0600    nicardipine Stopped (05/23/18 2300)    propofol 10 mcg/kg/min (05/24/18 0800)     Scheduled Meds:   amLODIPine  10 mg Oral Daily    chlorhexidine  15 mL Mouth/Throat BID    heparin (porcine)  5,000 Units Subcutaneous Q8H    lactated ringers  1,000 mL Intravenous Once    levothyroxine  100 mcg Oral QAM    metoprolol  5 mg Intravenous Q6H    pantoprazole 40 mg in dextrose 5 % 100 mL infusion (ready to mix system)  40 mg Intravenous Daily    piperacillin-tazobactam (ZOSYN) IVPB  4.5 g Intravenous Q8H    vancomycin (VANCOCIN) IVPB  750 mg Intravenous Q24H     PRN Meds:HYDROmorphone, sodium chloride 0.9%, sodium chloride 0.9%     Review of patient's allergies indicates:   Allergen Reactions    Sulfa (sulfonamide antibiotics) Swelling    Asa [aspirin] Itching     Objective:     Vital Signs (Most Recent):  Temp: 97.7 °F (36.5 °C) (05/24/18 0300)  Pulse: 90 (05/24/18 0724)  Resp: 16 (05/23/18 1806)  BP: (!) 153/67 (05/24/18 0700)  SpO2: (!) 93 % (05/24/18 0724) Vital Signs (24h Range):  Temp:  [96.6 °F (35.9 °C)-98.5 °F (36.9 °C)] 97.7 °F (36.5 °C)  Pulse:  [65-99] 90  Resp:  [12-21] 16  SpO2:  [93 %-100 %] 93 %  BP: (113-203)/(55-96) 153/67  Arterial Line BP: (108-162)/(52-80) 117/53     Weight: 52.2 kg (115 lb)  Body mass index is 19.74 kg/m².    Intake/Output - Last 3 Shifts       05/22 0700 - 05/23 0659 05/23 0700 - 05/24 0659 05/24 0700 - 05/25 0659    I.V. (mL/kg)  5585.4 (107)     Blood  450     IV Piggyback  1150     Total Intake(mL/kg)   7185.4 (137.7)     Urine (mL/kg/hr)  1380 (1.1) 35 (0.5)    Blood  50 (0)     Total Output   1430 35    Net   +5755.4 -35                 Physical Exam  Gen: NAD  CV: RRR  Pulm: Vent  GI: Soft, Ostomy is viable but edematous.  No output.  Midline CDI    Significant Labs:  CBC:   Recent Labs  Lab 05/24/18  0444   WBC 9.39   RBC 4.80   HGB 14.5   HCT 41.5      MCV 87   MCH 30.2   MCHC 34.9     CMP:   Recent Labs  Lab 05/24/18  0444   *   CALCIUM 7.7*   ALBUMIN 1.9*   PROT 4.0*   *   K 3.5   CO2 23      BUN 9*   CREATININE 0.5   ALKPHOS 60   ALT 15   AST 39   BILITOT 1.3*     Coagulation:   Recent Labs  Lab 05/23/18  1114   LABPROT 10.8   INR 1.0   APTT 23.2     ABGs:   Recent Labs  Lab 05/24/18  0455   PH 7.596*   PCO2 25.0*   PO2 80   HCO3 24.3   POCSATURATED 98   BE 3         Assessment/Plan:     * Bowel perforation    POD 1 Ex lap, Right colectomy, end ileosotmy    Left intubated overnight for suspected aggressive resuscitation.  Stable this morning.  Marginal UOP    Wean sedation as toelrated, PRN pain control  HDS.  Slightly hypotensive, suspect sedation related  Wean to extubate.  CUrrent resp alkalosis being corrected on vent  Ostomy is edematous but viable.  Post obstructive output.  NPO, NGT  Trend Cr and UOP.  Fluid resus per SICU based on suspected volume vs diuresis needs  H/H Stable, continue Abx for gross contamination related to cecal perf  Proph: DVT, GI, SCDs    Dispo: Wean to extuabte.  NPO.  Trend UOP and Cr        HLD (hyperlipidemia)    Home meds        Hypothyroidism    Repalce        GERD (gastroesophageal reflux disease)    PPI therapy        COPD (chronic obstructive pulmonary disease)    Will need aggressive pulm toilet post op        HTN (hypertension)    Trend BP  Restart home meds when appropriate            Armen Jean Baptiste MD  General Surgery  [facility]

## 2018-05-24 NOTE — ASSESSMENT & PLAN NOTE
POD 1 Ex lap, Right colectomy, end ileosotmy    Left intubated overnight for suspected aggressive resuscitation.  Stable this morning.  Marginal UOP    Wean sedation as toelrated, PRN pain control  HDS.  Slightly hypotensive, suspect sedation related  Wean to extubate.  CUrrent resp alkalosis being corrected on vent  Ostomy is edematous but viable.  Post obstructive output.  NPO, NGT  Trend Cr and UOP.  Fluid resus per SICU based on suspected volume vs diuresis needs  H/H Stable, continue Abx for gross contamination related to cecal perf  Proph: DVT, GI, SCDs    Dispo: Wean to extuabte.  NPO.  Trend UOP and Cr

## 2018-05-25 LAB
ALBUMIN SERPL BCP-MCNC: 1.8 G/DL
ALBUMIN SERPL BCP-MCNC: 2 G/DL
ALP SERPL-CCNC: 70 U/L
ALP SERPL-CCNC: 72 U/L
ALT SERPL W/O P-5'-P-CCNC: 14 U/L
ALT SERPL W/O P-5'-P-CCNC: 16 U/L
ANION GAP SERPL CALC-SCNC: 10 MMOL/L
ANION GAP SERPL CALC-SCNC: 8 MMOL/L
ANION GAP SERPL CALC-SCNC: 8 MMOL/L
ANISOCYTOSIS BLD QL SMEAR: SLIGHT
ANISOCYTOSIS BLD QL SMEAR: SLIGHT
AST SERPL-CCNC: 28 U/L
AST SERPL-CCNC: 30 U/L
BACTERIA UR CULT: NORMAL
BASOPHILS # BLD AUTO: 0.01 K/UL
BASOPHILS # BLD AUTO: 0.02 K/UL
BASOPHILS # BLD AUTO: ABNORMAL K/UL
BASOPHILS NFR BLD: 0 %
BASOPHILS NFR BLD: 0.1 %
BASOPHILS NFR BLD: 0.1 %
BILIRUB SERPL-MCNC: 0.7 MG/DL
BILIRUB SERPL-MCNC: 0.9 MG/DL
BUN SERPL-MCNC: 11 MG/DL
BUN SERPL-MCNC: 12 MG/DL
BUN SERPL-MCNC: 14 MG/DL
CALCIUM SERPL-MCNC: 7.8 MG/DL
CALCIUM SERPL-MCNC: 8.1 MG/DL
CALCIUM SERPL-MCNC: 8.3 MG/DL
CHLORIDE SERPL-SCNC: 103 MMOL/L
CHLORIDE SERPL-SCNC: 103 MMOL/L
CHLORIDE SERPL-SCNC: 104 MMOL/L
CO2 SERPL-SCNC: 23 MMOL/L
CO2 SERPL-SCNC: 24 MMOL/L
CO2 SERPL-SCNC: 27 MMOL/L
CREAT SERPL-MCNC: 0.5 MG/DL
CREAT SERPL-MCNC: 0.6 MG/DL
CREAT SERPL-MCNC: 0.6 MG/DL
DIFFERENTIAL METHOD: ABNORMAL
EOSINOPHIL # BLD AUTO: 0 K/UL
EOSINOPHIL # BLD AUTO: 0 K/UL
EOSINOPHIL # BLD AUTO: ABNORMAL K/UL
EOSINOPHIL NFR BLD: 0 %
EOSINOPHIL NFR BLD: 0.1 %
EOSINOPHIL NFR BLD: 0.1 %
ERYTHROCYTE [DISTWIDTH] IN BLOOD BY AUTOMATED COUNT: 15.9 %
ERYTHROCYTE [DISTWIDTH] IN BLOOD BY AUTOMATED COUNT: 16.1 %
ERYTHROCYTE [DISTWIDTH] IN BLOOD BY AUTOMATED COUNT: 16.6 %
EST. GFR  (AFRICAN AMERICAN): >60 ML/MIN/1.73 M^2
EST. GFR  (NON AFRICAN AMERICAN): >60 ML/MIN/1.73 M^2
GLUCOSE SERPL-MCNC: 56 MG/DL
GLUCOSE SERPL-MCNC: 72 MG/DL
GLUCOSE SERPL-MCNC: 80 MG/DL
HCT VFR BLD AUTO: 35.9 %
HCT VFR BLD AUTO: 37.9 %
HCT VFR BLD AUTO: 40.7 %
HGB BLD-MCNC: 11.3 G/DL
HGB BLD-MCNC: 13.1 G/DL
HGB BLD-MCNC: 13.5 G/DL
HYPOCHROMIA BLD QL SMEAR: ABNORMAL
IMM GRANULOCYTES # BLD AUTO: 0.12 K/UL
IMM GRANULOCYTES # BLD AUTO: 0.12 K/UL
IMM GRANULOCYTES # BLD AUTO: ABNORMAL K/UL
IMM GRANULOCYTES NFR BLD AUTO: 0.8 %
IMM GRANULOCYTES NFR BLD AUTO: 0.8 %
IMM GRANULOCYTES NFR BLD AUTO: ABNORMAL %
LACTATE SERPL-SCNC: 0.8 MMOL/L
LACTATE SERPL-SCNC: 2.5 MMOL/L
LYMPHOCYTES # BLD AUTO: 0.9 K/UL
LYMPHOCYTES # BLD AUTO: 1 K/UL
LYMPHOCYTES # BLD AUTO: ABNORMAL K/UL
LYMPHOCYTES NFR BLD: 6 %
LYMPHOCYTES NFR BLD: 6.2 %
LYMPHOCYTES NFR BLD: 7 %
MAGNESIUM SERPL-MCNC: 1.8 MG/DL
MAGNESIUM SERPL-MCNC: 1.8 MG/DL
MCH RBC QN AUTO: 30.2 PG
MCH RBC QN AUTO: 30.5 PG
MCH RBC QN AUTO: 30.8 PG
MCHC RBC AUTO-ENTMCNC: 31.5 G/DL
MCHC RBC AUTO-ENTMCNC: 33.2 G/DL
MCHC RBC AUTO-ENTMCNC: 34.6 G/DL
MCV RBC AUTO: 88 FL
MCV RBC AUTO: 91 FL
MCV RBC AUTO: 98 FL
MONOCYTES # BLD AUTO: 0.6 K/UL
MONOCYTES # BLD AUTO: 0.6 K/UL
MONOCYTES # BLD AUTO: ABNORMAL K/UL
MONOCYTES NFR BLD: 3.8 %
MONOCYTES NFR BLD: 4.1 %
MONOCYTES NFR BLD: 5 %
NEUTROPHILS # BLD AUTO: 12.8 K/UL
NEUTROPHILS # BLD AUTO: 14.1 K/UL
NEUTROPHILS NFR BLD: 81 %
NEUTROPHILS NFR BLD: 88.7 %
NEUTROPHILS NFR BLD: 89.2 %
NEUTS BAND NFR BLD MANUAL: 7 %
NRBC BLD-RTO: 0 /100 WBC
OVALOCYTES BLD QL SMEAR: ABNORMAL
PHOSPHATE SERPL-MCNC: 2.5 MG/DL
PHOSPHATE SERPL-MCNC: 2.8 MG/DL
PLATELET # BLD AUTO: 234 K/UL
PLATELET # BLD AUTO: 313 K/UL
PLATELET # BLD AUTO: 316 K/UL
PLATELET BLD QL SMEAR: ABNORMAL
PLATELET BLD QL SMEAR: ABNORMAL
PMV BLD AUTO: 8.9 FL
PMV BLD AUTO: 9.2 FL
PMV BLD AUTO: 9.3 FL
POIKILOCYTOSIS BLD QL SMEAR: SLIGHT
POTASSIUM SERPL-SCNC: 3.4 MMOL/L
POTASSIUM SERPL-SCNC: 3.5 MMOL/L
POTASSIUM SERPL-SCNC: 3.7 MMOL/L
PROT SERPL-MCNC: 4.3 G/DL
PROT SERPL-MCNC: 4.7 G/DL
RBC # BLD AUTO: 3.67 M/UL
RBC # BLD AUTO: 4.3 M/UL
RBC # BLD AUTO: 4.47 M/UL
SODIUM SERPL-SCNC: 135 MMOL/L
SODIUM SERPL-SCNC: 137 MMOL/L
SODIUM SERPL-SCNC: 138 MMOL/L
TARGETS BLD QL SMEAR: ABNORMAL
VANCOMYCIN TROUGH SERPL-MCNC: 5.3 UG/ML
WBC # BLD AUTO: 14.48 K/UL
WBC # BLD AUTO: 15.75 K/UL
WBC # BLD AUTO: 5.92 K/UL

## 2018-05-25 PROCEDURE — 25000003 PHARM REV CODE 250: Performed by: STUDENT IN AN ORGANIZED HEALTH CARE EDUCATION/TRAINING PROGRAM

## 2018-05-25 PROCEDURE — 63600175 PHARM REV CODE 636 W HCPCS: Performed by: STUDENT IN AN ORGANIZED HEALTH CARE EDUCATION/TRAINING PROGRAM

## 2018-05-25 PROCEDURE — 27000221 HC OXYGEN, UP TO 24 HOURS

## 2018-05-25 PROCEDURE — 20000000 HC ICU ROOM

## 2018-05-25 PROCEDURE — 84100 ASSAY OF PHOSPHORUS: CPT | Mod: 91

## 2018-05-25 PROCEDURE — 80053 COMPREHEN METABOLIC PANEL: CPT | Mod: 91

## 2018-05-25 PROCEDURE — 80053 COMPREHEN METABOLIC PANEL: CPT

## 2018-05-25 PROCEDURE — 83605 ASSAY OF LACTIC ACID: CPT | Mod: 91

## 2018-05-25 PROCEDURE — 84100 ASSAY OF PHOSPHORUS: CPT

## 2018-05-25 PROCEDURE — 80048 BASIC METABOLIC PNL TOTAL CA: CPT

## 2018-05-25 PROCEDURE — 99232 SBSQ HOSP IP/OBS MODERATE 35: CPT | Mod: GC,,, | Performed by: ANESTHESIOLOGY

## 2018-05-25 PROCEDURE — 25000003 PHARM REV CODE 250: Performed by: SURGERY

## 2018-05-25 PROCEDURE — 83605 ASSAY OF LACTIC ACID: CPT

## 2018-05-25 PROCEDURE — 83735 ASSAY OF MAGNESIUM: CPT | Mod: 91

## 2018-05-25 PROCEDURE — 85025 COMPLETE CBC W/AUTO DIFF WBC: CPT | Mod: 91

## 2018-05-25 PROCEDURE — 80202 ASSAY OF VANCOMYCIN: CPT

## 2018-05-25 PROCEDURE — 63600175 PHARM REV CODE 636 W HCPCS: Performed by: SURGERY

## 2018-05-25 PROCEDURE — 83735 ASSAY OF MAGNESIUM: CPT

## 2018-05-25 RX ORDER — PANTOPRAZOLE SODIUM 40 MG/1
40 TABLET, DELAYED RELEASE ORAL DAILY
Status: DISCONTINUED | OUTPATIENT
Start: 2018-05-25 | End: 2018-06-01 | Stop reason: HOSPADM

## 2018-05-25 RX ORDER — POTASSIUM CHLORIDE 14.9 MG/ML
20 INJECTION INTRAVENOUS ONCE
Status: DISCONTINUED | OUTPATIENT
Start: 2018-05-25 | End: 2018-05-25

## 2018-05-25 RX ORDER — CLOPIDOGREL BISULFATE 75 MG/1
75 TABLET ORAL DAILY
Status: DISCONTINUED | OUTPATIENT
Start: 2018-05-25 | End: 2018-06-01 | Stop reason: HOSPADM

## 2018-05-25 RX ORDER — POTASSIUM CHLORIDE 7.45 MG/ML
10 INJECTION INTRAVENOUS
Status: COMPLETED | OUTPATIENT
Start: 2018-05-25 | End: 2018-05-25

## 2018-05-25 RX ORDER — MAGNESIUM SULFATE HEPTAHYDRATE 40 MG/ML
2 INJECTION, SOLUTION INTRAVENOUS ONCE
Status: COMPLETED | OUTPATIENT
Start: 2018-05-25 | End: 2018-05-26

## 2018-05-25 RX ORDER — HYDROCODONE BITARTRATE AND ACETAMINOPHEN 5; 325 MG/1; MG/1
1 TABLET ORAL EVERY 6 HOURS PRN
Status: DISCONTINUED | OUTPATIENT
Start: 2018-05-25 | End: 2018-06-01

## 2018-05-25 RX ADMIN — HYDROMORPHONE HYDROCHLORIDE 0.5 MG: 1 INJECTION, SOLUTION INTRAMUSCULAR; INTRAVENOUS; SUBCUTANEOUS at 06:05

## 2018-05-25 RX ADMIN — HYDROMORPHONE HYDROCHLORIDE 0.5 MG: 1 INJECTION, SOLUTION INTRAMUSCULAR; INTRAVENOUS; SUBCUTANEOUS at 03:05

## 2018-05-25 RX ADMIN — POTASSIUM CHLORIDE 10 MEQ: 10 INJECTION, SOLUTION INTRAVENOUS at 11:05

## 2018-05-25 RX ADMIN — METOPROLOL TARTRATE 5 MG: 5 INJECTION INTRAVENOUS at 05:05

## 2018-05-25 RX ADMIN — LEVOTHYROXINE SODIUM 100 MCG: 100 TABLET ORAL at 08:05

## 2018-05-25 RX ADMIN — SODIUM CHLORIDE 750 MG: 9 INJECTION, SOLUTION INTRAVENOUS at 02:05

## 2018-05-25 RX ADMIN — PIPERACILLIN AND TAZOBACTAM 4.5 G: 4; .5 INJECTION, POWDER, LYOPHILIZED, FOR SOLUTION INTRAVENOUS; PARENTERAL at 03:05

## 2018-05-25 RX ADMIN — HYDROMORPHONE HYDROCHLORIDE 0.5 MG: 1 INJECTION, SOLUTION INTRAMUSCULAR; INTRAVENOUS; SUBCUTANEOUS at 11:05

## 2018-05-25 RX ADMIN — HYDROMORPHONE HYDROCHLORIDE 0.5 MG: 1 INJECTION, SOLUTION INTRAMUSCULAR; INTRAVENOUS; SUBCUTANEOUS at 08:05

## 2018-05-25 RX ADMIN — METOPROLOL TARTRATE 5 MG: 5 INJECTION INTRAVENOUS at 06:05

## 2018-05-25 RX ADMIN — POTASSIUM CHLORIDE 10 MEQ: 10 INJECTION, SOLUTION INTRAVENOUS at 10:05

## 2018-05-25 RX ADMIN — PANTOPRAZOLE SODIUM 40 MG: 40 TABLET, DELAYED RELEASE ORAL at 08:05

## 2018-05-25 RX ADMIN — METOPROLOL TARTRATE 5 MG: 5 INJECTION INTRAVENOUS at 11:05

## 2018-05-25 RX ADMIN — HEPARIN SODIUM 5000 UNITS: 5000 INJECTION, SOLUTION INTRAVENOUS; SUBCUTANEOUS at 10:05

## 2018-05-25 RX ADMIN — HYDROCODONE BITARTRATE AND ACETAMINOPHEN 1 TABLET: 5; 325 TABLET ORAL at 08:05

## 2018-05-25 RX ADMIN — HEPARIN SODIUM 5000 UNITS: 5000 INJECTION, SOLUTION INTRAVENOUS; SUBCUTANEOUS at 05:05

## 2018-05-25 RX ADMIN — AMLODIPINE BESYLATE 10 MG: 10 TABLET ORAL at 08:05

## 2018-05-25 RX ADMIN — METOPROLOL TARTRATE 5 MG: 5 INJECTION INTRAVENOUS at 12:05

## 2018-05-25 RX ADMIN — PIPERACILLIN AND TAZOBACTAM 4.5 G: 4; .5 INJECTION, POWDER, LYOPHILIZED, FOR SOLUTION INTRAVENOUS; PARENTERAL at 07:05

## 2018-05-25 RX ADMIN — MAGNESIUM SULFATE IN WATER 2 G: 40 INJECTION, SOLUTION INTRAVENOUS at 10:05

## 2018-05-25 RX ADMIN — HEPARIN SODIUM 5000 UNITS: 5000 INJECTION, SOLUTION INTRAVENOUS; SUBCUTANEOUS at 02:05

## 2018-05-25 RX ADMIN — HYDROMORPHONE HYDROCHLORIDE 0.5 MG: 1 INJECTION, SOLUTION INTRAMUSCULAR; INTRAVENOUS; SUBCUTANEOUS at 07:05

## 2018-05-25 RX ADMIN — PIPERACILLIN AND TAZOBACTAM 4.5 G: 4; .5 INJECTION, POWDER, LYOPHILIZED, FOR SOLUTION INTRAVENOUS; PARENTERAL at 11:05

## 2018-05-25 RX ADMIN — CLOPIDOGREL 75 MG: 75 TABLET, FILM COATED ORAL at 08:05

## 2018-05-25 RX ADMIN — SODIUM CHLORIDE, SODIUM LACTATE, POTASSIUM CHLORIDE, AND CALCIUM CHLORIDE 1000 ML: .6; .31; .03; .02 INJECTION, SOLUTION INTRAVENOUS at 01:05

## 2018-05-25 NOTE — SUBJECTIVE & OBJECTIVE
Interval History: 20cc/hr UOP.  Otherwise no other events.      Medications:  Continuous Infusions:   lactated ringers 125 mL/hr at 05/25/18 0715    nicardipine Stopped (05/23/18 2300)    propofol Stopped (05/24/18 0900)     Scheduled Meds:   amLODIPine  10 mg Oral Daily    heparin (porcine)  5,000 Units Subcutaneous Q8H    levothyroxine  100 mcg Oral QAM    metoprolol  5 mg Intravenous Q6H    pantoprazole 40 mg in dextrose 5 % 100 mL infusion (ready to mix system)  40 mg Intravenous Daily    piperacillin-tazobactam (ZOSYN) IVPB  4.5 g Intravenous Q8H    vancomycin (VANCOCIN) IVPB  750 mg Intravenous Q24H     PRN Meds:HYDROmorphone, sodium chloride 0.9%, sodium chloride 0.9%     Review of patient's allergies indicates:   Allergen Reactions    Sulfa (sulfonamide antibiotics) Swelling    Asa [aspirin] Itching     Objective:     Vital Signs (Most Recent):  Temp: 98.9 °F (37.2 °C) (05/25/18 0715)  Pulse: 81 (05/25/18 0715)  Resp: 17 (05/25/18 0715)  BP: 136/62 (05/25/18 0715)  SpO2: 97 % (05/25/18 0715) Vital Signs (24h Range):  Temp:  [97.6 °F (36.4 °C)-98.9 °F (37.2 °C)] 98.9 °F (37.2 °C)  Pulse:  [] 81  Resp:  [10-33] 17  SpO2:  [92 %-100 %] 97 %  BP: (123-174)/(57-87) 136/62     Weight: 52.2 kg (115 lb)  Body mass index is 19.74 kg/m².    Intake/Output - Last 3 Shifts       05/23 0700 - 05/24 0659 05/24 0700 - 05/25 0659 05/25 0700 - 05/26 0659    I.V. (mL/kg) 5585.4 (107) 1508 (28.9) 406.3 (7.8)    Blood 450      IV Piggyback 1150 2000     Total Intake(mL/kg) 7185.4 (137.7) 3508 (67.2) 406.3 (7.8)    Urine (mL/kg/hr) 1380 (1.1) 375 (0.3) 15 (0.3)    Drains  400 (0.3)     Stool  200 (0.2)     Blood 50 (0)      Total Output 1430 975 15    Net +5755.4 +2533 +391.3                 Physical Exam   Gen: NAD  Pulm: Unlabored  Abd: Soft, ATTP.  Staple line intact.  Osotmy viable      Significant Labs:  CBC:   Recent Labs  Lab 05/25/18  0538   WBC 14.48*   RBC 4.30   HGB 13.1   HCT 37.9      MCV 88    MCH 30.5   MCHC 34.6     CMP:   Recent Labs  Lab 05/25/18  0538   GLU 72   CALCIUM 7.8*   ALBUMIN 1.8*   PROT 4.3*   *   K 3.4*   CO2 24      BUN 12   CREATININE 0.5   ALKPHOS 70   ALT 14   AST 28   BILITOT 0.7

## 2018-05-25 NOTE — ASSESSMENT & PLAN NOTE
POD 2 Ex lap, Right colectomy, end ileosotmy    Marginal;     PRN pain control  HDS.   Pulm stable  Ostomy is edematous but viable.  Post obstructive vs true output.  Sips of clears  Trend Cr and UOP.  Lactic cleared.  Likely still third spacing from gross contamination.  Continue gentle hydration.    H/H Stable, continue Abx for gross contamination related to cecal perf.  WBC increased  Proph: DVT, GI, SCDs    Dispo:  PT OT, OOB as tolerated.  Sips of clears

## 2018-05-25 NOTE — ASSESSMENT & PLAN NOTE
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 flexure    Plan:    Neuro:   -Pain control with PRNs  -Sedation off, AAOx3    Pulmonary:   -Extubated to NC yesterday without issue  ABG    Recent Labs  Lab 05/24/18  0455   PH 7.596*   PO2 80   PCO2 25.0*   HCO3 24.3   BE 3       Cardiac:  -MAP goal >65  -HDS not requiring pressors  -H/o HTN, Cardene gtt started, titrate for SBT <165, currently off  -Home amlodipine restarted    Renal:   -Valladares in place  -UOP low overnight, 0.3cc/kg/hr, given 2L LR with minimal response  -Bun/Cr 12/0.5 consistent with 7/0.6 pre-op    Fluids/Electrolytes/Nutrition/GI:   -Nutritional status: NPO  -replace lytes PRN  -LR @ 125  -NG to LIWS, will likely remove today per primary    Hematology/Oncology:  - H/H 13.1/37.9 will continue to follow, CBC q8  -Anticoagulation: SQ heparin, holding plavix, will restart per primary    Infectious Disease:   -Afebrile  -WBC 14, will continue to trend   -Urine culture with presumed pseudomonas  -Lactates ordered q8, up to 3.5 overnight with recheck 2.5. 1L NS bolus. Lactate 0.8 this am.  -Abx: Vanc & zosyn post-op     Endocrine:  -Glucose goal of 120-180  -h/o hypothyrodism, home synthroid restarted  Dispo:  -Continue care in the ICU setting  -ACS primary

## 2018-05-25 NOTE — PROGRESS NOTES
[facility]  General Surgery  Progress Note    Subjective:     History of Present Illness:  89 y/o F presented with cecal perforation    Post-Op Info:  Procedure(s) (LRB):  EXPLORATORY-LAPAROTOMY (N/A)  DRAINAGE  COLECTOMY-RIGHT  ILEOSTOMY   2 Days Post-Op     Interval History: 20cc/hr UOP.  Otherwise no other events.      Medications:  Continuous Infusions:   lactated ringers 125 mL/hr at 05/25/18 0715    nicardipine Stopped (05/23/18 2300)    propofol Stopped (05/24/18 0900)     Scheduled Meds:   amLODIPine  10 mg Oral Daily    heparin (porcine)  5,000 Units Subcutaneous Q8H    levothyroxine  100 mcg Oral QAM    metoprolol  5 mg Intravenous Q6H    pantoprazole 40 mg in dextrose 5 % 100 mL infusion (ready to mix system)  40 mg Intravenous Daily    piperacillin-tazobactam (ZOSYN) IVPB  4.5 g Intravenous Q8H    vancomycin (VANCOCIN) IVPB  750 mg Intravenous Q24H     PRN Meds:HYDROmorphone, sodium chloride 0.9%, sodium chloride 0.9%     Review of patient's allergies indicates:   Allergen Reactions    Sulfa (sulfonamide antibiotics) Swelling    Asa [aspirin] Itching     Objective:     Vital Signs (Most Recent):  Temp: 98.9 °F (37.2 °C) (05/25/18 0715)  Pulse: 81 (05/25/18 0715)  Resp: 17 (05/25/18 0715)  BP: 136/62 (05/25/18 0715)  SpO2: 97 % (05/25/18 0715) Vital Signs (24h Range):  Temp:  [97.6 °F (36.4 °C)-98.9 °F (37.2 °C)] 98.9 °F (37.2 °C)  Pulse:  [] 81  Resp:  [10-33] 17  SpO2:  [92 %-100 %] 97 %  BP: (123-174)/(57-87) 136/62     Weight: 52.2 kg (115 lb)  Body mass index is 19.74 kg/m².    Intake/Output - Last 3 Shifts       05/23 0700 - 05/24 0659 05/24 0700 - 05/25 0659 05/25 0700 - 05/26 0659    I.V. (mL/kg) 5585.4 (107) 1508 (28.9) 406.3 (7.8)    Blood 450      IV Piggyback 1150 2000     Total Intake(mL/kg) 7185.4 (137.7) 3508 (67.2) 406.3 (7.8)    Urine (mL/kg/hr) 1380 (1.1) 375 (0.3) 15 (0.3)    Drains  400 (0.3)     Stool  200 (0.2)     Blood 50 (0)      Total Output  1430 975 15    Net +5755.4 +2533 +391.3                 Physical Exam   Gen: NAD  Pulm: Unlabored  Abd: Soft, ATTP.  Staple line intact.  Osotmy viable      Significant Labs:  CBC:   Recent Labs  Lab 05/25/18  0538   WBC 14.48*   RBC 4.30   HGB 13.1   HCT 37.9      MCV 88   MCH 30.5   MCHC 34.6     CMP:   Recent Labs  Lab 05/25/18  0538   GLU 72   CALCIUM 7.8*   ALBUMIN 1.8*   PROT 4.3*   *   K 3.4*   CO2 24      BUN 12   CREATININE 0.5   ALKPHOS 70   ALT 14   AST 28   BILITOT 0.7           Assessment/Plan:     * Bowel perforation    POD 2 Ex lap, Right colectomy, end ileosotmy    Marginal;     PRN pain control  HDS.   Pulm stable  Ostomy is edematous but viable.  Post obstructive vs true output.  Sips of clears  Trend Cr and UOP.  Lactic cleared.  Likely still third spacing from gross contamination.  Continue gentle hydration.    H/H Stable, continue Abx for gross contamination related to cecal perf.  WBC increased  Proph: DVT, GI, SCDs    Dispo:  PT OT, OOB as tolerated.  Sips of clears        HLD (hyperlipidemia)    Home meds        Hypothyroidism    Repalce        GERD (gastroesophageal reflux disease)    PPI therapy        COPD (chronic obstructive pulmonary disease)    Will need aggressive pulm toilet post op        HTN (hypertension)    Trend BP  Restart home meds when appropriate            Armen Jean Baptiste MD  General Surgery  [facility]

## 2018-05-25 NOTE — CONSULTS
Wound care consult received. Attempted to see patient but bedside procedure in progress. Unable to assess at this time. WIll continue to follow as needed. Nursing to continue care.

## 2018-05-25 NOTE — SUBJECTIVE & OBJECTIVE
Interval History/Significant Events: NAEON. Decreased urine output but no change in BUn/Cr. Extubated without issue yesterday.    Follow-up For: Procedure(s) (LRB):  EXPLORATORY-LAPAROTOMY (N/A)  DRAINAGE  COLECTOMY-RIGHT  ILEOSTOMY    Post-Operative Day: 2 Days Post-Op    Objective:     Vital Signs (Most Recent):  Temp: 98.3 °F (36.8 °C) (05/25/18 0300)  Pulse: 85 (05/25/18 0300)  Resp: 16 (05/25/18 0300)  BP: (!) 154/66 (05/25/18 0300)  SpO2: 97 % (05/25/18 0300) Vital Signs (24h Range):  Temp:  [97.6 °F (36.4 °C)-98.7 °F (37.1 °C)] 98.3 °F (36.8 °C)  Pulse:  [] 85  Resp:  [10-33] 16  SpO2:  [92 %-100 %] 97 %  BP: (123-174)/(57-87) 154/66     Weight: 52.2 kg (115 lb)  Body mass index is 19.74 kg/m².      Intake/Output Summary (Last 24 hours) at 05/25/18 0701  Last data filed at 05/25/18 0600   Gross per 24 hour   Intake             3508 ml   Output              955 ml   Net             2553 ml       Physical Exam   Constitutional: She is oriented to person, place, and time. She appears well-developed and well-nourished.   HENT:   Head: Normocephalic and atraumatic.   Nose: Nose normal.   NG in place   Eyes: Conjunctivae and EOM are normal. Pupils are equal, round, and reactive to light. No scleral icterus.   Neck: Normal range of motion. No thyromegaly present.   Cardiovascular: Normal rate and regular rhythm.  Exam reveals no gallop and no friction rub.    No murmur heard.  Pulmonary/Chest: Effort normal and breath sounds normal.   Extubated without issue   Abdominal: Soft.   End ileostomy pink  Incision c/d/i   Musculoskeletal: Normal range of motion.   Lymphadenopathy:     She has no cervical adenopathy.   Neurological: She is alert and oriented to person, place, and time.   Skin: Skin is warm and dry. No rash noted.       Vents:  Vent Mode: Spont (05/24/18 0946)  Ventilator Initiated: Yes (05/23/18 1805)  Set Rate: 12 bmp (05/24/18 0900)  Vt Set: 450 mL (05/24/18 0900)  Pressure Support: 5 cmH20  (05/24/18 0946)  PEEP/CPAP: 5 cmH20 (05/24/18 0946)  Oxygen Concentration (%): 100 (05/24/18 0946)  Peak Airway Pressure: 10 cmH2O (05/24/18 0946)  Plateau Pressure: 19 cmH20 (05/24/18 0946)  Total Ve: 8.82 mL (05/24/18 0946)  F/VT Ratio<105 (RSBI): 122.05 (05/24/18 0946)    Lines/Drains/Airways     Drain                 NG/OG Tube 05/23/18 2 days         Colostomy 05/23/18 1900 RLQ 1 day         Urethral Catheter 05/23/18 1216 Non-latex 1 day          Peripheral Intravenous Line                 Peripheral IV - Single Lumen 05/23/18 0732 Right Forearm 1 day         Peripheral IV - Single Lumen 05/23/18 1110 Left Forearm 1 day         Peripheral IV - Single Lumen 05/23/18 1217 Right Antecubital 1 day                Significant Labs:    CBC/Anemia Profile:    Recent Labs  Lab 05/24/18  0444 05/24/18  2157 05/25/18  0538   WBC 9.39 5.92 14.48*   HGB 14.5 11.3* 13.1   HCT 41.5 35.9* 37.9    234 316   MCV 87 98 88   RDW 15.3* 16.6* 15.9*        Chemistries:    Recent Labs  Lab 05/23/18  2247 05/24/18  0444 05/25/18  0001 05/25/18  0538    135* 138 135*   K 3.7 3.5 3.7 3.4*    105 103 103   CO2 19* 23 27 24   BUN 7* 9* 11 12   CREATININE 0.4* 0.5 0.6 0.5   CALCIUM 6.5* 7.7* 8.3* 7.8*   ALBUMIN 1.7* 1.9* 2.0* 1.8*   PROT 3.5* 4.0* 4.7* 4.3*   BILITOT 1.3* 1.3* 0.9 0.7   ALKPHOS 56 60 72 70   ALT 15 15 16 14   AST 41* 39 30 28   MG 2.2 2.0  --  1.8   PHOS  --   --   --  2.5*       All pertinent labs within the past 24 hours have been reviewed.    Significant Imaging:  I have reviewed all pertinent imaging results/findings within the past 24 hours.

## 2018-05-25 NOTE — CONSULTS
Placed 18g X 8cm midline in right brachial vein of RUE using realtime ultrasound guidance. Lot#[id number]. Remove on or before 06/23/2018.

## 2018-05-25 NOTE — PROGRESS NOTES
[facility]  Critical Care - Surgery  Progress Note    Patient Name: Suzan Villarreal  MRN: [id number]  Admission Date: 5/23/2018  Hospital Length of Stay: 2 days  Code Status: Full Code  Attending Provider: Tushar [place] MD  Primary Care Provider: Aly Rivas MD   Principal Problem: Bowel perforation    Subjective:     Hospital/ICU Course:  No notes on file    Interval History/Significant Events: NAEON. Decreased urine output but no change in BUn/Cr. Extubated without issue yesterday.    Follow-up For: Procedure(s) (LRB):  EXPLORATORY-LAPAROTOMY (N/A)  DRAINAGE  COLECTOMY-RIGHT  ILEOSTOMY    Post-Operative Day: 2 Days Post-Op    Objective:     Vital Signs (Most Recent):  Temp: 98.3 °F (36.8 °C) (05/25/18 0300)  Pulse: 85 (05/25/18 0300)  Resp: 16 (05/25/18 0300)  BP: (!) 154/66 (05/25/18 0300)  SpO2: 97 % (05/25/18 0300) Vital Signs (24h Range):  Temp:  [97.6 °F (36.4 °C)-98.7 °F (37.1 °C)] 98.3 °F (36.8 °C)  Pulse:  [] 85  Resp:  [10-33] 16  SpO2:  [92 %-100 %] 97 %  BP: (123-174)/(57-87) 154/66     Weight: 52.2 kg (115 lb)  Body mass index is 19.74 kg/m².      Intake/Output Summary (Last 24 hours) at 05/25/18 0701  Last data filed at 05/25/18 0600   Gross per 24 hour   Intake             3508 ml   Output              955 ml   Net             2553 ml       Physical Exam   Constitutional: She is oriented to person, place, and time. She appears well-developed and well-nourished.   HENT:   Head: Normocephalic and atraumatic.   Nose: Nose normal.   NG in place   Eyes: Conjunctivae and EOM are normal. Pupils are equal, round, and reactive to light. No scleral icterus.   Neck: Normal range of motion. No thyromegaly present.   Cardiovascular: Normal rate and regular rhythm.  Exam reveals no gallop and no friction rub.    No murmur heard.  Pulmonary/Chest: Effort normal and breath sounds normal.   Extubated without issue   Abdominal: Soft.   End ileostomy pink  Incision c/d/i    Musculoskeletal: Normal range of motion.   Lymphadenopathy:     She has no cervical adenopathy.   Neurological: She is alert and oriented to person, place, and time.   Skin: Skin is warm and dry. No rash noted.       Vents:  Vent Mode: Spont (05/24/18 0946)  Ventilator Initiated: Yes (05/23/18 1805)  Set Rate: 12 bmp (05/24/18 0900)  Vt Set: 450 mL (05/24/18 0900)  Pressure Support: 5 cmH20 (05/24/18 0946)  PEEP/CPAP: 5 cmH20 (05/24/18 0946)  Oxygen Concentration (%): 100 (05/24/18 0946)  Peak Airway Pressure: 10 cmH2O (05/24/18 0946)  Plateau Pressure: 19 cmH20 (05/24/18 0946)  Total Ve: 8.82 mL (05/24/18 0946)  F/VT Ratio<105 (RSBI): 122.05 (05/24/18 0946)    Lines/Drains/Airways     Drain                 NG/OG Tube 05/23/18 2 days         Colostomy 05/23/18 1900 RLQ 1 day         Urethral Catheter 05/23/18 1216 Non-latex 1 day          Peripheral Intravenous Line                 Peripheral IV - Single Lumen 05/23/18 0732 Right Forearm 1 day         Peripheral IV - Single Lumen 05/23/18 1110 Left Forearm 1 day         Peripheral IV - Single Lumen 05/23/18 1217 Right Antecubital 1 day                Significant Labs:    CBC/Anemia Profile:    Recent Labs  Lab 05/24/18  0444 05/24/18  2157 05/25/18  0538   WBC 9.39 5.92 14.48*   HGB 14.5 11.3* 13.1   HCT 41.5 35.9* 37.9    234 316   MCV 87 98 88   RDW 15.3* 16.6* 15.9*        Chemistries:    Recent Labs  Lab 05/23/18  2247 05/24/18  0444 05/25/18  0001 05/25/18  0538    135* 138 135*   K 3.7 3.5 3.7 3.4*    105 103 103   CO2 19* 23 27 24   BUN 7* 9* 11 12   CREATININE 0.4* 0.5 0.6 0.5   CALCIUM 6.5* 7.7* 8.3* 7.8*   ALBUMIN 1.7* 1.9* 2.0* 1.8*   PROT 3.5* 4.0* 4.7* 4.3*   BILITOT 1.3* 1.3* 0.9 0.7   ALKPHOS 56 60 72 70   ALT 15 15 16 14   AST 41* 39 30 28   MG 2.2 2.0  --  1.8   PHOS  --   --   --  2.5*       All pertinent labs within the past 24 hours have been reviewed.    Significant Imaging:  I have reviewed all pertinent imaging  results/findings within the past 24 hours.    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    Plan:    Neuro:   -Pain control with PRNs  -Sedation off, AAOx3    Pulmonary:   -Extubated to NC yesterday without issue  ABG    Recent Labs  Lab 05/24/18  0455   PH 7.596*   PO2 80   PCO2 25.0*   HCO3 24.3   BE 3       Cardiac:  -MAP goal >65  -HDS not requiring pressors  -H/o HTN, Cardene gtt started, titrate for SBT <165, currently off  -Home amlodipine restarted    Renal:   -Valladares in place  -UOP low overnight, 0.3cc/kg/hr, given 2L LR with minimal response  -Bun/Cr 12/0.5 consistent with 7/0.6 pre-op    Fluids/Electrolytes/Nutrition/GI:   -Nutritional status: NPO  -replace lytes PRN  -LR @ 125  -NG to LIWS, will likely remove today per primary    Hematology/Oncology:  - H/H 13.1/37.9 will continue to follow, CBC q8  -Anticoagulation: SQ heparin, holding plavix, will restart per primary    Infectious Disease:   -Afebrile  -WBC 14, will continue to trend   -Urine culture with presumed pseudomonas  -Lactates ordered q8, up to 3.5 overnight with recheck 2.5. 1L NS bolus. Lactate 0.8 this am.  -Abx: Vanc & zosyn post-op     Endocrine:  -Glucose goal of 120-180  -h/o hypothyrodism, home synthroid restarted  Dispo:  -Continue care in the ICU setting  -ACS primary               Critical care was time spent personally by me on the following activities: development of treatment plan with patient or surrogate and bedside caregivers, discussions with consultants, evaluation of patient's response to treatment, examination of patient, ordering and performing treatments and interventions, ordering and review of laboratory studies, ordering and review of  radiographic studies, pulse oximetry, re-evaluation of patient's condition.  This critical care time did not overlap with that of any other provider or involve time for any procedures.     Haydee Winchester MD  Critical Care - Surgery  [facility]

## 2018-05-25 NOTE — PLAN OF CARE
Called patients' daughter, Halye, back. She states the  told her patient would not be medically able to move w/her to [place] until 4-6 weeks s/p her surgery. She states she will have to leave her mother here, & she will need to go to a facility when she is discharged. ADAM informed her we would get back in touch w/her next week pending PT/OT recs & when DrDevin's let CM know patient is stable for discharge. She verbalized her understanding. Covering Jenni SCHULTE, informed.

## 2018-05-26 LAB
ALBUMIN SERPL BCP-MCNC: 1.7 G/DL
ALBUMIN SERPL BCP-MCNC: 1.8 G/DL
ALP SERPL-CCNC: 75 U/L
ALP SERPL-CCNC: 99 U/L
ALT SERPL W/O P-5'-P-CCNC: 13 U/L
ALT SERPL W/O P-5'-P-CCNC: 14 U/L
ANION GAP SERPL CALC-SCNC: 11 MMOL/L
ANION GAP SERPL CALC-SCNC: 14 MMOL/L
AST SERPL-CCNC: 25 U/L
AST SERPL-CCNC: 28 U/L
BASOPHILS # BLD AUTO: 0.02 K/UL
BASOPHILS NFR BLD: 0.1 %
BILIRUB SERPL-MCNC: 0.7 MG/DL
BILIRUB SERPL-MCNC: 0.7 MG/DL
BUN SERPL-MCNC: 14 MG/DL
BUN SERPL-MCNC: 14 MG/DL
CALCIUM SERPL-MCNC: 7.7 MG/DL
CALCIUM SERPL-MCNC: 8.1 MG/DL
CHLORIDE SERPL-SCNC: 101 MMOL/L
CHLORIDE SERPL-SCNC: 102 MMOL/L
CO2 SERPL-SCNC: 22 MMOL/L
CO2 SERPL-SCNC: 23 MMOL/L
CREAT SERPL-MCNC: 0.6 MG/DL
CREAT SERPL-MCNC: 0.7 MG/DL
DIFFERENTIAL METHOD: ABNORMAL
EOSINOPHIL # BLD AUTO: 0 K/UL
EOSINOPHIL NFR BLD: 0.3 %
ERYTHROCYTE [DISTWIDTH] IN BLOOD BY AUTOMATED COUNT: 17.4 %
EST. GFR  (AFRICAN AMERICAN): >60 ML/MIN/1.73 M^2
EST. GFR  (AFRICAN AMERICAN): >60 ML/MIN/1.73 M^2
EST. GFR  (NON AFRICAN AMERICAN): >60 ML/MIN/1.73 M^2
EST. GFR  (NON AFRICAN AMERICAN): >60 ML/MIN/1.73 M^2
GLUCOSE SERPL-MCNC: 152 MG/DL
GLUCOSE SERPL-MCNC: 47 MG/DL
HCT VFR BLD AUTO: 36.4 %
HGB BLD-MCNC: 12 G/DL
IMM GRANULOCYTES # BLD AUTO: 0.13 K/UL
IMM GRANULOCYTES NFR BLD AUTO: 0.9 %
LACTATE SERPL-SCNC: 0.8 MMOL/L
LYMPHOCYTES # BLD AUTO: 0.9 K/UL
LYMPHOCYTES NFR BLD: 5.8 %
MCH RBC QN AUTO: 30.5 PG
MCHC RBC AUTO-ENTMCNC: 33 G/DL
MCV RBC AUTO: 93 FL
MONOCYTES # BLD AUTO: 0.5 K/UL
MONOCYTES NFR BLD: 3.6 %
NEUTROPHILS # BLD AUTO: 13.5 K/UL
NEUTROPHILS NFR BLD: 89.3 %
NRBC BLD-RTO: 0 /100 WBC
PLATELET # BLD AUTO: 323 K/UL
PMV BLD AUTO: 10.4 FL
POCT GLUCOSE: 87 MG/DL (ref 70–110)
POTASSIUM SERPL-SCNC: 3.2 MMOL/L
POTASSIUM SERPL-SCNC: 3.4 MMOL/L
PROT SERPL-MCNC: 4.3 G/DL
PROT SERPL-MCNC: 4.7 G/DL
RBC # BLD AUTO: 3.93 M/UL
SODIUM SERPL-SCNC: 135 MMOL/L
SODIUM SERPL-SCNC: 138 MMOL/L
WBC # BLD AUTO: 15.05 K/UL

## 2018-05-26 PROCEDURE — 63600175 PHARM REV CODE 636 W HCPCS: Performed by: STUDENT IN AN ORGANIZED HEALTH CARE EDUCATION/TRAINING PROGRAM

## 2018-05-26 PROCEDURE — 99232 SBSQ HOSP IP/OBS MODERATE 35: CPT | Mod: ,,, | Performed by: ANESTHESIOLOGY

## 2018-05-26 PROCEDURE — 83605 ASSAY OF LACTIC ACID: CPT

## 2018-05-26 PROCEDURE — 25000003 PHARM REV CODE 250: Performed by: STUDENT IN AN ORGANIZED HEALTH CARE EDUCATION/TRAINING PROGRAM

## 2018-05-26 PROCEDURE — 94761 N-INVAS EAR/PLS OXIMETRY MLT: CPT

## 2018-05-26 PROCEDURE — 80053 COMPREHEN METABOLIC PANEL: CPT | Mod: 91

## 2018-05-26 PROCEDURE — 36415 COLL VENOUS BLD VENIPUNCTURE: CPT

## 2018-05-26 PROCEDURE — 20600001 HC STEP DOWN PRIVATE ROOM

## 2018-05-26 PROCEDURE — 25000003 PHARM REV CODE 250: Performed by: SURGERY

## 2018-05-26 PROCEDURE — 85025 COMPLETE CBC W/AUTO DIFF WBC: CPT

## 2018-05-26 PROCEDURE — 27000221 HC OXYGEN, UP TO 24 HOURS

## 2018-05-26 RX ORDER — DEXTROSE MONOHYDRATE, SODIUM CHLORIDE, AND POTASSIUM CHLORIDE 50; 1.49; 4.5 G/1000ML; G/1000ML; G/1000ML
INJECTION, SOLUTION INTRAVENOUS CONTINUOUS
Status: DISCONTINUED | OUTPATIENT
Start: 2018-05-26 | End: 2018-05-28

## 2018-05-26 RX ADMIN — METOPROLOL TARTRATE 5 MG: 5 INJECTION INTRAVENOUS at 06:05

## 2018-05-26 RX ADMIN — PANTOPRAZOLE SODIUM 40 MG: 40 TABLET, DELAYED RELEASE ORAL at 08:05

## 2018-05-26 RX ADMIN — METOPROLOL TARTRATE 5 MG: 5 INJECTION INTRAVENOUS at 12:05

## 2018-05-26 RX ADMIN — LEVOTHYROXINE SODIUM 100 MCG: 100 TABLET ORAL at 08:05

## 2018-05-26 RX ADMIN — NITROGLYCERIN 0.5 INCH: 20 OINTMENT TOPICAL at 05:05

## 2018-05-26 RX ADMIN — VANCOMYCIN HYDROCHLORIDE 750 MG: 10 INJECTION, POWDER, LYOPHILIZED, FOR SOLUTION INTRAVENOUS at 02:05

## 2018-05-26 RX ADMIN — PIPERACILLIN AND TAZOBACTAM 4.5 G: 4; .5 INJECTION, POWDER, LYOPHILIZED, FOR SOLUTION INTRAVENOUS; PARENTERAL at 08:05

## 2018-05-26 RX ADMIN — HYDROMORPHONE HYDROCHLORIDE 0.5 MG: 1 INJECTION, SOLUTION INTRAMUSCULAR; INTRAVENOUS; SUBCUTANEOUS at 03:05

## 2018-05-26 RX ADMIN — HYDROCODONE BITARTRATE AND ACETAMINOPHEN 1 TABLET: 5; 325 TABLET ORAL at 03:05

## 2018-05-26 RX ADMIN — HEPARIN SODIUM 5000 UNITS: 5000 INJECTION, SOLUTION INTRAVENOUS; SUBCUTANEOUS at 10:05

## 2018-05-26 RX ADMIN — POTASSIUM CHLORIDE, DEXTROSE MONOHYDRATE AND SODIUM CHLORIDE: 150; 5; 450 INJECTION, SOLUTION INTRAVENOUS at 10:05

## 2018-05-26 RX ADMIN — POTASSIUM CHLORIDE, DEXTROSE MONOHYDRATE AND SODIUM CHLORIDE: 150; 5; 450 INJECTION, SOLUTION INTRAVENOUS at 11:05

## 2018-05-26 RX ADMIN — VANCOMYCIN HYDROCHLORIDE 750 MG: 10 INJECTION, POWDER, LYOPHILIZED, FOR SOLUTION INTRAVENOUS at 03:05

## 2018-05-26 RX ADMIN — HEPARIN SODIUM 5000 UNITS: 5000 INJECTION, SOLUTION INTRAVENOUS; SUBCUTANEOUS at 01:05

## 2018-05-26 RX ADMIN — CLOPIDOGREL 75 MG: 75 TABLET, FILM COATED ORAL at 08:05

## 2018-05-26 RX ADMIN — NITROGLYCERIN 0.5 INCH: 20 OINTMENT TOPICAL at 01:05

## 2018-05-26 RX ADMIN — HEPARIN SODIUM 5000 UNITS: 5000 INJECTION, SOLUTION INTRAVENOUS; SUBCUTANEOUS at 06:05

## 2018-05-26 RX ADMIN — METOPROLOL TARTRATE 5 MG: 5 INJECTION INTRAVENOUS at 11:05

## 2018-05-26 RX ADMIN — PIPERACILLIN AND TAZOBACTAM 4.5 G: 4; .5 INJECTION, POWDER, LYOPHILIZED, FOR SOLUTION INTRAVENOUS; PARENTERAL at 10:05

## 2018-05-26 RX ADMIN — AMLODIPINE BESYLATE 10 MG: 10 TABLET ORAL at 08:05

## 2018-05-26 RX ADMIN — PIPERACILLIN AND TAZOBACTAM 4.5 G: 4; .5 INJECTION, POWDER, LYOPHILIZED, FOR SOLUTION INTRAVENOUS; PARENTERAL at 03:05

## 2018-05-26 RX ADMIN — METOPROLOL TARTRATE 5 MG: 5 INJECTION INTRAVENOUS at 05:05

## 2018-05-26 NOTE — ASSESSMENT & PLAN NOTE
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 flexure    Plan:    Neuro:   -Pain control with PRNs  -Sedation off, AAOx3    Pulmonary:   -Extubated to NC 5/24 without issue  -continuous pulse ox  -supplemental o2 prn  -ABGs prn    Cardiac:  -MAP goal >65  -HDS not requiring pressors  -H/o HTN, home amlodipine restarted    Renal:   -Valladares in place  -UOP remains low but adequate, 0.6cc/kg/hr  -Bun/Cr 14/0.6 consistent with 7/0.6 pre-op    Fluids/Electrolytes/Nutrition/GI:   -Nutritional status: CLD, advance per ACS team  -replace lytes PRN  -LR @ 125  -GI ppx    Hematology/Oncology:  - H/H 12/36.4 will continue to follow, CBC daily  -Anticoagulation: SQ heparin, home plavix restarted,    Infectious Disease:   -Afebrile  -WBC 15.05, will continue to trend   -Urine culture with presumed pseudomonas  -Lactates ordered daily, 0.8 today  -Abx: Vanc & zosyn post-op     Endocrine:  -Glucose goal of 120-180  -h/o hypothyrodism, home synthroid restarted    Dispo:  -Will discuss step down with primary team today  -ACS primary

## 2018-05-26 NOTE — PLAN OF CARE
Problem: Patient Care Overview  Goal: Plan of Care Review  Outcome: Ongoing (interventions implemented as appropriate)  Pt had an uneventful day and show no s/s of acute distress nor infection. Pt spent half of day in chair and c/o little to no pain. Pt incision WDL. Pt and daughter was updated on plan of care, questions were address and understanding was verbalized. Pt rec'd orders to be stepdown.

## 2018-05-26 NOTE — SUBJECTIVE & OBJECTIVE
Interval History/Significant Events: No acute events overnight, afebrile, vital signs stable. Tolerated clears without N/V. UOP remains low. Lactate wnl.    Follow-up For: Procedure(s) (LRB):  EXPLORATORY-LAPAROTOMY (N/A)  DRAINAGE  COLECTOMY-RIGHT  ILEOSTOMY    Post-Operative Day: 3 Days Post-Op    Objective:     Vital Signs (Most Recent):  Temp: 97.8 °F (36.6 °C) (05/26/18 0300)  Pulse: 93 (05/26/18 0600)  Resp: (!) 30 (05/26/18 0600)  BP: 111/71 (05/26/18 0600)  SpO2: (!) 77 % (05/26/18 0600) Vital Signs (24h Range):  Temp:  [97.6 °F (36.4 °C)-99.2 °F (37.3 °C)] 97.8 °F (36.6 °C)  Pulse:  [66-97] 93  Resp:  [10-30] 30  SpO2:  [77 %-97 %] 77 %  BP: ()/(53-87) 111/71     Weight: 52.2 kg (115 lb 1.3 oz)  Body mass index is 22.48 kg/m².      Intake/Output Summary (Last 24 hours) at 05/26/18 0634  Last data filed at 05/26/18 0600   Gross per 24 hour   Intake             5665 ml   Output             1037 ml   Net             4628 ml       Physical Exam   Constitutional: She appears well-nourished. She appears lethargic.   HENT:   Head: Normocephalic and atraumatic.   Cardiovascular: Normal rate and regular rhythm.    1+ bilateral femoral pulses  Right 1+ popliteal   Nonpalpable right pulses; left 1+ PT   Pulmonary/Chest: Effort normal.   Abdominal: Soft. She exhibits no distension.   exlap incision dressing c/d/i, appropriate tenderness postop  Ileostomy pink patent with air and stool in bag   Musculoskeletal: Normal range of motion. She exhibits no edema.   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       Vents:  Vent Mode: Spont (05/24/18 0946)  Ventilator Initiated: Yes (05/23/18 1805)  Set Rate: 12 bmp (05/24/18 0900)  Vt Set: 450 mL (05/24/18 0900)  Pressure Support: 5 cmH20 (05/24/18 0946)  PEEP/CPAP: 5 cmH20 (05/24/18 0946)  Oxygen Concentration (%): 100 (05/24/18 0946)  Peak Airway Pressure: 10 cmH2O (05/24/18  0946)  Plateau Pressure: 19 cmH20 (05/24/18 0946)  Total Ve: 8.82 mL (05/24/18 0946)  F/VT Ratio<105 (RSBI): 122.05 (05/24/18 0946)    Lines/Drains/Airways     Drain                 Colostomy 05/23/18 1900 RLQ 2 days         Urethral Catheter 05/23/18 1216 Non-latex 2 days          Peripheral Intravenous Line                 Peripheral IV - Single Lumen 05/23/18 0732 Right Forearm 2 days         Peripheral IV - Single Lumen 05/23/18 1110 Left Forearm 2 days         Peripheral IV - Single Lumen 05/23/18 1217 Right Antecubital 2 days         Midline Catheter Insertion/Assessment  - Single Lumen 05/25/18 1520 Right brachial vein 18g x 8cm less than 1 day                Significant Labs:    CBC/Anemia Profile:    Recent Labs  Lab 05/25/18  0538 05/25/18  1607 05/26/18  0450   WBC 14.48* 15.75* 15.05*   HGB 13.1 13.5 12.0   HCT 37.9 40.7 36.4*    313 323   MCV 88 91 93   RDW 15.9* 16.1* 17.4*        Chemistries:    Recent Labs  Lab 05/25/18  0001 05/25/18  0538 05/25/18  2043    135* 137   K 3.7 3.4* 3.5    103 104   CO2 27 24 23   BUN 11 12 14   CREATININE 0.6 0.5 0.6   CALCIUM 8.3* 7.8* 8.1*   ALBUMIN 2.0* 1.8*  --    PROT 4.7* 4.3*  --    BILITOT 0.9 0.7  --    ALKPHOS 72 70  --    ALT 16 14  --    AST 30 28  --    MG  --  1.8 1.8   PHOS  --  2.5* 2.8     Lactic Acid:   Recent Labs  Lab 05/25/18  0001 05/25/18  0538 05/26/18  0544   LACTATE 2.5* 0.8 0.8       Significant Imaging:  I have reviewed all pertinent imaging results/findings within the past 24 hours.

## 2018-05-26 NOTE — ASSESSMENT & PLAN NOTE
POD 3 Ex lap, Right colectomy, end ileostomy    PRN pain control  HDS.   Pulm stable  Ostomy is edematous but viable, improving from yesterday.  Post obstructive vs true output.  Tolerating CLD, consider advancing to regular diet pending discussion with staff    Trend Cr and UOP. Continue gentle hydration.      H/H Stable, continue Abx for gross contamination related to cecal perf.      Proph: DVT, GI, SCDs    Dispo:  PT OT, OOB as tolerated. CLD possible advancement to regular diet

## 2018-05-26 NOTE — NURSING TRANSFER
Nursing Transfer Note      5/26/2018     Transfer To: [facility]     Transfer via bed    Transfer with nasal cannula to O2, cardiac monitoring    Transported by RN    Medicines sent: yes    Chart send with patient: Yes    Notified: daughter    Patient reassessed at: 5/26/2018 1810    Upon arrival to floor: cardiac monitor applied, patient oriented to room, call bell in reach and bed in lowest position    Zarina Silver, RN

## 2018-05-26 NOTE — SUBJECTIVE & OBJECTIVE
Interval History: No acute events overnight. Tolerated CLD, no nausea/vomiting. UOP low.     Medications:  Continuous Infusions:   lactated ringers 125 mL/hr at 05/26/18 0500     Scheduled Meds:   amLODIPine  10 mg Oral Daily    clopidogrel  75 mg Oral Daily    heparin (porcine)  5,000 Units Subcutaneous Q8H    levothyroxine  100 mcg Oral QAM    metoprolol  5 mg Intravenous Q6H    nitroGLYCERIN 2% TD oint  0.5 inch Topical (Top) Q6H    pantoprazole  40 mg Oral Daily    piperacillin-tazobactam (ZOSYN) IVPB  4.5 g Intravenous Q8H    vancomycin (VANCOCIN) IVPB  750 mg Intravenous Q12H     PRN Meds:HYDROcodone-acetaminophen, HYDROmorphone, sodium chloride 0.9%, sodium chloride 0.9%     Review of patient's allergies indicates:   Allergen Reactions    Sulfa (sulfonamide antibiotics) Swelling    Asa [aspirin] Itching     Objective:     Vital Signs (Most Recent):  Temp: 97.8 °F (36.6 °C) (05/26/18 0300)  Pulse: 71 (05/26/18 0500)  Resp: 14 (05/26/18 0500)  BP: (!) 110/53 (05/26/18 0500)  SpO2: 96 % (05/26/18 0500) Vital Signs (24h Range):  Temp:  [97.6 °F (36.4 °C)-99.2 °F (37.3 °C)] 97.8 °F (36.6 °C)  Pulse:  [66-97] 71  Resp:  [10-25] 14  SpO2:  [92 %-97 %] 96 %  BP: ()/(53-87) 110/53     Weight: 52.2 kg (115 lb 1.3 oz)  Body mass index is 22.48 kg/m².    Intake/Output - Last 3 Shifts       05/24 0700 - 05/25 0659 05/25 0700 - 05/26 0659    P.O.  840    I.V. (mL/kg) 1508 (28.9) 3175 (60.8)    IV Piggyback 2000 1650    Total Intake(mL/kg) 3508 (67.2) 5665 (108.5)    Urine (mL/kg/hr) 375 (0.3) 632 (0.5)    Drains 400 (0.3)     Stool 200 (0.2) 340 (0.3)    Total Output 975 972    Net +2533 +4693          Stool Occurrence  1 x          Physical Exam     Gen: NAD  Pulm: Unlabored  Abd: Soft, ATTP.  Staple line intact.  Osotmy viable      Significant Labs:  CBC:     Recent Labs  Lab 05/26/18  0450   WBC 15.05*   RBC 3.93*   HGB 12.0   HCT 36.4*      MCV 93   MCH 30.5   MCHC 33.0     CMP:     Recent  Labs  Lab 05/25/18  0538 05/25/18 2043   GLU 72 56*   CALCIUM 7.8* 8.1*   ALBUMIN 1.8*  --    PROT 4.3*  --    * 137   K 3.4* 3.5   CO2 24 23    104   BUN 12 14   CREATININE 0.5 0.6   ALKPHOS 70  --    ALT 14  --    AST 28  --    BILITOT 0.7  --

## 2018-05-26 NOTE — ASSESSMENT & PLAN NOTE
89 y.o. with h/o HTN, HLD, GERD, and COPD presented one day h/o abdominal pain on 5/23/18.  She was found to have pneumoperitoneum , underwent R hemicolectomy with ileostomy for obstructing hepatic flexure mass, current in ICU.     Patient with expected course of continued right foot rest pain  Would recommend dependent positioning of right foot when possible  Betadiene paint to great toe  No endovascular reconstruction options for disease; has no pedal outflow; definitive management of rest pain would be R AKA would not offer this at this time while currently recovering from abdominal operation unless has concern for ascending infection which she currently does not  Please call with questions or concerns

## 2018-05-26 NOTE — PT/OT/SLP PROGRESS
Occupational Therapy      Patient Name:  Suzan Villarreal   MRN:  [id number]    Patient not seen today secondary to other (pt just returned to bed after being up in chair ~5 hrs today; requested to rest and about to step down to floor). Will follow-up as scheduled for evaluation.    KAI Champion  5/26/2018

## 2018-05-26 NOTE — PROGRESS NOTES
[facility]  General Surgery  Progress Note    Subjective:     History of Present Illness:  89 y/o F presented with cecal perforation    Post-Op Info:  Procedure(s) (LRB):  EXPLORATORY-LAPAROTOMY (N/A)  DRAINAGE  COLECTOMY-RIGHT  ILEOSTOMY   3 Days Post-Op     Interval History: No acute events overnight. Tolerated CLD, no nausea/vomiting. UOP low.     Medications:  Continuous Infusions:   lactated ringers 125 mL/hr at 05/26/18 0500     Scheduled Meds:   amLODIPine  10 mg Oral Daily    clopidogrel  75 mg Oral Daily    heparin (porcine)  5,000 Units Subcutaneous Q8H    levothyroxine  100 mcg Oral QAM    metoprolol  5 mg Intravenous Q6H    nitroGLYCERIN 2% TD oint  0.5 inch Topical (Top) Q6H    pantoprazole  40 mg Oral Daily    piperacillin-tazobactam (ZOSYN) IVPB  4.5 g Intravenous Q8H    vancomycin (VANCOCIN) IVPB  750 mg Intravenous Q12H     PRN Meds:HYDROcodone-acetaminophen, HYDROmorphone, sodium chloride 0.9%, sodium chloride 0.9%     Review of patient's allergies indicates:   Allergen Reactions    Sulfa (sulfonamide antibiotics) Swelling    Asa [aspirin] Itching     Objective:     Vital Signs (Most Recent):  Temp: 97.8 °F (36.6 °C) (05/26/18 0300)  Pulse: 71 (05/26/18 0500)  Resp: 14 (05/26/18 0500)  BP: (!) 110/53 (05/26/18 0500)  SpO2: 96 % (05/26/18 0500) Vital Signs (24h Range):  Temp:  [97.6 °F (36.4 °C)-99.2 °F (37.3 °C)] 97.8 °F (36.6 °C)  Pulse:  [66-97] 71  Resp:  [10-25] 14  SpO2:  [92 %-97 %] 96 %  BP: ()/(53-87) 110/53     Weight: 52.2 kg (115 lb 1.3 oz)  Body mass index is 22.48 kg/m².    Intake/Output - Last 3 Shifts       05/24 0700 - 05/25 0659 05/25 0700 - 05/26 0659    P.O.  840    I.V. (mL/kg) 1508 (28.9) 3175 (60.8)    IV Piggyback 2000 1650    Total Intake(mL/kg) 3508 (67.2) 5665 (108.5)    Urine (mL/kg/hr) 375 (0.3) 632 (0.5)    Drains 400 (0.3)     Stool 200 (0.2) 340 (0.3)    Total Output 975 972    Net +2533 +4693          Stool Occurrence  1 x           Physical Exam     Gen: NAD  Pulm: Unlabored  Abd: Soft, ATTP.  Staple line intact.  Osotmy viable      Significant Labs:  CBC:     Recent Labs  Lab 05/26/18  0450   WBC 15.05*   RBC 3.93*   HGB 12.0   HCT 36.4*      MCV 93   MCH 30.5   MCHC 33.0     CMP:     Recent Labs  Lab 05/25/18  0538 05/25/18  2043   GLU 72 56*   CALCIUM 7.8* 8.1*   ALBUMIN 1.8*  --    PROT 4.3*  --    * 137   K 3.4* 3.5   CO2 24 23    104   BUN 12 14   CREATININE 0.5 0.6   ALKPHOS 70  --    ALT 14  --    AST 28  --    BILITOT 0.7  --            Assessment/Plan:     * Bowel perforation    POD 3 Ex lap, Right colectomy, end ileostomy    PRN pain control  HDS.   Pulm stable  Ostomy is edematous but viable, improving from yesterday.  Post obstructive vs true output.  Tolerating CLD, consider advancing to regular diet pending discussion with staff    Trend Cr and UOP. Continue gentle hydration.      H/H Stable, continue Abx for gross contamination related to cecal perf.      Proph: DVT, GI, SCDs    Dispo:  PT OT, OOB as tolerated. CLD possible advancement to regular diet        HLD (hyperlipidemia)    Home meds        Hypothyroidism    Repalce        GERD (gastroesophageal reflux disease)    PPI therapy        COPD (chronic obstructive pulmonary disease)    Continue aggressive pulm toilet         HTN (hypertension)    Trend BP  Restart home meds when appropriate            Anjali Dumont MD  General Surgery  [facility]

## 2018-05-26 NOTE — CONSULTS
[facility]  Vascular Surgery  Consult Note    Inpatient consult to Vascular Surgery  Consult performed by: LIN SAHNI  Consult ordered by: BRIGETTE KENNEDY        Subjective:     Chief Complaint/Reason for Admission: pneumoperitoneum    History of Present Illness: 89 y.o. with h/o HTN, HLD, GERD, and COPD presented one day h/o abdominal pain on 5/23/18.  She was found to have pneumoperitoneumunderwent R hemicolectomy with ileostomy for obstructing hepatic flexure mass, current in ICU.  She recently underwent a RLE angiogram (5/18/18) for right foot rest pain which started on 5/10/18.   with recurrent right foot pain. During this angiogram was found to have a high grade popliteal stenosis which improved with stent, single vessel run off of AT which occluded mid calf with no pedal outflow.  She has had continued rest pain of her right foot, she states current 2/10 pain.  Vascular surgery consulted for management.    Prescriptions Prior to Admission   Medication Sig Dispense Refill Last Dose    acetaminophen (TYLENOL) 325 MG tablet Take 2 tablets (650 mg total) by mouth every 6 (six) hours as needed.   Past Month at Unknown time    amLODIPine (NORVASC) 10 MG tablet Take 1 tablet (10 mg total) by mouth once daily. 30 tablet 11 5/17/2018 at 0800    ANTIOX #11/OM3/DHA/EPA/LUT/BARBER (OCUVITE ADULT 50+ ORAL) Take 1 tablet by mouth once daily.   More than a month at Unknown time    benazepril (LOTENSIN) 20 MG tablet Take 1 tablet (20 mg total) by mouth 2 (two) times daily. 60 tablet 11 5/17/2018 at 0800    Bifidobacterium animalis 6 mg (5 billion cell) Cap Take 1 capsule by mouth once daily. 30 capsule 11 More than a month at Unknown time    calcium carbonate (OS-UZMA) 600 mg (1,500 mg) Tab Take 600 mg by mouth 2 (two) times daily with meals.   More than a month at Unknown time    clopidogrel (PLAVIX) 75 mg tablet Take 1 tablet (75 mg total) by mouth once daily. 30 tablet 11      docusate sodium (COLACE) 100 MG capsule Take 1 capsule (100 mg total) by mouth once daily.  0 5/17/2018 at 1200    esomeprazole (NEXIUM) 40 MG capsule Take 1 capsule (40 mg total) by mouth before breakfast. 90 capsule 1 More than a month at Unknown time    fluoruracil (CARAC) 0.5 % cream Apply topically once daily. Nasal tip 30 g 1 Past Month at Unknown time    inulin-sorbitol 2 gram Chew Take 1-2 tablets by mouth 2 (two) times daily.  0 More than a month at Unknown time    Lactobacillus acidoph-L.bulgar 1 million cell Chew Take 4 tablets by mouth 3 (three) times daily with meals. 40 tablet 0 More than a month at Unknown time    levothyroxine (SYNTHROID) 100 MCG tablet Take 1 tablet (100 mcg total) by mouth every morning. 30 tablet 11 5/17/2018 at 0800    metoclopramide HCl (REGLAN) 5 MG tablet Take 1 tablet (5 mg total) by mouth 2 (two) times daily as needed (nausea/vomiting). Do not take if severe abdominal pain present 30 tablet 0 5/17/2018 at 2100    metoprolol succinate (TOPROL-XL) 25 MG 24 hr tablet Take 1 tablet (25 mg total) by mouth once daily. 30 tablet 11 5/17/2018 at 0800    multivitamin capsule Take 1 capsule by mouth once daily.   More than a month at Unknown time    oxyCODONE-acetaminophen (PERCOCET) 5-325 mg per tablet Take 1 tablet by mouth every 6 (six) hours as needed for Pain. No alcohol, no driving, no operating machinery, no working, no swimming, no additional Tylenol (acetaminophen) while taking this medication. 35 tablet 0     rosuvastatin (CRESTOR) 20 MG tablet Take 1 tablet (20 mg total) by mouth once daily. 30 tablet 11     salmeterol (SEREVENT DISKUS) 50 mcg/dose diskus inhaler Inhale 1 puff into the lungs 2 (two) times daily. Controller 60 each 8 More than a month at Unknown time       Review of patient's allergies indicates:   Allergen Reactions    Sulfa (sulfonamide antibiotics) Swelling    Asa [aspirin] Itching       Past Medical History:   Diagnosis Date    Allergy      Cholelithiasis without obstruction     Chronic insomnia     COPD (chronic obstructive pulmonary disease)     DJD (degenerative joint disease), lumbar     GERD (gastroesophageal reflux disease)     HLD (hyperlipidemia)     HTN (hypertension)     Hypothyroid     OP (osteoporosis)     Osteoarthritis     S/P hysterectomy      Past Surgical History:   Procedure Laterality Date    APPENDECTOMY      CHOLECYSTECTOMY      EXPLORATORY LAPAROTOMY N/A 5/23/2018    Procedure: EXPLORATORY-LAPAROTOMY;  Surgeon: Tushar Anderson MD;  Location: [address];  Service: General;  Laterality: N/A;    HIP SURGERY  8-2013    right    HYSTERECTOMY      ILEOSTOMY  5/23/2018    Procedure: ILEOSTOMY;  Surgeon: Tushar Anderson MD;  Location: [address];  Service: General;;    KNEE ARTHROSCOPY Right 7-6-15    TK    RIGHT HEMICOLECTOMY  5/23/2018    Procedure: COLECTOMY-RIGHT;  Surgeon: Tushar Anderson MD;  Location: [address];  Service: General;;     Family History     None        Social History Main Topics    Smoking status: Never Smoker    Smokeless tobacco: Never Used    Alcohol use No    Drug use: No    Sexual activity: Not Currently     Review of Systems   Constitutional: Positive for activity change. Negative for chills and fever.   HENT: Negative for congestion.    Eyes: Negative for discharge.   Respiratory: Negative for apnea.    Gastrointestinal: Positive for abdominal pain.   Endocrine: Negative for cold intolerance and heat intolerance.   Genitourinary: Negative for vaginal pain.   Musculoskeletal: Positive for arthralgias and myalgias. Negative for gait problem.   Skin: Positive for wound.   Allergic/Immunologic: Negative for environmental allergies.   Neurological: Negative for dizziness.   Psychiatric/Behavioral: Negative for agitation and behavioral problems.     Objective:     Vital Signs (Most Recent):  Temp: 99 °F (37.2 °C) (05/25/18 1500)  Pulse: 93 (05/25/18 1800)  Resp:  (!) 23 (05/25/18 1800)  BP: (!) 149/67 (05/25/18 1800)  SpO2: 97 % (05/25/18 1800) Vital Signs (24h Range):  Temp:  [97.6 °F (36.4 °C)-99.2 °F (37.3 °C)] 99 °F (37.2 °C)  Pulse:  [70-97] 93  Resp:  [12-25] 23  SpO2:  [92 %-99 %] 97 %  BP: (104-155)/(53-87) 149/67     Weight: 52.2 kg (115 lb 1.3 oz)  Body mass index is 22.48 kg/m².    Physical Exam   Constitutional: She appears well-nourished. She appears lethargic.   HENT:   Head: Normocephalic and atraumatic.   Cardiovascular: Normal rate and regular rhythm.    1+ bilateral femoral pulses  Right 1+ popliteal   Nonpalpable right pulses; left 1+ PT   Pulmonary/Chest: Effort normal.   Abdominal: Soft. She exhibits no distension.   exlap incision dressing c/d/i, appropriate tenderness postop  Ileostomy pink patent with air and stool in bag   Musculoskeletal: Normal range of motion. She exhibits no edema.   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       Significant Labs:  CBC:   Recent Labs  Lab 05/25/18  1607   WBC 15.75*   RBC 4.47   HGB 13.5   HCT 40.7      MCV 91   MCH 30.2   MCHC 33.2     CMP:   Recent Labs  Lab 05/25/18  0538   GLU 72   CALCIUM 7.8*   ALBUMIN 1.8*   PROT 4.3*   *   K 3.4*   CO2 24      BUN 12   CREATININE 0.5   ALKPHOS 70   ALT 14   AST 28   BILITOT 0.7     Coagulation: No results for input(s): LABPROT, INR, APTT in the last 48 hours.  Lactic Acid:   Recent Labs  Lab 05/25/18  0538   LACTATE 0.8             Assessment/Plan:     PVD (peripheral vascular disease)    89 y.o. with h/o HTN, HLD, GERD, and COPD presented one day h/o abdominal pain on 5/23/18.  She was found to have pneumoperitoneum , underwent R hemicolectomy with ileostomy for obstructing hepatic flexure mass, current in ICU.     Patient with expected course of continued right foot rest pain  Would recommend dependent positioning of right foot when possible  Betadiene paint to  great toe  No endovascular reconstruction options for disease; has no pedal outflow; definitive management of rest pain would be R AKA would not offer this at this time while currently recovering from abdominal operation unless has concern for ascending infection which she currently does not  Please call with questions or concerns            Thank you for your consult.     Inna Carmichael MD  Vascular Surgery  [facility]

## 2018-05-26 NOTE — HPI
89 y.o. with h/o HTN, HLD, GERD, and COPD presented one day h/o abdominal pain on 5/23/18.  She was found to have pneumoperitoneumunderwent R hemicolectomy with ileostomy for obstructing hepatic flexure mass, current in ICU.  She recently underwent a RLE angiogram (5/18/18) for right foot rest pain which started on 5/10/18.   with recurrent right foot pain. During this angiogram was found to have a high grade popliteal stenosis which improved with stent, single vessel run off of AT which occluded mid calf with no pedal outflow.  She has had continued rest pain of her right foot, she states current 2/10 pain.  Vascular surgery consulted for management.

## 2018-05-26 NOTE — NURSING
Rec'd call from lab with critical value of Blood glucose of 44. Pt is asymptomatic and has eaten. Blood glucose was reassessed at bedside and read 87. D51/2NS infusing started. Will continue to monitor.

## 2018-05-26 NOTE — SUBJECTIVE & OBJECTIVE
Prescriptions Prior to Admission   Medication Sig Dispense Refill Last Dose    acetaminophen (TYLENOL) 325 MG tablet Take 2 tablets (650 mg total) by mouth every 6 (six) hours as needed.   Past Month at Unknown time    amLODIPine (NORVASC) 10 MG tablet Take 1 tablet (10 mg total) by mouth once daily. 30 tablet 11 5/17/2018 at 0800    ANTIOX #11/OM3/DHA/EPA/LUT/BARBER (OCUVITE ADULT 50+ ORAL) Take 1 tablet by mouth once daily.   More than a month at Unknown time    benazepril (LOTENSIN) 20 MG tablet Take 1 tablet (20 mg total) by mouth 2 (two) times daily. 60 tablet 11 5/17/2018 at 0800    Bifidobacterium animalis 6 mg (5 billion cell) Cap Take 1 capsule by mouth once daily. 30 capsule 11 More than a month at Unknown time    calcium carbonate (OS-UZMA) 600 mg (1,500 mg) Tab Take 600 mg by mouth 2 (two) times daily with meals.   More than a month at Unknown time    clopidogrel (PLAVIX) 75 mg tablet Take 1 tablet (75 mg total) by mouth once daily. 30 tablet 11     docusate sodium (COLACE) 100 MG capsule Take 1 capsule (100 mg total) by mouth once daily.  0 5/17/2018 at 1200    esomeprazole (NEXIUM) 40 MG capsule Take 1 capsule (40 mg total) by mouth before breakfast. 90 capsule 1 More than a month at Unknown time    fluoruracil (CARAC) 0.5 % cream Apply topically once daily. Nasal tip 30 g 1 Past Month at Unknown time    inulin-sorbitol 2 gram Chew Take 1-2 tablets by mouth 2 (two) times daily.  0 More than a month at Unknown time    Lactobacillus acidoph-L.bulgar 1 million cell Chew Take 4 tablets by mouth 3 (three) times daily with meals. 40 tablet 0 More than a month at Unknown time    levothyroxine (SYNTHROID) 100 MCG tablet Take 1 tablet (100 mcg total) by mouth every morning. 30 tablet 11 5/17/2018 at 0800    metoclopramide HCl (REGLAN) 5 MG tablet Take 1 tablet (5 mg total) by mouth 2 (two) times daily as needed (nausea/vomiting). Do not take if severe abdominal pain present 30 tablet 0 5/17/2018 at  2100    metoprolol succinate (TOPROL-XL) 25 MG 24 hr tablet Take 1 tablet (25 mg total) by mouth once daily. 30 tablet 11 5/17/2018 at 0800    multivitamin capsule Take 1 capsule by mouth once daily.   More than a month at Unknown time    oxyCODONE-acetaminophen (PERCOCET) 5-325 mg per tablet Take 1 tablet by mouth every 6 (six) hours as needed for Pain. No alcohol, no driving, no operating machinery, no working, no swimming, no additional Tylenol (acetaminophen) while taking this medication. 35 tablet 0     rosuvastatin (CRESTOR) 20 MG tablet Take 1 tablet (20 mg total) by mouth once daily. 30 tablet 11     salmeterol (SEREVENT DISKUS) 50 mcg/dose diskus inhaler Inhale 1 puff into the lungs 2 (two) times daily. Controller 60 each 8 More than a month at Unknown time       Review of patient's allergies indicates:   Allergen Reactions    Sulfa (sulfonamide antibiotics) Swelling    Asa [aspirin] Itching       Past Medical History:   Diagnosis Date    Allergy     Cholelithiasis without obstruction     Chronic insomnia     COPD (chronic obstructive pulmonary disease)     DJD (degenerative joint disease), lumbar     GERD (gastroesophageal reflux disease)     HLD (hyperlipidemia)     HTN (hypertension)     Hypothyroid     OP (osteoporosis)     Osteoarthritis     S/P hysterectomy      Past Surgical History:   Procedure Laterality Date    APPENDECTOMY      CHOLECYSTECTOMY      EXPLORATORY LAPAROTOMY N/A 5/23/2018    Procedure: EXPLORATORY-LAPAROTOMY;  Surgeon: Tushar Anderson MD;  Location: [address];  Service: General;  Laterality: N/A;    HIP SURGERY  8-2013    right    HYSTERECTOMY      ILEOSTOMY  5/23/2018    Procedure: ILEOSTOMY;  Surgeon: Tushar Anderson MD;  Location: [address];  Service: General;;    KNEE ARTHROSCOPY Right 7-6-15    TK    RIGHT HEMICOLECTOMY  5/23/2018    Procedure: COLECTOMY-RIGHT;  Surgeon: Tushar Anderson MD;  Location: [facility] OR [address] [place] [address];   Service: General;;     Family History     None        Social History Main Topics    Smoking status: Never Smoker    Smokeless tobacco: Never Used    Alcohol use No    Drug use: No    Sexual activity: Not Currently     Review of Systems   Constitutional: Positive for activity change. Negative for chills and fever.   HENT: Negative for congestion.    Eyes: Negative for discharge.   Respiratory: Negative for apnea.    Gastrointestinal: Positive for abdominal pain.   Endocrine: Negative for cold intolerance and heat intolerance.   Genitourinary: Negative for vaginal pain.   Musculoskeletal: Positive for arthralgias and myalgias. Negative for gait problem.   Skin: Positive for wound.   Allergic/Immunologic: Negative for environmental allergies.   Neurological: Negative for dizziness.   Psychiatric/Behavioral: Negative for agitation and behavioral problems.     Objective:     Vital Signs (Most Recent):  Temp: 99 °F (37.2 °C) (05/25/18 1500)  Pulse: 93 (05/25/18 1800)  Resp: (!) 23 (05/25/18 1800)  BP: (!) 149/67 (05/25/18 1800)  SpO2: 97 % (05/25/18 1800) Vital Signs (24h Range):  Temp:  [97.6 °F (36.4 °C)-99.2 °F (37.3 °C)] 99 °F (37.2 °C)  Pulse:  [70-97] 93  Resp:  [12-25] 23  SpO2:  [92 %-99 %] 97 %  BP: (104-155)/(53-87) 149/67     Weight: 52.2 kg (115 lb 1.3 oz)  Body mass index is 22.48 kg/m².    Physical Exam   Constitutional: She appears well-nourished. She appears lethargic.   HENT:   Head: Normocephalic and atraumatic.   Cardiovascular: Normal rate and regular rhythm.    1+ bilateral femoral pulses  Right 1+ popliteal   Nonpalpable right pulses; left 1+ PT   Pulmonary/Chest: Effort normal.   Abdominal: Soft. She exhibits no distension.   exlap incision dressing c/d/i, appropriate tenderness postop  Ileostomy pink patent with air and stool in bag   Musculoskeletal: Normal range of motion. She exhibits no edema.   Dependent rubor of right forefoot  Distal great toe bullae with begininings of dry gangrene    Neurological: She appears lethargic.       Significant Labs:  CBC:   Recent Labs  Lab 05/25/18  1607   WBC 15.75*   RBC 4.47   HGB 13.5   HCT 40.7      MCV 91   MCH 30.2   MCHC 33.2     CMP:   Recent Labs  Lab 05/25/18  0538   GLU 72   CALCIUM 7.8*   ALBUMIN 1.8*   PROT 4.3*   *   K 3.4*   CO2 24      BUN 12   CREATININE 0.5   ALKPHOS 70   ALT 14   AST 28   BILITOT 0.7     Coagulation: No results for input(s): LABPROT, INR, APTT in the last 48 hours.  Lactic Acid:   Recent Labs  Lab 05/25/18  0538   LACTATE 0.8

## 2018-05-26 NOTE — PROGRESS NOTES
[facility]  Critical Care - Surgery  Progress Note    Patient Name: Suzan Villarreal  MRN: [id number]  Admission Date: 5/23/2018  Hospital Length of Stay: 3 days  Code Status: Full Code  Attending Provider: Tushar Anderson MD  Primary Care Provider: Aly Rivas MD   Principal Problem: Bowel perforation    Subjective:     Interval History/Significant Events: No acute events overnight, afebrile, vital signs stable. Tolerated clears without N/V. UOP remains low. Lactate wnl.    Follow-up For: Procedure(s) (LRB):  EXPLORATORY-LAPAROTOMY (N/A)  DRAINAGE  COLECTOMY-RIGHT  ILEOSTOMY    Post-Operative Day: 3 Days Post-Op    Objective:     Vital Signs (Most Recent):  Temp: 97.8 °F (36.6 °C) (05/26/18 0300)  Pulse: 93 (05/26/18 0600)  Resp: (!) 30 (05/26/18 0600)  BP: 111/71 (05/26/18 0600)  SpO2: (!) 77 % (05/26/18 0600) Vital Signs (24h Range):  Temp:  [97.6 °F (36.4 °C)-99.2 °F (37.3 °C)] 97.8 °F (36.6 °C)  Pulse:  [66-97] 93  Resp:  [10-30] 30  SpO2:  [77 %-97 %] 77 %  BP: ()/(53-87) 111/71     Weight: 52.2 kg (115 lb 1.3 oz)  Body mass index is 22.48 kg/m².      Intake/Output Summary (Last 24 hours) at 05/26/18 0634  Last data filed at 05/26/18 0600   Gross per 24 hour   Intake             5665 ml   Output             1037 ml   Net             4628 ml       Physical Exam   Constitutional: She appears well-nourished. She appears lethargic.   HENT:   Head: Normocephalic and atraumatic.   Cardiovascular: Normal rate and regular rhythm.    1+ bilateral femoral pulses  Right 1+ popliteal   Nonpalpable right pulses; left 1+ PT   Pulmonary/Chest: Effort normal.   Abdominal: Soft. She exhibits no distension.   exlap incision dressing c/d/i, appropriate tenderness postop  Ileostomy pink patent with air and stool in bag   Musculoskeletal: Normal range of motion. She exhibits no edema.   Dependent rubor of right forefoot  Distal great toe bullae with begininings of dry gangrene   Neurological: She  appears lethargic.       Vents:  Vent Mode: Spont (05/24/18 0946)  Ventilator Initiated: Yes (05/23/18 1805)  Set Rate: 12 bmp (05/24/18 0900)  Vt Set: 450 mL (05/24/18 0900)  Pressure Support: 5 cmH20 (05/24/18 0946)  PEEP/CPAP: 5 cmH20 (05/24/18 0946)  Oxygen Concentration (%): 100 (05/24/18 0946)  Peak Airway Pressure: 10 cmH2O (05/24/18 0946)  Plateau Pressure: 19 cmH20 (05/24/18 0946)  Total Ve: 8.82 mL (05/24/18 0946)  F/VT Ratio<105 (RSBI): 122.05 (05/24/18 0946)    Lines/Drains/Airways     Drain                 Colostomy 05/23/18 1900 RLQ 2 days         Urethral Catheter 05/23/18 1216 Non-latex 2 days          Peripheral Intravenous Line                 Peripheral IV - Single Lumen 05/23/18 0732 Right Forearm 2 days         Peripheral IV - Single Lumen 05/23/18 1110 Left Forearm 2 days         Peripheral IV - Single Lumen 05/23/18 1217 Right Antecubital 2 days         Midline Catheter Insertion/Assessment  - Single Lumen 05/25/18 1520 Right brachial vein 18g x 8cm less than 1 day                Significant Labs:    CBC/Anemia Profile:    Recent Labs  Lab 05/25/18  0538 05/25/18  1607 05/26/18  0450   WBC 14.48* 15.75* 15.05*   HGB 13.1 13.5 12.0   HCT 37.9 40.7 36.4*    313 323   MCV 88 91 93   RDW 15.9* 16.1* 17.4*        Chemistries:    Recent Labs  Lab 05/25/18  0001 05/25/18  0538 05/25/18  2043    135* 137   K 3.7 3.4* 3.5    103 104   CO2 27 24 23   BUN 11 12 14   CREATININE 0.6 0.5 0.6   CALCIUM 8.3* 7.8* 8.1*   ALBUMIN 2.0* 1.8*  --    PROT 4.7* 4.3*  --    BILITOT 0.9 0.7  --    ALKPHOS 72 70  --    ALT 16 14  --    AST 30 28  --    MG  --  1.8 1.8   PHOS  --  2.5* 2.8     Lactic Acid:   Recent Labs  Lab 05/25/18  0001 05/25/18  0538 05/26/18  0544   LACTATE 2.5* 0.8 0.8       Significant Imaging:  I have reviewed all pertinent imaging results/findings within the past 24 hours.    Assessment/Plan:     * Bowel perforation    89yo F with PMH of COPD (not on home O2), HTN, PAD  (recent RLE angio, on plavix), HLD and GERD who presents to the SICU intubated and sedated 5/23 s/p ex lap, right hemicolectomy and end ileostomy for cecal perforation related to obstructing colon mass at the hepatic flexure    Plan:    Neuro:   -Pain control with PRNs  -Sedation off, AAOx3    Pulmonary:   -Extubated to NC 5/24 without issue  -continuous pulse ox  -supplemental o2 prn  -ABGs prn    Cardiac:  -MAP goal >65  -HDS not requiring pressors  -H/o HTN, home amlodipine restarted    Renal:   -Valladares in place  -UOP remains low but adequate, 0.6cc/kg/hr  -Bun/Cr 14/0.6 consistent with 7/0.6 pre-op    Fluids/Electrolytes/Nutrition/GI:   -Nutritional status: CLD, advance per ACS team  -replace lytes PRN  -LR @ 125  -GI ppx    Hematology/Oncology:  - H/H 12/36.4 will continue to follow, CBC daily  -Anticoagulation: SQ heparin, home plavix restarted,    Infectious Disease:   -Afebrile  -WBC 15.05, will continue to trend   -Urine culture with presumed pseudomonas  -Lactates ordered daily, 0.8 today  -Abx: Vanc & zosyn post-op     Endocrine:  -Glucose goal of 120-180  -h/o hypothyrodism, home synthroid restarted    Dispo:  -Will discuss step down with primary team today  -ACS primary               Critical care was time spent personally by me on the following activities: development of treatment plan with patient or surrogate and bedside caregivers, discussions with consultants, evaluation of patient's response to treatment, examination of patient, ordering and performing treatments and interventions, ordering and review of laboratory studies, ordering and review of radiographic studies, pulse oximetry, re-evaluation of patient's condition.  This critical care time did not overlap with that of any other provider or involve time for any procedures.     Zohreh Espinoza MD  Critical Care - Surgery  [facility]

## 2018-05-26 NOTE — TREATMENT PLAN
Vascular Surgery Brief Note      Suzan Villarreal is a 89 y.o. with h/o HTN, HLD, GERD, and COPD presented one day h/o abdominal pain on 5/23/18.  She was found to have pneumoperitoneum , underwent R hemicolectomy with ileostomy for obstructing hepatic flexure mass, current in ICU. Ostomy functioning, NGT out. Path pending. Leg pain improved from yesterday    Patient with expected course of continued right foot rest pain  No acute indications for amputation -- new ascending infection or severe uncontrollable rest pain would be indications  Recommend dependent positioning of right foot when possible  Please paint Betadiene to great toe  Please call with questions or concerns  Can plan for outpatient f/u with Dr Garcia

## 2018-05-27 LAB
ALBUMIN SERPL BCP-MCNC: 1.5 G/DL
ALP SERPL-CCNC: 65 U/L
ALT SERPL W/O P-5'-P-CCNC: 11 U/L
ANION GAP SERPL CALC-SCNC: 7 MMOL/L
AST SERPL-CCNC: 17 U/L
BASOPHILS # BLD AUTO: 0.03 K/UL
BASOPHILS NFR BLD: 0.2 %
BILIRUB SERPL-MCNC: 0.5 MG/DL
BUN SERPL-MCNC: 13 MG/DL
CALCIUM SERPL-MCNC: 7.4 MG/DL
CHLORIDE SERPL-SCNC: 102 MMOL/L
CO2 SERPL-SCNC: 22 MMOL/L
CREAT SERPL-MCNC: 0.6 MG/DL
DIFFERENTIAL METHOD: ABNORMAL
EOSINOPHIL # BLD AUTO: 0.2 K/UL
EOSINOPHIL NFR BLD: 1.4 %
ERYTHROCYTE [DISTWIDTH] IN BLOOD BY AUTOMATED COUNT: 15.4 %
EST. GFR  (AFRICAN AMERICAN): >60 ML/MIN/1.73 M^2
EST. GFR  (NON AFRICAN AMERICAN): >60 ML/MIN/1.73 M^2
GLUCOSE SERPL-MCNC: 178 MG/DL
HCT VFR BLD AUTO: 34.9 %
HGB BLD-MCNC: 11.9 G/DL
IMM GRANULOCYTES # BLD AUTO: 0.1 K/UL
IMM GRANULOCYTES NFR BLD AUTO: 0.7 %
LACTATE SERPL-SCNC: 1.1 MMOL/L
LYMPHOCYTES # BLD AUTO: 1 K/UL
LYMPHOCYTES NFR BLD: 7 %
MAGNESIUM SERPL-MCNC: 1.5 MG/DL
MCH RBC QN AUTO: 30.4 PG
MCHC RBC AUTO-ENTMCNC: 34.1 G/DL
MCV RBC AUTO: 89 FL
MONOCYTES # BLD AUTO: 0.6 K/UL
MONOCYTES NFR BLD: 4.5 %
NEUTROPHILS # BLD AUTO: 11.8 K/UL
NEUTROPHILS NFR BLD: 86.2 %
NRBC BLD-RTO: 0 /100 WBC
PHOSPHATE SERPL-MCNC: 1.8 MG/DL
PLATELET # BLD AUTO: 274 K/UL
PMV BLD AUTO: 9.1 FL
POTASSIUM SERPL-SCNC: 3.3 MMOL/L
PROT SERPL-MCNC: 3.7 G/DL
RBC # BLD AUTO: 3.91 M/UL
SODIUM SERPL-SCNC: 131 MMOL/L
VANCOMYCIN TROUGH SERPL-MCNC: 13 UG/ML
WBC # BLD AUTO: 13.64 K/UL

## 2018-05-27 PROCEDURE — 63600175 PHARM REV CODE 636 W HCPCS: Performed by: STUDENT IN AN ORGANIZED HEALTH CARE EDUCATION/TRAINING PROGRAM

## 2018-05-27 PROCEDURE — 84100 ASSAY OF PHOSPHORUS: CPT

## 2018-05-27 PROCEDURE — 25000003 PHARM REV CODE 250: Performed by: STUDENT IN AN ORGANIZED HEALTH CARE EDUCATION/TRAINING PROGRAM

## 2018-05-27 PROCEDURE — 97165 OT EVAL LOW COMPLEX 30 MIN: CPT

## 2018-05-27 PROCEDURE — 97162 PT EVAL MOD COMPLEX 30 MIN: CPT

## 2018-05-27 PROCEDURE — 80053 COMPREHEN METABOLIC PANEL: CPT

## 2018-05-27 PROCEDURE — 20600001 HC STEP DOWN PRIVATE ROOM

## 2018-05-27 PROCEDURE — 83735 ASSAY OF MAGNESIUM: CPT

## 2018-05-27 PROCEDURE — 85025 COMPLETE CBC W/AUTO DIFF WBC: CPT

## 2018-05-27 PROCEDURE — 80202 ASSAY OF VANCOMYCIN: CPT

## 2018-05-27 PROCEDURE — 36415 COLL VENOUS BLD VENIPUNCTURE: CPT

## 2018-05-27 PROCEDURE — 83605 ASSAY OF LACTIC ACID: CPT

## 2018-05-27 PROCEDURE — 25000003 PHARM REV CODE 250: Performed by: SURGERY

## 2018-05-27 RX ORDER — POTASSIUM CHLORIDE 7.45 MG/ML
10 INJECTION INTRAVENOUS
Status: COMPLETED | OUTPATIENT
Start: 2018-05-27 | End: 2018-05-27

## 2018-05-27 RX ORDER — ONDANSETRON 4 MG/1
4 TABLET, ORALLY DISINTEGRATING ORAL EVERY 6 HOURS PRN
Status: DISCONTINUED | OUTPATIENT
Start: 2018-05-27 | End: 2018-06-01 | Stop reason: HOSPADM

## 2018-05-27 RX ADMIN — HYDROMORPHONE HYDROCHLORIDE 0.5 MG: 1 INJECTION, SOLUTION INTRAMUSCULAR; INTRAVENOUS; SUBCUTANEOUS at 01:05

## 2018-05-27 RX ADMIN — PANTOPRAZOLE SODIUM 40 MG: 40 TABLET, DELAYED RELEASE ORAL at 08:05

## 2018-05-27 RX ADMIN — POTASSIUM CHLORIDE 10 MEQ: 7.46 INJECTION, SOLUTION INTRAVENOUS at 12:05

## 2018-05-27 RX ADMIN — POTASSIUM CHLORIDE 10 MEQ: 7.46 INJECTION, SOLUTION INTRAVENOUS at 08:05

## 2018-05-27 RX ADMIN — PIPERACILLIN AND TAZOBACTAM 4.5 G: 4; .5 INJECTION, POWDER, LYOPHILIZED, FOR SOLUTION INTRAVENOUS; PARENTERAL at 10:05

## 2018-05-27 RX ADMIN — POTASSIUM PHOSPHATE, MONOBASIC AND POTASSIUM PHOSPHATE, DIBASIC 30 MMOL: 224; 236 INJECTION, SOLUTION, CONCENTRATE INTRAVENOUS at 10:05

## 2018-05-27 RX ADMIN — NITROGLYCERIN 0.5 INCH: 20 OINTMENT TOPICAL at 12:05

## 2018-05-27 RX ADMIN — HYDROCODONE BITARTRATE AND ACETAMINOPHEN 1 TABLET: 5; 325 TABLET ORAL at 02:05

## 2018-05-27 RX ADMIN — HEPARIN SODIUM 5000 UNITS: 5000 INJECTION, SOLUTION INTRAVENOUS; SUBCUTANEOUS at 02:05

## 2018-05-27 RX ADMIN — VANCOMYCIN HYDROCHLORIDE 750 MG: 10 INJECTION, POWDER, LYOPHILIZED, FOR SOLUTION INTRAVENOUS at 02:05

## 2018-05-27 RX ADMIN — METOPROLOL TARTRATE 5 MG: 5 INJECTION INTRAVENOUS at 06:05

## 2018-05-27 RX ADMIN — SODIUM CHLORIDE 500 ML: 9 INJECTION, SOLUTION INTRAVENOUS at 07:05

## 2018-05-27 RX ADMIN — POTASSIUM CHLORIDE 10 MEQ: 7.46 INJECTION, SOLUTION INTRAVENOUS at 01:05

## 2018-05-27 RX ADMIN — PIPERACILLIN AND TAZOBACTAM 4.5 G: 4; .5 INJECTION, POWDER, LYOPHILIZED, FOR SOLUTION INTRAVENOUS; PARENTERAL at 06:05

## 2018-05-27 RX ADMIN — HEPARIN SODIUM 5000 UNITS: 5000 INJECTION, SOLUTION INTRAVENOUS; SUBCUTANEOUS at 09:05

## 2018-05-27 RX ADMIN — HYDROMORPHONE HYDROCHLORIDE 0.5 MG: 1 INJECTION, SOLUTION INTRAMUSCULAR; INTRAVENOUS; SUBCUTANEOUS at 10:05

## 2018-05-27 RX ADMIN — NITROGLYCERIN 0.5 INCH: 20 OINTMENT TOPICAL at 06:05

## 2018-05-27 RX ADMIN — HEPARIN SODIUM 5000 UNITS: 5000 INJECTION, SOLUTION INTRAVENOUS; SUBCUTANEOUS at 06:05

## 2018-05-27 RX ADMIN — HYDROCODONE BITARTRATE AND ACETAMINOPHEN 1 TABLET: 5; 325 TABLET ORAL at 10:05

## 2018-05-27 RX ADMIN — LEVOTHYROXINE SODIUM 100 MCG: 100 TABLET ORAL at 06:05

## 2018-05-27 RX ADMIN — AMLODIPINE BESYLATE 10 MG: 10 TABLET ORAL at 08:05

## 2018-05-27 RX ADMIN — METOPROLOL TARTRATE 5 MG: 5 INJECTION INTRAVENOUS at 12:05

## 2018-05-27 RX ADMIN — MAGNESIUM SULFATE HEPTAHYDRATE 3 G: 500 INJECTION, SOLUTION INTRAMUSCULAR; INTRAVENOUS at 02:05

## 2018-05-27 RX ADMIN — ONDANSETRON 4 MG: 4 TABLET, ORALLY DISINTEGRATING ORAL at 08:05

## 2018-05-27 RX ADMIN — HYDROCODONE BITARTRATE AND ACETAMINOPHEN 1 TABLET: 5; 325 TABLET ORAL at 12:05

## 2018-05-27 RX ADMIN — CLOPIDOGREL 75 MG: 75 TABLET, FILM COATED ORAL at 08:05

## 2018-05-27 RX ADMIN — VANCOMYCIN HYDROCHLORIDE 750 MG: 10 INJECTION, POWDER, LYOPHILIZED, FOR SOLUTION INTRAVENOUS at 03:05

## 2018-05-27 RX ADMIN — POTASSIUM CHLORIDE 10 MEQ: 7.46 INJECTION, SOLUTION INTRAVENOUS at 10:05

## 2018-05-27 RX ADMIN — POTASSIUM CHLORIDE, DEXTROSE MONOHYDRATE AND SODIUM CHLORIDE: 150; 5; 450 INJECTION, SOLUTION INTRAVENOUS at 10:05

## 2018-05-27 RX ADMIN — HYDROCODONE BITARTRATE AND ACETAMINOPHEN 1 TABLET: 5; 325 TABLET ORAL at 09:05

## 2018-05-27 NOTE — SUBJECTIVE & OBJECTIVE
Interval History: No acute events overnight. Tolerating regular diet, no nausea/vomiting. UOP continues to be low.     Medications:  Continuous Infusions:   dextrose 5 % and 0.45 % NaCl with KCl 20 mEq 100 mL/hr at 05/26/18 2251     Scheduled Meds:   amLODIPine  10 mg Oral Daily    clopidogrel  75 mg Oral Daily    heparin (porcine)  5,000 Units Subcutaneous Q8H    levothyroxine  100 mcg Oral QAM    metoprolol  5 mg Intravenous Q6H    nitroGLYCERIN 2% TD oint  0.5 inch Topical (Top) Q6H    pantoprazole  40 mg Oral Daily    piperacillin-tazobactam (ZOSYN) IVPB  4.5 g Intravenous Q8H    vancomycin (VANCOCIN) IVPB  750 mg Intravenous Q12H     PRN Meds:HYDROcodone-acetaminophen, HYDROmorphone, sodium chloride 0.9%, sodium chloride 0.9%     Review of patient's allergies indicates:   Allergen Reactions    Sulfa (sulfonamide antibiotics) Swelling    Asa [aspirin] Itching     Objective:     Vital Signs (Most Recent):  Temp: 96.8 °F (36 °C) (05/27/18 0734)  Pulse: 71 (05/27/18 0734)  Resp: 16 (05/27/18 0734)  BP: 135/66 (05/27/18 0734)  SpO2: 95 % (05/27/18 0734) Vital Signs (24h Range):  Temp:  [96.8 °F (36 °C)-98.5 °F (36.9 °C)] 96.8 °F (36 °C)  Pulse:  [63-93] 71  Resp:  [13-26] 16  SpO2:  [88 %-97 %] 95 %  BP: (115-154)/(54-99) 135/66     Weight: 52.2 kg (115 lb 1.3 oz)  Body mass index is 22.48 kg/m².    Intake/Output - Last 3 Shifts       05/25 0700 - 05/26 0659 05/26 0700 - 05/27 0659 05/27 0700 - 05/28 0659    P.O. 840      I.V. (mL/kg) 3175 (60.8) 2515 (48.2)     IV Piggyback 1650 800     Total Intake(mL/kg) 5665 (108.5) 3315 (63.5)     Urine (mL/kg/hr) 697 (0.6) 655 (0.5)     Stool 340 (0.3) 425 (0.3)     Total Output 1037 1080      Net +4628 +2235             Stool Occurrence 1 x 0 x           Physical Exam   Constitutional: She is oriented to person, place, and time. She appears well-developed and well-nourished.   HENT:   Head: Normocephalic and atraumatic.   Cardiovascular: Normal rate.     Pulmonary/Chest: Effort normal.   Abdominal: Soft. There is no tenderness.   Midline incision with staples in place, clean, dry and intact. Ostomy moist and pink.   Neurological: She is alert and oriented to person, place, and time.   Skin: Skin is warm and dry.   Nursing note and vitals reviewed.      Significant Labs:  CBC:     Recent Labs  Lab 05/27/18  0442   WBC 13.64*   RBC 3.91*   HGB 11.9*   HCT 34.9*      MCV 89   MCH 30.4   MCHC 34.1     CMP:     Recent Labs  Lab 05/27/18  0209   *   CALCIUM 7.4*   ALBUMIN 1.5*   PROT 3.7*   *   K 3.3*   CO2 22*      BUN 13   CREATININE 0.6   ALKPHOS 65   ALT 11   AST 17   BILITOT 0.5

## 2018-05-27 NOTE — ASSESSMENT & PLAN NOTE
POD 4 Ex lap, Right colectomy, end ileostomy    - tolerating regular diet, continue  - PRN po pain meds  - UOP borderline low, BUN/Cr 13/.06, continue IVF for gentle fluid resuscitation  - trend daily labs, H/H stable, WBC trending down, continue on vanc and zosyn for gross contamination from cecal perforation at time of surgery  - subcutaneous heparin for DVT prophylaxis, pantoprazole for GI prophylaxis, SCDs    Dispo:  PT/OT, OOB as tolerated. Regular diet. Possible discharge to SNF early this week

## 2018-05-27 NOTE — PLAN OF CARE
Problem: Patient Care Overview  Goal: Plan of Care Review  Outcome: Ongoing (interventions implemented as appropriate)  Plan of care reviewed with patient. Patient verbalized understanding.  Patient is AAO and VSS. Pain managed with PRN pain meds.  Nausea managed with PRN meds. Tolerating regular diet. Valladares Catheter in place with minimal output.  On Cardiac monitoring running NSR.  On 3L per nasal cannula. Ileostomy in place to RUQ, draining liquid stool.  Receiving IV antibiotics.  Received IV K, mag and phos replacement. Worked with PT and OT. Free of falls and injury. Will continue to monitor. Zarina Silver RN

## 2018-05-27 NOTE — PT/OT/SLP EVAL
Physical Therapy Evaluation    Patient Name:  Suzan Villarreal   MRN:  [id number]    Recommendations:     Discharge Recommendations:  nursing facility, skilled (in NH)   Discharge Equipment Recommendations: none   Barriers to discharge: Decreased caregiver support    Assessment:     Suzan Villarreal is a 89 y.o. female admitted with a medical diagnosis of Bowel perforation.  She presents with the following impairments/functional limitations:  weakness, gait instability, impaired endurance, impaired balance, decreased lower extremity function, impaired functional mobilty, decreased safety awareness, impaired skin. Pt performed bed mobility max A and seated scooting max A. Pt will benefit from skilled PT to improve deficits and increase overall functional.     Rehab Prognosis:  Good; patient would benefit from acute skilled PT services to address these deficits and reach maximum level of function.      Recent Surgery: Procedure(s) (LRB):  EXPLORATORY-LAPAROTOMY (N/A)  DRAINAGE  COLECTOMY-RIGHT  ILEOSTOMY 4 Days Post-Op    Plan:     During this hospitalization, patient to be seen 3 x/week to address the above listed problems via gait training, therapeutic activities, therapeutic exercises, neuromuscular re-education, wheelchair management/training  · Plan of Care Expires:  06/27/18   Plan of Care Reviewed with: patient, daughter    Subjective     Communicated with RN prior to session.  Patient found supine in bed and daughter present upon PT entry to room, agreeable to evaluation.      Chief Complaint: abd discomfort, nausea  Patient comments/goals: return to PLOF  Pain/Comfort:  · Pain Rating 1: other (see comments) (pt reported abd discomfort)     Living Environment:  Pt lives with daugther in 1-story house without KIKO. Pt reports amb with rollator and mod (I) with transfers. Pt daughter reports primary caregiver for past ~6 years but now unable to provide assist.   Prior to admission, patients level of function was mod (I).   Patient has the following equipment: bedside commode, shower chair, wheelchair, rollator (3-wheeled rollator).  DME owned (not currently used): none.  Upon discharge, patient does not have assistance, daughter reports moving to [place].    Objective:     Patient found with: vu catheter, pressure relief boots, peripheral IV, SCD     General Precautions: Standard, fall   Orthopedic Precautions:N/A   Braces: N/A     Exams:  · Cognitive Exam:  Patient is oriented to Person, Place, Time and Situation and follows 100% of 2-step commands   · Gross Motor Coordination:  WFL  · Postural Exam:  Patient presented with the following abnormalities:    · -       Rounded shoulders  · -       Forward head  · Sensation:    · -       Intact  light/touch B LE, impaired B feet  · RLE ROM: WFL  · RLE Strength: gross 3/5  · LLE ROM: WFL  · LLE Strength: gross 3/5    Functional Mobility:  Bed Mobility:     · Scooting: total assistance and of 2 persons  · Supine to Sit: maximal assistance  · Sit to Supine: maximal assistance    Sit to stand transfers not performed 2* nausea     AM-PAC 6 CLICK MOBILITY  Total Score:8       Therapeutic Activities and Exercises:  Pt sat EOB CGA/min A with post lean and unable to keep B feet on floor without tactile cues.  Pt performed seated scooting EOB with max A x2 trials.  Pt and daughter educated on:  -role of PT/POC  -importance of OOB activity  -sitting up in chair several times daily  Pt safe to perform transfers with RN staff.     Patient left with bed in chair position with all lines intact, call button in reach, RN notified and daughter present.    GOALS:    Physical Therapy Goals        Problem: Physical Therapy Goal    Goal Priority Disciplines Outcome Goal Variances Interventions   Physical Therapy Goal     PT/OT, PT Ongoing (interventions implemented as appropriate)     Description:  Goals to be met by: 2018     Patient will increase functional independence with mobility by performin.  Supine to sit with MInimal Assistance  2. Sit to supine with MInimal Assistance  3. Sit to stand transfer with Moderate Assistance  4. Bed to chair transfer with Moderate Assistance   5. Gait  x 20 feet with Maximum Assistance using Rolling Walker.   6. Lower extremity exercise program x15 reps per handout, with assistance as needed                      History:     Past Medical History:   Diagnosis Date    Allergy     Cholelithiasis without obstruction     Chronic insomnia     COPD (chronic obstructive pulmonary disease)     DJD (degenerative joint disease), lumbar     GERD (gastroesophageal reflux disease)     HLD (hyperlipidemia)     HTN (hypertension)     Hypothyroid     OP (osteoporosis)     Osteoarthritis     S/P hysterectomy        Past Surgical History:   Procedure Laterality Date    APPENDECTOMY      CHOLECYSTECTOMY      EXPLORATORY LAPAROTOMY N/A 5/23/2018    Procedure: EXPLORATORY-LAPAROTOMY;  Surgeon: Tushar Anderson MD;  Location: [facility] OR [address] [place] [address];  Service: General;  Laterality: N/A;    HIP SURGERY  8-2013    right    HYSTERECTOMY      ILEOSTOMY  5/23/2018    Procedure: ILEOSTOMY;  Surgeon: Tushar Anderson MD;  Location: [facility] OR [address] [place] [address];  Service: General;;    KNEE ARTHROSCOPY Right 7-6-15    TK    RIGHT HEMICOLECTOMY  5/23/2018    Procedure: COLECTOMY-RIGHT;  Surgeon: Tushar Anderson MD;  Location: [facility] OR [address] [place] [address];  Service: General;;       Clinical Decision Making:     History  Co-morbidities and personal factors that may impact the plan of care Examination  Body Structures and Functions, activity limitations and participation restrictions that may impact the plan of care Clinical Presentation   Decision Making/ Complexity Score   Co-morbidities:   [x] Time since onset of injury / illness / exacerbation  [x] Status of current condition  []Patient's cognitive status and safety concerns    [x] Multiple Medical Problems (see med hx)  Personal Factors:   [] Patient's  age  [] Prior Level of function   [x] Patient's home situation (environment and family support)  [] Patient's level of motivation  [] Expected progression of patient      HISTORY:(criteria)    [] 52914 - no personal factors/history    [] 74669 - has 1-2 personal factor/comorbidity     [x] 27194 - has >3 personal factor/comorbidity     Body Regions:  [x] Objective examination findings  [] Head     []  Neck  [x] Trunk   [] Upper Extremity  [x] Lower Extremity    Body Systems:  [x] For communication ability, affect, cognition, language, and learning style: the assessment of the ability to make needs known, consciousness, orientation (person, place, and time), expected emotional /behavioral responses, and learning preferences (eg, learning barriers, education  needs)  [x] For the neuromuscular system: a general assessment of gross coordinated movement (eg, balance, gait, locomotion, transfers, and transitions) and motor function  (motor control and motor learning)  [x] For the musculoskeletal system: the assessment of gross symmetry, gross range of motion, gross strength, height, and weight  [] For the integumentary system: the assessment of pliability(texture), presence of scar formation, skin color, and skin integrity  [] For cardiovascular/pulmonary system: the assessment of heart rate, respiratory rate, blood pressure, and edema     Activity limitations:    [x] Patient's cognitive status and saf ety concerns          [x] Status of current condition      [] Weight bearing restriction  [] Cardiopulmunary Restriction    Participation Restrictions:   [] Goals and goal agreement with the patient     [] Rehab potential (prognosis) and probable outcome      Examination of Body System: (criteria)    [] 27010 - addressing 1-2 elements    [] 54852 - addressing a total of 3 or more elements     [x] 43098 -  Addressing a total of 4 or more elements         Clinical Presentation: (criteria)  Evolving - 14757     On examination  of body system using standardized tests and measures patient presents with 3 or more elements from any of the following: body structures and functions, activity limitations, and/or participation restrictions.  Leading to a clinical presentation that is considered evolving with changing characteristics                              Clinical Decision Making  (Eval Complexity):  Moderate - 68600     Time Tracking:     PT Received On: 05/27/18  PT Start Time: 0733     PT Stop Time: 0803  PT Total Time (min): 30 min     Billable Minutes: Evaluation 30      VERONICA TERRY, PT  05/27/2018

## 2018-05-27 NOTE — PLAN OF CARE
Problem: Occupational Therapy Goal  Goal: Occupational Therapy Goal  Goals to be met by: 6/10/18     Patient will increase functional independence with ADLs by performing:    UE Dressing with Set-up Assistance.  LE Dressing with Moderate Assistance.  Grooming while seated with Minimal Assistance.  Toileting from bedside commode with Moderate Assistance for hygiene and clothing management.   Toilet transfer to bedside commode with Moderate Assistance.    Outcome: Ongoing (interventions implemented as appropriate)  con't with goals  Mercy Pollard, OTR

## 2018-05-27 NOTE — PT/OT/SLP EVAL
Occupational Therapy   Evaluation    Name: Suzan Villarreal  MRN: [id number]  Admitting Diagnosis:  Bowel perforation 4 Days Post-Op    Recommendations:     Discharge Recommendations:  SNF skilled in NH  Discharge Equipment Recommendations:  none  Barriers to discharge:  Decreased caregiver support    History:     Occupational Profile:  Living Environment: lives in  home  Previous level of function: per pt and dtr, ambulated with tri rollator mod I, could get in/out of shower mod I, dressing with assist (unclear how much assist)  Roles and Routines: none stated, likes to sew  Equipment Owned:  bedside commode, shower chair, wheelchair, grab bar (tri rollator)  Assistance upon Discharge: no, per dtr: dtr is moving to [place] and pt will not have assist    Past Medical History:   Diagnosis Date    Allergy     Cholelithiasis without obstruction     Chronic insomnia     COPD (chronic obstructive pulmonary disease)     DJD (degenerative joint disease), lumbar     GERD (gastroesophageal reflux disease)     HLD (hyperlipidemia)     HTN (hypertension)     Hypothyroid     OP (osteoporosis)     Osteoarthritis     S/P hysterectomy        Past Surgical History:   Procedure Laterality Date    APPENDECTOMY      CHOLECYSTECTOMY      EXPLORATORY LAPAROTOMY N/A 5/23/2018    Procedure: EXPLORATORY-LAPAROTOMY;  Surgeon: Tushar Anderson MD;  Location: [address];  Service: General;  Laterality: N/A;    HIP SURGERY  8-2013    right    HYSTERECTOMY      ILEOSTOMY  5/23/2018    Procedure: ILEOSTOMY;  Surgeon: Tushar Anderson MD;  Location: [address];  Service: General;;    KNEE ARTHROSCOPY Right 7-6-15    TK    RIGHT HEMICOLECTOMY  5/23/2018    Procedure: COLECTOMY-RIGHT;  Surgeon: Tushar Andesron MD;  Location: [address];  Service: General;;       Subjective     Chief Complaint: nausea  Patient/Family stated goals: none stated  Communicated with: nsg prior to session. Cc with  PT  Pain/Comfort:  · Pain Rating 1:  (no number given, does refer to abdominal issues after sx )    Patients cultural, spiritual, Baptism conflicts given the current situation: none    Objective:     Patient found with: vu catheter, pressure relief boots, peripheral IV    General Precautions: Standard, fall   Orthopedic Precautions:    Braces:       Occupational Performance:    Bed Mobility:    · Sup to sit with max assist  · Sit to sup with max assist    Functional Mobility/Transfers:  · EOB x ~10 min, mod to min assist for balance sitting EOB, able to hold static balance briefly with proper positioning  · Functional Mobility: scooting EOB with max assist  · Deferred OOB to chair due to pt c/o nausea    Activities of Daily Living:  · UE dress with gown with max assist  · Total assist LB dressing with shoes    Cognitive/Visual Perceptual:  Follows basic commands with occasional reps and cues      Physical Exam:  R shoulder with AROM <1/4 at baseline, wfl grossly elbow and hand; LUE grossly wfl AROM; noted B hand deformities but grasp wfl.  Mod to min assist for sitting balance, can hold statically briefly with  Proper positioning    Patient left HOB elevated with all lines intact, call button in reach and dtr present    AMPA 6 Click:  [facility] Total Score: 10    Treatment & Education:  Pt performed above activity, encouraged OOB to chair with nsg as tolerated and  Educated on detrimental effects of continued bedrest and importance of OOB. Educated pt and dtr on AROM exercises in bed and any self-ADL's as able. Educated on POC.  Education:    Assessment:     Suzan Villarreal is a 89 y.o. female with a medical diagnosis of Bowel perforation.  She presents with nausea limiting further OOB this date.  Performance deficits affecting function are weakness, impaired endurance, impaired self care skills, impaired functional mobilty, impaired skin.      Rehab Prognosis:  good; patient would benefit from acute skilled OT  "services to address these deficits and reach maximum level of function.         Clinical Decision Makin.  OT Low:  "Pt evaluation falls under low complexity for evaluation coding due to performance deficits noted in 1-3 areas as stated above and 0 co-morbities affecting current functional status. Data obtained from problem focused assessments. No modifications or assistance was required for completion of evaluation. Only brief occupational profile and history review completed."     Plan:     Patient to be seen 3 x/week to address the above listed problems via self-care/home management, therapeutic activities, therapeutic exercises  · Plan of Care Expires: 18  · Plan of Care Reviewed with:      This Plan of care has been discussed with the patient who was involved in its development and understands and is in agreement with the identified goals and treatment plan    GOALS:    Occupational Therapy Goals        Problem: Occupational Therapy Goal    Goal Priority Disciplines Outcome Interventions   Occupational Therapy Goal     OT, PT/OT Ongoing (interventions implemented as appropriate)    Description:  Goals to be met by: 6/10/18     Patient will increase functional independence with ADLs by performing:    UE Dressing with Set-up Assistance.  LE Dressing with Moderate Assistance.  Grooming while seated with Minimal Assistance.  Toileting from bedside commode with Moderate Assistance for hygiene and clothing management.   Toilet transfer to bedside commode with Moderate Assistance.                      Time Tracking:     OT Date of Treatment: 18  OT Start Time: 745  OT Stop Time: 804  OT Total Time (min): 19 min    Billable Minutes:Evaluation 19 min    Mercy Pollard OT  2018    "

## 2018-05-27 NOTE — PROGRESS NOTES
[facility]  General Surgery  Progress Note    Subjective:     History of Present Illness:  Patient is a 89 y.o. female presents with sudden onset of abdominal pain that woke her from sleep at 5 am.  Pain started in upper abdomen  Pain has been getting worse. She has been dealing with low grade obstructive symptoms off and on for the past month.  This was relieved with bowel rest and stool softeners.  CT in the ER shows free air     Prior surgical hx as per below  Recent RLE angio for PAD     No hx of MI or Stroke  Recently started on plavix     PMH notable for COPD, not on O2, HTN, HLD, PAD, GERD    Post-Op Info:  Procedure(s) (LRB):  EXPLORATORY-LAPAROTOMY (N/A)  DRAINAGE  COLECTOMY-RIGHT  ILEOSTOMY   4 Days Post-Op     Interval History: No acute events overnight. Tolerating regular diet, no nausea/vomiting. UOP continues to be low.     Medications:  Continuous Infusions:   dextrose 5 % and 0.45 % NaCl with KCl 20 mEq 100 mL/hr at 05/26/18 2251     Scheduled Meds:   amLODIPine  10 mg Oral Daily    clopidogrel  75 mg Oral Daily    heparin (porcine)  5,000 Units Subcutaneous Q8H    levothyroxine  100 mcg Oral QAM    metoprolol  5 mg Intravenous Q6H    nitroGLYCERIN 2% TD oint  0.5 inch Topical (Top) Q6H    pantoprazole  40 mg Oral Daily    piperacillin-tazobactam (ZOSYN) IVPB  4.5 g Intravenous Q8H    vancomycin (VANCOCIN) IVPB  750 mg Intravenous Q12H     PRN Meds:HYDROcodone-acetaminophen, HYDROmorphone, sodium chloride 0.9%, sodium chloride 0.9%     Review of patient's allergies indicates:   Allergen Reactions    Sulfa (sulfonamide antibiotics) Swelling    Asa [aspirin] Itching     Objective:     Vital Signs (Most Recent):  Temp: 96.8 °F (36 °C) (05/27/18 0734)  Pulse: 71 (05/27/18 0734)  Resp: 16 (05/27/18 0734)  BP: 135/66 (05/27/18 0734)  SpO2: 95 % (05/27/18 0734) Vital Signs (24h Range):  Temp:  [96.8 °F (36 °C)-98.5 °F (36.9 °C)] 96.8 °F (36 °C)  Pulse:  [63-93] 71  Resp:   [13-26] 16  SpO2:  [88 %-97 %] 95 %  BP: (115-154)/(54-99) 135/66     Weight: 52.2 kg (115 lb 1.3 oz)  Body mass index is 22.48 kg/m².    Intake/Output - Last 3 Shifts       05/25 0700 - 05/26 0659 05/26 0700 - 05/27 0659 05/27 0700 - 05/28 0659    P.O. 840      I.V. (mL/kg) 3175 (60.8) 2515 (48.2)     IV Piggyback 1650 800     Total Intake(mL/kg) 5665 (108.5) 3315 (63.5)     Urine (mL/kg/hr) 697 (0.6) 655 (0.5)     Stool 340 (0.3) 425 (0.3)     Total Output 1037 1080      Net +4628 +2235             Stool Occurrence 1 x 0 x           Physical Exam   Constitutional: She is oriented to person, place, and time. She appears well-developed and well-nourished.   HENT:   Head: Normocephalic and atraumatic.   Cardiovascular: Normal rate.    Pulmonary/Chest: Effort normal.   Abdominal: Soft. There is no tenderness.   Midline incision with staples in place, clean, dry and intact. Ostomy moist and pink.   Neurological: She is alert and oriented to person, place, and time.   Skin: Skin is warm and dry.   Nursing note and vitals reviewed.      Significant Labs:  CBC:     Recent Labs  Lab 05/27/18  0442   WBC 13.64*   RBC 3.91*   HGB 11.9*   HCT 34.9*      MCV 89   MCH 30.4   MCHC 34.1     CMP:     Recent Labs  Lab 05/27/18  0209   *   CALCIUM 7.4*   ALBUMIN 1.5*   PROT 3.7*   *   K 3.3*   CO2 22*      BUN 13   CREATININE 0.6   ALKPHOS 65   ALT 11   AST 17   BILITOT 0.5           Assessment/Plan:     * Bowel perforation    POD 4 Ex lap, Right colectomy, end ileostomy    - tolerating regular diet, continue  - PRN po pain meds  - UOP borderline low, BUN/Cr 13/.06, continue IVF for gentle fluid resuscitation  - trend daily labs, H/H stable, WBC trending down, continue on vanc and zosyn for gross contamination from cecal perforation at time of surgery  - subcutaneous heparin for DVT prophylaxis, pantoprazole for GI prophylaxis, SCDs    Dispo:  PT/OT, OOB as tolerated. Regular diet. Possible discharge to SNF  early this week        HLD (hyperlipidemia)    Home meds        Hypothyroidism    Repalce        GERD (gastroesophageal reflux disease)    PPI therapy        COPD (chronic obstructive pulmonary disease)    Continue aggressive pulm toilet         HTN (hypertension)    Trend BP  Restart home meds when appropriate            Anjali Dumont MD  General Surgery  [facility]

## 2018-05-27 NOTE — PLAN OF CARE
Problem: Physical Therapy Goal  Goal: Physical Therapy Goal  Goals to be met by: 2018     Patient will increase functional independence with mobility by performin. Supine to sit with MInimal Assistance  2. Sit to supine with MInimal Assistance  3. Sit to stand transfer with Moderate Assistance  4. Bed to chair transfer with Moderate Assistance   5. Gait  x 20 feet with Maximum Assistance using Rolling Walker.   6. Lower extremity exercise program x15 reps per handout, with assistance as needed    Outcome: Ongoing (interventions implemented as appropriate)  Pt evaluation complete; pt goals set.    VERONICA TERRY, PT  2018

## 2018-05-28 LAB
ALBUMIN SERPL BCP-MCNC: 1.4 G/DL
ALP SERPL-CCNC: 56 U/L
ALT SERPL W/O P-5'-P-CCNC: 12 U/L
ANION GAP SERPL CALC-SCNC: 4 MMOL/L
ANION GAP SERPL CALC-SCNC: 4 MMOL/L
ANION GAP SERPL CALC-SCNC: 7 MMOL/L
ANION GAP SERPL CALC-SCNC: 8 MMOL/L
AST SERPL-CCNC: 14 U/L
BASOPHILS # BLD AUTO: 0.03 K/UL
BASOPHILS NFR BLD: 0.3 %
BILIRUB SERPL-MCNC: 0.4 MG/DL
BUN SERPL-MCNC: 6 MG/DL
BUN SERPL-MCNC: 6 MG/DL
BUN SERPL-MCNC: 7 MG/DL
BUN SERPL-MCNC: 8 MG/DL
CALCIUM SERPL-MCNC: 7.2 MG/DL
CALCIUM SERPL-MCNC: 7.6 MG/DL
CALCIUM SERPL-MCNC: 7.6 MG/DL
CALCIUM SERPL-MCNC: 7.8 MG/DL
CHLORIDE SERPL-SCNC: 101 MMOL/L
CHLORIDE SERPL-SCNC: 103 MMOL/L
CHLORIDE SERPL-SCNC: 104 MMOL/L
CHLORIDE SERPL-SCNC: 104 MMOL/L
CO2 SERPL-SCNC: 17 MMOL/L
CO2 SERPL-SCNC: 20 MMOL/L
CO2 SERPL-SCNC: 20 MMOL/L
CO2 SERPL-SCNC: 24 MMOL/L
CREAT SERPL-MCNC: 0.5 MG/DL
CREAT SERPL-MCNC: 0.6 MG/DL
DIFFERENTIAL METHOD: ABNORMAL
EOSINOPHIL # BLD AUTO: 0.5 K/UL
EOSINOPHIL NFR BLD: 4.1 %
ERYTHROCYTE [DISTWIDTH] IN BLOOD BY AUTOMATED COUNT: 15.7 %
EST. GFR  (AFRICAN AMERICAN): >60 ML/MIN/1.73 M^2
EST. GFR  (NON AFRICAN AMERICAN): >60 ML/MIN/1.73 M^2
GLUCOSE SERPL-MCNC: 118 MG/DL
GLUCOSE SERPL-MCNC: 121 MG/DL
GLUCOSE SERPL-MCNC: 175 MG/DL
GLUCOSE SERPL-MCNC: 96 MG/DL
HCT VFR BLD AUTO: 34.8 %
HGB BLD-MCNC: 11.4 G/DL
IMM GRANULOCYTES # BLD AUTO: 0.16 K/UL
IMM GRANULOCYTES NFR BLD AUTO: 1.4 %
LACTATE SERPL-SCNC: 1.1 MMOL/L
LYMPHOCYTES # BLD AUTO: 0.9 K/UL
LYMPHOCYTES NFR BLD: 7.4 %
MAGNESIUM SERPL-MCNC: 2 MG/DL
MCH RBC QN AUTO: 30.6 PG
MCHC RBC AUTO-ENTMCNC: 32.8 G/DL
MCV RBC AUTO: 93 FL
MONOCYTES # BLD AUTO: 0.8 K/UL
MONOCYTES NFR BLD: 6.6 %
NEUTROPHILS # BLD AUTO: 9.2 K/UL
NEUTROPHILS NFR BLD: 80.2 %
NRBC BLD-RTO: 0 /100 WBC
PHOSPHATE SERPL-MCNC: 5.1 MG/DL
PLATELET # BLD AUTO: 263 K/UL
PMV BLD AUTO: 9.1 FL
POCT GLUCOSE: 125 MG/DL (ref 70–110)
POCT GLUCOSE: 145 MG/DL (ref 70–110)
POCT GLUCOSE: 170 MG/DL (ref 70–110)
POTASSIUM SERPL-SCNC: 5 MMOL/L
POTASSIUM SERPL-SCNC: 5 MMOL/L
POTASSIUM SERPL-SCNC: 5.1 MMOL/L
POTASSIUM SERPL-SCNC: 5.9 MMOL/L
PROT SERPL-MCNC: 3.7 G/DL
RBC # BLD AUTO: 3.73 M/UL
SODIUM SERPL-SCNC: 128 MMOL/L
SODIUM SERPL-SCNC: 129 MMOL/L
SODIUM SERPL-SCNC: 129 MMOL/L
SODIUM SERPL-SCNC: 130 MMOL/L
WBC # BLD AUTO: 11.43 K/UL

## 2018-05-28 PROCEDURE — 25000003 PHARM REV CODE 250: Performed by: SURGERY

## 2018-05-28 PROCEDURE — 36415 COLL VENOUS BLD VENIPUNCTURE: CPT

## 2018-05-28 PROCEDURE — 25000003 PHARM REV CODE 250: Performed by: STUDENT IN AN ORGANIZED HEALTH CARE EDUCATION/TRAINING PROGRAM

## 2018-05-28 PROCEDURE — 83735 ASSAY OF MAGNESIUM: CPT

## 2018-05-28 PROCEDURE — 80048 BASIC METABOLIC PNL TOTAL CA: CPT

## 2018-05-28 PROCEDURE — 85025 COMPLETE CBC W/AUTO DIFF WBC: CPT

## 2018-05-28 PROCEDURE — 80053 COMPREHEN METABOLIC PANEL: CPT

## 2018-05-28 PROCEDURE — 20600001 HC STEP DOWN PRIVATE ROOM

## 2018-05-28 PROCEDURE — 63600175 PHARM REV CODE 636 W HCPCS: Performed by: STUDENT IN AN ORGANIZED HEALTH CARE EDUCATION/TRAINING PROGRAM

## 2018-05-28 PROCEDURE — 83605 ASSAY OF LACTIC ACID: CPT

## 2018-05-28 PROCEDURE — 84100 ASSAY OF PHOSPHORUS: CPT

## 2018-05-28 RX ORDER — DOXYLAMINE SUCCINATE 25 MG
TABLET ORAL 2 TIMES DAILY
Status: DISCONTINUED | OUTPATIENT
Start: 2018-05-28 | End: 2018-05-29

## 2018-05-28 RX ORDER — METOPROLOL TARTRATE 25 MG/1
25 TABLET, FILM COATED ORAL 2 TIMES DAILY
Status: DISCONTINUED | OUTPATIENT
Start: 2018-05-28 | End: 2018-05-28

## 2018-05-28 RX ORDER — METOPROLOL TARTRATE 25 MG/1
12.5 TABLET ORAL 2 TIMES DAILY
Status: DISCONTINUED | OUTPATIENT
Start: 2018-05-28 | End: 2018-06-01 | Stop reason: HOSPADM

## 2018-05-28 RX ORDER — FUROSEMIDE 10 MG/ML
20 INJECTION INTRAMUSCULAR; INTRAVENOUS ONCE
Status: COMPLETED | OUTPATIENT
Start: 2018-05-28 | End: 2018-05-28

## 2018-05-28 RX ADMIN — PIPERACILLIN AND TAZOBACTAM 4.5 G: 4; .5 INJECTION, POWDER, LYOPHILIZED, FOR SOLUTION INTRAVENOUS; PARENTERAL at 12:05

## 2018-05-28 RX ADMIN — PIPERACILLIN AND TAZOBACTAM 4.5 G: 4; .5 INJECTION, POWDER, LYOPHILIZED, FOR SOLUTION INTRAVENOUS; PARENTERAL at 06:05

## 2018-05-28 RX ADMIN — HYDROMORPHONE HYDROCHLORIDE 0.5 MG: 1 INJECTION, SOLUTION INTRAMUSCULAR; INTRAVENOUS; SUBCUTANEOUS at 08:05

## 2018-05-28 RX ADMIN — POTASSIUM CHLORIDE, DEXTROSE MONOHYDRATE AND SODIUM CHLORIDE: 150; 5; 450 INJECTION, SOLUTION INTRAVENOUS at 05:05

## 2018-05-28 RX ADMIN — CLOPIDOGREL 75 MG: 75 TABLET, FILM COATED ORAL at 09:05

## 2018-05-28 RX ADMIN — CALCIUM GLUCONATE 1 G: 98 INJECTION, SOLUTION INTRAVENOUS at 09:05

## 2018-05-28 RX ADMIN — INSULIN HUMAN 10 UNITS: 100 INJECTION, SOLUTION PARENTERAL at 09:05

## 2018-05-28 RX ADMIN — VANCOMYCIN HYDROCHLORIDE 750 MG: 10 INJECTION, POWDER, LYOPHILIZED, FOR SOLUTION INTRAVENOUS at 03:05

## 2018-05-28 RX ADMIN — NITROGLYCERIN 0.5 INCH: 20 OINTMENT TOPICAL at 06:05

## 2018-05-28 RX ADMIN — DEXTROSE MONOHYDRATE 25 G: 25 INJECTION, SOLUTION INTRAVENOUS at 09:05

## 2018-05-28 RX ADMIN — NITROGLYCERIN 0.5 INCH: 20 OINTMENT TOPICAL at 12:05

## 2018-05-28 RX ADMIN — HYDROMORPHONE HYDROCHLORIDE 0.5 MG: 1 INJECTION, SOLUTION INTRAMUSCULAR; INTRAVENOUS; SUBCUTANEOUS at 06:05

## 2018-05-28 RX ADMIN — Medication 12.5 MG: at 10:05

## 2018-05-28 RX ADMIN — METOPROLOL TARTRATE 5 MG: 5 INJECTION INTRAVENOUS at 05:05

## 2018-05-28 RX ADMIN — HEPARIN SODIUM 5000 UNITS: 5000 INJECTION, SOLUTION INTRAVENOUS; SUBCUTANEOUS at 02:05

## 2018-05-28 RX ADMIN — LEVOTHYROXINE SODIUM 100 MCG: 100 TABLET ORAL at 06:05

## 2018-05-28 RX ADMIN — FUROSEMIDE 20 MG: 10 INJECTION, SOLUTION INTRAVENOUS at 04:05

## 2018-05-28 RX ADMIN — NITROGLYCERIN 0.5 INCH: 20 OINTMENT TOPICAL at 05:05

## 2018-05-28 RX ADMIN — AMLODIPINE BESYLATE 10 MG: 10 TABLET ORAL at 09:05

## 2018-05-28 RX ADMIN — MICONAZOLE NITRATE: 20 CREAM TOPICAL at 08:05

## 2018-05-28 RX ADMIN — NITROGLYCERIN 0.5 INCH: 20 OINTMENT TOPICAL at 01:05

## 2018-05-28 RX ADMIN — VANCOMYCIN HYDROCHLORIDE 750 MG: 10 INJECTION, POWDER, LYOPHILIZED, FOR SOLUTION INTRAVENOUS at 02:05

## 2018-05-28 RX ADMIN — HEPARIN SODIUM 5000 UNITS: 5000 INJECTION, SOLUTION INTRAVENOUS; SUBCUTANEOUS at 05:05

## 2018-05-28 RX ADMIN — Medication 12.5 MG: at 09:05

## 2018-05-28 RX ADMIN — HYDROCODONE BITARTRATE AND ACETAMINOPHEN 1 TABLET: 5; 325 TABLET ORAL at 07:05

## 2018-05-28 RX ADMIN — METOPROLOL TARTRATE 5 MG: 5 INJECTION INTRAVENOUS at 12:05

## 2018-05-28 RX ADMIN — POTASSIUM CHLORIDE, DEXTROSE MONOHYDRATE AND SODIUM CHLORIDE: 150; 5; 450 INJECTION, SOLUTION INTRAVENOUS at 04:05

## 2018-05-28 RX ADMIN — PIPERACILLIN AND TAZOBACTAM 4.5 G: 4; .5 INJECTION, POWDER, LYOPHILIZED, FOR SOLUTION INTRAVENOUS; PARENTERAL at 02:05

## 2018-05-28 RX ADMIN — PANTOPRAZOLE SODIUM 40 MG: 40 TABLET, DELAYED RELEASE ORAL at 09:05

## 2018-05-28 RX ADMIN — HEPARIN SODIUM 5000 UNITS: 5000 INJECTION, SOLUTION INTRAVENOUS; SUBCUTANEOUS at 10:05

## 2018-05-28 NOTE — PLAN OF CARE
SW met with Pt and Pt's daughter, Haley, and provided them a list of [facility] in-network SNF facilities. SW asked that they select two or three facilities that SW can initiate referrals to . SW left her name and number on the list and asked that she be called with their selections.     MAGUE GarciaW

## 2018-05-28 NOTE — PROGRESS NOTES
Vascular surgery brief note    Suzan Villarreal is a 89 y.o. with h/o HTN, HLD, GERD, and COPD presented one day h/o abdominal pain on 5/23/18.  She was found to have pneumoperitoneum , underwent R hemicolectomy with ileostomy for obstructing hepatic flexure mass, transferred to floor. Ostomy functioning, NGT out. Path pending. No c/o right foot pain unless touched.    Patient currently with only intermittent c/o right foot rest pain; to be expected.  Would recommend dependent positioning of right foot when pain presents  Continue betadiene pain to right distal toe  Only other intervention would be right leg AKA; would not perform after large abdominal operation unless infection presents or patient requests secondary to intractable pain  Please call with questions or concerns.    Inna Carmichael DO  PGY-7, Vascular fellow   [phone]

## 2018-05-28 NOTE — PLAN OF CARE
Problem: Patient Care Overview  Goal: Individualization & Mutuality  Outcome: Ongoing (interventions implemented as appropriate)  Pt AOx4 and injury free during night shift.  The pt is very [place] and can read lips.  The urine out put improved during the night shift, (big improvement from the night before). The pt pt's left arm wraped in a blue deneen because it is weeping, the rounding Dr's in the morning were made aware.  The midline dressing is leaking and covered with ABD pad.  The rounding Dr's were made aware.

## 2018-05-28 NOTE — PROGRESS NOTES
Patient seen for new right sided ileostomy. She is hard of hearing and her daughter at the bedside states she will is not comprehending at this time due to surgery and pain meds. She is attentive and has no questions at this time. Her daughter at the bedside does not wish to learn care for the ostomy at this time as the patient is going to SNF facility past discharge and patient is moving to [place]. Discussed with daughter that pending condition patient may in the future move with daughter and to call if education is needed for her at that time. Educational booklet and supplies left at the bedside. Will continue to follow for supply and pouching needs. Pouch intact at this time. Nursing to continue care.

## 2018-05-28 NOTE — PLAN OF CARE
Covering CM for 5/28/18.    Discharge plan pending PT/OT recommendations. PT/OT recommending SNF (nursing home) placement.    SW to obtain SNF choices. SW and CM will continue to follow for any additional needs.      05/28/18 0808   Discharge Reassessment   Assessment Type Discharge Planning Reassessment   Provided patient/caregiver education on the expected discharge date and the discharge plan Yes   Do you have any problems affording any of your prescribed medications? TBD   Discharge Plan A Skilled Nursing Facility   Discharge Plan B New Nursing Home placement - care home care facility   Patient choice form signed by patient/caregiver N/A   Can the patient answer the patient profile reliably? Yes, cognitively intact   How does the patient rate their overall health at the present time? Fair   Describe the patient's ability to walk at the present time. Does not walk or unable to take any steps at all   How often would a person be available to care for the patient? Infrequently   Number of comorbid conditions (as recorded on the chart) Five or more   During the past month, has the patient often been bothered by feeling down, depressed or hopeless? No   During the past month, has the patient often been bothered by little interest or pleasure in doing things? No

## 2018-05-29 PROBLEM — Z29.9 PREVENTIVE MEASURE: Status: ACTIVE | Noted: 2018-05-29

## 2018-05-29 PROBLEM — B37.9 YEAST INFECTION: Status: ACTIVE | Noted: 2018-05-29

## 2018-05-29 LAB
ANION GAP SERPL CALC-SCNC: 4 MMOL/L
ANION GAP SERPL CALC-SCNC: 5 MMOL/L
BUN SERPL-MCNC: 5 MG/DL
BUN SERPL-MCNC: 5 MG/DL
CALCIUM SERPL-MCNC: 7.7 MG/DL
CALCIUM SERPL-MCNC: 7.7 MG/DL
CHLORIDE SERPL-SCNC: 102 MMOL/L
CHLORIDE SERPL-SCNC: 103 MMOL/L
CO2 SERPL-SCNC: 24 MMOL/L
CO2 SERPL-SCNC: 24 MMOL/L
CREAT SERPL-MCNC: 0.5 MG/DL
CREAT SERPL-MCNC: 0.5 MG/DL
EST. GFR  (AFRICAN AMERICAN): >60 ML/MIN/1.73 M^2
EST. GFR  (AFRICAN AMERICAN): >60 ML/MIN/1.73 M^2
EST. GFR  (NON AFRICAN AMERICAN): >60 ML/MIN/1.73 M^2
EST. GFR  (NON AFRICAN AMERICAN): >60 ML/MIN/1.73 M^2
GLUCOSE SERPL-MCNC: 68 MG/DL
GLUCOSE SERPL-MCNC: 81 MG/DL
POTASSIUM SERPL-SCNC: 4.6 MMOL/L
POTASSIUM SERPL-SCNC: 4.6 MMOL/L
POTASSIUM SERPL-SCNC: 5 MMOL/L
SODIUM SERPL-SCNC: 131 MMOL/L
SODIUM SERPL-SCNC: 131 MMOL/L

## 2018-05-29 PROCEDURE — 80048 BASIC METABOLIC PNL TOTAL CA: CPT

## 2018-05-29 PROCEDURE — 99900035 HC TECH TIME PER 15 MIN (STAT)

## 2018-05-29 PROCEDURE — 63600175 PHARM REV CODE 636 W HCPCS: Performed by: SURGERY

## 2018-05-29 PROCEDURE — 63600175 PHARM REV CODE 636 W HCPCS: Performed by: STUDENT IN AN ORGANIZED HEALTH CARE EDUCATION/TRAINING PROGRAM

## 2018-05-29 PROCEDURE — G8987 SELF CARE CURRENT STATUS: HCPCS | Mod: CL

## 2018-05-29 PROCEDURE — G8979 MOBILITY GOAL STATUS: HCPCS | Mod: CL

## 2018-05-29 PROCEDURE — 25000003 PHARM REV CODE 250: Performed by: STUDENT IN AN ORGANIZED HEALTH CARE EDUCATION/TRAINING PROGRAM

## 2018-05-29 PROCEDURE — 20600001 HC STEP DOWN PRIVATE ROOM

## 2018-05-29 PROCEDURE — 25000003 PHARM REV CODE 250: Performed by: SURGERY

## 2018-05-29 PROCEDURE — 97530 THERAPEUTIC ACTIVITIES: CPT

## 2018-05-29 PROCEDURE — 86580 TB INTRADERMAL TEST: CPT | Performed by: SURGERY

## 2018-05-29 PROCEDURE — 97535 SELF CARE MNGMENT TRAINING: CPT

## 2018-05-29 PROCEDURE — G8988 SELF CARE GOAL STATUS: HCPCS | Mod: CJ

## 2018-05-29 PROCEDURE — 36415 COLL VENOUS BLD VENIPUNCTURE: CPT

## 2018-05-29 PROCEDURE — G8978 MOBILITY CURRENT STATUS: HCPCS | Mod: CM

## 2018-05-29 RX ORDER — IPRATROPIUM BROMIDE AND ALBUTEROL SULFATE 2.5; .5 MG/3ML; MG/3ML
3 SOLUTION RESPIRATORY (INHALATION) EVERY 6 HOURS PRN
Status: DISCONTINUED | OUTPATIENT
Start: 2018-05-29 | End: 2018-06-01 | Stop reason: HOSPADM

## 2018-05-29 RX ORDER — ACETAMINOPHEN 325 MG/1
650 TABLET ORAL EVERY 8 HOURS
Status: DISCONTINUED | OUTPATIENT
Start: 2018-05-29 | End: 2018-06-01 | Stop reason: HOSPADM

## 2018-05-29 RX ORDER — FLUCONAZOLE 150 MG/1
150 TABLET ORAL ONCE
Status: COMPLETED | OUTPATIENT
Start: 2018-05-29 | End: 2018-05-29

## 2018-05-29 RX ORDER — FUROSEMIDE 10 MG/ML
20 INJECTION INTRAMUSCULAR; INTRAVENOUS 2 TIMES DAILY
Status: DISCONTINUED | OUTPATIENT
Start: 2018-05-29 | End: 2018-05-31

## 2018-05-29 RX ADMIN — HEPARIN SODIUM 5000 UNITS: 5000 INJECTION, SOLUTION INTRAVENOUS; SUBCUTANEOUS at 03:05

## 2018-05-29 RX ADMIN — VANCOMYCIN HYDROCHLORIDE 750 MG: 10 INJECTION, POWDER, LYOPHILIZED, FOR SOLUTION INTRAVENOUS at 02:05

## 2018-05-29 RX ADMIN — Medication 12.5 MG: at 10:05

## 2018-05-29 RX ADMIN — PIPERACILLIN AND TAZOBACTAM 4.5 G: 4; .5 INJECTION, POWDER, LYOPHILIZED, FOR SOLUTION INTRAVENOUS; PARENTERAL at 02:05

## 2018-05-29 RX ADMIN — FUROSEMIDE 20 MG: 10 INJECTION, SOLUTION INTRAVENOUS at 06:05

## 2018-05-29 RX ADMIN — FUROSEMIDE 20 MG: 10 INJECTION, SOLUTION INTRAVENOUS at 10:05

## 2018-05-29 RX ADMIN — MICONAZOLE NITRATE: 20 OINTMENT TOPICAL at 10:05

## 2018-05-29 RX ADMIN — FLUCONAZOLE 150 MG: 150 TABLET ORAL at 10:05

## 2018-05-29 RX ADMIN — LEVOTHYROXINE SODIUM 100 MCG: 100 TABLET ORAL at 06:05

## 2018-05-29 RX ADMIN — PANTOPRAZOLE SODIUM 40 MG: 40 TABLET, DELAYED RELEASE ORAL at 10:05

## 2018-05-29 RX ADMIN — NITROGLYCERIN 0.5 INCH: 20 OINTMENT TOPICAL at 12:05

## 2018-05-29 RX ADMIN — HYDROCODONE BITARTRATE AND ACETAMINOPHEN 1 TABLET: 5; 325 TABLET ORAL at 11:05

## 2018-05-29 RX ADMIN — Medication 5 UNITS: at 04:05

## 2018-05-29 RX ADMIN — AMLODIPINE BESYLATE 10 MG: 10 TABLET ORAL at 10:05

## 2018-05-29 RX ADMIN — HEPARIN SODIUM 5000 UNITS: 5000 INJECTION, SOLUTION INTRAVENOUS; SUBCUTANEOUS at 10:05

## 2018-05-29 RX ADMIN — HEPARIN SODIUM 5000 UNITS: 5000 INJECTION, SOLUTION INTRAVENOUS; SUBCUTANEOUS at 06:05

## 2018-05-29 RX ADMIN — CLOPIDOGREL 75 MG: 75 TABLET, FILM COATED ORAL at 10:05

## 2018-05-29 RX ADMIN — NITROGLYCERIN 0.5 INCH: 20 OINTMENT TOPICAL at 06:05

## 2018-05-29 RX ADMIN — HYDROMORPHONE HYDROCHLORIDE 0.5 MG: 1 INJECTION, SOLUTION INTRAMUSCULAR; INTRAVENOUS; SUBCUTANEOUS at 12:05

## 2018-05-29 RX ADMIN — ACETAMINOPHEN 650 MG: 325 TABLET, FILM COATED ORAL at 10:05

## 2018-05-29 RX ADMIN — HYDROCODONE BITARTRATE AND ACETAMINOPHEN 1 TABLET: 5; 325 TABLET ORAL at 02:05

## 2018-05-29 RX ADMIN — NITROGLYCERIN 0.5 INCH: 20 OINTMENT TOPICAL at 11:05

## 2018-05-29 RX ADMIN — HYDROCODONE BITARTRATE AND ACETAMINOPHEN 1 TABLET: 5; 325 TABLET ORAL at 06:05

## 2018-05-29 NOTE — PROGRESS NOTES
[facility]  General Surgery  Progress Note    Subjective:     History of Present Illness:  Patient is a 89 y.o. female presents with sudden onset of abdominal pain that woke her from sleep at 5 am.  Pain started in upper abdomen  Pain has been getting worse. She has been dealing with low grade obstructive symptoms off and on for the past month.  This was relieved with bowel rest and stool softeners.  CT in the ER shows free air     Prior surgical hx as per below  Recent RLE angio for PAD     No hx of MI or Stroke  Recently started on plavix     PMH notable for COPD, not on O2, HTN, HLD, PAD, GERD    Post-Op Info:  Procedure(s) (LRB):  EXPLORATORY-LAPAROTOMY (N/A)  DRAINAGE  COLECTOMY-RIGHT  ILEOSTOMY   6 Days Post-Op     Interval History:   Patient seen and examined, no acute events overnight  Family reports difficult night due to foot pain  Tolerating regular diet with no N/V  +stool/gas in ostomy  On 2LNC  Afebrile/VSS    Medications:  Continuous Infusions:  Scheduled Meds:   amLODIPine  10 mg Oral Daily    clopidogrel  75 mg Oral Daily    furosemide  20 mg Intravenous BID    heparin (porcine)  5,000 Units Subcutaneous Q8H    levothyroxine  100 mcg Oral QAM    metoprolol tartrate  12.5 mg Oral BID    miconazole   Topical (Top) BID    nitroGLYCERIN 2% TD oint  0.5 inch Topical (Top) Q6H    pantoprazole  40 mg Oral Daily     PRN Meds:[COMPLETED] calcium gluconate IVPB **AND** calcium gluconate IVPB, [COMPLETED] dextrose 50% **AND** dextrose 50% **AND** [COMPLETED] insulin regular, HYDROcodone-acetaminophen, HYDROmorphone, ondansetron, sodium chloride 0.9%, sodium chloride 0.9%     Review of patient's allergies indicates:   Allergen Reactions    Sulfa (sulfonamide antibiotics) Swelling    Asa [aspirin] Itching     Objective:     Vital Signs (Most Recent):  Temp: 97.6 °F (36.4 °C) (05/29/18 0735)  Pulse: 85 (05/29/18 0735)  Resp: 12 (05/29/18 0735)  BP: (!) 173/78 (05/29/18 0735)  SpO2:  (!) 94 % (05/29/18 0735) Vital Signs (24h Range):  Temp:  [97.5 °F (36.4 °C)-98.6 °F (37 °C)] 97.6 °F (36.4 °C)  Pulse:  [70-96] 85  Resp:  [12-20] 12  SpO2:  [91 %-96 %] 94 %  BP: (120-173)/(60-78) 173/78     Weight: 52.2 kg (115 lb 1.3 oz)  Body mass index is 22.48 kg/m².    Intake/Output - Last 3 Shifts       05/27 0700 - 05/28 0659 05/28 0700 - 05/29 0659 05/29 0700 - 05/30 0659    P.O. 720      I.V. (mL/kg) 2431.7 (46.6) 1207 (23.1)     IV Piggyback 1550 450     Total Intake(mL/kg) 4701.7 (90.1) 1657 (31.7)     Urine (mL/kg/hr) 815 (0.7) 1175 (0.9)     Stool 350 (0.3) 150 (0.1)     Total Output 1165 1325      Net +3536.7 +332             Urine Occurrence  2 x     Stool Occurrence 0 x            Physical Exam   Constitutional: She appears well-developed and well-nourished. No distress.   HENT:   Head: Normocephalic and atraumatic.   Cardiovascular: Normal rate and regular rhythm.    Pulmonary/Chest: Effort normal.   +wheezing   Abdominal:   Soft, appropriate TTP, non-distended   Midlne incision with staples in place, clean, dry and intact. Ostomy moist and pink.   Skin: Skin is warm and dry.       Significant Labs:  CBC:   Recent Labs  Lab 05/28/18  0522   WBC 11.43   RBC 3.73*   HGB 11.4*   HCT 34.8*      MCV 93   MCH 30.6   MCHC 32.8     BMP:   Recent Labs  Lab 05/28/18  0522  05/28/18  2356   *  < > 81   *  < > 131*   K 5.9*  < > 4.6  4.6     < > 103   CO2 24  < > 24   BUN 8*  < > 5*   CREATININE 0.6  < > 0.5   CALCIUM 7.2*  < > 7.7*   MG 2.0  --   --    < > = values in this interval not displayed.  CMP:   Recent Labs  Lab 05/28/18  0522  05/28/18  2356   *  < > 81   CALCIUM 7.2*  < > 7.7*   ALBUMIN 1.4*  --   --    PROT 3.7*  --   --    *  < > 131*   K 5.9*  < > 4.6  4.6   CO2 24  < > 24     < > 103   BUN 8*  < > 5*   CREATININE 0.6  < > 0.5   ALKPHOS 56  --   --    ALT 12  --   --    AST 14  --   --    BILITOT 0.4  --   --    < > = values in this interval not  displayed.  LFTs:   Recent Labs  Lab 05/28/18  0522   ALT 12   AST 14   ALKPHOS 56   BILITOT 0.4   PROT 3.7*   ALBUMIN 1.4*     Coagulation:   Recent Labs  Lab 05/23/18  1114   LABPROT 10.8   INR 1.0   APTT 23.2     Cardiac markers:   Recent Labs  Lab 05/23/18  0827   TROPONINI 0.008     ABGs:   Recent Labs  Lab 05/24/18  0455   PH 7.596*   PCO2 25.0*   PO2 80   HCO3 24.3   POCSATURATED 98   BE 3     Assessment/Plan:     * Bowel perforation    89 yo female s/p Ex lap, Right colectomy, end ileostomy 5/23/18    - tolerating regular diet, continue  - PRN po pain meds  - d/c IVF, start lasix  - aggressive PT/OT, aggressive IS  - DVT/GI prophylaxis  - plan for SNF placement         Yeast infection    fluconazole        PVD (peripheral vascular disease)    -pain control  -follow up as outpatient with vascular  -plavix        Hypothyroidism    Home meds  -levothyroxine        GERD (gastroesophageal reflux disease)    PPI therapy        COPD (chronic obstructive pulmonary disease)    Continue aggressive pulm toilet         HTN (hypertension)    Home meds  -amlodipine, lopressor            ANDRIY AlmonteC   [id number]  General Surgery  [facility]

## 2018-05-29 NOTE — ASSESSMENT & PLAN NOTE
89 yo female s/p Ex lap, Right colectomy, end ileostomy 5/23/18    - tolerating regular diet, continue  - PRN po pain meds  - d/c IVF, start lasix  - aggressive PT/OT, aggressive IS  - DVT/GI prophylaxis  - plan for SNF placement

## 2018-05-29 NOTE — PLAN OF CARE
[facility]     Department of Hospital Medicine     [address] [place] [address]     [phone] after hours  [phone] fax       NURSING HOME ORDERS    05/31/2018    Admit to Nursing Home:  Skilled Bed                                                 Diagnoses:  Active Hospital Problems    Diagnosis  POA    *Bowel perforation [K63.1]  Yes    Yeast infection [B37.9]  No    Gait instability [R26.81]  Yes    PVD (peripheral vascular disease) [I73.9]  Yes    Hypothyroidism [E03.9]  Yes     Chronic    HTN (hypertension) [I10]  Yes     Chronic    COPD (chronic obstructive pulmonary disease) [J44.9]  Yes     Chronic    GERD (gastroesophageal reflux disease) [K21.9]  Yes     Chronic    Osteoarthritis [M19.90]  Yes      Resolved Hospital Problems    Diagnosis Date Resolved POA    HLD (hyperlipidemia) [E78.5] 05/29/2018 Yes     Chronic       Patient is homebound due to:  Bowel perforation    Allergies:  Review of patient's allergies indicates:   Allergen Reactions    Sulfa (sulfonamide antibiotics) Swelling    Asa [aspirin] Itching       Vitals:       Every shift (Skilled Nursing patients)    Diet: adult regular    Supplement:  1 can every three times a day with meals                         Type:   Ensure    Acitivities:      - Up in a chair each morning as tolerated   - Ambulate with assistance to bathroom   - Scheduled walks once each shift (every 8 hours)   - May ambulate independently   - May use walker or cane    LABS:  Per facility protocol    Nursing Precautions:     - Aspiration precautions:             - Total assistance with meals            -  Upright 90 degrees befor during and after meals             -  Suction at bedside          - Fall precautions per nursing home protocol   - Seizure precaution per [facility] protocol   - Decubitus precautions:        -  for positioning   - Pressure reducing foam mattress   - Turn patient every two  hours. Use wedge pillows to anchor patient    CONSULTS:      Physical Therapy to evaluate and treat     Occupational Therapy to evaluate and treat     Speech Therapy  to evaluate and treat     Nutrition to evaluate and recommend diet     Psychiatry to evaluate and follow patients for delirium    MISCELLANEOUS CARE:             Colostomy Care:  Empty bag every shift and prn                                             Change and clean site every 48 hours     Valladares Care: Empty Valladares bag every shift.  Change Valladares every month     Routine Skin for Bedridden Patients:  Apply moisture barrier cream to all    skin folds and wet areas in perineal area daily and after baths and                           all bowel movements.      Keep incision site clean and dry, cover with abd pad as needed for drainage. Change as needed.    WOUND CARE:  Wound spray or saline for wound cleaning with all dressing changes.    All wounds to be measured with first dressing changes and every week.    Other Wounds:   Surgical                Location:  abdomen           Apply the following to wound:            -  Other: cover midline wound with mepilex, change PRN   (frequency)          -  ET Consult    DIABETES CARE:       Check blood sugar:      Fingerstick blood sugar AC and HS   Fingerstick blood sugar every 6 hours if unable to eat      Report CBG < 60 or > 400 to physician.                                          Insulin Sliding Scale          Glucose  Novolog Insulin Subcutaneous        0 - 60   Orange juice or glucose tablet, hold insulin      No insulin   201-250  2 units   251-300  4 units   301-350  6 units   351-400  8 units   >400   10 units then call physician      Medications: Discontinue all previous medication orders, if any. See new list below.      Suzan Villarreal   Home Medication Instructions [id number]    Printed on:05/29/18 0516   Medication Information                      acetaminophen (TYLENOL) 325 MG tablet  Take  2 tablets (650 mg total) by mouth every 6 (six) hours as needed.             amLODIPine (NORVASC) 10 MG tablet  Take 1 tablet (10 mg total) by mouth once daily.                        benazepril (LOTENSIN) 20 MG tablet  Take 1 tablet (20 mg total) by mouth 2 (two) times daily.                        calcium carbonate (OS-UZMA) 600 mg (1,500 mg) Tab  Take 600 mg by mouth 2 (two) times daily with meals.             clopidogrel (PLAVIX) 75 mg tablet  Take 1 tablet (75 mg total) by mouth once daily.             docusate sodium (COLACE) 100 MG capsule  Take 1 capsule (100 mg total) by mouth once daily.             esomeprazole (NEXIUM) 40 MG capsule  Take 1 capsule (40 mg total) by mouth before breakfast.             fluoruracil (CARAC) 0.5 % cream  Apply topically once daily. Nasal tip             inulin-sorbitol 2 gram Chew  Take 1-2 tablets by mouth 2 (two) times daily.             Lactobacillus acidoph-L.bulgar 1 million cell Chew  Take 4 tablets by mouth 3 (three) times daily with meals.              levothyroxine (SYNTHROID) 100 MCG tablet  Take 1 tablet (100 mcg total) by mouth every morning.             metoclopramide HCl (REGLAN) 5 MG tablet  Take 1 tablet (5 mg total) by mouth 2 (two) times daily as needed (nausea/vomiting). Do not take if severe abdominal pain present             metoprolol succinate (TOPROL-XL) 25 MG 24 hr tablet  Take 1 tablet (25 mg total) by mouth once daily.             multivitamin capsule  Take 1 capsule by mouth once daily.                        rosuvastatin (CRESTOR) 20 MG tablet  Take 1 tablet (20 mg total) by mouth once daily.             salmeterol (SEREVENT DISKUS) 50 mcg/dose diskus inhaler  Inhale 1 puff into the lungs 2 (two) times daily. Controller                       _________________________________  Obdulia Gonsalves PA-C  05/29/2018

## 2018-05-29 NOTE — PLAN OF CARE
Problem: Physical Therapy Goal  Goal: Physical Therapy Goal  Goals to be met by: 2018     Patient will increase functional independence with mobility by performin. Supine to sit with MInimal Assistance  2. Sit to supine with MInimal Assistance  3. Sit to stand transfer with Moderate Assistance  4. Bed to chair transfer with Moderate Assistance   5. Gait  x 20 feet with Maximum Assistance using Rolling Walker.   6. Lower extremity exercise program x15 reps per handout, with assistance as needed     Outcome: Ongoing (interventions implemented as appropriate)  Goals updated and appropriate to address patient's current needs.     Yuly Messer PT, DPT  2018  [phone]

## 2018-05-29 NOTE — PLAN OF CARE
SW received call from Pt's daughter who provided her with the following SNFs that she would like referrals to be initiated to:    [facility]     SW asked CM SSC team to please initiate referrals to these facilities in [facility]. FRANCA asked Surgery PA, Obdulia Gonsalves, for PASRR.     MAGUE GarciaW

## 2018-05-29 NOTE — PLAN OF CARE
Problem: Patient Care Overview  Goal: Plan of Care Review  Outcome: Ongoing (interventions implemented as appropriate)  POC reviewed with pt and her daughter, all concerns addressed. VSS on 1.5L nasal cannula, AAOx4. Wound care consulted today RT spot in buttocks and leaking ileostomy bag, WOC orders acknowledged and cream ordered for yeast development. Pt tolerating regular diet with no NVD. Pain is managed with q4 hydrocodone 5mg q6. Tb skin test given in R FA, to be read 6/1/2018 after 1700. Pt sat on the side of her bed today with PT/OT but refused any other activity. Pt is resting quietly with the call light in reach, will continue to monitor.

## 2018-05-29 NOTE — PLAN OF CARE
SW attempted to call in LOCET ([phone]) to the state, but they had a high call volume and took SW's name and number to return call.     MAGUE GarciaW

## 2018-05-29 NOTE — PT/OT/SLP PROGRESS
Physical Therapy Treatment    Patient Name:  Suzan Villarreal   MRN:  [id number]    Recommendations:     Discharge Recommendations:  nursing facility, skilled   Discharge Equipment Recommendations: none   Barriers to discharge: None    Assessment:     Suzan Villarreal is a 89 y.o. female admitted with a medical diagnosis of Bowel perforation.  She presents with the following impairments/functional limitations:  weakness, impaired functional mobilty, impaired balance, decreased upper extremity function, impaired skin, edema, pain, decreased lower extremity function, gait instability, impaired self care skills, impaired endurance. Patient demonstrated good participation despite pain with movement. Level of assistance required max A - mod A. Able to tolerate bed mobility and supine to sit. Recommending SNF in [place]. Skilled physical therapy is medically necessary to address the above impairments in order to return the patient back to their previous level of function.       Rehab Prognosis:  poor; patient would benefit from acute skilled PT services to address these deficits and reach maximum level of function.      Recent Surgery: Procedure(s) (LRB):  EXPLORATORY-LAPAROTOMY (N/A)  DRAINAGE  COLECTOMY-RIGHT  ILEOSTOMY 6 Days Post-Op    Plan:     During this hospitalization, patient to be seen 3 x/week to address the above listed problems via gait training, therapeutic activities, therapeutic exercises, neuromuscular re-education  · Plan of Care Expires:  06/27/18   Plan of Care Reviewed with: patient, daughter    Subjective     Communicated with nurse Donato prior to/during session.  Patient found with HOB elevated and daughter present upon PT entry to room, agreeable to treatment.      Chief Complaint: n/a  Patient comments/goals: to get well  Pain/Comfort:  · Pain Rating 1: 0/10  · Pain Addressed 1: Reposition, Distraction, Cessation of Activity, Nurse notified    Patients cultural, spiritual, Adventist conflicts given the current  situation: none stated    Objective:     Patient found with: peripheral IV, telemetry, colostomy, pressure relief boots, SCD, PureWick     General Precautions: Standard, fall   Orthopedic Precautions:N/A   Braces: N/A     Functional Mobility:  · Bed Mobility:     · Rolling Left:  moderate assistance  · Rolling Right: moderate assistance  · Scooting: contact guard assistance  · Supine to Sit: maximal assistance  · Sit to Supine: maximal assistance      AM-PAC 6 CLICK MOBILITY  Turning over in bed (including adjusting bedclothes, sheets and blankets)?: 2  Sitting down on and standing up from a chair with arms (e.g., wheelchair, bedside commode, etc.): 1  Moving from lying on back to sitting on the side of the bed?: 2  Moving to and from a bed to a chair (including a wheelchair)?: 2  Need to walk in hospital room?: 2  Climbing 3-5 steps with a railing?: 1  Total Score: 10       Therapeutic Activities and Exercises:  PT arrived to patient's room to find patient resting quietly; agreeable to PT session. Patient performed mobility as above with non-skid socks in place.  Sitting balance at EOB supervision - min A for dressing with OT. Sacral shearing noted, nurse melina aware. Bandage applied and Melina contacted wound care. L arm with multiple sores and weeping. R great toe was black and oozing. Red wet skin between thighs.   · Patient Education:   · PT POC  · Bed mobility   · Functional mobility  · Sacral pressure and shearing   · PRAFO use   · EOB activity:  · Sitting x 10 minutes  Bedside table in front of patient and area set up for function, convenience, and safety. RN aware of patient's mobility needs and status. Questions/concerns addressed within PT scope of practice; patient and family with no further questions.       Patient left HOB elevated with all lines intact, call button in reach, nurse notified and daughter present..    GOALS:    Physical Therapy Goals        Problem: Physical Therapy Goal    Goal  Priority Disciplines Outcome Goal Variances Interventions   Physical Therapy Goal     PT/OT, PT Ongoing (interventions implemented as appropriate)     Description:  Goals to be met by: 2018     Patient will increase functional independence with mobility by performin. Supine to sit with MInimal Assistance  2. Sit to supine with MInimal Assistance  3. Sit to stand transfer with Moderate Assistance  4. Bed to chair transfer with Moderate Assistance   5. Gait  x 20 feet with Maximum Assistance using Rolling Walker.   6. Lower extremity exercise program x15 reps per handout, with assistance as needed                      Time Tracking:     PT Received On: 18  PT Start Time: 857     PT Stop Time: 931  PT Total Time (min): 34 min     Billable Minutes: Therapeutic Activity 34    Treatment Type: Treatment  PT/PTA: PT           Yuly Messer, PT  2018

## 2018-05-29 NOTE — PLAN OF CARE
FRANCA completed LOCET and faxed ([phone]) signed PASRR to the state. FRANCA asked that 142 be sent to her [facility] email address.     Hue Lentz LCSW

## 2018-05-29 NOTE — SUBJECTIVE & OBJECTIVE
Interval History:   Patient seen and examined, no acute events overnight  Family reports difficult night due to foot pain  Tolerating regular diet with no N/V  +stool/gas in ostomy  On 2LNC  Afebrile/VSS    Medications:  Continuous Infusions:  Scheduled Meds:   amLODIPine  10 mg Oral Daily    clopidogrel  75 mg Oral Daily    furosemide  20 mg Intravenous BID    heparin (porcine)  5,000 Units Subcutaneous Q8H    levothyroxine  100 mcg Oral QAM    metoprolol tartrate  12.5 mg Oral BID    miconazole   Topical (Top) BID    nitroGLYCERIN 2% TD oint  0.5 inch Topical (Top) Q6H    pantoprazole  40 mg Oral Daily     PRN Meds:[COMPLETED] calcium gluconate IVPB **AND** calcium gluconate IVPB, [COMPLETED] dextrose 50% **AND** dextrose 50% **AND** [COMPLETED] insulin regular, HYDROcodone-acetaminophen, HYDROmorphone, ondansetron, sodium chloride 0.9%, sodium chloride 0.9%     Review of patient's allergies indicates:   Allergen Reactions    Sulfa (sulfonamide antibiotics) Swelling    Asa [aspirin] Itching     Objective:     Vital Signs (Most Recent):  Temp: 97.6 °F (36.4 °C) (05/29/18 0735)  Pulse: 85 (05/29/18 0735)  Resp: 12 (05/29/18 0735)  BP: (!) 173/78 (05/29/18 0735)  SpO2: (!) 94 % (05/29/18 0735) Vital Signs (24h Range):  Temp:  [97.5 °F (36.4 °C)-98.6 °F (37 °C)] 97.6 °F (36.4 °C)  Pulse:  [70-96] 85  Resp:  [12-20] 12  SpO2:  [91 %-96 %] 94 %  BP: (120-173)/(60-78) 173/78     Weight: 52.2 kg (115 lb 1.3 oz)  Body mass index is 22.48 kg/m².    Intake/Output - Last 3 Shifts       05/27 0700 - 05/28 0659 05/28 0700 - 05/29 0659 05/29 0700 - 05/30 0659    P.O. 720      I.V. (mL/kg) 2431.7 (46.6) 1207 (23.1)     IV Piggyback 1550 450     Total Intake(mL/kg) 4701.7 (90.1) 1657 (31.7)     Urine (mL/kg/hr) 815 (0.7) 1175 (0.9)     Stool 350 (0.3) 150 (0.1)     Total Output 1165 1325      Net +3536.7 +332             Urine Occurrence  2 x     Stool Occurrence 0 x            Physical Exam   Constitutional: She  appears well-developed and well-nourished. No distress.   HENT:   Head: Normocephalic and atraumatic.   Cardiovascular: Normal rate and regular rhythm.    Pulmonary/Chest: Effort normal.   +wheezing   Abdominal:   Soft, appropriate TTP, non-distended   Midlne incision with staples in place, clean, dry and intact. Ostomy moist and pink.   Skin: Skin is warm and dry.       Significant Labs:  CBC:   Recent Labs  Lab 05/28/18 0522   WBC 11.43   RBC 3.73*   HGB 11.4*   HCT 34.8*      MCV 93   MCH 30.6   MCHC 32.8     BMP:   Recent Labs  Lab 05/28/18 0522 05/28/18  2356   *  < > 81   *  < > 131*   K 5.9*  < > 4.6  4.6     < > 103   CO2 24  < > 24   BUN 8*  < > 5*   CREATININE 0.6  < > 0.5   CALCIUM 7.2*  < > 7.7*   MG 2.0  --   --    < > = values in this interval not displayed.  CMP:   Recent Labs  Lab 05/28/18 0522 05/28/18 2356   *  < > 81   CALCIUM 7.2*  < > 7.7*   ALBUMIN 1.4*  --   --    PROT 3.7*  --   --    *  < > 131*   K 5.9*  < > 4.6  4.6   CO2 24  < > 24     < > 103   BUN 8*  < > 5*   CREATININE 0.6  < > 0.5   ALKPHOS 56  --   --    ALT 12  --   --    AST 14  --   --    BILITOT 0.4  --   --    < > = values in this interval not displayed.  LFTs:   Recent Labs  Lab 05/28/18 0522   ALT 12   AST 14   ALKPHOS 56   BILITOT 0.4   PROT 3.7*   ALBUMIN 1.4*     Coagulation:   Recent Labs  Lab 05/23/18  1114   LABPROT 10.8   INR 1.0   APTT 23.2     Cardiac markers:   Recent Labs  Lab 05/23/18  0827   TROPONINI 0.008     ABGs:   Recent Labs  Lab 05/24/18  0455   PH 7.596*   PCO2 25.0*   PO2 80   HCO3 24.3   POCSATURATED 98   BE 3

## 2018-05-29 NOTE — PROGRESS NOTES
"Call from Nurse Donato reporting ostomy pouch adhering difficulties.  Upon assessment, noted leaking pouch as well as midline incision with small opening of clear drainage inferiorly.  This clear drainage leaks under ostomy pouch when pt is turned to right side and also what is more of a cause for difficulty for pouch to adhere is the amount of peristomal denuded skin from what presents as yeast-like rash.  Daughter at bedside asking appropriate questions stating that no one empties pt pouch.  Demonstrated to daughter how to empty pouch and encouraged her participation with pt care.     Today, applied pouch with Marathon skin sealant and ostomy ring.  Recommend nursing to apply "crusting" technique (see orders) (powder and then 3M spray, 3 rounds) for peristomal skin and utilize ostomy ring (additional rings in purple bag)    Nursing may continue use of abd pads for draining incision.  May consider pouching the leaking incision as well if drainage continues to be havoc for skin and ostomy bag.     Discussed use of diapers causing for more moisture issues and daughter agreed not to use the diapers at least until yeast rash to perineum and perianal improves. Pt is on VERNELL overlay with heel protectors. Spoke with Nurse Donato regarding care provided today and the wound care orders entered.  Also spoke to Obdulia JAY regarding assessment today. Will follow up with pt tomorrow.  [id number]               05/29/18 1311       Wound 05/29/18 Intertrigo Perineum   Date First Assessed: 05/29/18   Primary Wound Type: Intertrigo  Location: (c) Perineum   Wound Image     Wound WDL ex   Dressing Appearance No dressing   Drainage Amount None   Drainage Characteristics/Odor No odor   Appearance Red;Moist;Other (see comments)  (yeast-like rash)   Care Cleansed with:;Soap and water;Other (see comments)  (ordered antifungal)   Dressing Change Due 05/29/18       Wound 05/29/18 Arterial Ulcer Toe, first   Date First Assessed: 05/29/18   " Pre-existing: Yes  Primary Wound Type: Arterial Ulcer  Side: Right  Location: Toe, first   Wound Image    Wound WDL ex   Dressing Appearance No dressing   Drainage Amount None   Drainage Characteristics/Odor No odor   Appearance Gray;Black

## 2018-05-29 NOTE — PT/OT/SLP PROGRESS
Occupational Therapy   Treatment    Name: Suzan Villarreal  MRN: [id number]  Admitting Diagnosis:  Bowel perforation  6 Days Post-Op    Recommendations:     Discharge Recommendations: nursing facility, skilled (in NH)  Discharge Equipment Recommendations:  none  Barriers to discharge:  Decreased caregiver support    Subjective     Communicated with: RN prior to session.  Pain/Comfort:  · Pain Rating 1: 0/10  · Location - Side 1: Right  · Location - Orientation 1: generalized  · Location 1: first toe  · Pain Addressed 1: Reposition, Distraction, Cessation of Activity, Nurse notified  · Pain Rating Post-Intervention 1:  (Pt c/o pain with movement in L foot and stomach)    Patients cultural, spiritual, Zoroastrian conflicts given the current situation: none    Objective:     Patient found with: peripheral IV, SCD, PureWick, telemetry, colostomy, pressure relief boots    General Precautions: Standard, fall   Orthopedic Precautions:N/A   Braces: N/A     Occupational Performance:    Bed Mobility:    · Patient completed Rolling/Turning to Left with  moderate assistance and with side rail  · Patient completed Rolling/Turning to Right with moderate assistance and with side rail  · Patient completed Scooting/Bridging to EOB with contact guard assistance; dependent drawsheet t/f x 2 to HOB  · Patient completed Supine to Sit with maximal assistance  · Patient completed Sit to Supine with maximal assistance     Functional Mobility/Transfers:  Pt decline t/f 2/2 L foot pain    Activities of Daily Living:  · Grooming: setup assistance to wash face while seated EOB  · UB Dressing: moderate assistance to doff/don gown while seated EOB 2/2 lines  · Toileting: total assistance for clothing management and hygiene in supine    Patient left HOB elevated with all lines intact, call button in reach, RN notified and daughter present    [facility] 6 Click:  [facility] Total Score: 13    Treatment & Education:  Pt educated on role of OT/POC  Pt/daughter  educated on importance of sitting EOB for pressure relief  White board/communication board updated  Education:    Assessment:     Suzan Villarreal is a 89 y.o. female with a medical diagnosis of Bowel perforation.  She presents with pain in L foot limiting participation in t/f and functional mobility. Pt progressing with bed mobility and completing ADLs at EOB.  Performance deficits affecting function are weakness, impaired endurance, impaired functional mobilty, impaired self care skills, gait instability, impaired balance, decreased lower extremity function, pain, edema, impaired skin.      Rehab Prognosis:  Fair; patient would benefit from acute skilled OT services to address these deficits and reach maximum level of function.       Plan:     Patient to be seen 3 x/week to address the above listed problems via self-care/home management, therapeutic activities, therapeutic exercises  · Plan of Care Expires: 06/27/18  · Plan of Care Reviewed with: patient, daughter    This Plan of care has been discussed with the patient who was involved in its development and understands and is in agreement with the identified goals and treatment plan    GOALS:    Occupational Therapy Goals        Problem: Occupational Therapy Goal    Goal Priority Disciplines Outcome Interventions   Occupational Therapy Goal     OT, PT/OT Ongoing (interventions implemented as appropriate)    Description:  Goals to be met by: 6/10/18     Patient will increase functional independence with ADLs by performing:    UE Dressing with Set-up Assistance.  LE Dressing with Moderate Assistance.  Grooming while seated with Minimal Assistance.  Toileting from bedside commode with Moderate Assistance for hygiene and clothing management.   Toilet transfer to bedside commode with Moderate Assistance.                      Time Tracking:     OT Date of Treatment: 05/29/18  OT Start Time: 0859  OT Stop Time: 0929  OT Total Time (min): 30 min    Billable Minutes:Self  Care/Home Management 30    Ryann Crawley, OT  5/29/2018

## 2018-05-30 PROBLEM — I70.269 ATHEROSCLEROSIS OF ARTERY OF EXTREMITY WITH GANGRENE: Status: ACTIVE | Noted: 2018-02-16

## 2018-05-30 LAB
ANION GAP SERPL CALC-SCNC: 8 MMOL/L
ANISOCYTOSIS BLD QL SMEAR: SLIGHT
BASOPHILS # BLD AUTO: 0.02 K/UL
BASOPHILS NFR BLD: 0.2 %
BUN SERPL-MCNC: 5 MG/DL
BURR CELLS BLD QL SMEAR: ABNORMAL
CALCIUM SERPL-MCNC: 8 MG/DL
CHLORIDE SERPL-SCNC: 101 MMOL/L
CO2 SERPL-SCNC: 23 MMOL/L
CREAT SERPL-MCNC: 0.5 MG/DL
DIFFERENTIAL METHOD: ABNORMAL
EOSINOPHIL # BLD AUTO: 0.3 K/UL
EOSINOPHIL NFR BLD: 2 %
ERYTHROCYTE [DISTWIDTH] IN BLOOD BY AUTOMATED COUNT: 15.8 %
EST. GFR  (AFRICAN AMERICAN): >60 ML/MIN/1.73 M^2
EST. GFR  (NON AFRICAN AMERICAN): >60 ML/MIN/1.73 M^2
GLUCOSE SERPL-MCNC: 56 MG/DL
HCT VFR BLD AUTO: 42.2 %
HGB BLD-MCNC: 13.8 G/DL
HYPOCHROMIA BLD QL SMEAR: ABNORMAL
IMM GRANULOCYTES # BLD AUTO: 0.51 K/UL
IMM GRANULOCYTES NFR BLD AUTO: 4 %
LYMPHOCYTES # BLD AUTO: 1.2 K/UL
LYMPHOCYTES NFR BLD: 9.5 %
MAGNESIUM SERPL-MCNC: 1.3 MG/DL
MCH RBC QN AUTO: 30.7 PG
MCHC RBC AUTO-ENTMCNC: 32.7 G/DL
MCV RBC AUTO: 94 FL
MONOCYTES # BLD AUTO: 0.9 K/UL
MONOCYTES NFR BLD: 7.1 %
NEUTROPHILS # BLD AUTO: 10 K/UL
NEUTROPHILS NFR BLD: 77.2 %
NRBC BLD-RTO: 0 /100 WBC
PHOSPHATE SERPL-MCNC: 2.5 MG/DL
PLATELET # BLD AUTO: 300 K/UL
PLATELET BLD QL SMEAR: ABNORMAL
PMV BLD AUTO: 9.5 FL
POIKILOCYTOSIS BLD QL SMEAR: SLIGHT
POLYCHROMASIA BLD QL SMEAR: ABNORMAL
POTASSIUM SERPL-SCNC: 4.3 MMOL/L
RBC # BLD AUTO: 4.5 M/UL
SODIUM SERPL-SCNC: 132 MMOL/L
WBC # BLD AUTO: 12.88 K/UL

## 2018-05-30 PROCEDURE — 80048 BASIC METABOLIC PNL TOTAL CA: CPT

## 2018-05-30 PROCEDURE — 36415 COLL VENOUS BLD VENIPUNCTURE: CPT

## 2018-05-30 PROCEDURE — 63600175 PHARM REV CODE 636 W HCPCS: Performed by: SURGERY

## 2018-05-30 PROCEDURE — 25000003 PHARM REV CODE 250: Performed by: STUDENT IN AN ORGANIZED HEALTH CARE EDUCATION/TRAINING PROGRAM

## 2018-05-30 PROCEDURE — 94664 DEMO&/EVAL PT USE INHALER: CPT

## 2018-05-30 PROCEDURE — 84100 ASSAY OF PHOSPHORUS: CPT

## 2018-05-30 PROCEDURE — 99900035 HC TECH TIME PER 15 MIN (STAT)

## 2018-05-30 PROCEDURE — 27000646 HC AEROBIKA DEVICE

## 2018-05-30 PROCEDURE — 83735 ASSAY OF MAGNESIUM: CPT

## 2018-05-30 PROCEDURE — 85025 COMPLETE CBC W/AUTO DIFF WBC: CPT

## 2018-05-30 PROCEDURE — 99231 SBSQ HOSP IP/OBS SF/LOW 25: CPT | Mod: ,,, | Performed by: SURGERY

## 2018-05-30 PROCEDURE — 20600001 HC STEP DOWN PRIVATE ROOM

## 2018-05-30 PROCEDURE — 25000003 PHARM REV CODE 250: Performed by: SURGERY

## 2018-05-30 PROCEDURE — 94761 N-INVAS EAR/PLS OXIMETRY MLT: CPT

## 2018-05-30 PROCEDURE — 63600175 PHARM REV CODE 636 W HCPCS: Performed by: STUDENT IN AN ORGANIZED HEALTH CARE EDUCATION/TRAINING PROGRAM

## 2018-05-30 RX ORDER — MICONAZOLE NITRATE 2 %
POWDER (GRAM) TOPICAL
Status: DISCONTINUED | OUTPATIENT
Start: 2018-05-30 | End: 2018-06-01 | Stop reason: HOSPADM

## 2018-05-30 RX ADMIN — HEPARIN SODIUM 5000 UNITS: 5000 INJECTION, SOLUTION INTRAVENOUS; SUBCUTANEOUS at 06:05

## 2018-05-30 RX ADMIN — HYDROCODONE BITARTRATE AND ACETAMINOPHEN 1 TABLET: 5; 325 TABLET ORAL at 09:05

## 2018-05-30 RX ADMIN — MICONAZOLE NITRATE: 20 OINTMENT TOPICAL at 08:05

## 2018-05-30 RX ADMIN — AMLODIPINE BESYLATE 10 MG: 10 TABLET ORAL at 09:05

## 2018-05-30 RX ADMIN — ACETAMINOPHEN 650 MG: 325 TABLET, FILM COATED ORAL at 07:05

## 2018-05-30 RX ADMIN — NITROGLYCERIN 0.5 INCH: 20 OINTMENT TOPICAL at 12:05

## 2018-05-30 RX ADMIN — Medication 12.5 MG: at 09:05

## 2018-05-30 RX ADMIN — HEPARIN SODIUM 5000 UNITS: 5000 INJECTION, SOLUTION INTRAVENOUS; SUBCUTANEOUS at 07:05

## 2018-05-30 RX ADMIN — FUROSEMIDE 20 MG: 10 INJECTION, SOLUTION INTRAVENOUS at 09:05

## 2018-05-30 RX ADMIN — LEVOTHYROXINE SODIUM 100 MCG: 100 TABLET ORAL at 06:05

## 2018-05-30 RX ADMIN — ACETAMINOPHEN 650 MG: 325 TABLET, FILM COATED ORAL at 01:05

## 2018-05-30 RX ADMIN — ACETAMINOPHEN 650 MG: 325 TABLET, FILM COATED ORAL at 06:05

## 2018-05-30 RX ADMIN — CLOPIDOGREL 75 MG: 75 TABLET, FILM COATED ORAL at 09:05

## 2018-05-30 RX ADMIN — MICONAZOLE NITRATE: 20 OINTMENT TOPICAL at 01:05

## 2018-05-30 RX ADMIN — NITROGLYCERIN 0.5 INCH: 20 OINTMENT TOPICAL at 06:05

## 2018-05-30 RX ADMIN — NITROGLYCERIN 0.5 INCH: 20 OINTMENT TOPICAL at 01:05

## 2018-05-30 RX ADMIN — HYDROMORPHONE HYDROCHLORIDE 0.5 MG: 1 INJECTION, SOLUTION INTRAMUSCULAR; INTRAVENOUS; SUBCUTANEOUS at 08:05

## 2018-05-30 RX ADMIN — Medication 12.5 MG: at 07:05

## 2018-05-30 RX ADMIN — HEPARIN SODIUM 5000 UNITS: 5000 INJECTION, SOLUTION INTRAVENOUS; SUBCUTANEOUS at 02:05

## 2018-05-30 RX ADMIN — FUROSEMIDE 20 MG: 10 INJECTION, SOLUTION INTRAVENOUS at 05:05

## 2018-05-30 RX ADMIN — PANTOPRAZOLE SODIUM 40 MG: 40 TABLET, DELAYED RELEASE ORAL at 09:05

## 2018-05-30 NOTE — SUBJECTIVE & OBJECTIVE
Interval History:   Patient seen and examined, no acute events overnight  Patient resting comfortably in bed  Tolerating regular diet with no N/V  +stool/gas in ostomy; difficulty keeping ostomy bag in place due to incisional drainage  On 2LNC  Afebrile/VSS    Medications:  Continuous Infusions:  Scheduled Meds:   acetaminophen  650 mg Oral Q8H    amLODIPine  10 mg Oral Daily    clopidogrel  75 mg Oral Daily    furosemide  20 mg Intravenous BID    heparin (porcine)  5,000 Units Subcutaneous Q8H    levothyroxine  100 mcg Oral QAM    metoprolol tartrate  12.5 mg Oral BID    miconazole nitrate 2%   Topical (Top) BID    nitroGLYCERIN 2% TD oint  0.5 inch Topical (Top) Q6H    pantoprazole  40 mg Oral Daily     PRN Meds:albuterol-ipratropium, [COMPLETED] calcium gluconate IVPB **AND** calcium gluconate IVPB, HYDROcodone-acetaminophen, HYDROmorphone, ondansetron, sodium chloride 0.9%, sodium chloride 0.9%     Review of patient's allergies indicates:   Allergen Reactions    Sulfa (sulfonamide antibiotics) Swelling    Asa [aspirin] Itching     Objective:     Vital Signs (Most Recent):  Temp: 97.8 °F (36.6 °C) (05/30/18 0736)  Pulse: 88 (05/30/18 0736)  Resp: 16 (05/30/18 0355)  BP: (!) 149/74 (05/30/18 0736)  SpO2: (!) 90 % (05/30/18 0736) Vital Signs (24h Range):  Temp:  [97.1 °F (36.2 °C)-98.4 °F (36.9 °C)] 97.8 °F (36.6 °C)  Pulse:  [] 88  Resp:  [16-20] 16  SpO2:  [90 %-97 %] 90 %  BP: (118-157)/(63-74) 149/74     Weight: 52.2 kg (115 lb 1.3 oz)  Body mass index is 22.48 kg/m².    Intake/Output - Last 3 Shifts       05/28 0700 - 05/29 0659 05/29 0700 - 05/30 0659 05/30 0700 - 05/31 0659    P.O.  150     I.V. (mL/kg) 1207 (23.1)      IV Piggyback 450      Total Intake(mL/kg) 1657 (31.7) 150 (2.9)     Urine (mL/kg/hr) 1175 (0.9) 800 (0.6)     Stool 150 (0.1) 275 (0.2)     Total Output 1325 1075      Net +332 -925             Urine Occurrence 2 x 5 x           Physical Exam   Constitutional: She appears  well-developed and well-nourished. No distress.   HENT:   Head: Normocephalic and atraumatic.   Cardiovascular: Normal rate and regular rhythm.    Pulmonary/Chest: Effort normal. No respiratory distress.   Abdominal:   Soft, appropriate TTP, non-distended   Midlne incision with staples in place, clean, dry and intact. Ostomy moist and pink.   Skin: Skin is warm and dry.       Significant Labs:  CBC:   Recent Labs  Lab 05/30/18  0501   WBC 12.88*   RBC 4.50   HGB 13.8   HCT 42.2      MCV 94   MCH 30.7   MCHC 32.7     BMP:   Recent Labs  Lab 05/30/18  0502   GLU 56*   *   K 4.3      CO2 23   BUN 5*   CREATININE 0.5   CALCIUM 8.0*   MG 1.3*     CMP:   Recent Labs  Lab 05/28/18  0522  05/30/18  0502   *  < > 56*   CALCIUM 7.2*  < > 8.0*   ALBUMIN 1.4*  --   --    PROT 3.7*  --   --    *  < > 132*   K 5.9*  < > 4.3   CO2 24  < > 23     < > 101   BUN 8*  < > 5*   CREATININE 0.6  < > 0.5   ALKPHOS 56  --   --    ALT 12  --   --    AST 14  --   --    BILITOT 0.4  --   --    < > = values in this interval not displayed.  LFTs:   Recent Labs  Lab 05/28/18  0522   ALT 12   AST 14   ALKPHOS 56   BILITOT 0.4   PROT 3.7*   ALBUMIN 1.4*     Coagulation:   Recent Labs  Lab 05/23/18  1114   LABPROT 10.8   INR 1.0   APTT 23.2     Cardiac markers:   Recent Labs  Lab 05/23/18  0827   TROPONINI 0.008     ABGs:   Recent Labs  Lab 05/24/18  0455   PH 7.596*   PCO2 25.0*   PO2 80   HCO3 24.3   POCSATURATED 98   BE 3

## 2018-05-30 NOTE — PLAN OF CARE
Problem: Patient Care Overview  Goal: Plan of Care Review  Outcome: Ongoing (interventions implemented as appropriate)  POC reviewed with patient and family at bedside. Pt A/Ox4, [place]. Pt denies pain. Slept well overnight. SCDs in place, off loading boots in place, Nitro paste applied to toe per order. Antifungal cream applied per order. 1.5L NC in place, VSS. R ileostomy draining as charted. Site intact. Safety maintained. Will continue to monitor.

## 2018-05-30 NOTE — PROGRESS NOTES
[facility]  General Surgery  Progress Note    Subjective:     History of Present Illness:  Patient is a 89 y.o. female presents with sudden onset of abdominal pain that woke her from sleep at 5 am.  Pain started in upper abdomen  Pain has been getting worse. She has been dealing with low grade obstructive symptoms off and on for the past month.  This was relieved with bowel rest and stool softeners.  CT in the ER shows free air     Prior surgical hx as per below  Recent RLE angio for PAD     No hx of MI or Stroke  Recently started on plavix     PMH notable for COPD, not on O2, HTN, HLD, PAD, GERD    Post-Op Info:  Procedure(s) (LRB):  EXPLORATORY-LAPAROTOMY (N/A)  DRAINAGE  COLECTOMY-RIGHT  ILEOSTOMY   7 Days Post-Op     Interval History:   Patient seen and examined, no acute events overnight  Patient resting comfortably in bed  Tolerating regular diet with no N/V  +stool/gas in ostomy; difficulty keeping ostomy bag in place due to incisional drainage  On 2LNC  Afebrile/VSS    Medications:  Continuous Infusions:  Scheduled Meds:   acetaminophen  650 mg Oral Q8H    amLODIPine  10 mg Oral Daily    clopidogrel  75 mg Oral Daily    furosemide  20 mg Intravenous BID    heparin (porcine)  5,000 Units Subcutaneous Q8H    levothyroxine  100 mcg Oral QAM    metoprolol tartrate  12.5 mg Oral BID    miconazole nitrate 2%   Topical (Top) BID    nitroGLYCERIN 2% TD oint  0.5 inch Topical (Top) Q6H    pantoprazole  40 mg Oral Daily     PRN Meds:albuterol-ipratropium, [COMPLETED] calcium gluconate IVPB **AND** calcium gluconate IVPB, HYDROcodone-acetaminophen, HYDROmorphone, ondansetron, sodium chloride 0.9%, sodium chloride 0.9%     Review of patient's allergies indicates:   Allergen Reactions    Sulfa (sulfonamide antibiotics) Swelling    Asa [aspirin] Itching     Objective:     Vital Signs (Most Recent):  Temp: 97.8 °F (36.6 °C) (05/30/18 0736)  Pulse: 88 (05/30/18 0736)  Resp: 16 (05/30/18  0355)  BP: (!) 149/74 (05/30/18 0736)  SpO2: (!) 90 % (05/30/18 0736) Vital Signs (24h Range):  Temp:  [97.1 °F (36.2 °C)-98.4 °F (36.9 °C)] 97.8 °F (36.6 °C)  Pulse:  [] 88  Resp:  [16-20] 16  SpO2:  [90 %-97 %] 90 %  BP: (118-157)/(63-74) 149/74     Weight: 52.2 kg (115 lb 1.3 oz)  Body mass index is 22.48 kg/m².    Intake/Output - Last 3 Shifts       05/28 0700 - 05/29 0659 05/29 0700 - 05/30 0659 05/30 0700 - 05/31 0659    P.O.  150     I.V. (mL/kg) 1207 (23.1)      IV Piggyback 450      Total Intake(mL/kg) 1657 (31.7) 150 (2.9)     Urine (mL/kg/hr) 1175 (0.9) 800 (0.6)     Stool 150 (0.1) 275 (0.2)     Total Output 1325 1075      Net +332 -925             Urine Occurrence 2 x 5 x           Physical Exam   Constitutional: She appears well-developed and well-nourished. No distress.   HENT:   Head: Normocephalic and atraumatic.   Cardiovascular: Normal rate and regular rhythm.    Pulmonary/Chest: Effort normal. No respiratory distress.   Abdominal:   Soft, appropriate TTP, non-distended   Midlne incision with staples in place, clean, dry and intact. Ostomy moist and pink.   Skin: Skin is warm and dry.       Significant Labs:  CBC:   Recent Labs  Lab 05/30/18  0501   WBC 12.88*   RBC 4.50   HGB 13.8   HCT 42.2      MCV 94   MCH 30.7   MCHC 32.7     BMP:   Recent Labs  Lab 05/30/18  0502   GLU 56*   *   K 4.3      CO2 23   BUN 5*   CREATININE 0.5   CALCIUM 8.0*   MG 1.3*     CMP:   Recent Labs  Lab 05/28/18  0522  05/30/18  0502   *  < > 56*   CALCIUM 7.2*  < > 8.0*   ALBUMIN 1.4*  --   --    PROT 3.7*  --   --    *  < > 132*   K 5.9*  < > 4.3   CO2 24  < > 23     < > 101   BUN 8*  < > 5*   CREATININE 0.6  < > 0.5   ALKPHOS 56  --   --    ALT 12  --   --    AST 14  --   --    BILITOT 0.4  --   --    < > = values in this interval not displayed.  LFTs:   Recent Labs  Lab 05/28/18  0522   ALT 12   AST 14   ALKPHOS 56   BILITOT 0.4   PROT 3.7*   ALBUMIN 1.4*     Coagulation:    Recent Labs  Lab 05/23/18  1114   LABPROT 10.8   INR 1.0   APTT 23.2     Cardiac markers:   Recent Labs  Lab 05/23/18  0827   TROPONINI 0.008     ABGs:   Recent Labs  Lab 05/24/18  0455   PH 7.596*   PCO2 25.0*   PO2 80   HCO3 24.3   POCSATURATED 98   BE 3     Assessment/Plan:     * Bowel perforation    89 yo female s/p Ex lap, Right colectomy, end ileostomy 5/23/18    - tolerating regular diet, continue  - PRN po pain meds  - d/c IVF, start lasix  - aggressive PT/OT, aggressive IS  - appreciate WOCN assistance  - DVT/GI prophylaxis  - stable for SNF placement        Yeast infection    fluconazole        PVD (peripheral vascular disease)    -pain control  -follow up as outpatient with vascular  -plavix        Hypothyroidism    Home meds  -levothyroxine        GERD (gastroesophageal reflux disease)    PPI therapy        COPD (chronic obstructive pulmonary disease)    Continue aggressive pulm toilet         HTN (hypertension)    Home meds  -amlodipine, lopressor            ANDRIY AlmonteC   [id number]  General Surgery  [facility]

## 2018-05-30 NOTE — PLAN OF CARE
Problem: Patient Care Overview  Goal: Plan of Care Review  Outcome: Ongoing (interventions implemented as appropriate)  POC reviewed with pt and her daughter, all questions and concerns addressed. VSS on 1.5L nasal cannula, pt is AAOx4. Tolerating regular diet well with no NVD. WOC nurse came today and assessed new wound on R heel and reassessed yeast development. Tb skin test to be read 6/1 after 1700. Pain is managed with scheduled tylenol and prn hydrocodone q6. RQ ileostomy is intact and patent with positive output. Pt is resting quietly with call light in reach. Will continue to monitor.

## 2018-05-30 NOTE — PLAN OF CARE
FRANCA saw that [facility] denied Pt, but [facility] accepted. FRANCA spoke to Pt's daughter, Haley, and informed her of this. Pt's daughter stated she needed to speak to her other family members to confirm this was ok, but that they would likely move forward with this plan. FRANCA spoke to liaison from [facility] who stated she would submit to [facility] for authorization.   FRANCA also received 142 from the [facility] and uploaded it into [employer].     Hue Lentz LCSW     Update: Pt's daughter confirmed that they would like to move forward with [facility]. FRANCA informed facility.

## 2018-05-30 NOTE — ASSESSMENT & PLAN NOTE
89 yo female s/p Ex lap, Right colectomy, end ileostomy 5/23/18    - tolerating regular diet, continue  - PRN po pain meds  - d/c IVF, start lasix  - aggressive PT/OT, aggressive IS  - appreciate WOCN assistance  - DVT/GI prophylaxis  - stable for SNF placement

## 2018-05-30 NOTE — PLAN OF CARE
SW received call from [facility] stating they needed to see a new OT note before they could authorize SNF placement. SW placed call to Rehab and asked they to please make sure Pt was assigned to OT tomorrow.     Hue Lentz LCSW

## 2018-05-30 NOTE — PROGRESS NOTES
Right foot rest pain and tissue loss.  Removed SCD and LINWOOD from right leg as compression can be higher than arterial perfusion pressure.    Worsening skin changes on distal forefoot.  Dry gangrene to the distal first and second toe.    Again discussed right AKA which she declined.    Will follow.    Mary Ann Garcia MD FACS [facility]  Vascular & Endovascular Surgery

## 2018-05-30 NOTE — NURSING
Discovered necrotic spot on pt's right heel. Notified Lindsey with wound care, and WOC should be rounding to assess the pt soon. Will continue to monitor.

## 2018-05-31 ENCOUNTER — PATIENT MESSAGE (OUTPATIENT)
Dept: SURGERY | Facility: CLINIC | Age: 83
End: 2018-05-31

## 2018-05-31 LAB
BASOPHILS # BLD AUTO: 0.05 K/UL
BASOPHILS NFR BLD: 0.4 %
DIFFERENTIAL METHOD: ABNORMAL
EOSINOPHIL # BLD AUTO: 0.2 K/UL
EOSINOPHIL NFR BLD: 1.4 %
ERYTHROCYTE [DISTWIDTH] IN BLOOD BY AUTOMATED COUNT: 14.7 %
HCT VFR BLD AUTO: 29.9 %
HGB BLD-MCNC: 10.8 G/DL
IMM GRANULOCYTES # BLD AUTO: 0.55 K/UL
IMM GRANULOCYTES NFR BLD AUTO: 4.3 %
LYMPHOCYTES # BLD AUTO: 1.7 K/UL
LYMPHOCYTES NFR BLD: 13.2 %
MAGNESIUM SERPL-MCNC: 1.4 MG/DL
MCH RBC QN AUTO: 31.3 PG
MCHC RBC AUTO-ENTMCNC: 36.1 G/DL
MCV RBC AUTO: 87 FL
MONOCYTES # BLD AUTO: 1 K/UL
MONOCYTES NFR BLD: 7.6 %
NEUTROPHILS # BLD AUTO: 9.3 K/UL
NEUTROPHILS NFR BLD: 73.1 %
NRBC BLD-RTO: 0 /100 WBC
PHOSPHATE SERPL-MCNC: 3.4 MG/DL
PLATELET # BLD AUTO: 304 K/UL
PMV BLD AUTO: 9.7 FL
RBC # BLD AUTO: 3.45 M/UL
WBC # BLD AUTO: 12.76 K/UL

## 2018-05-31 PROCEDURE — 83735 ASSAY OF MAGNESIUM: CPT

## 2018-05-31 PROCEDURE — 25000003 PHARM REV CODE 250: Performed by: STUDENT IN AN ORGANIZED HEALTH CARE EDUCATION/TRAINING PROGRAM

## 2018-05-31 PROCEDURE — 85025 COMPLETE CBC W/AUTO DIFF WBC: CPT

## 2018-05-31 PROCEDURE — 99231 SBSQ HOSP IP/OBS SF/LOW 25: CPT | Mod: ,,, | Performed by: SURGERY

## 2018-05-31 PROCEDURE — G8988 SELF CARE GOAL STATUS: HCPCS | Mod: CJ

## 2018-05-31 PROCEDURE — 99900035 HC TECH TIME PER 15 MIN (STAT)

## 2018-05-31 PROCEDURE — 97530 THERAPEUTIC ACTIVITIES: CPT

## 2018-05-31 PROCEDURE — 25000003 PHARM REV CODE 250: Performed by: SURGERY

## 2018-05-31 PROCEDURE — 36415 COLL VENOUS BLD VENIPUNCTURE: CPT

## 2018-05-31 PROCEDURE — 27000646 HC AEROBIKA DEVICE

## 2018-05-31 PROCEDURE — 63600175 PHARM REV CODE 636 W HCPCS: Performed by: SURGERY

## 2018-05-31 PROCEDURE — G8987 SELF CARE CURRENT STATUS: HCPCS | Mod: CL

## 2018-05-31 PROCEDURE — 63600175 PHARM REV CODE 636 W HCPCS: Performed by: STUDENT IN AN ORGANIZED HEALTH CARE EDUCATION/TRAINING PROGRAM

## 2018-05-31 PROCEDURE — 27000221 HC OXYGEN, UP TO 24 HOURS

## 2018-05-31 PROCEDURE — 97110 THERAPEUTIC EXERCISES: CPT

## 2018-05-31 PROCEDURE — 20600001 HC STEP DOWN PRIVATE ROOM

## 2018-05-31 PROCEDURE — 97535 SELF CARE MNGMENT TRAINING: CPT

## 2018-05-31 PROCEDURE — 94664 DEMO&/EVAL PT USE INHALER: CPT

## 2018-05-31 PROCEDURE — 94761 N-INVAS EAR/PLS OXIMETRY MLT: CPT

## 2018-05-31 PROCEDURE — 84100 ASSAY OF PHOSPHORUS: CPT

## 2018-05-31 RX ORDER — FUROSEMIDE 20 MG/1
20 TABLET ORAL 2 TIMES DAILY
Status: DISCONTINUED | OUTPATIENT
Start: 2018-05-31 | End: 2018-06-01 | Stop reason: HOSPADM

## 2018-05-31 RX ADMIN — ACETAMINOPHEN 650 MG: 325 TABLET, FILM COATED ORAL at 02:05

## 2018-05-31 RX ADMIN — LEVOTHYROXINE SODIUM 100 MCG: 100 TABLET ORAL at 05:05

## 2018-05-31 RX ADMIN — HYDROCODONE BITARTRATE AND ACETAMINOPHEN 1 TABLET: 5; 325 TABLET ORAL at 03:05

## 2018-05-31 RX ADMIN — ACETAMINOPHEN 650 MG: 325 TABLET, FILM COATED ORAL at 10:05

## 2018-05-31 RX ADMIN — MICONAZOLE NITRATE: 20 OINTMENT TOPICAL at 09:05

## 2018-05-31 RX ADMIN — Medication 12.5 MG: at 08:05

## 2018-05-31 RX ADMIN — HEPARIN SODIUM 5000 UNITS: 5000 INJECTION, SOLUTION INTRAVENOUS; SUBCUTANEOUS at 09:05

## 2018-05-31 RX ADMIN — NITROGLYCERIN 0.5 INCH: 20 OINTMENT TOPICAL at 11:05

## 2018-05-31 RX ADMIN — FUROSEMIDE 20 MG: 20 TABLET ORAL at 06:05

## 2018-05-31 RX ADMIN — PANTOPRAZOLE SODIUM 40 MG: 40 TABLET, DELAYED RELEASE ORAL at 09:05

## 2018-05-31 RX ADMIN — HYDROMORPHONE HYDROCHLORIDE 0.5 MG: 1 INJECTION, SOLUTION INTRAMUSCULAR; INTRAVENOUS; SUBCUTANEOUS at 11:05

## 2018-05-31 RX ADMIN — ONDANSETRON 4 MG: 4 TABLET, ORALLY DISINTEGRATING ORAL at 08:05

## 2018-05-31 RX ADMIN — NITROGLYCERIN 0.5 INCH: 20 OINTMENT TOPICAL at 05:05

## 2018-05-31 RX ADMIN — NITROGLYCERIN 0.5 INCH: 20 OINTMENT TOPICAL at 06:05

## 2018-05-31 RX ADMIN — Medication 12.5 MG: at 09:05

## 2018-05-31 RX ADMIN — CLOPIDOGREL 75 MG: 75 TABLET, FILM COATED ORAL at 09:05

## 2018-05-31 RX ADMIN — HYDROCODONE BITARTRATE AND ACETAMINOPHEN 1 TABLET: 5; 325 TABLET ORAL at 08:05

## 2018-05-31 RX ADMIN — HEPARIN SODIUM 5000 UNITS: 5000 INJECTION, SOLUTION INTRAVENOUS; SUBCUTANEOUS at 02:05

## 2018-05-31 RX ADMIN — AMLODIPINE BESYLATE 10 MG: 10 TABLET ORAL at 09:05

## 2018-05-31 RX ADMIN — NITROGLYCERIN 0.5 INCH: 20 OINTMENT TOPICAL at 12:05

## 2018-05-31 RX ADMIN — HYDROMORPHONE HYDROCHLORIDE 0.5 MG: 1 INJECTION, SOLUTION INTRAMUSCULAR; INTRAVENOUS; SUBCUTANEOUS at 05:05

## 2018-05-31 RX ADMIN — HEPARIN SODIUM 5000 UNITS: 5000 INJECTION, SOLUTION INTRAVENOUS; SUBCUTANEOUS at 05:05

## 2018-05-31 RX ADMIN — FUROSEMIDE 20 MG: 10 INJECTION, SOLUTION INTRAVENOUS at 09:05

## 2018-05-31 NOTE — PLAN OF CARE
SW sent PASRR and Updated Orders to Chhaya at [facility]. Pt's family going to facility to complete paperwork today and should be able to transfer there today. SW to continue to follow.     MAGUE GarciaW

## 2018-05-31 NOTE — PLAN OF CARE
Problem: Pressure Ulcer Risk (Wellington Scale) (Adult,Obstetrics,Pediatric)  Goal: Skin Integrity  Patient will demonstrate the desired outcomes by discharge/transition of care.   Outcome: Ongoing (interventions implemented as appropriate)  Wound care done per orders.

## 2018-05-31 NOTE — DISCHARGE SUMMARY
[facility]  General Surgery  Discharge Summary      Patient Name: Suzan Villarreal  MRN: [id number]  Admission Date: 5/23/2018  Hospital Length of Stay: 8 days  Discharge Date and Time:  06/01/2018 2:43 PM  Attending Physician: Tushar Anderson MD   Discharging Provider: Obdulia Gonsalves PA-C  Primary Care Provider: Aly Rivas MD    HPI:   Patient is a 89 y.o. female presents with sudden onset of abdominal pain that woke her from sleep at 5 am.  Pain started in upper abdomen  Pain has been getting worse. She has been dealing with low grade obstructive symptoms off and on for the past month.  This was relieved with bowel rest and stool softeners.  CT in the ER shows free air     Prior surgical hx as per below  Recent RLE angio for PAD     No hx of MI or Stroke  Recently started on plavix     PMH notable for COPD, not on O2, HTN, HLD, PAD, GERD    Procedure(s) (LRB):  EXPLORATORY-LAPAROTOMY (N/A)  DRAINAGE  COLECTOMY-RIGHT  ILEOSTOMY      Indwelling Lines/Drains at time of discharge:   Lines/Drains/Airways     Drain                 Colostomy 05/23/18 1900 RLQ 7 days              Hospital Course:   Patient presented to the ER with perforated viscus. She underwent: Procedure(s) (LRB):  EXPLORATORY-LAPAROTOMY (N/A)  DRAINAGE  COLECTOMY-RIGHT  ILEOSTOMY. She tolerated the procedure well and was transferred to the floor after recovery from anesthesia. Please see the operative note for further procedure details. Vitals remained stable, and she was afebrile all throughout the post operative period. Labs were reviewed and electrolytes were replaced appropriately. Physical exam was appropriate for post operative state.  Diet was advanced, and she was able to tolerate a regular diet prior to discharge. She was ambulating without difficulty and had normal bowel function prior to discharge. Patient was deemed suitable for discharge on 9 Days Post-Op. She was discharged the a skilled nursing facility  with medications and instructions as below. She voiced understanding of the instructions prior to discharge. She stayed an extra night due to issues with transportation.    For more thorough information, please refer to the hospital record and operative report.    Consults:   Consults         Status Ordering Provider     Inpatient consult to General Surgery  Once     Provider:  (Not yet assigned)    Completed TAMAR DALY     Inpatient consult to PICC team (NIAS)  Once     Provider:  (Not yet assigned)    Completed BRIGETTE KENNEDY     Inpatient consult to Vascular Surgery  Once     Provider:  (Not yet assigned)    Completed BRIGETTE KENNEDY          Significant Diagnostic Studies: Labs:   BMP:     Recent Labs  Lab 05/31/18  0700 06/01/18  0521   MG 1.4* 1.3*    CBC     Recent Labs  Lab 05/31/18  0700 06/01/18  0521   WBC 12.76* 12.44   HGB 10.8* 12.6   HCT 29.9* 37.7    470*   , INR   Lab Results   Component Value Date    INR 1.0 05/23/2018    INR 1.0 04/28/2018    INR 1.3 08/03/2015   , A1C:     Recent Labs  Lab 04/30/18  0450   HGBA1C 4.9    and All labs within the past 24 hours have been reviewed    Radiology: X-Ray: CXR: X-Ray Chest 1 View (CXR):   Results for orders placed or performed during the hospital encounter of 05/23/18   X-Ray Chest 1 View    Narrative    EXAMINATION:  XR CHEST 1 VIEW    CLINICAL HISTORY:  MD order for cough;    FINDINGS:  There is cardiomegaly, moderate edema, aortic plaque, pleural fluid, and worsening.      Impression    See above      Electronically signed by: Lamont Sandoval MD  Date:    05/28/2018  Time:    13:41      CT scan: CT ABDOMEN PELVIS WITH CONTRAST:   Results for orders placed or performed during the hospital encounter of 05/23/18   CT Abdomen Pelvis With Contrast    Narrative    EXAMINATION:  CT ABDOMEN PELVIS WITH CONTRAST    CLINICAL HISTORY:  abdominal pain    TECHNIQUE:  Low dose axial images, sagittal and coronal reformations were obtained  from the lung bases to the pubic symphysis after the administration of 75 cc Omnipaque 350 intravenous contrast.  Oral contrast was not administered.    COMPARISON:  CT abdomen pelvis with contrast 04/29/2018, 06/22/2010    FINDINGS:  - Lung bases: There is bilateral dependent and bandlike atelectatic change with trace right pleural fluid.  A calcified granuloma is noted at the left lung base.    - Heart/Pericardial structures: There is atherosclerotic calcification of the coronary arteries and calcification of the aortic valve.    - EG Junction: No hiatal hernia.    ABDOMEN:    - Liver: Normal in size and attenuation.  There are 2 right hepatic lobe hypodensities, the largest of which lies along the posterior margin measuring 1.5 cm and demonstrates attenuation compatible with a cyst.  The smaller hypodensity within the inferior right hepatic lobe is too small to characterize.    - Gallbladder: The gallbladder is surgically absent with cholecystectomy clips in the gallbladder fossa.    - Bile Ducts: No intra or extrahepatic biliary ductal dilatation.    - Spleen: Multiple scattered punctate calcifications are present within the spleen, compatible with calcified granulomas.    - Stomach/Duodenum: There is a large 5.1 cm diverticulum off the 2nd portion of the duodenum, stable since 04/29/2018, and enlarged since 06/22/2010.    - Pancreas: Unremarkable.    - Adrenals: Unremarkable.    - Kidneys/ureters/urinary bladder: Normal in size and location, concentrating excreting contrast appropriately on delayed imaging.  No hydronephrosis or stones.  There are multiple bilateral renal hypodensities.  At the right renal interpolar region lies a 1.5 cm and a 3.0 cm cystic lesions, compatible in attenuation with cysts.  A small 1.3 cm hypodensity along the anterior margin of the inferior right renal pole demonstrates attenuation greater than expected for a cyst.  The remaining hypodensities are too small to characterize.  The  ureters appear normal in course and caliber without evidence of ureteral dilatation.  The urinary bladder is unremarkable.    - Retroperitoneum: No significant adenopathy.    PELVIS:    - Uterus and adnexa: The uterus is surgically absent.    - Other: No pelvic adenopathy or mass. There is small volume free fluid within the pelvis.    BOWEL/MESENTERY:    There is significant volume pneumoperitoneum throughout the abdomen without definitive identification of a source.  Large volume stool within the ascending colon and cecum with fecalization of small bowel contents in the ileum.  A region of focal colonic narrowing is seen at the hepatic flexure (see coronal series 4, image 18).  This may be secondary to nondistention versus malignancy.  There are innumerable diverticuli of the sigmoid colon with additional diverticuli of the descending colon without surrounding inflammatory change of diverticulitis.    VASCULATURE: No abdominal aortic aneurysm.  There is moderate atherosclerotic calcification.    BONES: No acute fracture or bony destructive process. There is degenerative change of hips with a right hip gamma nail.  There is significant dextroscoliosis of the lumbar spine and significant degenerative change of the thoracolumbar spine with multilevel disc space height loss and vacuum disc phenomenon.    EXTRAPERITONEAL SOFT TISSUES: There is a small fat containing umbilical hernia.      Impression    Significant volume pneumoperitoneum throughout the abdomen without identified source.  Immediate surgical consultation is recommended.    Large volume stool within the ascending colon and cecum with fecalization of small bowel contents in the ileum.  A region of focal colonic narrowing is seen at the hepatic flexure, which may be secondary to malignancy/stricture versus nondistention.    Nonspecific right renal hypodensity demonstrating attenuation greater than expected for simple cyst.    Multiple additional findings as  detailed above.    This report was flagged in Epic as abnormal.    COMMUNICATION  This critical result was discovered/received at 10:00 a.m. on 05/23/2018.  The critical information above was relayed directly by me by telephone to Dr. Garcia on 05/23/2018 at 10:10 a.m..    Electronically signed by resident: Juliana Lowe  Date:    05/23/2018  Time:    09:40    Electronically signed by: Romulo Meade MD  Date:    05/23/2018  Time:    10:46      Pending Diagnostic Studies:     Procedure Component Value Units Date/Time    VANCOMYCIN, TROUGH before 4th dose [[id number]] Collected:  05/29/18 0544    Order Status:  Sent Lab Status:  In process Updated:  05/29/18 0545    Specimen:  Blood from Blood         Final Active Diagnoses:    Diagnosis Date Noted POA    PRINCIPAL PROBLEM:  Bowel perforation [K63.1] 05/23/2018 Yes    Yeast infection [B37.9] 05/29/2018 No    Preventive measure [Z29.9] 05/29/2018 Not Applicable    Gait instability [R26.81] 02/16/2018 Yes    PVD (peripheral vascular disease) [I73.9] 02/16/2018 Yes    Hypothyroidism [E03.9] 07/31/2013 Yes     Chronic    HTN (hypertension) [I10]  Yes     Chronic    COPD (chronic obstructive pulmonary disease) [J44.9]  Yes     Chronic    GERD (gastroesophageal reflux disease) [K21.9]  Yes     Chronic    Osteoarthritis [M19.90]  Yes      Problems Resolved During this Admission:    Diagnosis Date Noted Date Resolved POA    HLD (hyperlipidemia) [E78.5]  05/29/2018 Yes     Chronic      Patient Active Problem List   Diagnosis    HTN (hypertension)    COPD (chronic obstructive pulmonary disease)    DJD (degenerative joint disease), lumbar    Cholelithiasis without obstruction    GERD (gastroesophageal reflux disease)    S/P hysterectomy    Osteoarthritis    Macular degeneration    Subacromial bursitis    Foot pain    Hypothyroidism    Hyponatremia    History of fracture of hip    Rotator cuff tear arthropathy    Right shoulder pain    Right leg  numbness    Right knee pain    Closed displaced intertrochanteric fracture of right femur with routine healing    Nonexudative age-related macular degeneration, bilateral, advanced atrophic with subfoveal involvement    Posterior vitreous detachment, both eyes    Hypertensive retinopathy of both eyes    Status post right knee replacement    Knee joint replacement by other means    Status post total right knee replacement 7/6/2015    Osteoarthritis of right shoulder    Osteopenia    Senile osteoporosis    Fall    Balance problem    Gait instability    Neuropathy    SOBOE (shortness of breath on exertion)    PVD (peripheral vascular disease)    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    Specialty:  General Surgery  Why:  Post-operative Appt/Office to arrange & call you.   Contact information:  [address]  [facility] [id number] [phone]             JULIANNA Virk In 2 weeks.    Specialties:  Colon and Rectal Surgery, Wound Care  Why:  Wound care Nurse post-operative Ileostomy follow-up/Appt: 6/13/18 at 09:30 Am.   Contact information:  [address]  [facility] [id number] [phone]             [facility].    Specialties:  Nursing Home Agency, SNF Agency  Why:  SNF   Contact information:  [address] [facility] [place] [address]  [phone]             Tushar Anderson MD.    Specialty:  General Surgery  Contact information:  [address]  [facility] [id number] [phone]                 Patient Instructions:     Ambulatory referral to Outpatient Case Management   Referral Priority: Routine Referral Type: Consultation   Referral Reason: Specialty Services Required    Number of Visits Requested: 1      Diet Adult Regular     Lifting  restrictions   Order Comments: No heavy lifting, nothing heavier than 10lbs     Notify your health care provider if you experience any of the following:  difficulty breathing or increased cough     Notify your health care provider if you experience any of the following:  redness, tenderness, or signs of infection (pain, swelling, redness, odor or green/yellow discharge around incision site)     Notify your health care provider if you experience any of the following:  severe uncontrolled pain     Notify your health care provider if you experience any of the following:  persistent nausea and vomiting or diarrhea     Notify your health care provider if you experience any of the following:  temperature >100.4     Change dressing (specify)   Order Comments: Routine ostomy care. Cover midline incision with mepilex dressing. Change PRN.       Medications:  Reconciled Home Medications:      Medication List      CONTINUE taking these medications    acetaminophen 325 MG tablet  Commonly known as:  TYLENOL  Take 2 tablets (650 mg total) by mouth every 6 (six) hours as needed.     amLODIPine 10 MG tablet  Commonly known as:  NORVASC  Take 1 tablet (10 mg total) by mouth once daily.     benazepril 20 MG tablet  Commonly known as:  LOTENSIN  Take 1 tablet (20 mg total) by mouth 2 (two) times daily.     Bifidobacterium animalis 6 mg (5 billion cell) Cap  Take 1 capsule by mouth once daily.     calcium carbonate 600 mg calcium (1,500 mg) Tab  Commonly known as:  OS-UZMA  Take 600 mg by mouth 2 (two) times daily with meals.     clopidogrel 75 mg tablet  Commonly known as:  PLAVIX  Take 1 tablet (75 mg total) by mouth once daily.     docusate sodium 100 MG capsule  Commonly known as:  COLACE  Take 1 capsule (100 mg total) by mouth once daily.     esomeprazole 40 MG capsule  Commonly known as:  NEXIUM  Take 1 capsule (40 mg total) by mouth before breakfast.     fluoruracil 0.5 % cream  Commonly known as:  CARAC  Apply topically once  daily. Nasal tip     inulin-sorbitol 2 gram Chew  Take 1-2 tablets by mouth 2 (two) times daily.     Lactobacillus acidoph-L.bulgar 1 million cell Chew  Take 4 tablets by mouth 3 (three) times daily with meals.     levothyroxine 100 MCG tablet  Commonly known as:  SYNTHROID  Take 1 tablet (100 mcg total) by mouth every morning.     metoclopramide HCl 5 MG tablet  Commonly known as:  REGLAN  Take 1 tablet (5 mg total) by mouth 2 (two) times daily as needed (nausea/vomiting). Do not take if severe abdominal pain present     metoprolol succinate 25 MG 24 hr tablet  Commonly known as:  TOPROL-XL  Take 1 tablet (25 mg total) by mouth once daily.     multivitamin capsule  Take 1 capsule by mouth once daily.     OCUVITE ADULT 50+ ORAL  Take 1 tablet by mouth once daily.     oxyCODONE-acetaminophen 5-325 mg per tablet  Commonly known as:  PERCOCET  Take 1 tablet by mouth every 6 (six) hours as needed for Pain. No alcohol, no driving, no operating machinery, no working, no swimming, no additional Tylenol (acetaminophen) while taking this medication.     rosuvastatin 20 MG tablet  Commonly known as:  CRESTOR  Take 1 tablet (20 mg total) by mouth once daily.     salmeterol 50 mcg/dose diskus inhaler  Commonly known as:  SEREVENT DISKUS  Inhale 1 puff into the lungs 2 (two) times daily. Controller          Time spent on the discharge of patient: 2 minutes    Obdulia Gonsalves PA-C  General Surgery  [facility]

## 2018-05-31 NOTE — SUBJECTIVE & OBJECTIVE
Interval History:   Patient seen and examined, no acute events overnight  Patient resting comfortably in bed  Tolerating regular diet with no N/V  +stool/gas in ostomy  Midline drainage decreasing  On 1.5LNC  Afebrile/VSS    Medications:  Continuous Infusions:  Scheduled Meds:   acetaminophen  650 mg Oral Q8H    amLODIPine  10 mg Oral Daily    clopidogrel  75 mg Oral Daily    furosemide  20 mg Intravenous BID    heparin (porcine)  5,000 Units Subcutaneous Q8H    levothyroxine  100 mcg Oral QAM    metoprolol tartrate  12.5 mg Oral BID    miconazole nitrate 2%   Topical (Top) BID    nitroGLYCERIN 2% TD oint  0.5 inch Topical (Top) Q6H    pantoprazole  40 mg Oral Daily     PRN Meds:albuterol-ipratropium, [COMPLETED] calcium gluconate IVPB **AND** calcium gluconate IVPB, HYDROcodone-acetaminophen, HYDROmorphone, miconazole NITRATE 2 %, ondansetron, sodium chloride 0.9%, sodium chloride 0.9%     Review of patient's allergies indicates:   Allergen Reactions    Sulfa (sulfonamide antibiotics) Swelling    Asa [aspirin] Itching     Objective:     Vital Signs (Most Recent):  Temp: 97.7 °F (36.5 °C) (05/31/18 0740)  Pulse: (!) 148 (05/31/18 0740)  Resp: 20 (05/31/18 0740)  BP: (!) 148/70 (05/31/18 0740)  SpO2: (!) 93 % (05/31/18 0740) Vital Signs (24h Range):  Temp:  [96.2 °F (35.7 °C)-98.6 °F (37 °C)] 97.7 °F (36.5 °C)  Pulse:  [] 148  Resp:  [16-20] 20  SpO2:  [91 %-96 %] 93 %  BP: (104-161)/(68-74) 148/70     Weight: 52.2 kg (115 lb 1.3 oz)  Body mass index is 22.48 kg/m².    Intake/Output - Last 3 Shifts       05/29 0700 - 05/30 0659 05/30 0700 - 05/31 0659 05/31 0700 - 06/01 0659    P.O. 150 120     Total Intake(mL/kg) 150 (2.9) 120 (2.3)     Urine (mL/kg/hr) 800 (0.6)      Stool 275 (0.2) 125 (0.1)     Total Output 1075 125      Net -925 -5             Urine Occurrence 5 x 5 x           Physical Exam   Constitutional: She appears well-developed and well-nourished. No distress.   HENT:   Head:  Normocephalic and atraumatic.   Cardiovascular: Normal rate and regular rhythm.    Pulmonary/Chest: Effort normal. No respiratory distress.   Abdominal:   Soft, appropriate TTP, non-distended   Midlne incision with staples in place - drainage improving, clean, dry and intact. Ostomy moist and pink.   Skin: Skin is warm and dry.       Significant Labs:  CBC:   Recent Labs  Lab 05/30/18  0501   WBC 12.88*   RBC 4.50   HGB 13.8   HCT 42.2      MCV 94   MCH 30.7   MCHC 32.7     BMP:   Recent Labs  Lab 05/30/18  0502 05/31/18  0700   GLU 56*  --    *  --    K 4.3  --      --    CO2 23  --    BUN 5*  --    CREATININE 0.5  --    CALCIUM 8.0*  --    MG 1.3* 1.4*     CMP:   Recent Labs  Lab 05/28/18  0522  05/30/18  0502   *  < > 56*   CALCIUM 7.2*  < > 8.0*   ALBUMIN 1.4*  --   --    PROT 3.7*  --   --    *  < > 132*   K 5.9*  < > 4.3   CO2 24  < > 23     < > 101   BUN 8*  < > 5*   CREATININE 0.6  < > 0.5   ALKPHOS 56  --   --    ALT 12  --   --    AST 14  --   --    BILITOT 0.4  --   --    < > = values in this interval not displayed.  LFTs:   Recent Labs  Lab 05/28/18  0522   ALT 12   AST 14   ALKPHOS 56   BILITOT 0.4   PROT 3.7*   ALBUMIN 1.4*

## 2018-05-31 NOTE — PLAN OF CARE
05/31/18 1207   Final Note   Assessment Type Final Discharge Note   Discharge Disposition SNF  ([facility])   What phone number can be called within the next 1-3 days to see how you are doing after discharge? (Wiley Beck Daughter [phone]   )   Hospital Follow Up  Appt(s) scheduled? Yes   Discharge plans and expectations educations in teach back method with documentation complete? Yes   Right Care Referral Info   Post Acute Recommendation SNF / Sub-Acute Rehab

## 2018-05-31 NOTE — PLAN OF CARE
4:22 PM Floor nurse, Cristy ([phone]), came to CM/SW office looking for SW because patients' daughter is saying she wants patient to be transferred in an ambulance. Informed floor nurse if the patient is able to sit up the patient is acceptable to ride in w/c van;If family's request is an ambulance they need to know they would be responsible for the ambulance bill. Cristy confirmed patient sat up in chair at  today. Instructed her to call on call SW, who comes on at 4:30 PM, to change mode of transportation should daughter agree to pay for ambulance ride. Cristy verbalized her understanding. Cristy states W/c van ride isn't here yet.     4:35 PM  called [facility], ph [phone], & spoke to Chhaya, informing of above. She said she would see if there was anyone in administration to talk w/to consider covering. Gave her 5th floor nurse station contact # [phone], ask for Nurse Cristy. Charge nurse, Twila, updated.     4:50 PM Per ANABEL Womack  nurse, CRIS Lentz, FRANCA, phone had voice message, from their liaison-Chhaya, denoting [facility] will pay for ambulance transportation.

## 2018-05-31 NOTE — PLAN OF CARE
Problem: Occupational Therapy Goal  Goal: Occupational Therapy Goal  Goals to be met by: 6/10/18     Patient will increase functional independence with ADLs by performing:    UE Dressing with Set-up Assistance.  LE Dressing with Moderate Assistance.  Grooming while seated with Minimal Assistance.  Toileting from bedside commode with Moderate Assistance for hygiene and clothing management.   Toilet transfer to bedside commode with Moderate Assistance.     Outcome: Ongoing (interventions implemented as appropriate)  Pt progressing toward goals    Comments: Continue OT GOYO Crawley OT  5/31/2018

## 2018-05-31 NOTE — PLAN OF CARE
Problem: Patient Care Overview  Goal: Plan of Care Review  Outcome: Ongoing (interventions implemented as appropriate)  POC reviewed with patient and family at bedside. Pt A/Ox4, [place]. VSS, 1.5L NC in place, SR on monitor. Pt requiring PRN meds multiple times overnight for pain control of toes. Able to sleep on/ off throughout night. Off loading boots in place, Nitro paste applied to toe per order. Pt incontinent and voiding. Skin care provided and antifungal cream applied as ordered. R ileostomy draining as charted. Site intact. RA midline intact. Safety maintained. Will continue to monitor.

## 2018-05-31 NOTE — NURSING
Spoke with Chhaya at [facility]. Chhaya made aware that per on call  that [facility] has to initiate the pt transfer to facility via stretcher. Chhaya stated transport request will be placed on 6/1/2018 @ 0800.

## 2018-05-31 NOTE — PLAN OF CARE
FRANCA spoke to Chhaya at [facility] ([phone]) who said Pt would be going to room # 801B and report could be called to above number.   FRANCA arranged wheelchair transport with [facility] ([phone]) and asked them to please bring a wheelchair and oxygen. Packet for transport placed at 6th floor nurse's station.  FRANCA informed Pt's nurse of above.    MAGUE GarciaW

## 2018-05-31 NOTE — PROGRESS NOTES
[facility]  General Surgery  Progress Note    Subjective:     History of Present Illness:  Patient is a 89 y.o. female presents with sudden onset of abdominal pain that woke her from sleep at 5 am.  Pain started in upper abdomen  Pain has been getting worse. She has been dealing with low grade obstructive symptoms off and on for the past month.  This was relieved with bowel rest and stool softeners.  CT in the ER shows free air     Prior surgical hx as per below  Recent RLE angio for PAD     No hx of MI or Stroke  Recently started on plavix     PMH notable for COPD, not on O2, HTN, HLD, PAD, GERD    Post-Op Info:  Procedure(s) (LRB):  EXPLORATORY-LAPAROTOMY (N/A)  DRAINAGE  COLECTOMY-RIGHT  ILEOSTOMY   8 Days Post-Op     Interval History:   Patient seen and examined, no acute events overnight  Patient resting comfortably in bed  Tolerating regular diet with no N/V  +stool/gas in ostomy  Midline drainage decreasing  On 1.5LNC  Afebrile/VSS    Medications:  Continuous Infusions:  Scheduled Meds:   acetaminophen  650 mg Oral Q8H    amLODIPine  10 mg Oral Daily    clopidogrel  75 mg Oral Daily    furosemide  20 mg Intravenous BID    heparin (porcine)  5,000 Units Subcutaneous Q8H    levothyroxine  100 mcg Oral QAM    metoprolol tartrate  12.5 mg Oral BID    miconazole nitrate 2%   Topical (Top) BID    nitroGLYCERIN 2% TD oint  0.5 inch Topical (Top) Q6H    pantoprazole  40 mg Oral Daily     PRN Meds:albuterol-ipratropium, [COMPLETED] calcium gluconate IVPB **AND** calcium gluconate IVPB, HYDROcodone-acetaminophen, HYDROmorphone, miconazole NITRATE 2 %, ondansetron, sodium chloride 0.9%, sodium chloride 0.9%     Review of patient's allergies indicates:   Allergen Reactions    Sulfa (sulfonamide antibiotics) Swelling    Asa [aspirin] Itching     Objective:     Vital Signs (Most Recent):  Temp: 97.7 °F (36.5 °C) (05/31/18 0740)  Pulse: (!) 148 (05/31/18 0740)  Resp: 20 (05/31/18 0740)  BP:  (!) 148/70 (05/31/18 0740)  SpO2: (!) 93 % (05/31/18 0740) Vital Signs (24h Range):  Temp:  [96.2 °F (35.7 °C)-98.6 °F (37 °C)] 97.7 °F (36.5 °C)  Pulse:  [] 148  Resp:  [16-20] 20  SpO2:  [91 %-96 %] 93 %  BP: (104-161)/(68-74) 148/70     Weight: 52.2 kg (115 lb 1.3 oz)  Body mass index is 22.48 kg/m².    Intake/Output - Last 3 Shifts       05/29 0700 - 05/30 0659 05/30 0700 - 05/31 0659 05/31 0700 - 06/01 0659    P.O. 150 120     Total Intake(mL/kg) 150 (2.9) 120 (2.3)     Urine (mL/kg/hr) 800 (0.6)      Stool 275 (0.2) 125 (0.1)     Total Output 1075 125      Net -925 -5             Urine Occurrence 5 x 5 x           Physical Exam   Constitutional: She appears well-developed and well-nourished. No distress.   HENT:   Head: Normocephalic and atraumatic.   Cardiovascular: Normal rate and regular rhythm.    Pulmonary/Chest: Effort normal. No respiratory distress.   Abdominal:   Soft, appropriate TTP, non-distended   Midlne incision with staples in place - drainage improving, clean, dry and intact. Ostomy moist and pink.   Skin: Skin is warm and dry.       Significant Labs:  CBC:   Recent Labs  Lab 05/30/18  0501   WBC 12.88*   RBC 4.50   HGB 13.8   HCT 42.2      MCV 94   MCH 30.7   MCHC 32.7     BMP:   Recent Labs  Lab 05/30/18  0502 05/31/18  0700   GLU 56*  --    *  --    K 4.3  --      --    CO2 23  --    BUN 5*  --    CREATININE 0.5  --    CALCIUM 8.0*  --    MG 1.3* 1.4*     CMP:   Recent Labs  Lab 05/28/18  0522  05/30/18  0502   *  < > 56*   CALCIUM 7.2*  < > 8.0*   ALBUMIN 1.4*  --   --    PROT 3.7*  --   --    *  < > 132*   K 5.9*  < > 4.3   CO2 24  < > 23     < > 101   BUN 8*  < > 5*   CREATININE 0.6  < > 0.5   ALKPHOS 56  --   --    ALT 12  --   --    AST 14  --   --    BILITOT 0.4  --   --    < > = values in this interval not displayed.  LFTs:   Recent Labs  Lab 05/28/18  0522   ALT 12   AST 14   ALKPHOS 56   BILITOT 0.4   PROT 3.7*   ALBUMIN 1.4*      Assessment/Plan:     * Bowel perforation    89 yo female s/p Ex lap, Right colectomy, end ileostomy 5/23/18    - tolerating regular diet, continue  - PRN po pain meds  - d/c IVF, start lasix  - aggressive PT/OT, aggressive IS  - appreciate WOCN assistance  - DVT/GI prophylaxis  - stable for SNF placement        Yeast infection    fluconazole        PVD (peripheral vascular disease)    -pain control  -follow up as outpatient with vascular  -plavix        Hypothyroidism    Home meds  -levothyroxine        GERD (gastroesophageal reflux disease)    PPI therapy        COPD (chronic obstructive pulmonary disease)    Continue aggressive pulm toilet         HTN (hypertension)    Home meds  -amlodipine, lopressor            Obdulia Gonsalves, PAHannahC   [id number]  General Surgery  [facility]

## 2018-05-31 NOTE — PROGRESS NOTES
Patient seen for ostomy follow up. Patient pouch applied yesterday remains intact. Stoma is pink and stool is thick brown liquid. Yeast on skin appears to be healing. Right leg and foot remains in heel elevation boot and under the care of vascular surgery. Supplies are at the bedside. Patient's daughter reports she is being discharged to the nursing home today. No questions from either. Nursing to continue care.

## 2018-05-31 NOTE — PT/OT/SLP PROGRESS
Occupational Therapy   Treatment    Name: Suzan Villarreal  MRN: [id number]  Admitting Diagnosis:  Bowel perforation  8 Days Post-Op    Recommendations:     Discharge Recommendations: nursing facility, skilled  Discharge Equipment Recommendations:  none  Barriers to discharge:  Decreased caregiver support    Subjective     Communicated with: RN prior to session.  Pain/Comfort:  · Pain Rating 1: 0/10  · Pain Addressed 1: Reposition, Distraction, Cessation of Activity  · Pain Rating Post-Intervention 1: 0/10    Patients cultural, spiritual, Temple conflicts given the current situation: none    Objective:     Patient found with: oxygen, pressure relief boots, telemetry    General Precautions: Standard, fall   Orthopedic Precautions:N/A   Braces: N/A     Occupational Performance:    Bed Mobility:    · Patient completed Rolling/Turning to Left with moderate assistance and with side rail and elevated HOB  · Patient completed Scooting/Bridging at EOB with moderate assistance; dependent drawsheet t/f to HOB x 2 persons  · Patient completed Supine to Sit with moderate assistance, with side rail and elevated HOB  · Patient completed Sit to Supine with maximal assistance     Functional Mobility/Transfers:  · Patient completed Sit <> Stand Transfer with moderate assistance from bed and maximal assistance from chair with no assistive device; cues for hand placement and forward lean to assist with stand  · Patient completed Bed <> Chair Transfer using Stand Pivot technique with maximal assistance with no assistive device  · Functional Mobility: NT    Activities of Daily Living:  · Feeding:  setup assistance to make coffee and eat breakfast while UIC; pt required assist to open coffee and creamers, RUE ROM deficits require use of LUE for feeding and pt reports this is difficult however pt able to use fork successfully; daughter states pt required assist  for feeding in bed, but is able to complete feeding self while UIC due to more  functional use of UE  · Grooming: setup assistance to brush and style hair while seated UIC  · UB Dressing: minimum assistance to doff/don gown while seated UIC   · LB Dressing: total assistance to don R sock while seated EOB  · Toileting: dependence pt with one episode of urinary incontinence during sessions     Patient left HOB elevated with all lines intact, call button in reach, RN notified and daughter present    [facility] 6 Click:  [facility] Total Score: 13    Treatment & Education:  LUE AROM exercises 10 x 1 for shoulder flex, elbow flex/ext, overhead press, and chest press while seated UIC  RUE AROM exercises 10 x 1 for shoulder flex to 60, elbow flex/ext, overhead press to flex/abd at 60, and chest press while seated UIC  Pt educated on role of OT/POC  Pt/daughter educated on importance of EOB/UIC for increased use of BUE  White board/communication board updated  Education:    Assessment:     Suzan Villarreal is a 89 y.o. female with a medical diagnosis of Bowel perforation.  She presents with good motivation and participation in therapy this date. Pt wiling to attempt standing and required mod/max A. Pt demo more functional use of BUE for grooming and feeding while UIC vs in bed, per daughter report. Pt sat up in chair for 1 hour before OT assisted with return to bed.  Performance deficits affecting function are weakness, impaired endurance, impaired functional mobilty, impaired self care skills, impaired balance, gait instability, pain, decreased lower extremity function, impaired cardiopulmonary response to activity, edema, decreased ROM.      Rehab Prognosis:  Fair; patient would benefit from acute skilled OT services to address these deficits and reach maximum level of function.       Plan:     Patient to be seen 3 x/week to address the above listed problems via self-care/home management, therapeutic activities, therapeutic exercises  · Plan of Care Expires: 06/27/18  · Plan of Care Reviewed with: patient,  daughter    This Plan of care has been discussed with the patient who was involved in its development and understands and is in agreement with the identified goals and treatment plan    GOALS:    Occupational Therapy Goals        Problem: Occupational Therapy Goal    Goal Priority Disciplines Outcome Interventions   Occupational Therapy Goal     OT, PT/OT Ongoing (interventions implemented as appropriate)    Description:  Goals to be met by: 6/10/18     Patient will increase functional independence with ADLs by performing:    UE Dressing with Set-up Assistance.  LE Dressing with Moderate Assistance.  Grooming while seated with Minimal Assistance.  Toileting from bedside commode with Moderate Assistance for hygiene and clothing management.   Toilet transfer to bedside commode with Moderate Assistance.                      Time Tracking:     OT Date of Treatment: 05/31/18  OT Start Time: 0921 (second in time 1050)  OT Stop Time: 0959 (second out time 1105)  OT Total Time (min): 53 min    Billable Minutes:Self Care/Home Management 13  Therapeutic Activity 30  Therapeutic Exercise 10    Ryann Crawley OT  5/31/2018

## 2018-06-01 ENCOUNTER — OUTPATIENT CASE MANAGEMENT (OUTPATIENT)
Dept: ADMINISTRATIVE | Facility: OTHER | Age: 83
End: 2018-06-01

## 2018-06-01 VITALS
RESPIRATION RATE: 17 BRPM | WEIGHT: 115.06 LBS | TEMPERATURE: 98 F | HEART RATE: 74 BPM | SYSTOLIC BLOOD PRESSURE: 118 MMHG | BODY MASS INDEX: 22.59 KG/M2 | OXYGEN SATURATION: 94 % | DIASTOLIC BLOOD PRESSURE: 59 MMHG | HEIGHT: 60 IN

## 2018-06-01 LAB
BASOPHILS # BLD AUTO: 0.06 K/UL
BASOPHILS NFR BLD: 0.5 %
DIFFERENTIAL METHOD: ABNORMAL
EOSINOPHIL # BLD AUTO: 0.3 K/UL
EOSINOPHIL NFR BLD: 2.1 %
ERYTHROCYTE [DISTWIDTH] IN BLOOD BY AUTOMATED COUNT: 15.3 %
HCT VFR BLD AUTO: 37.7 %
HGB BLD-MCNC: 12.6 G/DL
IMM GRANULOCYTES # BLD AUTO: 0.29 K/UL
IMM GRANULOCYTES NFR BLD AUTO: 2.3 %
LYMPHOCYTES # BLD AUTO: 1.8 K/UL
LYMPHOCYTES NFR BLD: 14.8 %
MAGNESIUM SERPL-MCNC: 1.3 MG/DL
MCH RBC QN AUTO: 30.4 PG
MCHC RBC AUTO-ENTMCNC: 33.4 G/DL
MCV RBC AUTO: 91 FL
MONOCYTES # BLD AUTO: 0.9 K/UL
MONOCYTES NFR BLD: 7.6 %
NEUTROPHILS # BLD AUTO: 9.1 K/UL
NEUTROPHILS NFR BLD: 72.7 %
NRBC BLD-RTO: 0 /100 WBC
PHOSPHATE SERPL-MCNC: 2.8 MG/DL
PLATELET # BLD AUTO: 470 K/UL
PMV BLD AUTO: 8.9 FL
RBC # BLD AUTO: 4.14 M/UL
TB INDURATION 48 - 72 HR READ: 0 MM
WBC # BLD AUTO: 12.44 K/UL

## 2018-06-01 PROCEDURE — G8987 SELF CARE CURRENT STATUS: HCPCS | Mod: CL

## 2018-06-01 PROCEDURE — G8989 SELF CARE D/C STATUS: HCPCS | Mod: CL

## 2018-06-01 PROCEDURE — 63600175 PHARM REV CODE 636 W HCPCS: Performed by: STUDENT IN AN ORGANIZED HEALTH CARE EDUCATION/TRAINING PROGRAM

## 2018-06-01 PROCEDURE — 25000003 PHARM REV CODE 250: Performed by: STUDENT IN AN ORGANIZED HEALTH CARE EDUCATION/TRAINING PROGRAM

## 2018-06-01 PROCEDURE — G8988 SELF CARE GOAL STATUS: HCPCS | Mod: CL

## 2018-06-01 PROCEDURE — 83735 ASSAY OF MAGNESIUM: CPT

## 2018-06-01 PROCEDURE — 25000003 PHARM REV CODE 250: Performed by: SURGERY

## 2018-06-01 PROCEDURE — 84100 ASSAY OF PHOSPHORUS: CPT

## 2018-06-01 PROCEDURE — 36415 COLL VENOUS BLD VENIPUNCTURE: CPT

## 2018-06-01 PROCEDURE — 85025 COMPLETE CBC W/AUTO DIFF WBC: CPT

## 2018-06-01 RX ORDER — HYDROCODONE BITARTRATE AND ACETAMINOPHEN 10; 325 MG/1; MG/1
1 TABLET ORAL EVERY 4 HOURS PRN
Status: DISCONTINUED | OUTPATIENT
Start: 2018-06-01 | End: 2018-06-01 | Stop reason: HOSPADM

## 2018-06-01 RX ORDER — HYDROCODONE BITARTRATE AND ACETAMINOPHEN 10; 325 MG/1; MG/1
1 TABLET ORAL EVERY 6 HOURS PRN
Status: DISCONTINUED | OUTPATIENT
Start: 2018-06-01 | End: 2018-06-01

## 2018-06-01 RX ADMIN — HYDROCODONE BITARTRATE AND ACETAMINOPHEN 1 TABLET: 10; 325 TABLET ORAL at 05:06

## 2018-06-01 RX ADMIN — PANTOPRAZOLE SODIUM 40 MG: 40 TABLET, DELAYED RELEASE ORAL at 08:06

## 2018-06-01 RX ADMIN — CLOPIDOGREL 75 MG: 75 TABLET, FILM COATED ORAL at 08:06

## 2018-06-01 RX ADMIN — NITROGLYCERIN 0.5 INCH: 20 OINTMENT TOPICAL at 05:06

## 2018-06-01 RX ADMIN — HEPARIN SODIUM 5000 UNITS: 5000 INJECTION, SOLUTION INTRAVENOUS; SUBCUTANEOUS at 05:06

## 2018-06-01 RX ADMIN — HYDROCODONE BITARTRATE AND ACETAMINOPHEN 1 TABLET: 10; 325 TABLET ORAL at 12:06

## 2018-06-01 RX ADMIN — MICONAZOLE NITRATE: 20 OINTMENT TOPICAL at 08:06

## 2018-06-01 RX ADMIN — LEVOTHYROXINE SODIUM 100 MCG: 100 TABLET ORAL at 06:06

## 2018-06-01 RX ADMIN — AMLODIPINE BESYLATE 10 MG: 10 TABLET ORAL at 08:06

## 2018-06-01 RX ADMIN — Medication 12.5 MG: at 08:06

## 2018-06-01 RX ADMIN — FUROSEMIDE 20 MG: 20 TABLET ORAL at 08:06

## 2018-06-01 NOTE — PROGRESS NOTES
Continues with right foot rest pain. Toe appears stable but new ischemic skin ulcerations on right mid-foot.  She is not ready to commit to an above knee amputation at this time.    Will check in periodically.  Discuss with Ortho management of Femoral nail and prior TKA hardware as they relate to AKA.    Mary Ann Garcia MD FACS [facility]  Vascular & Endovascular Surgery

## 2018-06-01 NOTE — NURSING
[facility] here to pick patient up and transport to [facility].

## 2018-06-01 NOTE — PT/OT/SLP PROGRESS
Physical Therapy      Patient Name:  Suzan Villarreal   MRN:  [id number]    Patient not seen today secondary to?hospital discharge with complete discharge summary to follow      Maverick Guajardo PTA  6/1/2018

## 2018-06-01 NOTE — ASSESSMENT & PLAN NOTE
89 yo female s/p Ex lap, Right colectomy, end ileostomy 5/23/18    - tolerating regular diet, continue  - PRN po pain meds  - d/c IVF, start lasix  - aggressive PT/OT, aggressive IS  - appreciate WOCN assistance  - DVT/GI prophylaxis  - stable for SNF placement; plan for dc today

## 2018-06-01 NOTE — PROGRESS NOTES
Please note that this patient was not enrolled in Outpatient Case Management at this time due to being transferred to a SNF facility.     Please contact [facility] at Ext. [phone] with questions.    Thank you,    Lizette Sahu, [facility]  Outpatient Complex Care Mgmt  Ext [phone]

## 2018-06-01 NOTE — PLAN OF CARE
Problem: Patient Care Overview  Goal: Plan of Care Review  Outcome: Ongoing (interventions implemented as appropriate)  POC reviewed with patient who verbalized understanding. AAOx4, [place]. VSS on 1.5L NC. Midline abdominal incision noted with foam dressing in place. Left calf and right toe/heel foam dressing CDI. Nitropaste applied to right great toe per MAR. Right ileostomy in place draining mushy, soft green stool. Pain controlled with PRN medications per MAR for right great toe. Incontinent to urine, changed throughout shift. Tolerating regular diet, c/o nausea x1 and relieved with PRN medication. Telemetry being monitored running NSR and Afib at times. BL foot boots in place, refusing TEDS/SCDS. IV placed to left wrist due to patient needing IV pain medications. Safety precautions maintained, call light within reach. Remains free from falls and injury. No acute events. No distress noted. Will continue to monitor.

## 2018-06-01 NOTE — PLAN OF CARE
Patient ready for discharge, patient discharging via ambulance that will be paid for by [facility], arranged ambulance transfer [facility]aya in admissions to verify that they are paying for transport, transport is arranged for within the hour, notified patients nurses Cristy/Mercy, will follow for additional needs.

## 2018-06-01 NOTE — PROGRESS NOTES
[facility]  General Surgery  Progress Note    Subjective:     History of Present Illness:  Patient is a 89 y.o. female presents with sudden onset of abdominal pain that woke her from sleep at 5 am.  Pain started in upper abdomen  Pain has been getting worse. She has been dealing with low grade obstructive symptoms off and on for the past month.  This was relieved with bowel rest and stool softeners.  CT in the ER shows free air     Prior surgical hx as per below  Recent RLE angio for PAD     No hx of MI or Stroke  Recently started on plavix     PMH notable for COPD, not on O2, HTN, HLD, PAD, GERD    Post-Op Info:  Procedure(s) (LRB):  EXPLORATORY-LAPAROTOMY (N/A)  DRAINAGE  COLECTOMY-RIGHT  ILEOSTOMY   9 Days Post-Op     Interval History:   Patient seen and examined, no acute events overnight  Did not leave yesterday due to transportation issues  Patient resting comfortably in bed  Tolerating regular diet with no N/V  +stool/gas in ostomy  Midline drainage decreasing  On 1.5LNC  Afebrile/VSS    Medications:  Continuous Infusions:  Scheduled Meds:   acetaminophen  650 mg Oral Q8H    amLODIPine  10 mg Oral Daily    clopidogrel  75 mg Oral Daily    furosemide  20 mg Oral BID    heparin (porcine)  5,000 Units Subcutaneous Q8H    levothyroxine  100 mcg Oral QAM    metoprolol tartrate  12.5 mg Oral BID    miconazole nitrate 2%   Topical (Top) BID    nitroGLYCERIN 2% TD oint  0.5 inch Topical (Top) Q6H    pantoprazole  40 mg Oral Daily     PRN Meds:albuterol-ipratropium, [COMPLETED] calcium gluconate IVPB **AND** calcium gluconate IVPB, HYDROcodone-acetaminophen, HYDROmorphone, miconazole NITRATE 2 %, ondansetron, sodium chloride 0.9%, sodium chloride 0.9%     Review of patient's allergies indicates:   Allergen Reactions    Sulfa (sulfonamide antibiotics) Swelling    Asa [aspirin] Itching     Objective:     Vital Signs (Most Recent):  Temp: 97.6 °F (36.4 °C) (06/01/18 0820)  Pulse: 93  (06/01/18 0820)  Resp: 20 (06/01/18 0820)  BP: 131/65 (06/01/18 0820)  SpO2: 95 % (06/01/18 0820) Vital Signs (24h Range):  Temp:  [96.4 °F (35.8 °C)-97.6 °F (36.4 °C)] 97.6 °F (36.4 °C)  Pulse:  [] 93  Resp:  [14-20] 20  SpO2:  [93 %-96 %] 95 %  BP: (123-131)/(57-65) 131/65     Weight: 52.2 kg (115 lb 1.3 oz)  Body mass index is 22.48 kg/m².    Intake/Output - Last 3 Shifts       05/30 0700 - 05/31 0659 05/31 0700 - 06/01 0659 06/01 0700 - 06/02 0659    P.O. 120 960 240    Total Intake(mL/kg) 120 (2.3) 960 (18.4) 240 (4.6)    Stool 125 450     Total Output 125 450      Net -5 +510 +240           Urine Occurrence 5 x 4 x     Stool Occurrence  0 x     Emesis Occurrence  0 x           Physical Exam   Constitutional: She appears well-developed and well-nourished. No distress.   HENT:   Head: Normocephalic and atraumatic.   Cardiovascular: Normal rate and regular rhythm.    Pulmonary/Chest: Effort normal. No respiratory distress.   Abdominal:   Soft, appropriate TTP, non-distended   Midlne incision with staples in place - drainage improving, clean, dry and intact. Ostomy moist and pink.   Skin: Skin is warm and dry.       Significant Labs:  CBC:   Recent Labs  Lab 06/01/18 0521   WBC 12.44   RBC 4.14   HGB 12.6   HCT 37.7   *   MCV 91   MCH 30.4   MCHC 33.4     BMP:   Recent Labs  Lab 05/30/18  0502  06/01/18  0521   GLU 56*  --   --    *  --   --    K 4.3  --   --      --   --    CO2 23  --   --    BUN 5*  --   --    CREATININE 0.5  --   --    CALCIUM 8.0*  --   --    MG 1.3*  < > 1.3*   < > = values in this interval not displayed.  CMP:   Recent Labs  Lab 05/28/18  0522  05/30/18  0502   *  < > 56*   CALCIUM 7.2*  < > 8.0*   ALBUMIN 1.4*  --   --    PROT 3.7*  --   --    *  < > 132*   K 5.9*  < > 4.3   CO2 24  < > 23     < > 101   BUN 8*  < > 5*   CREATININE 0.6  < > 0.5   ALKPHOS 56  --   --    ALT 12  --   --    AST 14  --   --    BILITOT 0.4  --   --    < > = values in  this interval not displayed.  LFTs:   Recent Labs  Lab 05/28/18  0522   ALT 12   AST 14   ALKPHOS 56   BILITOT 0.4   PROT 3.7*   ALBUMIN 1.4*     Assessment/Plan:     * Bowel perforation    89 yo female s/p Ex lap, Right colectomy, end ileostomy 5/23/18    - tolerating regular diet, continue  - PRN po pain meds  - d/c IVF, start lasix  - aggressive PT/OT, aggressive IS  - appreciate WOCN assistance  - DVT/GI prophylaxis  - stable for SNF placement; plan for dc today        Yeast infection    fluconazole        PVD (peripheral vascular disease)    -pain control  -follow up as outpatient with vascular  -plavix        Hypothyroidism    Home meds  -levothyroxine        GERD (gastroesophageal reflux disease)    PPI therapy        COPD (chronic obstructive pulmonary disease)    Continue aggressive pulm toilet         HTN (hypertension)    Home meds  -amlodipine, lopressor            Obdulia Gonsalves PA-C   [id number]  General Surgery  [facility]

## 2018-06-01 NOTE — PLAN OF CARE
09:00 AM [facility], ph [phone], Chhaya, left voice message on assigned CRIS SCHULTE' phone, stating they have approved to pay for ambulance for patients' transport to them. Covering GRISEL SCHULTE, will be updated & arrange. Spoke to floor nurse, Mercy, & updated on above.   Mercy states she called on call SW, yesterday evening, for them to arrange for ambulance transportation & FRANCA told her to call SNF facility in AM to arrange for [facility] needs to initiate ambulance transportation since they are paying for it.     09:10 AM Updated covering GRISEL SCHULTE ([phone]). She will arrange.

## 2018-06-01 NOTE — NURSING
Notified Dr. Flores is c/o 8/10 pain to right great toe after both PO and IV PRN medications given. MD placed new orders, ok to give PO dose of pain medication now.

## 2018-06-01 NOTE — NURSING
IV removed, pain medicine given per request and illiostomy emptied. Reported called to [facility].

## 2018-06-01 NOTE — PLAN OF CARE
06/01/18 0949   Final Note   Assessment Type Final Discharge Note   Discharge Disposition SNF  ([facility] in [place])   What phone number can be called within the next 1-3 days to see how you are doing after discharge? (Wiley Beck Daughter [phone]   )   Hospital Follow Up  Appt(s) scheduled? Yes   Discharge plans and expectations educations in teach back method with documentation complete? Yes   Right Care Referral Info   Post Acute Recommendation SNF / Sub-Acute Rehab     Patient did not discharge to [facility] yesterday evening, as planned, due to patient daughter not wanting her mother to be transferred in a w/c van. See CM note from yesterday.

## 2018-06-01 NOTE — SUBJECTIVE & OBJECTIVE
Interval History:   Patient seen and examined, no acute events overnight  Did not leave yesterday due to transportation issues  Patient resting comfortably in bed  Tolerating regular diet with no N/V  +stool/gas in ostomy  Midline drainage decreasing  On 1.5LNC  Afebrile/VSS    Medications:  Continuous Infusions:  Scheduled Meds:   acetaminophen  650 mg Oral Q8H    amLODIPine  10 mg Oral Daily    clopidogrel  75 mg Oral Daily    furosemide  20 mg Oral BID    heparin (porcine)  5,000 Units Subcutaneous Q8H    levothyroxine  100 mcg Oral QAM    metoprolol tartrate  12.5 mg Oral BID    miconazole nitrate 2%   Topical (Top) BID    nitroGLYCERIN 2% TD oint  0.5 inch Topical (Top) Q6H    pantoprazole  40 mg Oral Daily     PRN Meds:albuterol-ipratropium, [COMPLETED] calcium gluconate IVPB **AND** calcium gluconate IVPB, HYDROcodone-acetaminophen, HYDROmorphone, miconazole NITRATE 2 %, ondansetron, sodium chloride 0.9%, sodium chloride 0.9%     Review of patient's allergies indicates:   Allergen Reactions    Sulfa (sulfonamide antibiotics) Swelling    Asa [aspirin] Itching     Objective:     Vital Signs (Most Recent):  Temp: 97.6 °F (36.4 °C) (06/01/18 0820)  Pulse: 93 (06/01/18 0820)  Resp: 20 (06/01/18 0820)  BP: 131/65 (06/01/18 0820)  SpO2: 95 % (06/01/18 0820) Vital Signs (24h Range):  Temp:  [96.4 °F (35.8 °C)-97.6 °F (36.4 °C)] 97.6 °F (36.4 °C)  Pulse:  [] 93  Resp:  [14-20] 20  SpO2:  [93 %-96 %] 95 %  BP: (123-131)/(57-65) 131/65     Weight: 52.2 kg (115 lb 1.3 oz)  Body mass index is 22.48 kg/m².    Intake/Output - Last 3 Shifts       05/30 0700 - 05/31 0659 05/31 0700 - 06/01 0659 06/01 0700 - 06/02 0659    P.O. 120 960 240    Total Intake(mL/kg) 120 (2.3) 960 (18.4) 240 (4.6)    Stool 125 450     Total Output 125 450      Net -5 +510 +240           Urine Occurrence 5 x 4 x     Stool Occurrence  0 x     Emesis Occurrence  0 x           Physical Exam   Constitutional: She appears well-developed  and well-nourished. No distress.   HENT:   Head: Normocephalic and atraumatic.   Cardiovascular: Normal rate and regular rhythm.    Pulmonary/Chest: Effort normal. No respiratory distress.   Abdominal:   Soft, appropriate TTP, non-distended   Midlne incision with staples in place - drainage improving, clean, dry and intact. Ostomy moist and pink.   Skin: Skin is warm and dry.       Significant Labs:  CBC:   Recent Labs  Lab 06/01/18 0521   WBC 12.44   RBC 4.14   HGB 12.6   HCT 37.7   *   MCV 91   MCH 30.4   MCHC 33.4     BMP:   Recent Labs  Lab 05/30/18 0502 06/01/18 0521   GLU 56*  --   --    *  --   --    K 4.3  --   --      --   --    CO2 23  --   --    BUN 5*  --   --    CREATININE 0.5  --   --    CALCIUM 8.0*  --   --    MG 1.3*  < > 1.3*   < > = values in this interval not displayed.  CMP:   Recent Labs  Lab 05/28/18 0522 05/30/18 0502   *  < > 56*   CALCIUM 7.2*  < > 8.0*   ALBUMIN 1.4*  --   --    PROT 3.7*  --   --    *  < > 132*   K 5.9*  < > 4.3   CO2 24  < > 23     < > 101   BUN 8*  < > 5*   CREATININE 0.6  < > 0.5   ALKPHOS 56  --   --    ALT 12  --   --    AST 14  --   --    BILITOT 0.4  --   --    < > = values in this interval not displayed.  LFTs:   Recent Labs  Lab 05/28/18 0522   ALT 12   AST 14   ALKPHOS 56   BILITOT 0.4   PROT 3.7*   ALBUMIN 1.4*

## 2018-06-02 ENCOUNTER — HOSPITAL ENCOUNTER (INPATIENT)
Facility: HOSPITAL | Age: 83
LOS: 18 days | Discharge: NURSING, REHAB OR LONG-TERM CARE | DRG: 240 | End: 2018-06-20
Attending: EMERGENCY MEDICINE | Admitting: HOSPITALIST
Payer: MEDICARE

## 2018-06-02 ENCOUNTER — PATIENT MESSAGE (OUTPATIENT)
Dept: VASCULAR SURGERY | Facility: CLINIC | Age: 83
End: 2018-06-02

## 2018-06-02 DIAGNOSIS — C18.4 PRIMARY ADENOCARCINOMA OF TRANSVERSE COLON: Chronic | ICD-10-CM

## 2018-06-02 DIAGNOSIS — I73.9 PAD (PERIPHERAL ARTERY DISEASE): ICD-10-CM

## 2018-06-02 DIAGNOSIS — R53.1 WEAKNESS: ICD-10-CM

## 2018-06-02 DIAGNOSIS — I70.269 ATHEROSCLEROSIS OF ARTERY OF EXTREMITY WITH GANGRENE: ICD-10-CM

## 2018-06-02 DIAGNOSIS — I96 GANGRENE OF TOE OF RIGHT FOOT: Primary | ICD-10-CM

## 2018-06-02 DIAGNOSIS — R09.02 HYPOXIA: ICD-10-CM

## 2018-06-02 LAB
ALBUMIN SERPL BCP-MCNC: 1.6 G/DL
ALP SERPL-CCNC: 79 U/L
ALT SERPL W/O P-5'-P-CCNC: 10 U/L
ANION GAP SERPL CALC-SCNC: 6 MMOL/L
AST SERPL-CCNC: 20 U/L
BASOPHILS # BLD AUTO: 0.06 K/UL
BASOPHILS NFR BLD: 0.4 %
BILIRUB SERPL-MCNC: 0.5 MG/DL
BUN SERPL-MCNC: 5 MG/DL
CALCIUM SERPL-MCNC: 7.9 MG/DL
CHLORIDE SERPL-SCNC: 101 MMOL/L
CO2 SERPL-SCNC: 28 MMOL/L
CREAT SERPL-MCNC: 0.5 MG/DL
DIFFERENTIAL METHOD: ABNORMAL
EOSINOPHIL # BLD AUTO: 0.1 K/UL
EOSINOPHIL NFR BLD: 0.6 %
ERYTHROCYTE [DISTWIDTH] IN BLOOD BY AUTOMATED COUNT: 15.6 %
EST. GFR  (AFRICAN AMERICAN): >60 ML/MIN/1.73 M^2
EST. GFR  (NON AFRICAN AMERICAN): >60 ML/MIN/1.73 M^2
GLUCOSE SERPL-MCNC: 93 MG/DL
HCT VFR BLD AUTO: 37.3 %
HGB BLD-MCNC: 12.7 G/DL
IMM GRANULOCYTES # BLD AUTO: 0.22 K/UL
IMM GRANULOCYTES NFR BLD AUTO: 1.5 %
LYMPHOCYTES # BLD AUTO: 1.3 K/UL
LYMPHOCYTES NFR BLD: 8.9 %
MCH RBC QN AUTO: 31 PG
MCHC RBC AUTO-ENTMCNC: 34 G/DL
MCV RBC AUTO: 91 FL
MONOCYTES # BLD AUTO: 0.8 K/UL
MONOCYTES NFR BLD: 5.7 %
NEUTROPHILS # BLD AUTO: 11.9 K/UL
NEUTROPHILS NFR BLD: 82.9 %
NRBC BLD-RTO: 0 /100 WBC
PLATELET # BLD AUTO: 473 K/UL
PMV BLD AUTO: 9.1 FL
POTASSIUM SERPL-SCNC: 3.8 MMOL/L
PROT SERPL-MCNC: 4.3 G/DL
RBC # BLD AUTO: 4.1 M/UL
SODIUM SERPL-SCNC: 135 MMOL/L
WBC # BLD AUTO: 14.29 K/UL

## 2018-06-02 PROCEDURE — 25000003 PHARM REV CODE 250

## 2018-06-02 PROCEDURE — 99285 EMERGENCY DEPT VISIT HI MDM: CPT | Mod: 25

## 2018-06-02 PROCEDURE — 63600175 PHARM REV CODE 636 W HCPCS: Performed by: EMERGENCY MEDICINE

## 2018-06-02 PROCEDURE — 96375 TX/PRO/DX INJ NEW DRUG ADDON: CPT

## 2018-06-02 PROCEDURE — 80053 COMPREHEN METABOLIC PANEL: CPT

## 2018-06-02 PROCEDURE — 63600175 PHARM REV CODE 636 W HCPCS

## 2018-06-02 PROCEDURE — 99285 EMERGENCY DEPT VISIT HI MDM: CPT | Mod: ,,, | Performed by: EMERGENCY MEDICINE

## 2018-06-02 PROCEDURE — 93010 ELECTROCARDIOGRAM REPORT: CPT | Mod: ,,, | Performed by: INTERNAL MEDICINE

## 2018-06-02 PROCEDURE — 86901 BLOOD TYPING SEROLOGIC RH(D): CPT

## 2018-06-02 PROCEDURE — 87040 BLOOD CULTURE FOR BACTERIA: CPT

## 2018-06-02 PROCEDURE — 11000001 HC ACUTE MED/SURG PRIVATE ROOM

## 2018-06-02 PROCEDURE — 93005 ELECTROCARDIOGRAM TRACING: CPT

## 2018-06-02 PROCEDURE — 12000002 HC ACUTE/MED SURGE SEMI-PRIVATE ROOM

## 2018-06-02 PROCEDURE — 96374 THER/PROPH/DIAG INJ IV PUSH: CPT

## 2018-06-02 PROCEDURE — 96372 THER/PROPH/DIAG INJ SC/IM: CPT | Mod: 59

## 2018-06-02 PROCEDURE — 63600175 PHARM REV CODE 636 W HCPCS: Performed by: HOSPITALIST

## 2018-06-02 PROCEDURE — 85025 COMPLETE CBC W/AUTO DIFF WBC: CPT

## 2018-06-02 RX ORDER — OXYCODONE AND ACETAMINOPHEN 5; 325 MG/1; MG/1
1 TABLET ORAL EVERY 8 HOURS
Status: DISCONTINUED | OUTPATIENT
Start: 2018-06-02 | End: 2018-06-03

## 2018-06-02 RX ORDER — ONDANSETRON 8 MG/1
8 TABLET, ORALLY DISINTEGRATING ORAL EVERY 8 HOURS PRN
Status: DISCONTINUED | OUTPATIENT
Start: 2018-06-02 | End: 2018-06-20 | Stop reason: HOSPADM

## 2018-06-02 RX ORDER — OXYCODONE HYDROCHLORIDE 5 MG/1
10 TABLET ORAL EVERY 6 HOURS PRN
Status: DISCONTINUED | OUTPATIENT
Start: 2018-06-02 | End: 2018-06-04

## 2018-06-02 RX ORDER — ROSUVASTATIN CALCIUM 20 MG/1
20 TABLET, COATED ORAL DAILY
Status: DISCONTINUED | OUTPATIENT
Start: 2018-06-03 | End: 2018-06-04

## 2018-06-02 RX ORDER — PROCHLORPERAZINE EDISYLATE 5 MG/ML
5 INJECTION INTRAMUSCULAR; INTRAVENOUS EVERY 6 HOURS PRN
Status: DISCONTINUED | OUTPATIENT
Start: 2018-06-02 | End: 2018-06-20 | Stop reason: HOSPADM

## 2018-06-02 RX ORDER — AMOXICILLIN 250 MG
1 CAPSULE ORAL 2 TIMES DAILY
Status: DISCONTINUED | OUTPATIENT
Start: 2018-06-02 | End: 2018-06-20 | Stop reason: HOSPADM

## 2018-06-02 RX ORDER — MAGNESIUM SULFATE HEPTAHYDRATE 40 MG/ML
2 INJECTION, SOLUTION INTRAVENOUS ONCE
Status: COMPLETED | OUTPATIENT
Start: 2018-06-02 | End: 2018-06-02

## 2018-06-02 RX ORDER — LEVOTHYROXINE SODIUM 100 UG/1
100 TABLET ORAL EVERY MORNING
Status: DISCONTINUED | OUTPATIENT
Start: 2018-06-03 | End: 2018-06-04

## 2018-06-02 RX ORDER — SODIUM CHLORIDE 0.9 % (FLUSH) 0.9 %
3 SYRINGE (ML) INJECTION
Status: DISCONTINUED | OUTPATIENT
Start: 2018-06-02 | End: 2018-06-20 | Stop reason: HOSPADM

## 2018-06-02 RX ORDER — FENTANYL CITRATE 50 UG/ML
50 INJECTION, SOLUTION INTRAMUSCULAR; INTRAVENOUS
Status: COMPLETED | OUTPATIENT
Start: 2018-06-02 | End: 2018-06-02

## 2018-06-02 RX ORDER — PANTOPRAZOLE SODIUM 40 MG/1
40 TABLET, DELAYED RELEASE ORAL DAILY
Status: DISCONTINUED | OUTPATIENT
Start: 2018-06-03 | End: 2018-06-20 | Stop reason: HOSPADM

## 2018-06-02 RX ORDER — ENOXAPARIN SODIUM 100 MG/ML
40 INJECTION SUBCUTANEOUS EVERY 24 HOURS
Status: DISCONTINUED | OUTPATIENT
Start: 2018-06-02 | End: 2018-06-03

## 2018-06-02 RX ORDER — MORPHINE SULFATE 4 MG/ML
4 INJECTION, SOLUTION INTRAMUSCULAR; INTRAVENOUS EVERY 4 HOURS PRN
Status: DISCONTINUED | OUTPATIENT
Start: 2018-06-02 | End: 2018-06-02

## 2018-06-02 RX ADMIN — OXYCODONE HYDROCHLORIDE AND ACETAMINOPHEN 1 TABLET: 5; 325 TABLET ORAL at 09:06

## 2018-06-02 RX ADMIN — MAGNESIUM SULFATE IN WATER 2 G: 40 INJECTION, SOLUTION INTRAVENOUS at 09:06

## 2018-06-02 RX ADMIN — FENTANYL CITRATE 50 MCG: 50 INJECTION INTRAMUSCULAR; INTRAVENOUS at 05:06

## 2018-06-02 RX ADMIN — STANDARDIZED SENNA CONCENTRATE AND DOCUSATE SODIUM 1 TABLET: 8.6; 5 TABLET, FILM COATED ORAL at 09:06

## 2018-06-02 RX ADMIN — OXYCODONE HYDROCHLORIDE 10 MG: 5 TABLET ORAL at 08:06

## 2018-06-02 RX ADMIN — ENOXAPARIN SODIUM 40 MG: 100 INJECTION SUBCUTANEOUS at 09:06

## 2018-06-02 NOTE — ED PROVIDER NOTES
Encounter Date: 6/2/2018    SCRIBE #1 NOTE: I, Lisa Castro, am scribing for, and in the presence of,  Dr. Correa. I have scribed the entire note.       History     Chief Complaint   Patient presents with    Toe Pain     Pt arrives for evaluation of right great toe pain with poor circulation and black skin noted - cool to touch - recommended by doctor to have AKA performed     Time patient was seen by the provider: 4:24 PM      The patient is a 89 y.o. female with co-morbidities including: HTN, HLD, COPD, DJD, and GERD who presents to the ED with a complaint of right great toe pain. She rates the pain at a 12. Pt's doctor, Dr. Mary Ann Garcia, has suggested pt have AKA due to pain. She has poor circulation and black skin is noted.       The history is provided by the patient.     Review of patient's allergies indicates:   Allergen Reactions    Sulfa (sulfonamide antibiotics) Swelling    Asa [aspirin] Itching     Past Medical History:   Diagnosis Date    Allergy     Cholelithiasis without obstruction     Chronic insomnia     COPD (chronic obstructive pulmonary disease)     DJD (degenerative joint disease), lumbar     GERD (gastroesophageal reflux disease)     HLD (hyperlipidemia)     HTN (hypertension)     Hypothyroid     OP (osteoporosis)     Osteoarthritis     S/P hysterectomy      Past Surgical History:   Procedure Laterality Date    APPENDECTOMY      CHOLECYSTECTOMY      EXPLORATORY LAPAROTOMY N/A 5/23/2018    Procedure: EXPLORATORY-LAPAROTOMY;  Surgeon: Tushar Anderson MD;  Location: [address];  Service: General;  Laterality: N/A;    HIP SURGERY  8-2013    right    HYSTERECTOMY      ILEOSTOMY  5/23/2018    Procedure: ILEOSTOMY;  Surgeon: Tushar Anderson MD;  Location: [address];  Service: General;;    KNEE ARTHROSCOPY Right 7-6-15    TK    RIGHT HEMICOLECTOMY  5/23/2018    Procedure: COLECTOMY-RIGHT;  Surgeon: Tushar Anderson MD;  Location: [facility] OR [address] [place] [address];   Service: General;;     Family History   Problem Relation Age of Onset    Melanoma Neg Hx      Social History   Substance Use Topics    Smoking status: Never Smoker    Smokeless tobacco: Never Used    Alcohol use No     Review of Systems   Constitutional: Negative for chills and fever.   HENT: Negative for congestion.    Eyes: Negative for pain.   Respiratory: Negative for stridor.    Cardiovascular: Negative for chest pain.   Gastrointestinal: Negative for nausea and vomiting.   Genitourinary: Negative for difficulty urinating.   Musculoskeletal:        (+) right great toe pain   Skin: Positive for color change.   Neurological: Negative for headaches.       Physical Exam     Initial Vitals [06/02/18 1611]   BP Pulse Resp Temp SpO2   118/60 80 18 97.9 °F (36.6 °C) 95 %      MAP       79.33         Physical Exam    Nursing note and vitals reviewed.  Constitutional: She is not diaphoretic. No distress.   HENT:   Head: Normocephalic and atraumatic.   Eyes: EOM are normal. Pupils are equal, round, and reactive to light.   Neck: Normal range of motion. Neck supple.   Cardiovascular: Normal rate, regular rhythm and normal heart sounds.   No murmur heard.  Pulmonary/Chest: Breath sounds normal. No respiratory distress.   Abdominal:   Recent ileostomy-colostomy. Abdomen is not super tender.   Musculoskeletal: Normal range of motion. She exhibits no edema.   Neurological: She is alert and oriented to person, place, and time. She has normal strength. No cranial nerve deficit or sensory deficit.   Skin:   Gangrene of tip of right great toe with poor skin color. Proximal and non-healing epidermis. Unable to palpate pulses.         ED Course   Procedures  Labs Reviewed   CBC W/ AUTO DIFFERENTIAL - Abnormal; Notable for the following:        Result Value    WBC 14.29 (*)     RDW 15.6 (*)     Platelets 473 (*)     MPV 9.1 (*)     Immature Granulocytes 1.5 (*)     Gran # (ANC) 11.9 (*)     Immature Grans (Abs) 0.22 (*)     Gran%  82.9 (*)     Lymph% 8.9 (*)     All other components within normal limits   COMPREHENSIVE METABOLIC PANEL - Abnormal; Notable for the following:     Sodium 135 (*)     BUN, Bld 5 (*)     Calcium 7.9 (*)     Total Protein 4.3 (*)     Albumin 1.6 (*)     Anion Gap 6 (*)     All other components within normal limits   CULTURE, BLOOD   CULTURE, BLOOD   TYPE & SCREEN   TYPE & SCREEN     EKG Readings: (Independently Interpreted)   Normal sinus rhythm. Low voltage. No ischemic changes.       X-Rays:   Independently Interpreted Readings:   Chest X-Ray: No acute process      Medical Decision Making:   History:   Old Medical Records: I decided to obtain old medical records.  Initial Assessment:   Gangrene of right toe and foot. Told she needs an AKA but has been trying to put it off. She can no longer take the pain. Discussed with vascular surgery for evaluation and hospital medicine for admission.  Independently Interpreted Test(s):   I have ordered and independently interpreted X-rays - see prior notes.  I have ordered and independently interpreted EKG Reading(s) - see prior notes  Clinical Tests:   Lab Tests: Ordered and Reviewed  Radiological Study: Ordered and Reviewed  Medical Tests: Ordered and Reviewed  Other:   I have discussed this case with another health care provider.            Scribe Attestation:   Scribe #1: I performed the above scribed service and the documentation accurately describes the services I performed. I attest to the accuracy of the note.               Clinical Impression:   The primary encounter diagnosis was Gangrene of toe of right foot. Diagnoses of Hypoxia, Weakness, and PAD (peripheral artery disease) were also pertinent to this visit.    X-Ray Chest 1 View   Final Result      1. Overall, grossly stable chronic findings including pleural effusions and pulmonary edema.  Developing consolidation projected over the left lower lung zone however is not excluded.         Electronically signed  by: Saurav Hill MD   Date:    06/02/2018   Time:    17:44          Disposition:   Disposition: Admitted                        Dain Correa MD  06/02/18 1939

## 2018-06-02 NOTE — PT/OT/SLP DISCHARGE
Occupational Therapy Discharge Summary    Suzan Villarreal  MRN: [id number]   Principal Problem: Bowel perforation      Patient Discharged from acute Occupational Therapy on 6/2/18.  Please refer to prior OT note dated 5/31/18 for functional status.    Assessment:      Patient has not met goals.    Objective:     GOALS:    Occupational Therapy Goals     Not on file          Multidisciplinary Problems (Resolved)        Problem: Occupational Therapy Goal    Goal Priority Disciplines Outcome Interventions   Occupational Therapy Goal   (Resolved)     OT, PT/OT Outcome(s) achieved    Description:  Goals to be met by: 6/10/18     Patient will increase functional independence with ADLs by performing:    UE Dressing with Set-up Assistance.  LE Dressing with Moderate Assistance.  Grooming while seated with Minimal Assistance.  Toileting from bedside commode with Moderate Assistance for hygiene and clothing management.   Toilet transfer to bedside commode with Moderate Assistance.                       Reasons for Discontinuation of Therapy Services  Transfer to alternate level of care.      Plan:     Patient Discharged to: Skilled Nursing Facility    Ryann Crawley, OT  6/2/2018

## 2018-06-02 NOTE — HPI
Ms. Villarreal is a 89 woman with PMHx significant for severe PVD, HTN, HLD, hypothyroidism, GERD and chronic constipation who presents to the ED with severe pain, gangrene of the R foot. Patient with recent admission for perforated viscus, s/p ex-lap and ostomy (please see Discharge Summary from 5/31 for further details of that admission.) Patient's daughter is at bedside and provides much of the history. Patient first developed foot pain 3-4 weeks ago. Then around Mother's Day began developing a black eschar on her distal R great toe. Follows with Dr. Garcia in Vascular Surgery. On 5/16, aortogram with run off performed. R AKA recommended; however, patient and family refused at that time. Pain progressed since that time, with ulceration of the dorsum of her foot and heel developing. Daughter brought patient back to ED due to uncontrolled pain.

## 2018-06-02 NOTE — ASSESSMENT & PLAN NOTE
- Vascular surgery consulted, appreciate recs  - Scheduled norco with oxycodone for breakthrough pain

## 2018-06-02 NOTE — ED TRIAGE NOTES
Patient came to ED for she was discharged yesterday.  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  Patient states she can no longer take the pain.

## 2018-06-02 NOTE — CONSULTS
History & Physical  Surgery      SUBJECTIVE:     Chief Complaint/Reason for Admission: Ischemia of the right lower extremity     History of Present Illness: Suzan Villarreal is a 89 y.o. female with   89 y.o. with h/o HTN, HLD, GERD, and COPD presented one day h/o abdominal pain on 5/23/18.  She was found to have pneumoperitoneum and underwent R hemicolectomy with ileostomy for obstructing hepatic flexure mass.  She recently underwent a RLE angiogram (5/18/18) for right foot rest pain which started on 5/10/18. During this angiogram was found to have a high grade popliteal stenosis which improved with stent, single vessel run off of AT which occluded mid calf with no pedal outflow.     She presents to the ED today with severe recurrent right foot pain after being discharged from the hospital. Last week we explained that the only alternative we are left is amputation, but the patient remained resistant to this until today. She denies interval redness or drainage from her gangrenous foot, also denies general malaise, CP SOB, Abdominal pain syncope or lightheadedness    No current facility-administered medications on file prior to encounter.      Current Outpatient Prescriptions on File Prior to Encounter   Medication Sig    acetaminophen (TYLENOL) 325 MG tablet Take 2 tablets (650 mg total) by mouth every 6 (six) hours as needed.    amLODIPine (NORVASC) 10 MG tablet Take 1 tablet (10 mg total) by mouth once daily.    ANTIOX #11/OM3/DHA/EPA/LUT/BARBER (OCUVITE ADULT 50+ ORAL) Take 1 tablet by mouth once daily.    benazepril (LOTENSIN) 20 MG tablet Take 1 tablet (20 mg total) by mouth 2 (two) times daily.    Bifidobacterium animalis 6 mg (5 billion cell) Cap Take 1 capsule by mouth once daily.    calcium carbonate (OS-UZMA) 600 mg (1,500 mg) Tab Take 600 mg by mouth 2 (two) times daily with meals.    clopidogrel (PLAVIX) 75 mg tablet Take 1 tablet (75 mg total) by mouth once daily.    docusate sodium (COLACE) 100 MG  capsule Take 1 capsule (100 mg total) by mouth once daily.    esomeprazole (NEXIUM) 40 MG capsule Take 1 capsule (40 mg total) by mouth before breakfast.    fluoruracil (CARAC) 0.5 % cream Apply topically once daily. Nasal tip    inulin-sorbitol 2 gram Chew Take 1-2 tablets by mouth 2 (two) times daily.    Lactobacillus acidoph-L.bulgar 1 million cell Chew Take 4 tablets by mouth 3 (three) times daily with meals.    levothyroxine (SYNTHROID) 100 MCG tablet Take 1 tablet (100 mcg total) by mouth every morning.    metoclopramide HCl (REGLAN) 5 MG tablet Take 1 tablet (5 mg total) by mouth 2 (two) times daily as needed (nausea/vomiting). Do not take if severe abdominal pain present    metoprolol succinate (TOPROL-XL) 25 MG 24 hr tablet Take 1 tablet (25 mg total) by mouth once daily.    multivitamin capsule Take 1 capsule by mouth once daily.    oxyCODONE-acetaminophen (PERCOCET) 5-325 mg per tablet Take 1 tablet by mouth every 6 (six) hours as needed for Pain. No alcohol, no driving, no operating machinery, no working, no swimming, no additional Tylenol (acetaminophen) while taking this medication.    rosuvastatin (CRESTOR) 20 MG tablet Take 1 tablet (20 mg total) by mouth once daily.    salmeterol (SEREVENT DISKUS) 50 mcg/dose diskus inhaler Inhale 1 puff into the lungs 2 (two) times daily. Controller       Review of patient's allergies indicates:   Allergen Reactions    Sulfa (sulfonamide antibiotics) Swelling    Asa [aspirin] Itching       Past Medical History:   Diagnosis Date    Allergy     Cholelithiasis without obstruction     Chronic insomnia     COPD (chronic obstructive pulmonary disease)     DJD (degenerative joint disease), lumbar     GERD (gastroesophageal reflux disease)     HLD (hyperlipidemia)     HTN (hypertension)     Hypothyroid     OP (osteoporosis)     Osteoarthritis     S/P hysterectomy      Past Surgical History:   Procedure Laterality Date    APPENDECTOMY       CHOLECYSTECTOMY      EXPLORATORY LAPAROTOMY N/A 5/23/2018    Procedure: EXPLORATORY-LAPAROTOMY;  Surgeon: Tushar Anderson MD;  Location: [facility] OR [address] [place] [address];  Service: General;  Laterality: N/A;    HIP SURGERY  8-2013    right    HYSTERECTOMY      ILEOSTOMY  5/23/2018    Procedure: ILEOSTOMY;  Surgeon: Tushar Anderson MD;  Location: [facility] OR [address] [place] [address];  Service: General;;    KNEE ARTHROSCOPY Right 7-6-15    TK    RIGHT HEMICOLECTOMY  5/23/2018    Procedure: COLECTOMY-RIGHT;  Surgeon: Tushar Anderson MD;  Location: [facility] OR [address] [place] [address];  Service: General;;     Family History   Problem Relation Age of Onset    Melanoma Neg Hx      Social History   Substance Use Topics    Smoking status: Never Smoker    Smokeless tobacco: Never Used    Alcohol use No        Review of Systems   Otherwise negative  OBJECTIVE:     Vital Signs (Most Recent)  Temp: 97.9 °F (36.6 °C) (06/02/18 1611)  Pulse: 76 (06/02/18 1707)  Resp: 18 (06/02/18 1611)  BP: (!) 120/57 (06/02/18 1707)  SpO2: 96 % (06/02/18 1707)    Physical Exam   A&O, NAD  RRR  No Resp Distress  ABD: s/nt/nd  Right foot without pedal pulses dry gangrene of the digit, no erythema or drainage    Laboratory  CBC:   Recent Labs  Lab 06/02/18  1729   WBC 14.29*   RBC 4.10   HGB 12.7   HCT 37.3   *   MCV 91   MCH 31.0   MCHC 34.0     CMP:   Recent Labs  Lab 06/02/18  1729   GLU 93   CALCIUM 7.9*   ALBUMIN 1.6*   PROT 4.3*   *   K 3.8   CO2 28      BUN 5*   CREATININE 0.5   ALKPHOS 79   ALT 10   AST 20   BILITOT 0.5     Diagnostic Results:  Reviewed     ASSESSMENT/PLAN:     A/P:  89 y/o F with ischemic right leg. Needs amputation for worsening rest pain. No signs/symptoms of sepsis/infection to warrant emergency surgery at this time    - admit to IM  - improve pain control  - Plan for R AKA Tuesday  - Risks, benefits, and alternatives were discussed with the patient and her family, and full informed consent was obtained from the daughter (POA)        Dain Martin MD   [phone]  General Surgery PGY-I  [facility]

## 2018-06-02 NOTE — ASSESSMENT & PLAN NOTE
- Holding home amlodipine, benazepril, toprol in the setting of low/normal pressures  - Will restart as appropriate

## 2018-06-02 NOTE — ED TRIAGE NOTES
Pt arrives for evaluation of right great toe pain with poor circulation and black skin noted - cool to touch - recommended by doctor to have AKA performed

## 2018-06-02 NOTE — PLAN OF CARE
Problem: Occupational Therapy Goal  Goal: Occupational Therapy Goal  Goals to be met by: 6/10/18     Patient will increase functional independence with ADLs by performing:    UE Dressing with Set-up Assistance.  LE Dressing with Moderate Assistance.  Grooming while seated with Minimal Assistance.  Toileting from bedside commode with Moderate Assistance for hygiene and clothing management.   Toilet transfer to bedside commode with Moderate Assistance.     Outcome: Outcome(s) achieved Date Met: 06/02/18  Goals not met; pt d/c from hospital

## 2018-06-02 NOTE — SUBJECTIVE & OBJECTIVE
Past Medical History:   Diagnosis Date    Allergy     Cholelithiasis without obstruction     Chronic insomnia     COPD (chronic obstructive pulmonary disease)     DJD (degenerative joint disease), lumbar     GERD (gastroesophageal reflux disease)     HLD (hyperlipidemia)     HTN (hypertension)     Hypothyroid     OP (osteoporosis)     Osteoarthritis     S/P hysterectomy        Past Surgical History:   Procedure Laterality Date    APPENDECTOMY      CHOLECYSTECTOMY      EXPLORATORY LAPAROTOMY N/A 5/23/2018    Procedure: EXPLORATORY-LAPAROTOMY;  Surgeon: Tushar Anderson MD;  Location: [address];  Service: General;  Laterality: N/A;    HIP SURGERY  8-2013    right    HYSTERECTOMY      ILEOSTOMY  5/23/2018    Procedure: ILEOSTOMY;  Surgeon: Tushar Anderson MD;  Location: [facility] OR [address] [place] [address];  Service: General;;    KNEE ARTHROSCOPY Right 7-6-15    TK    RIGHT HEMICOLECTOMY  5/23/2018    Procedure: COLECTOMY-RIGHT;  Surgeon: Tushar Anderson MD;  Location: [facility] OR [address] [place] [address];  Service: General;;       Review of patient's allergies indicates:   Allergen Reactions    Sulfa (sulfonamide antibiotics) Swelling    Asa [aspirin] Itching       No current facility-administered medications on file prior to encounter.      Current Outpatient Prescriptions on File Prior to Encounter   Medication Sig    acetaminophen (TYLENOL) 325 MG tablet Take 2 tablets (650 mg total) by mouth every 6 (six) hours as needed.    amLODIPine (NORVASC) 10 MG tablet Take 1 tablet (10 mg total) by mouth once daily.    ANTIOX #11/OM3/DHA/EPA/LUT/BARBER (OCUVITE ADULT 50+ ORAL) Take 1 tablet by mouth once daily.    benazepril (LOTENSIN) 20 MG tablet Take 1 tablet (20 mg total) by mouth 2 (two) times daily.    Bifidobacterium animalis 6 mg (5 billion cell) Cap Take 1 capsule by mouth once daily.    calcium carbonate (OS-UZMA) 600 mg (1,500 mg) Tab Take 600 mg by mouth 2 (two) times daily with meals.    clopidogrel (PLAVIX)  75 mg tablet Take 1 tablet (75 mg total) by mouth once daily.    docusate sodium (COLACE) 100 MG capsule Take 1 capsule (100 mg total) by mouth once daily.    esomeprazole (NEXIUM) 40 MG capsule Take 1 capsule (40 mg total) by mouth before breakfast.    fluoruracil (CARAC) 0.5 % cream Apply topically once daily. Nasal tip    inulin-sorbitol 2 gram Chew Take 1-2 tablets by mouth 2 (two) times daily.    Lactobacillus acidoph-L.bulgar 1 million cell Chew Take 4 tablets by mouth 3 (three) times daily with meals.    levothyroxine (SYNTHROID) 100 MCG tablet Take 1 tablet (100 mcg total) by mouth every morning.    metoclopramide HCl (REGLAN) 5 MG tablet Take 1 tablet (5 mg total) by mouth 2 (two) times daily as needed (nausea/vomiting). Do not take if severe abdominal pain present    metoprolol succinate (TOPROL-XL) 25 MG 24 hr tablet Take 1 tablet (25 mg total) by mouth once daily.    multivitamin capsule Take 1 capsule by mouth once daily.    oxyCODONE-acetaminophen (PERCOCET) 5-325 mg per tablet Take 1 tablet by mouth every 6 (six) hours as needed for Pain. No alcohol, no driving, no operating machinery, no working, no swimming, no additional Tylenol (acetaminophen) while taking this medication.    rosuvastatin (CRESTOR) 20 MG tablet Take 1 tablet (20 mg total) by mouth once daily.    salmeterol (SEREVENT DISKUS) 50 mcg/dose diskus inhaler Inhale 1 puff into the lungs 2 (two) times daily. Controller     Family History     None        Social History Main Topics    Smoking status: Never Smoker    Smokeless tobacco: Never Used    Alcohol use No    Drug use: No    Sexual activity: Not Currently     Review of Systems   Constitutional: Positive for activity change, appetite change and fatigue.   HENT: Negative for trouble swallowing.    Eyes: Negative for visual disturbance.   Respiratory: Negative for cough and shortness of breath.    Cardiovascular: Negative for chest pain.   Gastrointestinal: Negative for  abdominal distention and abdominal pain.   Genitourinary: Negative for dysuria.   Musculoskeletal: Positive for gait problem.   Skin: Positive for wound.   Neurological: Negative for headaches.   Psychiatric/Behavioral: Negative for confusion.     Objective:     Vital Signs (Most Recent):  Temp: 97.9 °F (36.6 °C) (06/02/18 1611)  Pulse: 76 (06/02/18 1707)  Resp: 18 (06/02/18 1611)  BP: (!) 120/57 (06/02/18 1707)  SpO2: 96 % (06/02/18 1707) Vital Signs (24h Range):  Temp:  [97.9 °F (36.6 °C)] 97.9 °F (36.6 °C)  Pulse:  [76-80] 76  Resp:  [18] 18  SpO2:  [95 %-96 %] 96 %  BP: (118-120)/(57-60) 120/57     Weight: 49.9 kg (110 lb)  Body mass index is 22.99 kg/m².    Physical Exam   Constitutional: She is oriented to person, place, and time. She appears distressed.   Elderly, frail woman   HENT:   Head: Normocephalic and atraumatic.   Eyes: Conjunctivae are normal. No scleral icterus.   Neck: Normal range of motion.   Cardiovascular: Normal rate, regular rhythm and normal heart sounds.    Cannot palpate DP/PT pulses   Pulmonary/Chest: Effort normal and breath sounds normal.   Abdominal: Soft. She exhibits no distension. There is no tenderness.   Dressing over surgical site c/d/i  Ostomy bag in place, stool present, no erythema/ signs of infection at site   Musculoskeletal: Normal range of motion. She exhibits tenderness. She exhibits no edema.   R great toe with black eschar  Multiple areas of erythema and ulceration over dorsum of foot   Neurological: She is alert and oriented to person, place, and time.   Skin: Skin is warm and dry.   Psychiatric: She has a normal mood and affect. Her behavior is normal.           Significant Labs: All pertinent labs within the past 24 hours have been reviewed.    Significant Imaging: I have reviewed and interpreted all pertinent imaging results/findings within the past 24 hours.

## 2018-06-02 NOTE — HOSPITAL COURSE
"Patient was admitted to hospital medicine for pain control for right foot gangrene and medication management. Vascular Surgery evaluated patient, plan for right AKA on 6/5. Scheduled percocet 5 started with prn oxycodone 10. Pain controlled overnight.  06/04/2018: Pain overnight, night MD called for IV morphine but patient later refused, discussed with patient and family will change pain meds to scheduled oxy IR 5mg q6 with breakthrough. Awaiting AKA tomorrow. Also placed on 2L supplemental O2 via NC but no SOB, suspect poor pulse ox readings from fingers, will wean today.   06/05/2018: Pain well controlled overnight on scheduled oxycodone. Plan for AKA today with vascular sx.  06/06/2018: AKA yesterday, denies pain. No subjective complaints. VSS, afebrile.   06/07/2018: ISH. Pain well controlled.   06/08/2018: ISH. Perineural catheter removed, reports some pain but controlled with oral medication.  06/09/2018: Some pain overnight, responded to one dose of oxycodone. VSS, afebrile.   06/10/2018: No events overnight. Patient reports pain is well controlled. She is alert and oriented.  06/11/2018: ISH. Medically stable, ready for transfer to SNF.   06/12/2018: ISH. Some discoloration of skin of left heel, will ask wound care to see.   06/13/2018: ISH. Medically stable, awaiting SNF placement.  06/14/2018: Some pain overnight, responded to oxycodone. Changed scheduled Tylenol to PRN as patient has been received 3g/day for last 6 days. Medically stable, awaiting placement.  06/15/2018: No acute events. Pain well controlled. Had some nausea overnight.  06/16/2018: ISH. Complains of some "prickling and puffiness" of stump, but normal exam. No other issues. Medically stable, awaiting SNF.  06/17/2018- 6/19: No issues. Stable, waiting for placement.  06/20/2018: finally approved for discharge, leaving today    "

## 2018-06-02 NOTE — H&P
[facility] Medicine  History & Physical    Patient Name: Suzan Villarreal  MRN: [id number]  Admission Date: 6/2/2018  Attending Physician: Gary Rosen MD   Primary Care Provider: Aly Rivas MD    [facility] Medicine Team: [facility] MED 3 Kenji Watson MD     Patient information was obtained from patient, relative(s), past medical records and ER records.     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 Villarreal is a 89 woman with PMHx significant for severe PVD, HTN, HLD, hypothyroidism, GERD and chronic constipation who presents to the ED with severe pain, gangrene of the R foot. Patient with recent admission for perforated viscus, s/p ex-lap and ostomy (please see Discharge Summary from 5/31 for further details of that admission.) Patient's daughter is at bedside and provides much of the history. Patient first developed foot pain 3-4 weeks ago. Then around Mother's Day began developing a black eschar on her distal R great toe. Follows with Dr. Garcia in Vascular Surgery. On 5/16, aortogram with run off performed. R AKA recommended; however, patient and family refused at that time. Pain progressed since that time, with ulceration of the dorsum of her foot and heel developing. Daughter brought patient back to ED due to uncontrolled pain.     Past Medical History:   Diagnosis Date    Allergy     Cholelithiasis without obstruction     Chronic insomnia     COPD (chronic obstructive pulmonary disease)     DJD (degenerative joint disease), lumbar     GERD (gastroesophageal reflux disease)     HLD (hyperlipidemia)     HTN (hypertension)     Hypothyroid     OP (osteoporosis)      Osteoarthritis     S/P hysterectomy        Past Surgical History:   Procedure Laterality Date    APPENDECTOMY      CHOLECYSTECTOMY      EXPLORATORY LAPAROTOMY N/A 5/23/2018    Procedure: EXPLORATORY-LAPAROTOMY;  Surgeon: Tushar Anderson MD;  Location: [facility] OR [address] [place] [address];  Service: General;  Laterality: N/A;    HIP SURGERY  8-2013    right    HYSTERECTOMY      ILEOSTOMY  5/23/2018    Procedure: ILEOSTOMY;  Surgeon: Tushar Anderson MD;  Location: [facility] OR [address] [place] [address];  Service: General;;    KNEE ARTHROSCOPY Right 7-6-15    TK    RIGHT HEMICOLECTOMY  5/23/2018    Procedure: COLECTOMY-RIGHT;  Surgeon: Tushar Anderson MD;  Location: [facility] OR [address] [place] [address];  Service: General;;       Review of patient's allergies indicates:   Allergen Reactions    Sulfa (sulfonamide antibiotics) Swelling    Asa [aspirin] Itching       No current facility-administered medications on file prior to encounter.      Current Outpatient Prescriptions on File Prior to Encounter   Medication Sig    acetaminophen (TYLENOL) 325 MG tablet Take 2 tablets (650 mg total) by mouth every 6 (six) hours as needed.    amLODIPine (NORVASC) 10 MG tablet Take 1 tablet (10 mg total) by mouth once daily.    ANTIOX #11/OM3/DHA/EPA/LUT/BARBER (OCUVITE ADULT 50+ ORAL) Take 1 tablet by mouth once daily.    benazepril (LOTENSIN) 20 MG tablet Take 1 tablet (20 mg total) by mouth 2 (two) times daily.    Bifidobacterium animalis 6 mg (5 billion cell) Cap Take 1 capsule by mouth once daily.    calcium carbonate (OS-UZMA) 600 mg (1,500 mg) Tab Take 600 mg by mouth 2 (two) times daily with meals.    clopidogrel (PLAVIX) 75 mg tablet Take 1 tablet (75 mg total) by mouth once daily.    docusate sodium (COLACE) 100 MG capsule Take 1 capsule (100 mg total) by mouth once daily.    esomeprazole (NEXIUM) 40 MG capsule Take 1 capsule (40 mg total) by mouth before breakfast.    fluoruracil (CARAC) 0.5 % cream Apply topically once daily. Nasal tip    inulin-sorbitol  2 gram Chew Take 1-2 tablets by mouth 2 (two) times daily.    Lactobacillus acidoph-L.bulgar 1 million cell Chew Take 4 tablets by mouth 3 (three) times daily with meals.    levothyroxine (SYNTHROID) 100 MCG tablet Take 1 tablet (100 mcg total) by mouth every morning.    metoclopramide HCl (REGLAN) 5 MG tablet Take 1 tablet (5 mg total) by mouth 2 (two) times daily as needed (nausea/vomiting). Do not take if severe abdominal pain present    metoprolol succinate (TOPROL-XL) 25 MG 24 hr tablet Take 1 tablet (25 mg total) by mouth once daily.    multivitamin capsule Take 1 capsule by mouth once daily.    oxyCODONE-acetaminophen (PERCOCET) 5-325 mg per tablet Take 1 tablet by mouth every 6 (six) hours as needed for Pain. No alcohol, no driving, no operating machinery, no working, no swimming, no additional Tylenol (acetaminophen) while taking this medication.    rosuvastatin (CRESTOR) 20 MG tablet Take 1 tablet (20 mg total) by mouth once daily.    salmeterol (SEREVENT DISKUS) 50 mcg/dose diskus inhaler Inhale 1 puff into the lungs 2 (two) times daily. Controller     Family History     None        Social History Main Topics    Smoking status: Never Smoker    Smokeless tobacco: Never Used    Alcohol use No    Drug use: No    Sexual activity: Not Currently     Review of Systems   Constitutional: Positive for activity change, appetite change and fatigue.   HENT: Negative for trouble swallowing.    Eyes: Negative for visual disturbance.   Respiratory: Negative for cough and shortness of breath.    Cardiovascular: Negative for chest pain.   Gastrointestinal: Negative for abdominal distention and abdominal pain.   Genitourinary: Negative for dysuria.   Musculoskeletal: Positive for gait problem.   Skin: Positive for wound.   Neurological: Negative for headaches.   Psychiatric/Behavioral: Negative for confusion.     Objective:     Vital Signs (Most Recent):  Temp: 97.9 °F (36.6 °C) (06/02/18 1611)  Pulse: 76  (06/02/18 1707)  Resp: 18 (06/02/18 1611)  BP: (!) 120/57 (06/02/18 1707)  SpO2: 96 % (06/02/18 1707) Vital Signs (24h Range):  Temp:  [97.9 °F (36.6 °C)] 97.9 °F (36.6 °C)  Pulse:  [76-80] 76  Resp:  [18] 18  SpO2:  [95 %-96 %] 96 %  BP: (118-120)/(57-60) 120/57     Weight: 49.9 kg (110 lb)  Body mass index is 22.99 kg/m².    Physical Exam   Constitutional: She is oriented to person, place, and time. She appears distressed.   Elderly, frail woman   HENT:   Head: Normocephalic and atraumatic.   Eyes: Conjunctivae are normal. No scleral icterus.   Neck: Normal range of motion.   Cardiovascular: Normal rate, regular rhythm and normal heart sounds.    Cannot palpate DP/PT pulses   Pulmonary/Chest: Effort normal and breath sounds normal.   Abdominal: Soft. She exhibits no distension. There is no tenderness.   Dressing over surgical site c/d/i  Ostomy bag in place, stool present, no erythema/ signs of infection at site   Musculoskeletal: Normal range of motion. She exhibits tenderness. She exhibits no edema.   R great toe with black eschar  Multiple areas of erythema and ulceration over dorsum of foot   Neurological: She is alert and oriented to person, place, and time.   Skin: Skin is warm and dry.   Psychiatric: She has a normal mood and affect. Her behavior is normal.           Significant Labs: All pertinent labs within the past 24 hours have been reviewed.    Significant Imaging: I have reviewed and interpreted all pertinent imaging results/findings within the past 24 hours.    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    - Vascular surgery consulted, appreciate recs  - Scheduled norco with oxycodone for breakthrough pain          PVD (peripheral vascular disease)    - Has ASA allergy  - Home plavix held in anticipation of surgery          Hyponatremia    - Mild, chronic, stable  - Will monitor daily          Hypothyroidism    - Continue home levothyroxine          GERD  (gastroesophageal reflux disease)    - Home med Nexium, pantoprazole while inpatient        HTN (hypertension)    - Holding home amlodipine, benazepril, toprol in the setting of low/normal pressures  - Will restart as appropriate            VTE Risk Mitigation         Ordered     enoxaparin injection 40 mg  Daily      06/02/18 1734     IP VTE HIGH RISK PATIENT  Once      06/02/18 1734     IP VTE HIGH RISK PATIENT  Once      06/02/18 1734             Kenji Watson MD  Department of Hospital Medicine   [facility]

## 2018-06-03 PROBLEM — Z90.49 S/P RIGHT HEMICOLECTOMY: Status: ACTIVE | Noted: 2018-06-03

## 2018-06-03 LAB
ABO + RH BLD: NORMAL
ALBUMIN SERPL BCP-MCNC: 1.6 G/DL
ALP SERPL-CCNC: 72 U/L
ALT SERPL W/O P-5'-P-CCNC: 10 U/L
ANION GAP SERPL CALC-SCNC: 7 MMOL/L
APTT BLDCRRT: 25.6 SEC
AST SERPL-CCNC: 21 U/L
BASOPHILS # BLD AUTO: 0.1 K/UL
BASOPHILS NFR BLD: 0.6 %
BILIRUB SERPL-MCNC: 0.3 MG/DL
BLD GP AB SCN CELLS X3 SERPL QL: NORMAL
BUN SERPL-MCNC: 5 MG/DL
CALCIUM SERPL-MCNC: 7.8 MG/DL
CHLORIDE SERPL-SCNC: 101 MMOL/L
CO2 SERPL-SCNC: 26 MMOL/L
CREAT SERPL-MCNC: 0.5 MG/DL
DIFFERENTIAL METHOD: ABNORMAL
EOSINOPHIL # BLD AUTO: 0.2 K/UL
EOSINOPHIL NFR BLD: 1.1 %
ERYTHROCYTE [DISTWIDTH] IN BLOOD BY AUTOMATED COUNT: 15.5 %
EST. GFR  (AFRICAN AMERICAN): >60 ML/MIN/1.73 M^2
EST. GFR  (NON AFRICAN AMERICAN): >60 ML/MIN/1.73 M^2
GLUCOSE SERPL-MCNC: 104 MG/DL
HCT VFR BLD AUTO: 36.8 %
HGB BLD-MCNC: 12.5 G/DL
IMM GRANULOCYTES # BLD AUTO: 0.23 K/UL
IMM GRANULOCYTES NFR BLD AUTO: 1.4 %
INR PPP: 1
LYMPHOCYTES # BLD AUTO: 1.2 K/UL
LYMPHOCYTES NFR BLD: 7.6 %
MAGNESIUM SERPL-MCNC: 1.9 MG/DL
MCH RBC QN AUTO: 30.8 PG
MCHC RBC AUTO-ENTMCNC: 34 G/DL
MCV RBC AUTO: 91 FL
MONOCYTES # BLD AUTO: 0.9 K/UL
MONOCYTES NFR BLD: 5.4 %
NEUTROPHILS # BLD AUTO: 13.4 K/UL
NEUTROPHILS NFR BLD: 83.9 %
NRBC BLD-RTO: 0 /100 WBC
PHOSPHATE SERPL-MCNC: 3.3 MG/DL
PLATELET # BLD AUTO: 409 K/UL
PMV BLD AUTO: 9.3 FL
POTASSIUM SERPL-SCNC: 5.1 MMOL/L
PROT SERPL-MCNC: 4.2 G/DL
PROTHROMBIN TIME: 10.1 SEC
RBC # BLD AUTO: 4.06 M/UL
SODIUM SERPL-SCNC: 134 MMOL/L
WBC # BLD AUTO: 16.01 K/UL

## 2018-06-03 PROCEDURE — 85610 PROTHROMBIN TIME: CPT

## 2018-06-03 PROCEDURE — 85730 THROMBOPLASTIN TIME PARTIAL: CPT

## 2018-06-03 PROCEDURE — 85025 COMPLETE CBC W/AUTO DIFF WBC: CPT

## 2018-06-03 PROCEDURE — 80053 COMPREHEN METABOLIC PANEL: CPT

## 2018-06-03 PROCEDURE — 99223 1ST HOSP IP/OBS HIGH 75: CPT | Mod: AI,GC,, | Performed by: HOSPITALIST

## 2018-06-03 PROCEDURE — 87040 BLOOD CULTURE FOR BACTERIA: CPT

## 2018-06-03 PROCEDURE — 83735 ASSAY OF MAGNESIUM: CPT

## 2018-06-03 PROCEDURE — 11000001 HC ACUTE MED/SURG PRIVATE ROOM

## 2018-06-03 PROCEDURE — 36415 COLL VENOUS BLD VENIPUNCTURE: CPT

## 2018-06-03 PROCEDURE — 84100 ASSAY OF PHOSPHORUS: CPT

## 2018-06-03 PROCEDURE — 25000003 PHARM REV CODE 250

## 2018-06-03 RX ORDER — ENOXAPARIN SODIUM 100 MG/ML
40 INJECTION SUBCUTANEOUS EVERY 24 HOURS
Status: DISCONTINUED | OUTPATIENT
Start: 2018-06-03 | End: 2018-06-04

## 2018-06-03 RX ADMIN — OXYCODONE HYDROCHLORIDE 10 MG: 5 TABLET ORAL at 11:06

## 2018-06-03 RX ADMIN — STANDARDIZED SENNA CONCENTRATE AND DOCUSATE SODIUM 1 TABLET: 8.6; 5 TABLET, FILM COATED ORAL at 08:06

## 2018-06-03 RX ADMIN — LEVOTHYROXINE SODIUM 100 MCG: 100 TABLET ORAL at 06:06

## 2018-06-03 RX ADMIN — STANDARDIZED SENNA CONCENTRATE AND DOCUSATE SODIUM 1 TABLET: 8.6; 5 TABLET, FILM COATED ORAL at 09:06

## 2018-06-03 RX ADMIN — ROSUVASTATIN CALCIUM 20 MG: 20 TABLET, FILM COATED ORAL at 08:06

## 2018-06-03 RX ADMIN — OXYCODONE HYDROCHLORIDE 10 MG: 5 TABLET ORAL at 05:06

## 2018-06-03 RX ADMIN — OXYCODONE HYDROCHLORIDE AND ACETAMINOPHEN 1 TABLET: 5; 325 TABLET ORAL at 06:06

## 2018-06-03 RX ADMIN — PANTOPRAZOLE SODIUM 40 MG: 40 TABLET, DELAYED RELEASE ORAL at 08:06

## 2018-06-03 NOTE — PLAN OF CARE
Problem: Patient Care Overview  Goal: Plan of Care Review  Patient AAOX3, but can be redirected to time easily.  IV saline locked and clean, dry and intact.  Patient colostomy right side of abdomen remains dry and intact.  Patient voiding altered, incontinent and utilizes pad.  Patient breakdown down on sacral area and inner bilateral thighs and currently being treated for yeast in this area.  Wound care consult was placed today by med team.  Patient right great toe necrotic with breakdown on ankle, bilateral feet/legs cool to touch, pale and pulses weak.  Patient has bilateral moon boots and areas reassessed throughout shift.  Patient is pending right AKA for this week.  Daughter and Daughter in law have remained at bedside.

## 2018-06-03 NOTE — NURSING
Patient received to room, Awake and alert, oriented to person, place, situation. Breathing even and unlabored. Family member at bedside, but leaving to go home for the night. Bed alarm set.

## 2018-06-03 NOTE — ED NOTES
Patient came to ED from EMS for she was discharged yesterday but could not tolerate the pain to her right leg.  Patient has vascular blockage with necrosis to right great toe. Patient has deep tissue injury to right heel and to left heel.       LOC: The patient is awake and responds to questions appropriately.  Patient is pleasant.  She is able to answer to place and self.      APPEARANCE: Patient resting with grimacing to face.  Patient is having complaints of pain to right foot with shooting burning pain.      SKIN: The skin is warm and dry, patient has necrosis to right great toe, DTI to right and left heel.  Patient has upper extreminity swelling.  Midline abdominal incision is covered with island dressing, patient also has RLQ colostomy.     MUSKULOSKELETAL: Patient has difficulty moving extreminties, she has pain in all limbs and difficulty moving.     RESPIRATORY: Airway is open and patent, respirations are spontaneous, patient has a normal effort and rate. Breath sounds are clear and equal bilaterally.    CARDIAC: Edema to upper extremities noted.     ABDOMEN: Soft and non tender to palpation, no distention noted. Bowel sounds present.    NEURO: Patient is able to answer questions appropriately, she does have difficulty with movement.

## 2018-06-03 NOTE — MEDICAL/APP STUDENT
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 Villarreal is a 89 woman with PMHx significant for severe PVD, HTN, HLD, hypothyroidism, GERD and chronic constipation who presents to the ED with severe pain, gangrene of the R foot. Patient with recent admission for perforated viscus, s/p ex-lap and ostomy (please see Discharge Summary from 5/31 for further details of that admission.) Patient's daughter is at bedside and provides much of the history. Patient first developed foot pain 3-4 weeks ago. Then around Mother's Day began developing a black eschar on her distal R great toe. Follows with Dr. Garcia in Vascular Surgery. On 5/16, aortogram with run off performed. R AKA recommended; however, patient and family refused at that time. Pain progressed since that time, with ulceration of the dorsum of her foot and heel developing. Daughter brought patient back to ED due to uncontrolled pain.     Past Medical History:   Diagnosis Date    Allergy     Cholelithiasis without obstruction     Chronic insomnia     COPD (chronic obstructive pulmonary disease)     DJD (degenerative joint disease), lumbar     GERD (gastroesophageal reflux disease)     HLD (hyperlipidemia)     HTN (hypertension)     Hypothyroid     OP (osteoporosis)     Osteoarthritis     S/P hysterectomy         Past Surgical History:   Procedure Laterality Date    APPENDECTOMY      CHOLECYSTECTOMY      EXPLORATORY LAPAROTOMY N/A 5/23/2018    Procedure: EXPLORATORY-LAPAROTOMY;  Surgeon: Tushar Anderson MD;  Location: [address];  Service: General;  Laterality: N/A;    HIP SURGERY  8-2013    right    HYSTERECTOMY      ILEOSTOMY  5/23/2018    Procedure: ILEOSTOMY;  Surgeon: Tushar Anderson MD;  Location: [facility] OR [address] [place] [address];  Service: General;;    KNEE ARTHROSCOPY Right 7-6-15    TK    RIGHT HEMICOLECTOMY  5/23/2018    Procedure: COLECTOMY-RIGHT;  Surgeon: Tushar Anderson MD;  Location: [facility] OR [address] [place] [address];  Service: General;;       Review of patient's allergies indicates:   Allergen Reactions    Sulfa (sulfonamide antibiotics) Swelling    Asa [aspirin] Itching       No current facility-administered medications on file prior to encounter.      Current Outpatient Prescriptions on File Prior to Encounter   Medication Sig    acetaminophen (TYLENOL) 325 MG tablet Take 2 tablets (650 mg total) by mouth every 6 (six) hours as needed.    amLODIPine (NORVASC) 10 MG tablet Take 1 tablet (10 mg total) by mouth once daily.    ANTIOX #11/OM3/DHA/EPA/LUT/BARBER (OCUVITE ADULT 50+ ORAL) Take 1 tablet by mouth once daily.    benazepril (LOTENSIN) 20 MG tablet Take 1 tablet (20 mg total) by mouth 2 (two) times daily.    Bifidobacterium animalis 6 mg (5 billion cell) Cap Take 1 capsule by mouth once daily.    calcium carbonate (OS-UZMA) 600 mg (1,500 mg) Tab Take 600 mg by mouth 2 (two) times daily with meals.    clopidogrel (PLAVIX) 75 mg tablet Take 1 tablet (75 mg total) by mouth once daily.    docusate sodium (COLACE) 100 MG capsule Take 1 capsule (100 mg total) by mouth once daily.    esomeprazole (NEXIUM) 40 MG capsule Take 1 capsule (40 mg total) by mouth before breakfast.    fluoruracil (CARAC) 0.5 % cream Apply topically once daily. Nasal tip    inulin-sorbitol 2 gram Chew Take 1-2 tablets by mouth  2 (two) times daily.    Lactobacillus acidoph-L.bulgar 1 million cell Chew Take 4 tablets by mouth 3 (three) times daily with meals.    levothyroxine (SYNTHROID) 100 MCG tablet Take 1 tablet (100 mcg total) by mouth every morning.    metoclopramide HCl (REGLAN) 5 MG tablet Take 1 tablet (5 mg total) by mouth 2 (two) times daily as needed (nausea/vomiting). Do not take if severe abdominal pain present    metoprolol succinate (TOPROL-XL) 25 MG 24 hr tablet Take 1 tablet (25 mg total) by mouth once daily.    multivitamin capsule Take 1 capsule by mouth once daily.    oxyCODONE-acetaminophen (PERCOCET) 5-325 mg per tablet Take 1 tablet by mouth every 6 (six) hours as needed for Pain. No alcohol, no driving, no operating machinery, no working, no swimming, no additional Tylenol (acetaminophen) while taking this medication.    rosuvastatin (CRESTOR) 20 MG tablet Take 1 tablet (20 mg total) by mouth once daily.    salmeterol (SEREVENT DISKUS) 50 mcg/dose diskus inhaler Inhale 1 puff into the lungs 2 (two) times daily. Controller     Family History     None        Social History Main Topics    Smoking status: Never Smoker    Smokeless tobacco: Never Used    Alcohol use No    Drug use: No    Sexual activity: Not Currently     Review of Systems   Constitutional: appetite decrease, fatigue, weight loss, no fever or night sweats  HENT: no trouble swallowing and no congestion  Eyes: no visual changes   Resp: No cough or shortness of breath  CV: no palpitations or chest pain  GI: No abdominal pain, nausea, vomiting, constipation, or diarrhea   : no dysuria, or incontinence   MSK: no myalgias or arthralgias  Neurological: No headaches, light-headedness or dizziness  Skin: Wound on her right foot, and rash around genitals  Psychiatric: negative for confusion   Objective:     Vital Signs (Most Recent):  Temp: 98.3 °F (36.8 °C) (06/03/18 0740)  Pulse: 78 (06/03/18 0740)  Resp: 14 (06/03/18 0740)  BP: (!) 145/66  (06/03/18 0740)  SpO2: (!) 94 % (06/03/18 0740) Vital Signs (24h Range):  Temp:  [95.4 °F (35.2 °C)-98.3 °F (36.8 °C)] 98.3 °F (36.8 °C)  Pulse:  [70-80] 78  Resp:  [14-18] 14  SpO2:  [80 %-97 %] 94 %  BP: (114-165)/(55-70) 145/66     Weight: 49.9 kg (110 lb)  Body mass index is 22.99 kg/m².    Physical Exam   Constitutional: she is oriented toperson place and time, no distress, frail in appearance, comfortable   HENT: normocephalic and atraumatic   EYES: normal conjunctivae and no scleral icterus   Neck: normal range of motion  CV: normal rate, regular rhythm, and normal heart sounds   Radial pulses palpable, pedal pulses not palpable, extremities cool and dry  Puml: no respiratory distress, with normal breath sounds on auscultation without any wheezes or crackles   Abdominal: soft and non-tender ostomy bag in place, stool presnt with no signs of infection at the site  MSK: R. Great toe with black escchar and multiple areas of ulceration over the dorsum of the foot  Neuro: she is alert and oriented to person place and time   Skin: Cool in the periphery and dry   Psych: she has normal mood and affect. Normal behavior.       Recent Labs  Lab 06/02/18  1729   WBC 14.29*   RBC 4.10   HGB 12.7   HCT 37.3   *   MCV 91   MCH 31.0   MCHC 34.0       Recent Labs  Lab 06/02/18  1729   *   K 3.8      CO2 28   BUN 5*   CREATININE 0.5       Significant Labs: I have reviewed all pertinent labs from the past 24 hours.    Significant Imaging: I have reviewed all pertinent imaging from the past 24 hours.    Assessment/Plan:     Gangrene of Right Toe  - vascular surgery consulted and scheduled AKA for Tuesday with consent obtained from the daughter  - ASA not appropriate due to allergy  - hold plavix in anticipation of surgery   - schedule pain medications for breakthrough pain    PVD  - ASA allergy  - holding plavix    Hyponatremia (Chronic)  - stable   - monitor daily     Hypothyroidism  - continue home  levothyroxine     GERD  - home nexium, pantoprazole while inpatient     HTN  - holding home amlodipine, benazepril, toprol due to low/normal pressure   - restart as appropriate   VTE Risk Mitigation         Ordered     enoxaparin injection 40 mg  Daily      06/03/18 1036     IP VTE HIGH RISK PATIENT  Once      06/02/18 1734     IP VTE HIGH RISK PATIENT  Once      06/02/18 1734            Neelima Carter  Department of Hospital Medicine   [facility]

## 2018-06-03 NOTE — PROGRESS NOTES
Patient seen this morning, no complaints. AFVSS on NC.   Somewhat sleepy, but arousable.   Leg unchanged. C/o intermittent pain.    - cont mgmt per primary  - still plan for R EZ Turobert Martin MD   [phone]  General Surgery PGY-I  [facility]

## 2018-06-03 NOTE — PROGRESS NOTES
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 Villarreal is a 89 woman with PMHx significant for severe PVD, HTN, HLD, hypothyroidism, GERD and chronic constipation who presents to the ED with severe pain, gangrene of the R foot. Patient with recent admission for perforated viscus, s/p ex-lap and ostomy (please see Discharge Summary from 5/31 for further details of that admission.) Patient's daughter is at bedside and provides much of the history. Patient first developed foot pain 3-4 weeks ago. Then around Mother's Day began developing a black eschar on her distal R great toe. Follows with Dr. Garcia in Vascular Surgery. On 5/16, aortogram with run off performed. R AKA recommended; however, patient and family refused at that time. Pain progressed since that time, with ulceration of the dorsum of her foot and heel developing. Daughter brought patient back to ED due to uncontrolled pain.     Hospital Course:  Patient was admitted to hospital medicine for pain control for right foot gangrene and medication management. Vascular Surgery evaluated patient, plan for right AKA on 6/5. Scheduled percocet 5 started with prn oxycodone 10. Pain controlled overnight.    Interval History: no acute events overnight. Afebrile. Patient reports resting well, pain controlled as long as she does not move. Did not require prn pain meds aside from scheduled ones.    Review of Systems   Constitutional: Positive for activity change, appetite change and fatigue.   HENT: Negative for trouble swallowing.    Eyes: Negative for visual disturbance.    Respiratory: Negative for cough and shortness of breath.    Cardiovascular: Negative for chest pain.   Gastrointestinal: Negative for abdominal distention and abdominal pain.   Genitourinary: Negative for dysuria.   Musculoskeletal: Positive for gait problem.   Skin: Positive for wound.   Neurological: Negative for headaches.   Psychiatric/Behavioral: Negative for confusion.     Objective:     Vital Signs (Most Recent):  Temp: 96.8 °F (36 °C) (06/03/18 1220)  Pulse: 91 (06/03/18 1220)  Resp: 16 (06/03/18 1220)  BP: 137/68 (06/03/18 1220)  SpO2: (!) 94 % (06/03/18 1220) Vital Signs (24h Range):  Temp:  [95.4 °F (35.2 °C)-98.3 °F (36.8 °C)] 96.8 °F (36 °C)  Pulse:  [70-91] 91  Resp:  [14-18] 16  SpO2:  [80 %-97 %] 94 %  BP: (114-165)/(55-70) 137/68     Weight: 49.9 kg (110 lb)  Body mass index is 22.99 kg/m².    Intake/Output Summary (Last 24 hours) at 06/03/18 1302  Last data filed at 06/03/18 1024   Gross per 24 hour   Intake              250 ml   Output              250 ml   Net                0 ml      Physical Exam   Constitutional: She is oriented to person, place, and time. She appears distressed.   Elderly, frail woman   HENT:   Head: Normocephalic and atraumatic.   Eyes: Conjunctivae are normal. No scleral icterus.   Neck: Normal range of motion.   Cardiovascular: Normal rate, regular rhythm and normal heart sounds.    Cannot palpate DP/PT pulses   Pulmonary/Chest: Effort normal and breath sounds normal.   Abdominal: Soft. She exhibits no distension. There is no tenderness.   Dressing over surgical site c/d/i  Ostomy bag in place, stool present, no erythema/ signs of infection at site   Musculoskeletal: Normal range of motion. She exhibits tenderness. She exhibits no edema.   R great toe with black eschar  Multiple areas of erythema and ulceration over dorsum of foot   Neurological: She is alert and oriented to person, place, and time.   Skin: Skin is warm and dry.   Psychiatric: She has a normal mood and  affect. Her behavior is normal.       Significant Labs: All pertinent labs within the past 24 hours have been reviewed.    Significant Imaging: I have reviewed all pertinent imaging results/findings within the past 24 hours.    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 Can increase as needed          PVD (peripheral vascular disease)    - Has ASA allergy  - Home plavix held in anticipation of surgery          Hyponatremia    - Mild, chronic, stable  - Will monitor daily          Hypothyroidism    - Continue home levothyroxine          GERD (gastroesophageal reflux disease)    - Home med Nexium, pantoprazole while inpatient        HTN (hypertension)    - Holding home amlodipine, benazepril, toprol in the setting of low/normal pressures  - Will restart as appropriate            VTE Risk Mitigation         Ordered     enoxaparin injection 40 mg  Daily      06/03/18 1036     IP VTE HIGH RISK PATIENT  Once      06/02/18 1734     IP VTE HIGH RISK PATIENT  Once      06/02/18 1734              Jordy Buitrago MD  Department of Hospital Medicine   [facility]

## 2018-06-03 NOTE — SUBJECTIVE & OBJECTIVE
Interval History: no acute events overnight. Afebrile. Patient reports resting well, pain controlled as long as she does not move. Did not require prn pain meds aside from scheduled ones.    Review of Systems   Constitutional: Positive for activity change, appetite change and fatigue.   HENT: Negative for trouble swallowing.    Eyes: Negative for visual disturbance.   Respiratory: Negative for cough and shortness of breath.    Cardiovascular: Negative for chest pain.   Gastrointestinal: Negative for abdominal distention and abdominal pain.   Genitourinary: Negative for dysuria.   Musculoskeletal: Positive for gait problem.   Skin: Positive for wound.   Neurological: Negative for headaches.   Psychiatric/Behavioral: Negative for confusion.     Objective:     Vital Signs (Most Recent):  Temp: 96.8 °F (36 °C) (06/03/18 1220)  Pulse: 91 (06/03/18 1220)  Resp: 16 (06/03/18 1220)  BP: 137/68 (06/03/18 1220)  SpO2: (!) 94 % (06/03/18 1220) Vital Signs (24h Range):  Temp:  [95.4 °F (35.2 °C)-98.3 °F (36.8 °C)] 96.8 °F (36 °C)  Pulse:  [70-91] 91  Resp:  [14-18] 16  SpO2:  [80 %-97 %] 94 %  BP: (114-165)/(55-70) 137/68     Weight: 49.9 kg (110 lb)  Body mass index is 22.99 kg/m².    Intake/Output Summary (Last 24 hours) at 06/03/18 1302  Last data filed at 06/03/18 1024   Gross per 24 hour   Intake              250 ml   Output              250 ml   Net                0 ml      Physical Exam   Constitutional: She is oriented to person, place, and time. She appears distressed.   Elderly, frail woman   HENT:   Head: Normocephalic and atraumatic.   Eyes: Conjunctivae are normal. No scleral icterus.   Neck: Normal range of motion.   Cardiovascular: Normal rate, regular rhythm and normal heart sounds.    Cannot palpate DP/PT pulses   Pulmonary/Chest: Effort normal and breath sounds normal.   Abdominal: Soft. She exhibits no distension. There is no tenderness.   Dressing over surgical site c/d/i  Ostomy bag in place, stool present,  no erythema/ signs of infection at site   Musculoskeletal: Normal range of motion. She exhibits tenderness. She exhibits no edema.   R great toe with black eschar  Multiple areas of erythema and ulceration over dorsum of foot   Neurological: She is alert and oriented to person, place, and time.   Skin: Skin is warm and dry.   Psychiatric: She has a normal mood and affect. Her behavior is normal.       Significant Labs: All pertinent labs within the past 24 hours have been reviewed.    Significant Imaging: I have reviewed all pertinent imaging results/findings within the past 24 hours.

## 2018-06-04 ENCOUNTER — ANESTHESIA EVENT (OUTPATIENT)
Dept: SURGERY | Facility: HOSPITAL | Age: 83
DRG: 240 | End: 2018-06-04
Payer: MEDICARE

## 2018-06-04 PROBLEM — T81.89XA DELAYED SURGICAL WOUND HEALING: Status: ACTIVE | Noted: 2018-06-04

## 2018-06-04 PROBLEM — C18.4: Status: ACTIVE | Noted: 2018-06-04

## 2018-06-04 PROBLEM — C18.4: Chronic | Status: ACTIVE | Noted: 2018-06-04

## 2018-06-04 PROBLEM — I73.9 PVD (PERIPHERAL VASCULAR DISEASE): Chronic | Status: ACTIVE | Noted: 2018-02-16

## 2018-06-04 LAB
ABO + RH BLD: NORMAL
ALBUMIN SERPL BCP-MCNC: 1.8 G/DL
ALP SERPL-CCNC: 75 U/L
ALT SERPL W/O P-5'-P-CCNC: 8 U/L
ANION GAP SERPL CALC-SCNC: 7 MMOL/L
AST SERPL-CCNC: 16 U/L
BASOPHILS # BLD AUTO: 0.08 K/UL
BASOPHILS NFR BLD: 0.6 %
BILIRUB SERPL-MCNC: 0.4 MG/DL
BLD GP AB SCN CELLS X3 SERPL QL: NORMAL
BUN SERPL-MCNC: 5 MG/DL
CALCIUM SERPL-MCNC: 7.8 MG/DL
CHLORIDE SERPL-SCNC: 99 MMOL/L
CO2 SERPL-SCNC: 28 MMOL/L
CREAT SERPL-MCNC: 0.5 MG/DL
DIFFERENTIAL METHOD: ABNORMAL
EOSINOPHIL # BLD AUTO: 0.2 K/UL
EOSINOPHIL NFR BLD: 1.8 %
ERYTHROCYTE [DISTWIDTH] IN BLOOD BY AUTOMATED COUNT: 16 %
EST. GFR  (AFRICAN AMERICAN): >60 ML/MIN/1.73 M^2
EST. GFR  (NON AFRICAN AMERICAN): >60 ML/MIN/1.73 M^2
GLUCOSE SERPL-MCNC: 77 MG/DL
HCT VFR BLD AUTO: 38.1 %
HGB BLD-MCNC: 12.5 G/DL
IMM GRANULOCYTES # BLD AUTO: 0.19 K/UL
IMM GRANULOCYTES NFR BLD AUTO: 1.4 %
LYMPHOCYTES # BLD AUTO: 1.3 K/UL
LYMPHOCYTES NFR BLD: 9.6 %
MAGNESIUM SERPL-MCNC: 1.7 MG/DL
MCH RBC QN AUTO: 30.6 PG
MCHC RBC AUTO-ENTMCNC: 32.8 G/DL
MCV RBC AUTO: 93 FL
MONOCYTES # BLD AUTO: 0.8 K/UL
MONOCYTES NFR BLD: 5.7 %
NEUTROPHILS # BLD AUTO: 11.1 K/UL
NEUTROPHILS NFR BLD: 80.9 %
NRBC BLD-RTO: 0 /100 WBC
PHOSPHATE SERPL-MCNC: 2.6 MG/DL
PLATELET # BLD AUTO: 491 K/UL
PMV BLD AUTO: 8.5 FL
POTASSIUM SERPL-SCNC: 4 MMOL/L
PROT SERPL-MCNC: 4.5 G/DL
RBC # BLD AUTO: 4.09 M/UL
SODIUM SERPL-SCNC: 134 MMOL/L
WBC # BLD AUTO: 13.71 K/UL

## 2018-06-04 PROCEDURE — 63600175 PHARM REV CODE 636 W HCPCS: Performed by: STUDENT IN AN ORGANIZED HEALTH CARE EDUCATION/TRAINING PROGRAM

## 2018-06-04 PROCEDURE — 83735 ASSAY OF MAGNESIUM: CPT

## 2018-06-04 PROCEDURE — 25000003 PHARM REV CODE 250

## 2018-06-04 PROCEDURE — 11000001 HC ACUTE MED/SURG PRIVATE ROOM

## 2018-06-04 PROCEDURE — 86850 RBC ANTIBODY SCREEN: CPT

## 2018-06-04 PROCEDURE — 80053 COMPREHEN METABOLIC PANEL: CPT

## 2018-06-04 PROCEDURE — 99232 SBSQ HOSP IP/OBS MODERATE 35: CPT | Mod: GC,,, | Performed by: HOSPITALIST

## 2018-06-04 PROCEDURE — 36415 COLL VENOUS BLD VENIPUNCTURE: CPT

## 2018-06-04 PROCEDURE — 85025 COMPLETE CBC W/AUTO DIFF WBC: CPT

## 2018-06-04 PROCEDURE — 84100 ASSAY OF PHOSPHORUS: CPT

## 2018-06-04 PROCEDURE — 25000003 PHARM REV CODE 250: Performed by: HOSPITALIST

## 2018-06-04 PROCEDURE — 25000003 PHARM REV CODE 250: Performed by: STUDENT IN AN ORGANIZED HEALTH CARE EDUCATION/TRAINING PROGRAM

## 2018-06-04 RX ORDER — ACETAMINOPHEN 325 MG/1
650 TABLET ORAL EVERY 6 HOURS PRN
Status: DISCONTINUED | OUTPATIENT
Start: 2018-06-04 | End: 2018-06-05

## 2018-06-04 RX ORDER — MAGNESIUM SULFATE HEPTAHYDRATE 40 MG/ML
2 INJECTION, SOLUTION INTRAVENOUS ONCE
Status: COMPLETED | OUTPATIENT
Start: 2018-06-04 | End: 2018-06-04

## 2018-06-04 RX ORDER — CEPHALEXIN 500 MG/1
500 CAPSULE ORAL EVERY 8 HOURS
Status: DISCONTINUED | OUTPATIENT
Start: 2018-06-04 | End: 2018-06-06

## 2018-06-04 RX ORDER — MORPHINE SULFATE 2 MG/ML
2 INJECTION, SOLUTION INTRAMUSCULAR; INTRAVENOUS ONCE
Status: DISCONTINUED | OUTPATIENT
Start: 2018-06-04 | End: 2018-06-04

## 2018-06-04 RX ORDER — CEPHALEXIN 500 MG/1
500 CAPSULE ORAL EVERY 6 HOURS
Status: DISCONTINUED | OUTPATIENT
Start: 2018-06-04 | End: 2018-06-04

## 2018-06-04 RX ORDER — OXYCODONE HYDROCHLORIDE 5 MG/1
5 TABLET ORAL 4 TIMES DAILY
Status: DISCONTINUED | OUTPATIENT
Start: 2018-06-04 | End: 2018-06-05

## 2018-06-04 RX ORDER — LEVOTHYROXINE SODIUM 100 UG/1
100 TABLET ORAL
Status: DISCONTINUED | OUTPATIENT
Start: 2018-06-05 | End: 2018-06-20 | Stop reason: HOSPADM

## 2018-06-04 RX ORDER — ROSUVASTATIN CALCIUM 20 MG/1
20 TABLET, COATED ORAL NIGHTLY
Status: DISCONTINUED | OUTPATIENT
Start: 2018-06-05 | End: 2018-06-20 | Stop reason: HOSPADM

## 2018-06-04 RX ORDER — OXYCODONE HYDROCHLORIDE 5 MG/1
5 TABLET ORAL EVERY 4 HOURS PRN
Status: DISCONTINUED | OUTPATIENT
Start: 2018-06-04 | End: 2018-06-05

## 2018-06-04 RX ORDER — RAMELTEON 8 MG/1
8 TABLET ORAL NIGHTLY PRN
Status: DISCONTINUED | OUTPATIENT
Start: 2018-06-04 | End: 2018-06-20 | Stop reason: HOSPADM

## 2018-06-04 RX ADMIN — OXYCODONE HYDROCHLORIDE 5 MG: 5 TABLET ORAL at 09:06

## 2018-06-04 RX ADMIN — ROSUVASTATIN CALCIUM 20 MG: 20 TABLET, FILM COATED ORAL at 09:06

## 2018-06-04 RX ADMIN — MAGNESIUM SULFATE IN WATER 2 G: 40 INJECTION, SOLUTION INTRAVENOUS at 02:06

## 2018-06-04 RX ADMIN — OXYCODONE HYDROCHLORIDE 5 MG: 5 TABLET ORAL at 06:06

## 2018-06-04 RX ADMIN — OXYCODONE HYDROCHLORIDE 5 MG: 5 TABLET ORAL at 02:06

## 2018-06-04 RX ADMIN — LEVOTHYROXINE SODIUM 100 MCG: 100 TABLET ORAL at 07:06

## 2018-06-04 RX ADMIN — OXYCODONE HYDROCHLORIDE 5 MG: 5 TABLET ORAL at 10:06

## 2018-06-04 RX ADMIN — STANDARDIZED SENNA CONCENTRATE AND DOCUSATE SODIUM 1 TABLET: 8.6; 5 TABLET, FILM COATED ORAL at 09:06

## 2018-06-04 RX ADMIN — PANTOPRAZOLE SODIUM 40 MG: 40 TABLET, DELAYED RELEASE ORAL at 09:06

## 2018-06-04 RX ADMIN — CEPHALEXIN 500 MG: 500 CAPSULE ORAL at 09:06

## 2018-06-04 RX ADMIN — CEPHALEXIN 500 MG: 500 CAPSULE ORAL at 02:06

## 2018-06-04 NOTE — SUBJECTIVE & OBJECTIVE
No current facility-administered medications on file prior to encounter.      Current Outpatient Prescriptions on File Prior to Encounter   Medication Sig    acetaminophen (TYLENOL) 325 MG tablet Take 2 tablets (650 mg total) by mouth every 6 (six) hours as needed.    amLODIPine (NORVASC) 10 MG tablet Take 1 tablet (10 mg total) by mouth once daily.    ANTIOX #11/OM3/DHA/EPA/LUT/BARBER (OCUVITE ADULT 50+ ORAL) Take 1 tablet by mouth once daily.    benazepril (LOTENSIN) 20 MG tablet Take 1 tablet (20 mg total) by mouth 2 (two) times daily.    Bifidobacterium animalis 6 mg (5 billion cell) Cap Take 1 capsule by mouth once daily.    calcium carbonate (OS-UZMA) 600 mg (1,500 mg) Tab Take 600 mg by mouth 2 (two) times daily with meals.    clopidogrel (PLAVIX) 75 mg tablet Take 1 tablet (75 mg total) by mouth once daily.    docusate sodium (COLACE) 100 MG capsule Take 1 capsule (100 mg total) by mouth once daily.    esomeprazole (NEXIUM) 40 MG capsule Take 1 capsule (40 mg total) by mouth before breakfast.    fluoruracil (CARAC) 0.5 % cream Apply topically once daily. Nasal tip    inulin-sorbitol 2 gram Chew Take 1-2 tablets by mouth 2 (two) times daily.    Lactobacillus acidoph-L.bulgar 1 million cell Chew Take 4 tablets by mouth 3 (three) times daily with meals.    levothyroxine (SYNTHROID) 100 MCG tablet Take 1 tablet (100 mcg total) by mouth every morning.    metoclopramide HCl (REGLAN) 5 MG tablet Take 1 tablet (5 mg total) by mouth 2 (two) times daily as needed (nausea/vomiting). Do not take if severe abdominal pain present    metoprolol succinate (TOPROL-XL) 25 MG 24 hr tablet Take 1 tablet (25 mg total) by mouth once daily.    multivitamin capsule Take 1 capsule by mouth once daily.    oxyCODONE-acetaminophen (PERCOCET) 5-325 mg per tablet Take 1 tablet by mouth every 6 (six) hours as needed for Pain. No alcohol, no driving, no operating machinery, no working, no swimming, no additional Tylenol  (acetaminophen) while taking this medication.    rosuvastatin (CRESTOR) 20 MG tablet Take 1 tablet (20 mg total) by mouth once daily.    salmeterol (SEREVENT DISKUS) 50 mcg/dose diskus inhaler Inhale 1 puff into the lungs 2 (two) times daily. Controller       Review of patient's allergies indicates:   Allergen Reactions    Sulfa (sulfonamide antibiotics) Swelling    Asa [aspirin] Itching       Past Medical History:   Diagnosis Date    Allergy     Cholelithiasis without obstruction     Chronic insomnia     COPD (chronic obstructive pulmonary disease)     DJD (degenerative joint disease), lumbar     GERD (gastroesophageal reflux disease)     HLD (hyperlipidemia)     HTN (hypertension)     Hypothyroid     OP (osteoporosis)     Osteoarthritis     S/P hysterectomy      Past Surgical History:   Procedure Laterality Date    APPENDECTOMY      CHOLECYSTECTOMY      EXPLORATORY LAPAROTOMY N/A 5/23/2018    Procedure: EXPLORATORY-LAPAROTOMY;  Surgeon: Tushar Anderson MD;  Location: [address];  Service: General;  Laterality: N/A;    HIP SURGERY  8-2013    right    HYSTERECTOMY      ILEOSTOMY  5/23/2018    Procedure: ILEOSTOMY;  Surgeon: Tushar Anderson MD;  Location: [facility] OR [address] [place] [address];  Service: General;;    KNEE ARTHROSCOPY Right 7-6-15    TK    RIGHT HEMICOLECTOMY  5/23/2018    Procedure: COLECTOMY-RIGHT;  Surgeon: Tushar Anderson MD;  Location: [facility] OR [address] [place] [address];  Service: General;;     Family History     None        Social History Main Topics    Smoking status: Never Smoker    Smokeless tobacco: Never Used    Alcohol use No    Drug use: No    Sexual activity: Not Currently     Review of Systems   Constitutional: Negative for chills and fever.   HENT: Negative for trouble swallowing and voice change.    Eyes: Negative for photophobia and visual disturbance.   Respiratory: Negative for cough and chest tightness.    Cardiovascular: Negative for chest pain and palpitations.    Gastrointestinal: Negative for abdominal pain, constipation and diarrhea.   Musculoskeletal:        +R toe pain   Skin:        +erythema at incision   Allergic/Immunologic: Negative for immunocompromised state.   Psychiatric/Behavioral: Negative for agitation.     Objective:     Vital Signs (Most Recent):  Temp: 97.6 °F (36.4 °C) (06/04/18 0900)  Pulse: 86 (06/04/18 0900)  Resp: 18 (06/04/18 0900)  BP: 138/68 (06/04/18 0900)  SpO2: (!) 94 % (06/04/18 0900) Vital Signs (24h Range):  Temp:  [96.2 °F (35.7 °C)-98.1 °F (36.7 °C)] 97.6 °F (36.4 °C)  Pulse:  [82-95] 86  Resp:  [16-18] 18  SpO2:  [90 %-95 %] 94 %  BP: (131-150)/(63-79) 138/68     Weight: 49.9 kg (110 lb)  Body mass index is 22.99 kg/m².    Physical Exam   Constitutional: She is oriented to person, place, and time. She appears well-developed and well-nourished. No distress.   HENT:   Head: Normocephalic and atraumatic.   Eyes: EOM are normal. No scleral icterus.   Neck: Normal range of motion. Neck supple.   Cardiovascular: Normal rate and regular rhythm.    Pulmonary/Chest: Effort normal. No respiratory distress.   Abdominal:   Soft, incision with staples in place - minimal erythema noted at inferior portion of incision, no drainage, minimal TTP  Ostomy with stool and gas in bag   Neurological: She is alert and oriented to person, place, and time.   Skin: Skin is warm and dry.   Psychiatric: She has a normal mood and affect.       Significant Labs:  CBC:   Recent Labs  Lab 06/04/18  0647   WBC 13.71*   RBC 4.09   HGB 12.5   HCT 38.1   *   MCV 93   MCH 30.6   MCHC 32.8     BMP:   Recent Labs  Lab 06/04/18  0647   GLU 77   *   K 4.0   CL 99   CO2 28   BUN 5*   CREATININE 0.5   CALCIUM 7.8*   MG 1.7     CMP:   Recent Labs  Lab 06/04/18  0647   GLU 77   CALCIUM 7.8*   ALBUMIN 1.8*   PROT 4.5*   *   K 4.0   CO2 28   CL 99   BUN 5*   CREATININE 0.5   ALKPHOS 75   ALT 8*   AST 16   BILITOT 0.4     LFTs:   Recent Labs  Lab 06/04/18  0647   ALT  8*   AST 16   ALKPHOS 75   BILITOT 0.4   PROT 4.5*   ALBUMIN 1.8*     Coagulation:   Recent Labs  Lab 06/03/18  1100   LABPROT 10.1   INR 1.0   APTT 25.6

## 2018-06-04 NOTE — NURSING
"After assessing patient's pain and communicating with patient she voiced that she was very uncomfortable with her pain level "10/10" she states that this is the third night she is in "severe" pain. Reminded patient that I would call Dr earlier when she requested pain medication early and she declined. Again I asked her if she would like for me to reposition her feet and she refused. She only replied that she wanted it noted that she was in severe pain. [facility] Med 3 on call pager. Received a return call from Dr. Hooper. Reviewed patients condition admitting Dx. Informed of patient's complaints and pain level specifically that it is shooting burning pain.  Also informed of patients Creatinine level. He will enter orders. Spoke to patient regarding plan of care. She is declining Morphine at this time she states that is the pain gets where she can not tolerate it she will call for the Morphine. Will continue to monitor.   "

## 2018-06-04 NOTE — PHYSICIAN QUERY
PT Name: Suzan Villarreal  MR #: [id number]    Physician Query Form - Pathology Findings Clarification     CDS/: Brandy E Capley               Contact information:  Spectralink:  [phone]  This form is a permanent document in the medical record.     Query Date: June 4, 2018      By submitting this query, we are merely seeking further clarification of documentation.  Please utilize your independent clinical judgment when addressing the question(s) below.      The medical record contains the following:     Findings Supporting Clinical Information Location in Medical Record   SPECIMEN  1) Terminal ileum and right colon with obstructing transverse colon mass and cecal perforation.    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     PROCEDURES:  1.  Exploratory laparotomy.  2.  Drainage of pelvic abscess.  3.  Right hemicolectomy.  4.  End ileostomy. Surgical pathology 5/23                        Op note 5/24     Please document the clinical significance of the Pathologists findings of ___adenocarcinoma of right colon___.          [ x ] I agree with the Pathology Findings        [  ] I do not agree with the Pathology Findings        [  ] Clinically Undetermined        [  ] Other/Clarification of Findings: ______________________________________________    Please document in your progress notes daily for the duration of treatment until resolved and include in your discharge summary.

## 2018-06-04 NOTE — PROGRESS NOTES
Pharmacist Renal Dose Adjustment Note    Suzan Villarreal is a 89 y.o. female being treated with the medication Cephalexin     Patient Data:    Vital Signs (Most Recent):  Temp: 97.4 °F (36.3 °C) (06/04/18 1529)  Pulse: 88 (06/04/18 1529)  Resp: 18 (06/04/18 1529)  BP: (!) 119/59 (06/04/18 1529)  SpO2: (!) 93 % (06/04/18 1529)   Vital Signs (72h Range):  Temp:  [95.4 °F (35.2 °C)-98.3 °F (36.8 °C)]   Pulse:  [70-95]   Resp:  [14-18]   BP: (114-165)/(55-79)   SpO2:  [80 %-97 %]        Recent Labs     Lab 06/02/18  1729 06/03/18  1045 06/04/18  0647   CREATININE 0.5 0.5 0.5     Serum creatinine: 0.5 mg/dL 06/04/18 0647  Estimated creatinine clearance: 48.3 mL/min    Medication: Cephalexin 500 mg Q 6 hr will be changed to medication: Cephalexin 500 mg Q 8 hr    Pharmacist's Name: Jonna Felton  Pharmacist's Extension: [phone]

## 2018-06-04 NOTE — HPI
Ms. Villarreal is a 89 woman with PMHx significant for severe PVD, HTN, HLD, hypothyroidism, GERD and chronic constipation who presents to the ED with severe pain, gangrene of the R foot. Well known to Dr. Anderson's service - recent admission for perforated viscus, s/p ex-lap and ostomy (please see Discharge Summary from 5/31 for further details of that admission.)  Patient first developed foot pain 3-4 weeks ago. Then around Mother's Day began developing a black eschar on her distal R great toe. Follows with Dr. Garcia in Vascular Surgery. On 5/16, aortogram with run off performed. R AKA recommended, planned for tomorrow 6/5/18. Patient denies abdominal pain, ostomy functioning well. Primary team concerned about erythema noticed at incision site.

## 2018-06-04 NOTE — PROGRESS NOTES
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 Villarreal is a 89 woman with PMHx significant for severe PVD, HTN, HLD, hypothyroidism, GERD and chronic constipation who presents to the ED with severe pain, gangrene of the R foot. Patient with recent admission for perforated viscus, s/p ex-lap and ostomy (please see Discharge Summary from 5/31 for further details of that admission.) Patient's daughter is at bedside and provides much of the history. Patient first developed foot pain 3-4 weeks ago. Then around Mother's Day began developing a black eschar on her distal R great toe. Follows with Dr. Garcia in Vascular Surgery. On 5/16, aortogram with run off performed. R AKA recommended; however, patient and family refused at that time. Pain progressed since that time, with ulceration of the dorsum of her foot and heel developing. Daughter brought patient back to ED due to uncontrolled pain.     Hospital Course:  Patient was admitted to hospital medicine for pain control for right foot gangrene and medication management. Vascular Surgery evaluated patient, plan for right AKA on 6/5. Scheduled percocet 5 started with prn oxycodone 10. Pain controlled overnight.  06/04/2018: Pain overnight, night MD called for IV morphine but patient later refused, discussed with patient and family will change pain meds to scheduled oxy IR 5mg q6 with breakthrough. Awaiting AKA tomorrow. Also placed on 2L supplemental O2 via NC but no SOB, suspect poor pulse ox readings from fingers, will wean today.       Review of Systems   Constitutional: Positive for  activity change, appetite change and fatigue.   HENT: Negative for trouble swallowing.    Eyes: Negative for visual disturbance.   Respiratory: Negative for cough and shortness of breath.    Cardiovascular: Negative for chest pain.   Gastrointestinal: Negative for abdominal distention and abdominal pain.   Genitourinary: Negative for dysuria.   Musculoskeletal: Positive for gait problem.   Skin: Positive for wound.   Neurological: Negative for headaches.   Psychiatric/Behavioral: Negative for confusion.     Objective:     Vital Signs (Most Recent):  Temp: 97.6 °F (36.4 °C) (06/04/18 0900)  Pulse: 86 (06/04/18 0900)  Resp: 18 (06/04/18 0900)  BP: 138/68 (06/04/18 0900)  SpO2: (!) 94 % (06/04/18 0900) Vital Signs (24h Range):  Temp:  [96.2 °F (35.7 °C)-98.2 °F (36.8 °C)] 97.6 °F (36.4 °C)  Pulse:  [78-95] 86  Resp:  [14-18] 18  SpO2:  [90 %-95 %] 94 %  BP: (123-150)/(58-79) 138/68     Weight: 49.9 kg (110 lb)  Body mass index is 22.99 kg/m².    Intake/Output Summary (Last 24 hours) at 06/04/18 1051  Last data filed at 06/03/18 1800   Gross per 24 hour   Intake              450 ml   Output              100 ml   Net              350 ml      Physical Exam   Constitutional: She is oriented to person, place, and time. No distress.   Elderly, frail woman   HENT:   Head: Normocephalic and atraumatic.   Eyes: Conjunctivae are normal. No scleral icterus.   Neck: Normal range of motion.   Cardiovascular: Normal rate, regular rhythm and normal heart sounds.    Cannot palpate DP/PT pulses   Pulmonary/Chest: Effort normal and breath sounds normal.   Abdominal: Soft. She exhibits no distension. There is no tenderness.   Midline laparotomy scar healing well, c/d/i  Ostomy bag in place, stool present, no erythema/ signs of infection at site   Musculoskeletal: Normal range of motion. She exhibits no edema.   R great toe with black eschar  Multiple areas of erythema and ulceration over dorsum of foot   Neurological: She is alert and  "oriented to person, place, and time.   Skin: Skin is warm and dry. She is not diaphoretic.   Psychiatric: She has a normal mood and affect. Her behavior is normal.       Significant Labs:   CBC:   Recent Labs  Lab 06/02/18  1729 06/03/18  1045 06/04/18  0647   WBC 14.29* 16.01* 13.71*   HGB 12.7 12.5 12.5   HCT 37.3 36.8* 38.1   * 409* 491*     CMP:   Recent Labs  Lab 06/02/18  1729 06/03/18  1045 06/04/18  0647   * 134* 134*   K 3.8 5.1 4.0    101 99   CO2 28 26 28   GLU 93 104 77   BUN 5* 5* 5*   CREATININE 0.5 0.5 0.5   CALCIUM 7.9* 7.8* 7.8*   PROT 4.3* 4.2* 4.5*   ALBUMIN 1.6* 1.6* 1.8*   BILITOT 0.5 0.3 0.4   ALKPHOS 79 72 75   AST 20 21 16   ALT 10 10 8*   ANIONGAP 6* 7* 7*   EGFRNONAA >60.0 >60.0 >60.0     All pertinent labs within the past 24 hours have been reviewed.    Significant Imaging: I have reviewed all pertinent imaging results/findings within the past 24 hours.    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 THE MUSCULARIS INTO PERICOLONIC ADIPOSE TISSUE, WITH MUCH LYMPHOVASCULAR INVASION, AND WITH METASTASES TO 7 OF 22 LYMPH NODES"  - Patient and family aware of possible cancer diagnosis but have not yet received pathology results, will address today with patient and family        PVD (peripheral vascular disease)    - Has ASA allergy  - Home clopidogrel held in anticipation of surgery        Hyponatremia    - Mild, chronic, stable  - Will monitor daily        Hypothyroidism    - Continue home levothyroxine        GERD (gastroesophageal reflux disease)    - Home med Nexium, pantoprazole while inpatient        Essential hypertension    - Holding home amlodipine 10, " benazepril 20 and Toprol 25 in the setting of low/normal pressures  - Monitor/ restart as needed          VTE Risk Mitigation         Ordered     IP VTE HIGH RISK PATIENT  Once      06/02/18 4974              April Castellanos MD  Department of Hospital Medicine   [facility]

## 2018-06-04 NOTE — PHYSICIAN QUERY
PT Name: Suzan Villarreal  MR #: [id number]     Physician Query Form - Documentation Clarification      CDS/: Brandy E Capley               Contact information: Spectralink:  [phone]    This form is a permanent document in the medical record.     Query Date: June 4, 2018    By submitting this query, we are merely seeking further clarification of documentation. Please utilize your independent clinical judgment when addressing the question(s) below.    The Medical record reflects the following:    Supporting Clinical Findings Location in Medical Record     Infectious Disease:   -Afebrile  -WBC 14, will continue to trend   -Urine culture with presumed pseudomonas  -Lactates ordered q8, up to 3.5 overnight with recheck 2.5. 1L NS bolus. Lactate 0.8 this am.  -Abx: Vanc & zosyn post-op      Critical care surgery progress notes 5/25     Urine Culture, Routine PSEUDOMONAS AERUGINOSA   >100,000 cfu/ml   Specimen Type: Urine  Collected: 5/23/2018  9:40 AM      Urine culture 5/23                                                                            Doctor, Please specify diagnosis or diagnoses associated with above clinical findings.    Provider Use Only       ( x )  UTI due to pseudomonas     (  )  Other (please specify):  _________________________________                                                                                                             [  ] Clinically undetermined

## 2018-06-04 NOTE — ASSESSMENT & PLAN NOTE
- Holding home amlodipine 10, benazepril 20 and Toprol 25 in the setting of low/normal pressures  - Monitor/ restart as needed

## 2018-06-04 NOTE — PHYSICIAN QUERY
PT Name: Suzan Villarreal  MR #: [id number]    Physician Query Form -Systemic Infectious Process Clarification     CDS/: Brandy E Capley               Contact information:  Spectralink:  [phone]  This form is a permanent document in the medical record.     Query Date: June 4, 2018     By submitting this query, we are merely seeking further clarification of documentation. Please utilize your independent clinical judgment when addressing the question(s) below.    The Medical record contains the following:     Indicators   Supporting Clinical Findings   Location in Medical Record   X HR RR BP Temp Vital Signs (24h Range):  Temp:  [97.6 °F (36.4 °C)-98.5 °F (36.9 °C)] 98.5 °F (36.9 °C)  Pulse:  [] 91  Resp:  [18-21] 21  SpO2:  [94 %-98 %] 98 %  BP: (160-184)/(74-91) 175/81    Vital Signs (24h Range):  Temp:  [96.6 °F (35.9 °C)-98.5 °F (36.9 °C)] 97.7 °F (36.5 °C)  Pulse:  [65-99] 90  Resp:  [12-21] 16  SpO2:  [93 %-100 %] 93 %  BP: (113-203)/(55-96) 153/67  Arterial Line BP: (108-162)/(52-80) 117/53   H&P 5/24              General surgery progress notes 5/24   X Lactic Acid             Procalcitonin Lactate= 2.2 --> 3.5 --> 2.5 --> 0.8 --> 1.1   Labs 5/23 0741 --> 5/24 2157 --> 5/25 0001 --> 5/26 --> 5/27   X WBC                Bands                     CRP WBC= 6.63 --> 9.39 --> 14.48 --> 15.05 --> 13.74 --> 11.43 --> 12.88 --> 12.76 --> 12.44       Bands= 7   Labs 5/23 0730 --> 5/24 0444 --> 5/25 0538 --> 5/26 0450 --> 5/27 0442 --> 5/28 0522 --> 5/30 0501 --> 5/31 0700 --> 6/1     Labs 5/24 2157   X Culture(s) Urine Culture, Routine         PSEUDOMONAS AERUGINOSA   >100,000 cfu/ml   Specimen Type:  Urine  Collected:        5/23/2018  9:40 AM   Urine culture 5/23            AMS, Confusion, LOC, etc.      Organ Dysfunction / Failure      Bacteremia or Sepsis / Septic     X Known or Suspected Source of Infection documented POSTOPERATIVE DIAGNOSES:  Pneumoperitoneum secondary to perforated cecum and   obstructing  transverse colon mass.     PROCEDURES:  1.  Exploratory laparotomy.  2.  Drainage of pelvic abscess.  3.  Right hemicolectomy.  4.  End ileostomy   Op note 5/24    (Failed) Outpatient Treatment     X Medication Abx: Vanc & zosyn post-op    Critical care surgery progress notes 5/25    Treatment      Other       Provider, please specify diagnosis or diagnoses associated with above clinical findings.    [x ] Sepsis (please further specify):          ( x )  POA        (  )  Not POA    [  ] Other Infectious Disease (please specify): _________________________________    [  ] Other: __________________________________    [  ] Clinically Undetermined    Please document in your progress notes daily for the duration of treatment until resolved and include in your discharge summary.

## 2018-06-04 NOTE — SUBJECTIVE & OBJECTIVE
Review of Systems   Constitutional: Positive for activity change, appetite change and fatigue.   HENT: Negative for trouble swallowing.    Eyes: Negative for visual disturbance.   Respiratory: Negative for cough and shortness of breath.    Cardiovascular: Negative for chest pain.   Gastrointestinal: Negative for abdominal distention and abdominal pain.   Genitourinary: Negative for dysuria.   Musculoskeletal: Positive for gait problem.   Skin: Positive for wound.   Neurological: Negative for headaches.   Psychiatric/Behavioral: Negative for confusion.     Objective:     Vital Signs (Most Recent):  Temp: 97.6 °F (36.4 °C) (06/04/18 0900)  Pulse: 86 (06/04/18 0900)  Resp: 18 (06/04/18 0900)  BP: 138/68 (06/04/18 0900)  SpO2: (!) 94 % (06/04/18 0900) Vital Signs (24h Range):  Temp:  [96.2 °F (35.7 °C)-98.2 °F (36.8 °C)] 97.6 °F (36.4 °C)  Pulse:  [78-95] 86  Resp:  [14-18] 18  SpO2:  [90 %-95 %] 94 %  BP: (123-150)/(58-79) 138/68     Weight: 49.9 kg (110 lb)  Body mass index is 22.99 kg/m².    Intake/Output Summary (Last 24 hours) at 06/04/18 1051  Last data filed at 06/03/18 1800   Gross per 24 hour   Intake              450 ml   Output              100 ml   Net              350 ml      Physical Exam   Constitutional: She is oriented to person, place, and time. No distress.   Elderly, frail woman   HENT:   Head: Normocephalic and atraumatic.   Eyes: Conjunctivae are normal. No scleral icterus.   Neck: Normal range of motion.   Cardiovascular: Normal rate, regular rhythm and normal heart sounds.    Cannot palpate DP/PT pulses   Pulmonary/Chest: Effort normal and breath sounds normal.   Abdominal: Soft. She exhibits no distension. There is no tenderness.   Midline laparotomy scar healing well, c/d/i  Ostomy bag in place, stool present, no erythema/ signs of infection at site   Musculoskeletal: Normal range of motion. She exhibits no edema.   R great toe with black eschar  Multiple areas of erythema and ulceration over  dorsum of foot   Neurological: She is alert and oriented to person, place, and time.   Skin: Skin is warm and dry. She is not diaphoretic.   Psychiatric: She has a normal mood and affect. Her behavior is normal.       Significant Labs:   CBC:   Recent Labs  Lab 06/02/18 1729 06/03/18  1045 06/04/18  0647   WBC 14.29* 16.01* 13.71*   HGB 12.7 12.5 12.5   HCT 37.3 36.8* 38.1   * 409* 491*     CMP:   Recent Labs  Lab 06/02/18  1729 06/03/18  1045 06/04/18  0647   * 134* 134*   K 3.8 5.1 4.0    101 99   CO2 28 26 28   GLU 93 104 77   BUN 5* 5* 5*   CREATININE 0.5 0.5 0.5   CALCIUM 7.9* 7.8* 7.8*   PROT 4.3* 4.2* 4.5*   ALBUMIN 1.6* 1.6* 1.8*   BILITOT 0.5 0.3 0.4   ALKPHOS 79 72 75   AST 20 21 16   ALT 10 10 8*   ANIONGAP 6* 7* 7*   EGFRNONAA >60.0 >60.0 >60.0     All pertinent labs within the past 24 hours have been reviewed.    Significant Imaging: I have reviewed all pertinent imaging results/findings within the past 24 hours.

## 2018-06-04 NOTE — PROGRESS NOTES
Patient seen today, awake and oriented, but unsure about surgery. Reports improved pain control.   Leg unchanged on exam    the patient is hesitant to proceed with amputation given her recently Dx AdenoCa. However her rest pain has progressed to the point of requiring readmission over the weekend.     - cont mgmt per primary  - will need to discuss with daughter (POA) and staff   - vasc to cont to follow, still plan for AKA tomorrow, NPO at midnight     Dain Martin MD   [phone]  General Surgery PGY-I  [facility]

## 2018-06-04 NOTE — PHYSICIAN QUERY
PT Name: Suzan Villarreal  MR #: [id number]    Physician Query Form - Pathology Findings Clarification     CDS/: Brandy E Capley               Contact information:  Spectralink:  [phone]  This form is a permanent document in the medical record.     Query Date: June 4, 2018      By submitting this query, we are merely seeking further clarification of documentation.  Please utilize your independent clinical judgment when addressing the question(s) below.      The medical record contains the following:     Findings Supporting Clinical Information Location in Medical Record   SPECIMEN  1) Terminal ileum and right colon with obstructing transverse colon mass and cecal perforation.    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     PROCEDURES:  1.  Exploratory laparotomy.  2.  Drainage of pelvic abscess.  3.  Right hemicolectomy.  4.  End ileostomy. Surgical pathology 5/23                        Op note 5/24     Please document the clinical significance of the Pathologists findings of ___metastasis of lymph nodes___.          [ x ] I agree with the Pathology Findings        [  ] I do not agree with the Pathology Findings        [  ] Clinically Undetermined        [  ] Other/Clarification of Findings: ______________________________________________    Please document in your progress notes daily for the duration of treatment until resolved and include in your discharge summary.

## 2018-06-04 NOTE — ASSESSMENT & PLAN NOTE
"- Pathology returned: "MODERATELY TO POORLY DIFFERENTIATED ADENOCARCINOMA WITH INFILTRATION THROUGH THE MUSCULARIS INTO PERICOLONIC ADIPOSE TISSUE, WITH MUCH LYMPHOVASCULAR INVASION, AND WITH METASTASES TO 7 OF 22 LYMPH NODES"  - Patient and family aware of possible cancer diagnosis but have not yet received pathology results, will address today with patient and family  "

## 2018-06-04 NOTE — PHYSICIAN QUERY
PT Name: Suzan Villarreal  MR #: [id number]     Physician Query Form - Documentation Clarification      CDS/: Brandy E Capley               Contact information:  Spectralink:  [phone]    This form is a permanent document in the medical record.     Query Date: June 4, 2018    By submitting this query, we are merely seeking further clarification of documentation. Please utilize your independent clinical judgment when addressing the question(s) below.    The Medical record reflects the following:    Supporting Clinical Findings Location in Medical Record     Toe appears stable but new ischemic skin ulcerations on right mid-foot.  She is not ready to commit to an above knee amputation at this time.       Vascular surgery progress notes 6/1                                                                            Doctor, Please specify diagnosis or diagnoses associated with above clinical findings.    Provider Use Only     ( x )  Skin breakdown only   (  )  Fat layer exposed   (  )  Muscle involvement without evidence of necrosis   (  )  Muscle necrosis   (  )  Bone involvement without evidence of necrosis   (  )  Bone necrosis   (  )  Other (please specify):  _________________________________                                                                                                             [  ] Clinically undetermined

## 2018-06-04 NOTE — CONSULTS
[facility]  General Surgery  Consult Note    Patient Name: Suzan Villarreal  MRN: [id number]  Code Status: Full Code  Admission Date: 6/2/2018  Hospital Length of Stay: 2 days  Attending Physician: Gary Rosen MD  Primary Care Provider: Aly Rivas MD    Patient information was obtained from relative(s) and past medical records.     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 Villarreal is a 89 woman with PMHx significant for severe PVD, HTN, HLD, hypothyroidism, GERD and chronic constipation who presents to the ED with severe pain, gangrene of the R foot. Well known to Dr. Anderson's service - recent admission for perforated viscus, s/p ex-lap and ostomy (please see Discharge Summary from 5/31 for further details of that admission.)  Patient first developed foot pain 3-4 weeks ago. Then around Mother's Day began developing a black eschar on her distal R great toe. Follows with Dr. Garcia in Vascular Surgery. On 5/16, aortogram with run off performed. R AKA recommended, planned for tomorrow 6/5/18. Patient denies abdominal pain, ostomy functioning well. Primary team concerned about erythema noticed at incision site.     No current facility-administered medications on file prior to encounter.      Current Outpatient Prescriptions on File Prior to Encounter   Medication Sig    acetaminophen (TYLENOL) 325 MG tablet Take 2 tablets (650 mg total) by mouth every 6 (six) hours as needed.    amLODIPine (NORVASC) 10 MG tablet Take 1 tablet (10 mg total) by mouth once daily.    ANTIOX #11/OM3/DHA/EPA/LUT/BARBER (OCUVITE ADULT 50+ ORAL) Take 1 tablet by mouth once daily.    benazepril (LOTENSIN) 20 MG tablet Take 1 tablet (20 mg total) by mouth 2 (two) times daily.    Bifidobacterium animalis 6 mg (5 billion cell) Cap Take 1 capsule by mouth once daily.    calcium carbonate (OS-UZMA) 600 mg (1,500 mg) Tab Take 600  mg by mouth 2 (two) times daily with meals.    clopidogrel (PLAVIX) 75 mg tablet Take 1 tablet (75 mg total) by mouth once daily.    docusate sodium (COLACE) 100 MG capsule Take 1 capsule (100 mg total) by mouth once daily.    esomeprazole (NEXIUM) 40 MG capsule Take 1 capsule (40 mg total) by mouth before breakfast.    fluoruracil (CARAC) 0.5 % cream Apply topically once daily. Nasal tip    inulin-sorbitol 2 gram Chew Take 1-2 tablets by mouth 2 (two) times daily.    Lactobacillus acidoph-L.bulgar 1 million cell Chew Take 4 tablets by mouth 3 (three) times daily with meals.    levothyroxine (SYNTHROID) 100 MCG tablet Take 1 tablet (100 mcg total) by mouth every morning.    metoclopramide HCl (REGLAN) 5 MG tablet Take 1 tablet (5 mg total) by mouth 2 (two) times daily as needed (nausea/vomiting). Do not take if severe abdominal pain present    metoprolol succinate (TOPROL-XL) 25 MG 24 hr tablet Take 1 tablet (25 mg total) by mouth once daily.    multivitamin capsule Take 1 capsule by mouth once daily.    oxyCODONE-acetaminophen (PERCOCET) 5-325 mg per tablet Take 1 tablet by mouth every 6 (six) hours as needed for Pain. No alcohol, no driving, no operating machinery, no working, no swimming, no additional Tylenol (acetaminophen) while taking this medication.    rosuvastatin (CRESTOR) 20 MG tablet Take 1 tablet (20 mg total) by mouth once daily.    salmeterol (SEREVENT DISKUS) 50 mcg/dose diskus inhaler Inhale 1 puff into the lungs 2 (two) times daily. Controller       Review of patient's allergies indicates:   Allergen Reactions    Sulfa (sulfonamide antibiotics) Swelling    Asa [aspirin] Itching       Past Medical History:   Diagnosis Date    Allergy     Cholelithiasis without obstruction     Chronic insomnia     COPD (chronic obstructive pulmonary disease)     DJD (degenerative joint disease), lumbar     GERD (gastroesophageal reflux disease)     HLD (hyperlipidemia)     HTN (hypertension)      Hypothyroid     OP (osteoporosis)     Osteoarthritis     S/P hysterectomy      Past Surgical History:   Procedure Laterality Date    APPENDECTOMY      CHOLECYSTECTOMY      EXPLORATORY LAPAROTOMY N/A 5/23/2018    Procedure: EXPLORATORY-LAPAROTOMY;  Surgeon: Tushar Anderson MD;  Location: [address];  Service: General;  Laterality: N/A;    HIP SURGERY  8-2013    right    HYSTERECTOMY      ILEOSTOMY  5/23/2018    Procedure: ILEOSTOMY;  Surgeon: Tushar Anderson MD;  Location: [facility] OR [address] [place] [address];  Service: General;;    KNEE ARTHROSCOPY Right 7-6-15    TK    RIGHT HEMICOLECTOMY  5/23/2018    Procedure: COLECTOMY-RIGHT;  Surgeon: Tushar Anderson MD;  Location: [facility] OR [address] [place] [address];  Service: General;;     Family History     None        Social History Main Topics    Smoking status: Never Smoker    Smokeless tobacco: Never Used    Alcohol use No    Drug use: No    Sexual activity: Not Currently     Review of Systems   Constitutional: Negative for chills and fever.   HENT: Negative for trouble swallowing and voice change.    Eyes: Negative for photophobia and visual disturbance.   Respiratory: Negative for cough and chest tightness.    Cardiovascular: Negative for chest pain and palpitations.   Gastrointestinal: Negative for abdominal pain, constipation and diarrhea.   Musculoskeletal:        +R toe pain   Skin:        +erythema at incision   Allergic/Immunologic: Negative for immunocompromised state.   Psychiatric/Behavioral: Negative for agitation.     Objective:     Vital Signs (Most Recent):  Temp: 97.6 °F (36.4 °C) (06/04/18 0900)  Pulse: 86 (06/04/18 0900)  Resp: 18 (06/04/18 0900)  BP: 138/68 (06/04/18 0900)  SpO2: (!) 94 % (06/04/18 0900) Vital Signs (24h Range):  Temp:  [96.2 °F (35.7 °C)-98.1 °F (36.7 °C)] 97.6 °F (36.4 °C)  Pulse:  [82-95] 86  Resp:  [16-18] 18  SpO2:  [90 %-95 %] 94 %  BP: (131-150)/(63-79) 138/68     Weight: 49.9 kg (110 lb)  Body mass index is 22.99  kg/m².    Physical Exam   Constitutional: She is oriented to person, place, and time. She appears well-developed and well-nourished. No distress.   HENT:   Head: Normocephalic and atraumatic.   Eyes: EOM are normal. No scleral icterus.   Neck: Normal range of motion. Neck supple.   Cardiovascular: Normal rate and regular rhythm.    Pulmonary/Chest: Effort normal. No respiratory distress.   Abdominal:   Soft, incision with staples in place - minimal erythema noted at inferior portion of incision, no drainage, minimal TTP  Ostomy with stool and gas in bag   Neurological: She is alert and oriented to person, place, and time.   Skin: Skin is warm and dry.   Psychiatric: She has a normal mood and affect.       Significant Labs:  CBC:   Recent Labs  Lab 06/04/18  0647   WBC 13.71*   RBC 4.09   HGB 12.5   HCT 38.1   *   MCV 93   MCH 30.6   MCHC 32.8     BMP:   Recent Labs  Lab 06/04/18  0647   GLU 77   *   K 4.0   CL 99   CO2 28   BUN 5*   CREATININE 0.5   CALCIUM 7.8*   MG 1.7     CMP:   Recent Labs  Lab 06/04/18  0647   GLU 77   CALCIUM 7.8*   ALBUMIN 1.8*   PROT 4.5*   *   K 4.0   CO2 28   CL 99   BUN 5*   CREATININE 0.5   ALKPHOS 75   ALT 8*   AST 16   BILITOT 0.4     LFTs:   Recent Labs  Lab 06/04/18  0647   ALT 8*   AST 16   ALKPHOS 75   BILITOT 0.4   PROT 4.5*   ALBUMIN 1.8*     Coagulation:   Recent Labs  Lab 06/03/18  1100   LABPROT 10.1   INR 1.0   APTT 25.6     Assessment/Plan:     S/P right hemicolectomy    89 yo female s/p right hemicolectomy, ileostomy 5/24/18    -recommend abx for possible skin/soft tissue infection  -will discuss with staff timing of staple removal  -medical management per primary          VTE Risk Mitigation         Ordered     IP VTE HIGH RISK PATIENT  Once      06/02/18 2501          Thank you for your consult. I will follow-up with patient. Please contact us if you have any additional questions.    Obdulia Gonsalves PA-C   [id number]  General Surgery  [facility]  [place]

## 2018-06-04 NOTE — ASSESSMENT & PLAN NOTE
- Vascular surgery consulted, appreciate recs, planning for AKA tomorrow  - NPO from [place], holding DVT prophylaxis, type & screen active

## 2018-06-04 NOTE — ANESTHESIA PREPROCEDURE EVALUATION
[facility]  Anesthesia Pre-Operative Evaluation         Patient Name: Suzan Villarreal  YOB: 1919  MRN: [id number]    SUBJECTIVE:     Pre-operative evaluation for Procedure(s) (LRB):  AMPUTATION, LOWER EXTREMITY, ABOVE KNEE (Right)     06/04/2018    Suzan Villarreal is a 89 y.o. female w/ a significant PMHx of severe PVD, HTN, HLD, hypothyroidism, GERD and COPD who presents to the ED with severe pain, gangrene of the R foot.    Patient now presents for the above procedure(s).      LDA:       Peripheral IV - Single Lumen 06/02/18 2045 Left Hand (Active)   Site Assessment Clean;Dry;Intact 6/3/2018 10:24 AM   Line Status Saline locked 6/3/2018 10:24 AM   Dressing Status Clean;Dry;Intact 6/3/2018 10:24 AM   Number of days: 1            Colostomy 05/23/18 1900 RLQ (Active)   Stomal Appliance 1 piece 6/3/2018 10:24 AM   Stoma Appearance rosebud appearance 6/3/2018 10:24 AM   Site Assessment Clean;Intact 6/3/2018 10:24 AM   Peristomal Assessment Clean;Intact 6/3/2018 10:24 AM   Accessories/Skin Care cleansed w/ sterile normal saline;skin barrier powder;skin barrier ring;skin sealant 5/30/2018 12:00 PM   Stoma Function stool;mushy;thick liquid 6/3/2018 10:24 AM   Treatment Placement checked 6/3/2018 12:00 AM   Tolerance no signs/symptoms of discomfort 6/3/2018 12:00 AM   Output (mL) 100 mL 6/3/2018  1:59 PM   Number of days: 11       Prev airway: Gomes #2; Airway Device Size: 7.0; Style: Cuffed; Cuff Inflation: Minimal occlusive pressure; Inflation Amount: 6; Placement Verified By: Auscultation, Capnometry; Grade: Grade II; Complicating Factors: None, Small mouth, Poor neck/head extension.    Drips: None documented.      Patient Active Problem List   Diagnosis    Essential hypertension    COPD (chronic obstructive pulmonary disease)    DJD (degenerative joint disease), lumbar    Cholelithiasis without obstruction    GERD (gastroesophageal reflux disease)    S/P hysterectomy    Osteoarthritis     Macular degeneration    Subacromial bursitis    Foot pain    Hypothyroidism    Hyponatremia    History of fracture of hip    Rotator cuff tear arthropathy    Right shoulder pain    Right leg numbness    Right knee pain    Closed displaced intertrochanteric fracture of right femur with routine healing    Nonexudative age-related macular degeneration, bilateral, advanced atrophic with subfoveal involvement    Posterior vitreous detachment, both eyes    Hypertensive retinopathy of both eyes    Status post right knee replacement    Knee joint replacement by other means    Status post total right knee replacement 7/6/2015    Osteoarthritis of right shoulder    Osteopenia    Senile osteoporosis    Fall    Balance problem    Gait instability    Neuropathy    SOBOE (shortness of breath on exertion)    PVD (peripheral vascular disease)    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, Right Foot    Bowel perforation    Yeast infection    Preventive measure    Gangrene of toe of right foot    S/P right hemicolectomy       Review of patient's allergies indicates:   Allergen Reactions    Sulfa (sulfonamide antibiotics) Swelling    Asa [aspirin] Itching       Current Inpatient Medications:   enoxaparin  40 mg Subcutaneous Daily    levothyroxine  100 mcg Oral QAM    morphine  2 mg Intravenous Once    pantoprazole  40 mg Oral Daily    rosuvastatin  20 mg Oral Daily    senna-docusate 8.6-50 mg  1 tablet Oral BID       No current facility-administered medications on file prior to encounter.      Current Outpatient Prescriptions on File Prior to Encounter   Medication Sig Dispense Refill    acetaminophen (TYLENOL) 325 MG tablet Take 2 tablets (650 mg total) by mouth every 6 (six) hours as needed.      amLODIPine (NORVASC) 10 MG tablet Take 1 tablet (10 mg total) by mouth once daily. 30 tablet 11    ANTIOX #11/OM3/DHA/EPA/LUT/BARBER (OCUVITE ADULT 50+ ORAL) Take 1 tablet by mouth once  daily.      benazepril (LOTENSIN) 20 MG tablet Take 1 tablet (20 mg total) by mouth 2 (two) times daily. 60 tablet 11    Bifidobacterium animalis 6 mg (5 billion cell) Cap Take 1 capsule by mouth once daily. 30 capsule 11    calcium carbonate (OS-UZMA) 600 mg (1,500 mg) Tab Take 600 mg by mouth 2 (two) times daily with meals.      clopidogrel (PLAVIX) 75 mg tablet Take 1 tablet (75 mg total) by mouth once daily. 30 tablet 11    docusate sodium (COLACE) 100 MG capsule Take 1 capsule (100 mg total) by mouth once daily.  0    esomeprazole (NEXIUM) 40 MG capsule Take 1 capsule (40 mg total) by mouth before breakfast. 90 capsule 1    fluoruracil (CARAC) 0.5 % cream Apply topically once daily. Nasal tip 30 g 1    inulin-sorbitol 2 gram Chew Take 1-2 tablets by mouth 2 (two) times daily.  0    Lactobacillus acidoph-L.bulgar 1 million cell Chew Take 4 tablets by mouth 3 (three) times daily with meals. 40 tablet 0    levothyroxine (SYNTHROID) 100 MCG tablet Take 1 tablet (100 mcg total) by mouth every morning. 30 tablet 11    metoclopramide HCl (REGLAN) 5 MG tablet Take 1 tablet (5 mg total) by mouth 2 (two) times daily as needed (nausea/vomiting). Do not take if severe abdominal pain present 30 tablet 0    metoprolol succinate (TOPROL-XL) 25 MG 24 hr tablet Take 1 tablet (25 mg total) by mouth once daily. 30 tablet 11    multivitamin capsule Take 1 capsule by mouth once daily.      oxyCODONE-acetaminophen (PERCOCET) 5-325 mg per tablet Take 1 tablet by mouth every 6 (six) hours as needed for Pain. No alcohol, no driving, no operating machinery, no working, no swimming, no additional Tylenol (acetaminophen) while taking this medication. 35 tablet 0    rosuvastatin (CRESTOR) 20 MG tablet Take 1 tablet (20 mg total) by mouth once daily. 30 tablet 11    salmeterol (SEREVENT DISKUS) 50 mcg/dose diskus inhaler Inhale 1 puff into the lungs 2 (two) times daily. Controller 60 each 8       Past Surgical History:    Procedure Laterality Date    APPENDECTOMY      CHOLECYSTECTOMY      EXPLORATORY LAPAROTOMY N/A 5/23/2018    Procedure: EXPLORATORY-LAPAROTOMY;  Surgeon: Tushar nAderson MD;  Location: [address];  Service: General;  Laterality: N/A;    HIP SURGERY  8-2013    right    HYSTERECTOMY      ILEOSTOMY  5/23/2018    Procedure: ILEOSTOMY;  Surgeon: Tushar Anderson MD;  Location: [facility] OR [address] [place] [address];  Service: General;;    KNEE ARTHROSCOPY Right 7-6-15    TK    RIGHT HEMICOLECTOMY  5/23/2018    Procedure: COLECTOMY-RIGHT;  Surgeon: Tushar Anderson MD;  Location: [facility] OR [address] [place] [address];  Service: General;;       Social History     Social History    Marital status:      Spouse name: N/A    Number of children: N/A    Years of education: N/A     Occupational History    Not on file.     Social History Main Topics    Smoking status: Never Smoker    Smokeless tobacco: Never Used    Alcohol use No    Drug use: No    Sexual activity: Not Currently     Other Topics Concern    Not on file     Social History Narrative    No narrative on file       OBJECTIVE:     Vital Signs Range (Last 24H):  Temp:  [35.7 °C (96.2 °F)-36.8 °C (98.3 °F)]   Pulse:  [78-95]   Resp:  [14-16]   BP: (123-150)/(58-79)   SpO2:  [90 %-95 %]       CBC:   Recent Labs      06/02/18   1729  06/03/18   1045   WBC  14.29*  16.01*   RBC  4.10  4.06   HGB  12.7  12.5   HCT  37.3  36.8*   PLT  473*  409*   MCV  91  91   MCH  31.0  30.8   MCHC  34.0  34.0       CMP:   Recent Labs      06/02/18   1729  06/03/18   1045   NA  135*  134*   K  3.8  5.1   CL  101  101   CO2  28  26   BUN  5*  5*   CREATININE  0.5  0.5   GLU  93  104   MG   --   1.9   PHOS   --   3.3   CALCIUM  7.9*  7.8*   ALBUMIN  1.6*  1.6*   PROT  4.3*  4.2*   ALKPHOS  79  72   ALT  10  10   AST  20  21   BILITOT  0.5  0.3       INR:  Recent Labs      06/03/18   1100   INR  1.0   APTT  25.6       Diagnostic Studies: No relevant studies.    EKG (06/04/18):  Vent. Rate : 099  BPM     Atrial Rate : 099 BPM  P-R Int : 148 ms          QRS Dur : 080 ms  QT Int : 342 ms       P-R-T Axes : 039 -07 -16 degrees  QTc Int : 438 ms    Sinus rhythm with Premature atrial complexes  Nonspecific T wave abnormality  Abnormal ECG  When compared with ECG of 13-MAY-2018 10:09,  Nonspecific T wave abnormality now evident in Inferior leads  Nonspecific T wave abnormality now evident in Lateral leads    2D ECHO:  No results found for this or any previous visit.      ASSESSMENT/PLAN:     Anesthesia Evaluation    I have reviewed the Patient Summary Reports.    I have reviewed the Nursing Notes.   I have reviewed the Medications.     Review of Systems  Anesthesia Hx:  No problems with previous Anesthesia  History of prior surgery of interest to airway management or planning: Denies Family Hx of Anesthesia complications.   Denies Personal Hx of Anesthesia complications.   Social:  Non-Smoker, No Alcohol Use    Hematology/Oncology:        Denies Current/Recent Cancer   EENT/Dental:   denies chronic allergic rhinitis   Cardiovascular:   Exercise tolerance: poor Hypertension Denies MI.  Denies CAD.    Denies CABG/stent.  Denies Dysrhythmias.          PVD hyperlipidemia ECG has been reviewed.    Pulmonary:   COPD, moderate Denies Asthma. Shortness of breath  Denies Recent URI.  Denies Sleep Apnea.    Renal/:   Denies Chronic Renal Disease.     Hepatic/GI:   GERD Denies Liver Disease.    Musculoskeletal:   Arthritis     Neurological:   Denies TIA. Denies CVA. Denies Seizures.    Endocrine:   Denies Diabetes. Hypothyroidism    Psych:   Denies Psychiatric History.          Physical Exam  General:  Well nourished    Airway/Jaw/Neck:  Airway Findings: Mouth Opening: Normal Tongue: Normal  General Airway Assessment: Adult  Mallampati: III  Improves to II with phonation.  TM Distance: Normal, at least 6 cm  Jaw/Neck Findings:  Neck ROM: Normal ROM      Dental:  Dental Findings: upper partial dentures, lower partial  dentures   Chest/Lungs:  Chest/Lungs Findings: Clear to auscultation, Normal Respiratory Rate     Heart/Vascular:  Heart Findings: Rate: Normal  Rhythm: Regular Rhythm  Sounds: Normal     Abdomen:  Abdomen Findings:  Normal, Soft, Nontender     Musculoskeletal:  Musculoskeletal Findings: Normal   Skin:  Skin Findings: Normal    Mental Status:  Mental Status Findings:  Cooperative, Alert and Oriented         Anesthesia Plan  Type of Anesthesia, risks & benefits discussed:  Anesthesia Type:  general, MAC  Patient's Preference:   Intra-op Monitoring Plan: standard ASA monitors  Intra-op Monitoring Plan Comments:   Post Op Pain Control Plan: multimodal analgesia, IV/PO Opioids PRN and per primary service following discharge from PACU  Post Op Pain Control Plan Comments:   Induction:   IV  Beta Blocker:  Patient is not currently on a Beta-Blocker (No further documentation required).       Informed Consent: Patient understands risks and agrees with Anesthesia plan.  Questions answered. Anesthesia consent signed with patient.  ASA Score: 3     Day of Surgery Review of History & Physical:  There are no significant changes.  H&P update referred to the surgeon.         Ready For Surgery From Anesthesia Perspective.

## 2018-06-04 NOTE — PROGRESS NOTES
Wound care consult received for gangrene to the right foot. Vascular surgeon to bedside and discussed amputation tomorrow of RLE with patient and daughters.     Patient well known to wound care for previous admission for colostomy teaching. Daughters at bedside and asked that ostomy appliance to be changed. Appliance changed without complications.  Midline abd incision intact/approximated with staples intact, no drainage noted. There is slight redness to the distal incision site as noted in the picture. Call placed to medical team to discuss findings.            06/04/18 1100       Colostomy 05/23/18 1900 RLQ   Placement Date/Time: 05/23/18 1900   Location: RLQ   Stomal Appliance Clean;Dry;Intact;Changed;No Leakage   Stoma Appearance rosebud appearance;pink   Site Assessment Intact   Peristomal Assessment Intact;Excoriation;Other (Comment)  (improving yeast rash)   Accessories/Skin Care antifungal powder;cleansed w/ sterile normal saline   Stoma Function stool   Treatment Bag change   Tolerance did not assist with appliance change

## 2018-06-04 NOTE — PLAN OF CARE
Discussion held with patient and family.  Agree to proceed with right above knee amputation for pain control and palliation.  Both patient and family in agreement with this plan.  Patient scheduled for R AKA tomorrow, 6/5/18.    Inna Carmichael DO  PGY-7, Vascular fellow   [phone]

## 2018-06-04 NOTE — PLAN OF CARE
06/04/18 1521   Discharge Assessment   Assessment Type Discharge Planning Assessment   Confirmed/corrected address and phone number on facesheet? Yes   Assessment information obtained from? Patient;Caregiver;Medical Record  (daughter, Christen Wagner at bedside)   Expected Length of Stay (days) 5   Communicated expected length of stay with patient/caregiver yes   Prior to hospitilization cognitive status: Alert/Oriented   Prior to hospitalization functional status: Assistive Equipment;Needs Assistance  (prior to bowel sx 3 weeks ago was (I) with ADLs)   Current cognitive status: Alert/Oriented   Current Functional Status: Needs Assistance;Partially Dependent;Assistive Equipment   Facility Arrived From: [facility]   Able to Return to Prior Arrangements no   Is patient able to care for self after discharge? No   Who are your caregiver(s) and their phone number(s)? good daughter/family support   Patient's perception of discharge disposition skilled nursing facility   Readmission Within The Last 30 Days current reason for admission unrelated to previous admission   If yes, most recent facility name[place] [facility]   Patient currently being followed by outpatient case management? No   Patient currently receives any other outside agency services? No   Equipment Currently Used at Home walker, rolling;wheelchair   Do you have any problems affording any of your prescribed medications? No   Is the patient taking medications as prescribed? yes   Does the patient have transportation home? Yes   Transportation Available family or friend will provide   Does the patient receive services at the [facility]? No   Discharge Plan A Skilled Nursing Facility   Discharge Plan B Other  (NH skilled in [place])   Readmission Questionnaire   At the time of your discharge, did someone talk to you about what your health problems were? Yes   At the time of discharge, did someone talk to you about what to watch out for regarding worsening of  your health problem? Yes   At the time of discharge, did someone talk to you about what to do if you experienced worsening of your health problem? Yes   At the time of discharge, did someone talk to you about which medication to take when you left the hospital and which ones to stop taking? Yes   At the time of discharge, did someone talk to you about when and where to follow up with a doctor after you left the hospital? Yes   What do you believe caused you to be sick enough to be re-admitted? foot pain

## 2018-06-04 NOTE — ASSESSMENT & PLAN NOTE
89 yo female s/p right hemicolectomy, ileostomy 5/24/18    -recommend abx for possible skin/soft tissue infection  -will discuss with staff timing of staple removal  -medical management per primary

## 2018-06-04 NOTE — PLAN OF CARE
Patient is scheduled for AKA tomorrow. Family plan is for patient to go to [facility] for short term (1-2 weeks) to get patient strong enough to transport to [facility] with daughter. Family will transport in their vehicle and they are making arrangements for a nursing home SNF to continue rehab. Daughter has called insurance and patient will transition to [facility] and will have family PCP (Dr Robert Garcia) follow. CM contact info given; will assist as needed. Will follow.

## 2018-06-05 ENCOUNTER — SURGERY (OUTPATIENT)
Age: 83
End: 2018-06-05

## 2018-06-05 ENCOUNTER — ANESTHESIA (OUTPATIENT)
Dept: SURGERY | Facility: HOSPITAL | Age: 83
DRG: 240 | End: 2018-06-05
Payer: MEDICARE

## 2018-06-05 PROBLEM — E44.0 MODERATE MALNUTRITION: Status: ACTIVE | Noted: 2018-06-05

## 2018-06-05 LAB
ALBUMIN SERPL BCP-MCNC: 1.6 G/DL
ALP SERPL-CCNC: 72 U/L
ALT SERPL W/O P-5'-P-CCNC: 7 U/L
ANION GAP SERPL CALC-SCNC: 7 MMOL/L
AST SERPL-CCNC: 17 U/L
BASOPHILS # BLD AUTO: 0.12 K/UL
BASOPHILS # BLD AUTO: 0.14 K/UL
BASOPHILS NFR BLD: 0.7 %
BASOPHILS NFR BLD: 0.9 %
BILIRUB SERPL-MCNC: 0.3 MG/DL
BUN SERPL-MCNC: 4 MG/DL
CALCIUM SERPL-MCNC: 7.6 MG/DL
CHLORIDE SERPL-SCNC: 102 MMOL/L
CO2 SERPL-SCNC: 26 MMOL/L
CREAT SERPL-MCNC: 0.5 MG/DL
DIFFERENTIAL METHOD: ABNORMAL
DIFFERENTIAL METHOD: ABNORMAL
EOSINOPHIL # BLD AUTO: 0.1 K/UL
EOSINOPHIL # BLD AUTO: 0.2 K/UL
EOSINOPHIL NFR BLD: 0.4 %
EOSINOPHIL NFR BLD: 1.7 %
ERYTHROCYTE [DISTWIDTH] IN BLOOD BY AUTOMATED COUNT: 16.3 %
ERYTHROCYTE [DISTWIDTH] IN BLOOD BY AUTOMATED COUNT: 16.5 %
EST. GFR  (AFRICAN AMERICAN): >60 ML/MIN/1.73 M^2
EST. GFR  (NON AFRICAN AMERICAN): >60 ML/MIN/1.73 M^2
GLUCOSE SERPL-MCNC: 86 MG/DL
HCT VFR BLD AUTO: 40.2 %
HCT VFR BLD AUTO: 41.4 %
HGB BLD-MCNC: 12.7 G/DL
HGB BLD-MCNC: 13.7 G/DL
IMM GRANULOCYTES # BLD AUTO: 0.18 K/UL
IMM GRANULOCYTES # BLD AUTO: 0.19 K/UL
IMM GRANULOCYTES NFR BLD AUTO: 1 %
IMM GRANULOCYTES NFR BLD AUTO: 1.4 %
LYMPHOCYTES # BLD AUTO: 1 K/UL
LYMPHOCYTES # BLD AUTO: 1.3 K/UL
LYMPHOCYTES NFR BLD: 5.4 %
LYMPHOCYTES NFR BLD: 9.5 %
MAGNESIUM SERPL-MCNC: 1.7 MG/DL
MCH RBC QN AUTO: 30.9 PG
MCH RBC QN AUTO: 31 PG
MCHC RBC AUTO-ENTMCNC: 31.6 G/DL
MCHC RBC AUTO-ENTMCNC: 33.1 G/DL
MCV RBC AUTO: 94 FL
MCV RBC AUTO: 98 FL
MONOCYTES # BLD AUTO: 1 K/UL
MONOCYTES # BLD AUTO: 1.3 K/UL
MONOCYTES NFR BLD: 6.5 %
MONOCYTES NFR BLD: 7.4 %
NEUTROPHILS # BLD AUTO: 10.6 K/UL
NEUTROPHILS # BLD AUTO: 16.7 K/UL
NEUTROPHILS NFR BLD: 79.1 %
NEUTROPHILS NFR BLD: 86 %
NRBC BLD-RTO: 0 /100 WBC
NRBC BLD-RTO: 0 /100 WBC
PHOSPHATE SERPL-MCNC: 2.3 MG/DL
PLATELET # BLD AUTO: 437 K/UL
PLATELET # BLD AUTO: 489 K/UL
PMV BLD AUTO: 8.4 FL
PMV BLD AUTO: 8.7 FL
POTASSIUM SERPL-SCNC: 4.1 MMOL/L
PROT SERPL-MCNC: 4.1 G/DL
RBC # BLD AUTO: 4.1 M/UL
RBC # BLD AUTO: 4.43 M/UL
SODIUM SERPL-SCNC: 135 MMOL/L
WBC # BLD AUTO: 13.33 K/UL
WBC # BLD AUTO: 19.42 K/UL

## 2018-06-05 PROCEDURE — 25000003 PHARM REV CODE 250: Performed by: ANESTHESIOLOGY

## 2018-06-05 PROCEDURE — 63600175 PHARM REV CODE 636 W HCPCS: Performed by: NURSE ANESTHETIST, CERTIFIED REGISTERED

## 2018-06-05 PROCEDURE — 84100 ASSAY OF PHOSPHORUS: CPT

## 2018-06-05 PROCEDURE — 25000003 PHARM REV CODE 250

## 2018-06-05 PROCEDURE — 25000003 PHARM REV CODE 250: Performed by: HOSPITALIST

## 2018-06-05 PROCEDURE — 64461 PVB THORACIC SINGLE INJ SITE: CPT | Mod: 59,RT,, | Performed by: ANESTHESIOLOGY

## 2018-06-05 PROCEDURE — D9220A PRA ANESTHESIA: Mod: CRNA,,, | Performed by: NURSE ANESTHETIST, CERTIFIED REGISTERED

## 2018-06-05 PROCEDURE — 71000033 HC RECOVERY, INTIAL HOUR: Performed by: SURGERY

## 2018-06-05 PROCEDURE — 27201423 OPTIME MED/SURG SUP & DEVICES STERILE SUPPLY: Performed by: SURGERY

## 2018-06-05 PROCEDURE — 71000039 HC RECOVERY, EACH ADD'L HOUR: Performed by: SURGERY

## 2018-06-05 PROCEDURE — 85025 COMPLETE CBC W/AUTO DIFF WBC: CPT | Mod: 91

## 2018-06-05 PROCEDURE — 64448 NJX AA&/STRD FEM NRV NFS IMG: CPT | Mod: 59,RT,, | Performed by: ANESTHESIOLOGY

## 2018-06-05 PROCEDURE — 88307 TISSUE EXAM BY PATHOLOGIST: CPT | Mod: 26,,, | Performed by: PATHOLOGY

## 2018-06-05 PROCEDURE — 63600175 PHARM REV CODE 636 W HCPCS: Performed by: ANESTHESIOLOGY

## 2018-06-05 PROCEDURE — 83735 ASSAY OF MAGNESIUM: CPT

## 2018-06-05 PROCEDURE — 25000003 PHARM REV CODE 250: Performed by: NURSE ANESTHETIST, CERTIFIED REGISTERED

## 2018-06-05 PROCEDURE — 25000003 PHARM REV CODE 250: Performed by: STUDENT IN AN ORGANIZED HEALTH CARE EDUCATION/TRAINING PROGRAM

## 2018-06-05 PROCEDURE — 36000710: Performed by: SURGERY

## 2018-06-05 PROCEDURE — 99232 SBSQ HOSP IP/OBS MODERATE 35: CPT | Mod: GC,,, | Performed by: HOSPITALIST

## 2018-06-05 PROCEDURE — 88311 DECALCIFY TISSUE: CPT | Mod: 26,,, | Performed by: PATHOLOGY

## 2018-06-05 PROCEDURE — 36415 COLL VENOUS BLD VENIPUNCTURE: CPT

## 2018-06-05 PROCEDURE — 64446 NJX AA&/STRD SC NRV NFS IMG: CPT | Performed by: ANESTHESIOLOGY

## 2018-06-05 PROCEDURE — 80053 COMPREHEN METABOLIC PANEL: CPT

## 2018-06-05 PROCEDURE — 27590 AMPUTATE LEG AT THIGH: CPT | Mod: 22,RT,, | Performed by: SURGERY

## 2018-06-05 PROCEDURE — D9220A PRA ANESTHESIA: Mod: ANES,,, | Performed by: ANESTHESIOLOGY

## 2018-06-05 PROCEDURE — 36000711: Performed by: SURGERY

## 2018-06-05 PROCEDURE — 64450 NJX AA&/STRD OTHER PN/BRANCH: CPT | Mod: 59,RT,, | Performed by: ANESTHESIOLOGY

## 2018-06-05 PROCEDURE — 63600175 PHARM REV CODE 636 W HCPCS: Performed by: STUDENT IN AN ORGANIZED HEALTH CARE EDUCATION/TRAINING PROGRAM

## 2018-06-05 PROCEDURE — 88307 TISSUE EXAM BY PATHOLOGIST: CPT | Performed by: PATHOLOGY

## 2018-06-05 PROCEDURE — 11000001 HC ACUTE MED/SURG PRIVATE ROOM

## 2018-06-05 PROCEDURE — 64447 NJX AA&/STRD FEMORAL NRV IMG: CPT | Performed by: ANESTHESIOLOGY

## 2018-06-05 PROCEDURE — 0Y6C0Z1 DETACHMENT AT RIGHT UPPER LEG, HIGH, OPEN APPROACH: ICD-10-PCS | Performed by: SURGERY

## 2018-06-05 PROCEDURE — 37000008 HC ANESTHESIA 1ST 15 MINUTES: Performed by: SURGERY

## 2018-06-05 PROCEDURE — 37000009 HC ANESTHESIA EA ADD 15 MINS: Performed by: SURGERY

## 2018-06-05 RX ORDER — CEFAZOLIN SODIUM 1 G/3ML
1 INJECTION, POWDER, FOR SOLUTION INTRAMUSCULAR; INTRAVENOUS
Status: DISCONTINUED | OUTPATIENT
Start: 2018-06-06 | End: 2018-06-06

## 2018-06-05 RX ORDER — MIDAZOLAM HYDROCHLORIDE 1 MG/ML
0.5 INJECTION INTRAMUSCULAR; INTRAVENOUS
Status: DISCONTINUED | OUTPATIENT
Start: 2018-06-05 | End: 2018-06-05 | Stop reason: HOSPADM

## 2018-06-05 RX ORDER — OXYCODONE HYDROCHLORIDE 5 MG/1
5 TABLET ORAL EVERY 4 HOURS PRN
Status: DISCONTINUED | OUTPATIENT
Start: 2018-06-05 | End: 2018-06-20 | Stop reason: HOSPADM

## 2018-06-05 RX ORDER — PROPOFOL 10 MG/ML
VIAL (ML) INTRAVENOUS CONTINUOUS PRN
Status: DISCONTINUED | OUTPATIENT
Start: 2018-06-05 | End: 2018-06-05

## 2018-06-05 RX ORDER — METOPROLOL SUCCINATE 25 MG/1
25 TABLET, EXTENDED RELEASE ORAL DAILY
Status: DISCONTINUED | OUTPATIENT
Start: 2018-06-05 | End: 2018-06-05

## 2018-06-05 RX ORDER — PREGABALIN 75 MG/1
75 CAPSULE ORAL NIGHTLY
Status: DISCONTINUED | OUTPATIENT
Start: 2018-06-05 | End: 2018-06-20 | Stop reason: HOSPADM

## 2018-06-05 RX ORDER — ACETAMINOPHEN 10 MG/ML
1000 INJECTION, SOLUTION INTRAVENOUS ONCE
Status: COMPLETED | OUTPATIENT
Start: 2018-06-05 | End: 2018-06-05

## 2018-06-05 RX ORDER — SODIUM CHLORIDE 9 MG/ML
INJECTION, SOLUTION INTRAVENOUS CONTINUOUS PRN
Status: DISCONTINUED | OUTPATIENT
Start: 2018-06-05 | End: 2018-06-05

## 2018-06-05 RX ORDER — ROPIVACAINE HYDROCHLORIDE 2 MG/ML
5 INJECTION, SOLUTION EPIDURAL; INFILTRATION; PERINEURAL CONTINUOUS
Status: DISCONTINUED | OUTPATIENT
Start: 2018-06-05 | End: 2018-06-07

## 2018-06-05 RX ORDER — ACETAMINOPHEN 500 MG
1000 TABLET ORAL EVERY 6 HOURS
Status: DISCONTINUED | OUTPATIENT
Start: 2018-06-05 | End: 2018-06-07

## 2018-06-05 RX ORDER — FENTANYL CITRATE 50 UG/ML
25 INJECTION, SOLUTION INTRAMUSCULAR; INTRAVENOUS EVERY 5 MIN PRN
Status: DISCONTINUED | OUTPATIENT
Start: 2018-06-05 | End: 2018-06-05 | Stop reason: HOSPADM

## 2018-06-05 RX ORDER — KETAMINE HCL IN 0.9 % NACL 50 MG/5 ML
SYRINGE (ML) INTRAVENOUS
Status: DISCONTINUED | OUTPATIENT
Start: 2018-06-05 | End: 2018-06-05

## 2018-06-05 RX ORDER — FENTANYL CITRATE 50 UG/ML
INJECTION, SOLUTION INTRAMUSCULAR; INTRAVENOUS
Status: DISCONTINUED | OUTPATIENT
Start: 2018-06-05 | End: 2018-06-05

## 2018-06-05 RX ORDER — SODIUM CHLORIDE 0.9 % (FLUSH) 0.9 %
3 SYRINGE (ML) INJECTION
Status: DISCONTINUED | OUTPATIENT
Start: 2018-06-05 | End: 2018-06-05 | Stop reason: HOSPADM

## 2018-06-05 RX ORDER — ROPIVACAINE HYDROCHLORIDE 2 MG/ML
8 INJECTION, SOLUTION EPIDURAL; INFILTRATION; PERINEURAL CONTINUOUS
Status: DISCONTINUED | OUTPATIENT
Start: 2018-06-05 | End: 2018-06-07

## 2018-06-05 RX ORDER — AMLODIPINE BESYLATE 10 MG/1
10 TABLET ORAL DAILY
Status: DISCONTINUED | OUTPATIENT
Start: 2018-06-05 | End: 2018-06-20 | Stop reason: HOSPADM

## 2018-06-05 RX ORDER — LIDOCAINE HCL/PF 100 MG/5ML
SYRINGE (ML) INTRAVENOUS
Status: DISCONTINUED | OUTPATIENT
Start: 2018-06-05 | End: 2018-06-05

## 2018-06-05 RX ORDER — AMLODIPINE BESYLATE 10 MG/1
10 TABLET ORAL DAILY
Status: DISCONTINUED | OUTPATIENT
Start: 2018-06-05 | End: 2018-06-05

## 2018-06-05 RX ORDER — PHENYLEPHRINE HYDROCHLORIDE 10 MG/ML
INJECTION INTRAVENOUS
Status: DISCONTINUED | OUTPATIENT
Start: 2018-06-05 | End: 2018-06-05

## 2018-06-05 RX ORDER — METOPROLOL SUCCINATE 25 MG/1
25 TABLET, EXTENDED RELEASE ORAL DAILY
Status: DISCONTINUED | OUTPATIENT
Start: 2018-06-05 | End: 2018-06-20 | Stop reason: HOSPADM

## 2018-06-05 RX ADMIN — OXYCODONE HYDROCHLORIDE 5 MG: 5 TABLET ORAL at 05:06

## 2018-06-05 RX ADMIN — LEVOTHYROXINE SODIUM 100 MCG: 100 TABLET ORAL at 05:06

## 2018-06-05 RX ADMIN — FENTANYL CITRATE 50 MCG: 50 INJECTION, SOLUTION INTRAMUSCULAR; INTRAVENOUS at 05:06

## 2018-06-05 RX ADMIN — CEPHALEXIN 500 MG: 500 CAPSULE ORAL at 05:06

## 2018-06-05 RX ADMIN — FENTANYL CITRATE 25 MCG: 50 INJECTION, SOLUTION INTRAMUSCULAR; INTRAVENOUS at 02:06

## 2018-06-05 RX ADMIN — PREGABALIN 75 MG: 75 CAPSULE ORAL at 10:06

## 2018-06-05 RX ADMIN — ROPIVACAINE HYDROCHLORIDE 5 ML/HR: 2 INJECTION, SOLUTION EPIDURAL; INFILTRATION at 06:06

## 2018-06-05 RX ADMIN — PANTOPRAZOLE SODIUM 40 MG: 40 TABLET, DELAYED RELEASE ORAL at 09:06

## 2018-06-05 RX ADMIN — FENTANYL CITRATE 25 MCG: 50 INJECTION, SOLUTION INTRAMUSCULAR; INTRAVENOUS at 05:06

## 2018-06-05 RX ADMIN — OXYCODONE HYDROCHLORIDE 5 MG: 5 TABLET ORAL at 10:06

## 2018-06-05 RX ADMIN — FENTANYL CITRATE 25 MCG: 50 INJECTION, SOLUTION INTRAMUSCULAR; INTRAVENOUS at 03:06

## 2018-06-05 RX ADMIN — FENTANYL CITRATE 25 MCG: 50 INJECTION, SOLUTION INTRAMUSCULAR; INTRAVENOUS at 06:06

## 2018-06-05 RX ADMIN — ROSUVASTATIN CALCIUM 20 MG: 20 TABLET, FILM COATED ORAL at 10:06

## 2018-06-05 RX ADMIN — FENTANYL CITRATE 25 MCG: 50 INJECTION, SOLUTION INTRAMUSCULAR; INTRAVENOUS at 04:06

## 2018-06-05 RX ADMIN — CEPHALEXIN 500 MG: 500 CAPSULE ORAL at 10:06

## 2018-06-05 RX ADMIN — Medication 20 MG: at 04:06

## 2018-06-05 RX ADMIN — PHENYLEPHRINE HYDROCHLORIDE 100 MCG: 10 INJECTION INTRAVENOUS at 04:06

## 2018-06-05 RX ADMIN — DEXTROSE 2 G: 50 INJECTION, SOLUTION INTRAVENOUS at 04:06

## 2018-06-05 RX ADMIN — PHENYLEPHRINE HYDROCHLORIDE 100 MCG: 10 INJECTION INTRAVENOUS at 05:06

## 2018-06-05 RX ADMIN — PROPOFOL 50 MCG/KG/MIN: 10 INJECTION, EMULSION INTRAVENOUS at 03:06

## 2018-06-05 RX ADMIN — OXYCODONE HYDROCHLORIDE 5 MG: 5 TABLET ORAL at 09:06

## 2018-06-05 RX ADMIN — SODIUM CHLORIDE: 0.9 INJECTION, SOLUTION INTRAVENOUS at 02:06

## 2018-06-05 RX ADMIN — ACETAMINOPHEN 1000 MG: 10 INJECTION, SOLUTION INTRAVENOUS at 06:06

## 2018-06-05 RX ADMIN — LIDOCAINE HYDROCHLORIDE 40 MG: 20 INJECTION, SOLUTION INTRAVENOUS at 03:06

## 2018-06-05 RX ADMIN — ROPIVACAINE HYDROCHLORIDE 8 ML/HR: 2 INJECTION, SOLUTION EPIDURAL; INFILTRATION at 06:06

## 2018-06-05 RX ADMIN — STANDARDIZED SENNA CONCENTRATE AND DOCUSATE SODIUM 1 TABLET: 8.6; 5 TABLET, FILM COATED ORAL at 10:06

## 2018-06-05 RX ADMIN — SODIUM CHLORIDE: 0.9 INJECTION, SOLUTION INTRAVENOUS at 03:06

## 2018-06-05 NOTE — PROGRESS NOTES
[facility]  General Surgery  Progress Note    Subjective:     History of Present Illness:  Ms. Villarreal is a 89 woman with PMHx significant for severe PVD, HTN, HLD, hypothyroidism, GERD and chronic constipation who presents to the ED with severe pain, gangrene of the R foot. Well known to Dr. Anderson's service - recent admission for perforated viscus, s/p ex-lap and ostomy (please see Discharge Summary from 5/31 for further details of that admission.)  Patient first developed foot pain 3-4 weeks ago. Then around Mother's Day began developing a black eschar on her distal R great toe. Follows with Dr. Garcia in Vascular Surgery. On 5/16, aortogram with run off performed. R AKA recommended, planned for tomorrow 6/5/18. Patient denies abdominal pain, ostomy functioning well. Primary team concerned about erythema noticed at incision site.     Post-Op Info:  Procedure(s) (LRB):  AMPUTATION, LOWER EXTREMITY, ABOVE KNEE (Right)         Interval History:   Patient seen and examined, no acute events overnight  To OR today for AKA  Minimal TTP at lower aspect of incision    Medications:  Continuous Infusions:  Scheduled Meds:   cephALEXin  500 mg Oral Q8H    levothyroxine  100 mcg Oral Before breakfast    oxyCODONE  5 mg Oral QID    pantoprazole  40 mg Oral Daily    rosuvastatin  20 mg Oral QHS    senna-docusate 8.6-50 mg  1 tablet Oral BID     PRN Meds:acetaminophen, ondansetron, oxyCODONE, prochlorperazine, ramelteon, sodium chloride 0.9%     Review of patient's allergies indicates:   Allergen Reactions    Sulfa (sulfonamide antibiotics) Swelling    Asa [aspirin] Itching     Objective:     Vital Signs (Most Recent):  Temp: 96.6 °F (35.9 °C) (06/05/18 0758)  Pulse: 82 (06/05/18 0758)  Resp: 18 (06/05/18 0758)  BP: (!) 154/72 (06/05/18 0758)  SpO2: (!) 90 % (06/05/18 0758) Vital Signs (24h Range):  Temp:  [96.6 °F (35.9 °C)-98.1 °F (36.7 °C)] 96.6 °F (35.9 °C)  Pulse:  [82-97] 82  Resp:  [16-18]  18  SpO2:  [90 %-94 %] 90 %  BP: (119-162)/(59-74) 154/72     Weight: 49.9 kg (110 lb)  Body mass index is 22.99 kg/m².    Intake/Output - Last 3 Shifts       06/03 0700 - 06/04 0659 06/04 0700 - 06/05 0659 06/05 0700 - 06/06 0659    P.O. 950 360     Total Intake(mL/kg) 950 (19) 360 (7.2)     Urine (mL/kg/hr)  400 (0.3)     Stool 200 (0.2) 200 (0.2)     Total Output 200 600      Net +750 -240             Urine Occurrence 3 x 3 x     Emesis Occurrence  0 x           Physical Exam   Constitutional: She appears well-developed and well-nourished. No distress.   HENT:   Head: Normocephalic and atraumatic.   Cardiovascular: Normal rate and regular rhythm.    Pulmonary/Chest: Effort normal. No respiratory distress.   Abdominal:   Soft, incision with staples in place - minimal erythema noted at inferior portion of incision, no drainage, minimal TTP  Ostomy with stool and gas in bag       Significant Labs:  CBC:   Recent Labs  Lab 06/05/18  0515   WBC 13.33*   RBC 4.10   HGB 12.7   HCT 40.2   *   MCV 98   MCH 31.0   MCHC 31.6*     BMP:   Recent Labs  Lab 06/05/18  0515   GLU 86   *   K 4.1      CO2 26   BUN 4*   CREATININE 0.5   CALCIUM 7.6*   MG 1.7     CMP:   Recent Labs  Lab 06/05/18  0515   GLU 86   CALCIUM 7.6*   ALBUMIN 1.6*   PROT 4.1*   *   K 4.1   CO2 26      BUN 4*   CREATININE 0.5   ALKPHOS 72   ALT 7*   AST 17   BILITOT 0.3     LFTs:   Recent Labs  Lab 06/05/18  0515   ALT 7*   AST 17   ALKPHOS 72   BILITOT 0.3   PROT 4.1*   ALBUMIN 1.6*     Coagulation:   Recent Labs  Lab 06/03/18  1100   LABPROT 10.1   INR 1.0   APTT 25.6     Assessment/Plan:     S/P right hemicolectomy    89 yo female s/p right hemicolectomy, ileostomy 5/24/18, to OR today for AKA    -continue keflex  -will discuss with staff timing of staple removal  -medical management per primary            Obdulia Gonsalves PA-C   [id number]  General Surgery  [facility]

## 2018-06-05 NOTE — OP NOTE
[facility]  Vascular Surgery  Operative Note    SUMMARY     Date of Procedure: 6/5/2018     Procedure: Right above knee amputation    Surgeon(s) and Role:     * Inna Carmichael MD - Fellow     * Dain Martin MD - Resident - Assisting     * Mar yAnn Garcia MD - Primary     * Michael Joseph Casale, MD - Resident - Assisting      Pre-Operative Diagnosis:  PAD (peripheral artery disease) [I73.9]      Gangrene of toe of right foot [I96]    Post-Operative Diagnosis: Same    Anesthesia: General/MAC    Indication for operation: 89 yo F with new diagnosis of transverse colon cancer with h/o R toe gangrene with intractable RLE rest pain presenting for R AKA.    Description of the Findings of the Procedure: healthy flap    Consider -22 modifier for the presence of the Titanium femoral imer. This orthopaedic hardware significanty increased the complexity of the operation, pre-operative planning and the time required to perform.    Complications: No    Estimated Blood Loss (EBL): 50 mL           Implants: * No implants in log *    Specimens:   Specimen (12h ago through future)    Start     Ordered    06/05/18 1704  Specimen to Pathology - Surgery  Once     Comments:  1. Right above the knee leg (PERM)      06/05/18 1704                  Condition: Good    Disposition: PACU - hemodynamically stable.    Operation in detail: After appropriate informed consent obtained, the patient was taken to the operating room and placed in the supine position. A block with perineural catheters was performed by anesthesia in the preoperative period. The right lower extremity was prepped and draped in usual sterile fashion.  The anterior and posterior skin flaps were outlined using a marking pen. A fish-mouth incision was made at the level of distal femur, just proximal to the patella, and a longer posterior flap was created. It was deepened through the subcutaneous tissues and anterior muscle groups were divided at the  skin level using sharp dissection. The saphenous vein was identified and ligated. The periosteum of the femur was elevated proximally and circumferentially along with the femoral shaft.  The femur was divided with a Maestro drill using a metal cutting letty secondary to the patient's prior right femoral imer.  The SFA, femoral vein were isolated controlled and ligated with a 2-0 prolene.   The posterior muscle compartment was divided with bovie electrocautery. The leg was then passed off of the field.  The sciatic nerve was identified and divided with silk sutures. Hemostasis was obtained with electrocautery. The deep investing fascia of the anterior and posterior muscle flaps were approximated interrupted 0 PDS sutures followed by fascial closure with interrupted 2-0 Vicryl sutures.  The subcutaneous tissue was then approximated using interrupted vicryl suture. The skin was approximated with staples. Secondary to patient's edema we elected to place an incisional wound vac. The patient was transferred to the recovery room in stable condition.

## 2018-06-05 NOTE — MEDICAL/APP STUDENT
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 Villarreal is a 89 woman with PMHx significant for severe PVD, HTN, HLD, hypothyroidism, GERD and chronic constipation who presents to the ED with severe pain, gangrene of the R foot. Patient with recent admission for perforated viscus, s/p ex-lap and ostomy (please see Discharge Summary from 5/31 for further details of that admission.) Patient's daughter is at bedside and provides much of the history. Patient first developed foot pain 3-4 weeks ago. Then around Mother's Day began developing a black eschar on her distal R great toe. Follows with Dr. Garcia in Vascular Surgery. On 5/16, aortogram with run off performed. R AKA recommended; however, patient and family refused at that time. Pain progressed since that time, with ulceration of the dorsum of her foot and heel developing. Daughter brought patient back to ED due to uncontrolled pain.     Hospital Course: Patient was admitted to hospital medicine for pain control for right foot gangrene and medication management. Vascular Surgery evaluated patient, plan for right AKA on 6/5. Scheduled percocet 5 started with prn oxycodone 10. Pain controlled overnight.  06/04/2018: Pain overnight, night MD called for IV morphine but patient later refused, discussed with patient and family will change pain meds to scheduled oxy IR 5mg q6 with breakthrough. Awaiting AKA tomorrow. Also placed on 2L supplemental O2 via NC but no SOB, suspect poor pulse ox readings from fingers, will wean today.     Interval History: Patient did well overnight. Two  daughters were at the bedside and reported that she slept well. She had some pain in her foot this morning, but this improved with medication. No other complaints.     Review of Systems   Constitutional: Positive for activity change, appetite change and fatigue.   HENT: Negative for trouble swallowing.    Eyes: Negative for visual disturbance.   Respiratory: Negative for cough and shortness of breath.   Cardiovascular: Negative for chest pain.   Gastrointestinal: Negative for abdominal distention and abdominal pain.   Genitourinary: Negative for dysuria.   Musculoskeletal: Positive for gait problem.   Skin: Positive for wound.   Neurological: Negative for headaches.   Psychiatric/Behavioral: Negative for confusion.   Objective:     Vital Signs (Most Recent):  Temp: 96.6 °F (35.9 °C) (06/05/18 0758)  Pulse: 82 (06/05/18 0758)  Resp: 18 (06/05/18 0758)  BP: (!) 154/72 (06/05/18 0758)  SpO2: (!) 90 % (06/05/18 0758) Vital Signs (24h Range):  Temp:  [96.6 °F (35.9 °C)-98.1 °F (36.7 °C)] 96.6 °F (35.9 °C)  Pulse:  [82-97] 82  Resp:  [16-18] 18  SpO2:  [90 %-94 %] 90 %  BP: (119-162)/(59-74) 154/72     Weight: 49.9 kg (110 lb)  Body mass index is 22.99 kg/m².    Intake/Output Summary (Last 24 hours) at 06/05/18 0845  Last data filed at 06/05/18 0500   Gross per 24 hour   Intake              360 ml   Output              600 ml   Net             -240 ml      Physical Exam   Constitutional: She is oriented to person, place, and time. She appeared comfortable and alert laying in bed during interview  HENT:   Head: Normocephalic and atraumatic.   Eyes: Conjunctivae are normal. No scleral icterus.   Neck: Normal range of motion.   Cardiovascular: Normal rate, regular rhythm and normal heart sounds.    Cannot palpate DP/PT pulses   Pulmonary/Chest: Effort normal and breath sounds normal. No wheezes or basal crackles   Abdominal: Soft. She exhibits no distension. There is no tenderness.   Midline laparotomy scar healing well,  "c/d/i  Ostomy bag in place, stool present, no erythema/ signs of infection at site   Musculoskeletal: Normal range of motion. She exhibits no edema.   R great toe with black eschar  Multiple areas of erythema and ulceration over dorsum of foot   Neurological: She is alert and oriented to person, place, and time.   Skin: Skin is warm and dry. She is not diaphoretic.   Psychiatric: She has a normal mood and affect. Her behavior is normal      Recent Labs  Lab 06/05/18  0515   WBC 13.33*   RBC 4.10   HGB 12.7   HCT 40.2   *   MCV 98   MCH 31.0   MCHC 31.6*       Recent Labs  Lab 06/05/18  0515   *   K 4.1      CO2 26   BUN 4*   CREATININE 0.5   MG 1.7           Significant Labs: I have reviewed all pertinent labs in the past 24 hours.    Significant Imaging: I have reviewed all pertinent imaging in the last 24 hours.    Assessment/Plan:    Ms. Villarreal is a 89 y.o. Female who presented with a primary complaint of R. toe gangrene. She is scheduled for an AKA today.     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           PVD (peripheral vascular disease)     - Has ASA allergy  - Home clopidogrel held in anticipation of surgery          Hyponatremia     - Mild, chronic, stable  - Will monitor daily          Hypothyroidism     - Continue home levothyroxine          GERD (gastroesophageal reflux disease)     - Home med Nexium, pantoprazole while inpatient          Essential hypertension "     - Holding home amlodipine 10, benazepril 20 and Toprol 25 in the setting of low/normal pressures  - Monitor/ restart as needed          VTE Risk Mitigation         Ordered     IP VTE HIGH RISK PATIENT  Once      06/02/18 2761             Neelima Carter  Department of Hospital Medicine   [facility]

## 2018-06-05 NOTE — ANESTHESIA PROCEDURE NOTES
Obturator Nerve Single Injection Block    Patient location during procedure: pre-op   Block not for primary anesthetic.  Reason for block: at surgeon's request and post-op pain management   Post-op Pain Location: right LE pain   Start time: 6/5/2018 2:39 PM  Timeout: 6/5/2018 2:38 PM   End time: 6/5/2018 3:33 PM  Surgery related to: right  AKA   Staffing  Anesthesiologist: RAYA ALEXIS  Resident/CRNA: TAMAR MADDOX  Performed: resident/CRNA   Preanesthetic Checklist  Completed: patient identified, site marked, surgical consent, pre-op evaluation, timeout performed, IV checked, risks and benefits discussed and monitors and equipment checked  Peripheral Block  Patient position: supine  Prep: ChloraPrep  Patient monitoring: heart rate, cardiac monitor, continuous pulse ox, continuous capnometry and frequent blood pressure checks  Block type: obturator  Laterality: right  Injection technique: single shot  Needle  Needle type: Stimuplex   Needle gauge: 22 G  Needle length: 2 in  Needle localization: anatomical landmarks and ultrasound guidance   -ultrasound image captured on disc.  Assessment  Injection assessment: negative aspiration, negative parasthesia and local visualized surrounding nerve  Paresthesia pain: none  Heart rate change: no  Slow fractionated injection: yes  Medications:  Bolus administered: 10 mL of 1.5 and 0.5 mepivacaine and ropivacaine  Epinephrine added: 3.75 mcg/mL (1/300,000)  Additional Notes  VSS.  [facility] RN monitoring vitals throughout procedure.  Patient tolerated procedure well.

## 2018-06-05 NOTE — PLAN OF CARE
Problem: Patient Care Overview  Goal: Plan of Care Review    Recommendations    Recommendation/Intervention:   1. Advance diet as medically feasible after surgery to cardiac diet + boost plus w/ meals.   2. Encourage po intake.   3. RD following.     Goals: meet >85% of EEN/EPN  Nutrition Goal Status: new

## 2018-06-05 NOTE — PROGRESS NOTES
[facility]  Vascular Surgery  Progress Note    Patient Name: Suzan Villarreal  MRN: [id number]  Admission Date: 6/2/2018  Primary Care Provider: Aly Rivas MD    Subjective:     Interval History: NAON. AFVSS. To OR today, pain manageable     Post-Op Info:  Procedure(s) (LRB):  AMPUTATION, LOWER EXTREMITY, ABOVE KNEE (Right)           Medications:  Continuous Infusions:  Scheduled Meds:   cephALEXin  500 mg Oral Q8H    levothyroxine  100 mcg Oral Before breakfast    oxyCODONE  5 mg Oral QID    pantoprazole  40 mg Oral Daily    rosuvastatin  20 mg Oral QHS    senna-docusate 8.6-50 mg  1 tablet Oral BID     PRN Meds:acetaminophen, ondansetron, oxyCODONE, prochlorperazine, ramelteon, sodium chloride 0.9%     Objective:     Vital Signs (Most Recent):  Temp: 97.7 °F (36.5 °C) (06/05/18 0404)  Pulse: 87 (06/05/18 0404)  Resp: 17 (06/05/18 0404)  BP: (!) 153/74 (06/05/18 0404)  SpO2: (!) 94 % (06/05/18 0404) Vital Signs (24h Range):  Temp:  [97.4 °F (36.3 °C)-98.1 °F (36.7 °C)] 97.7 °F (36.5 °C)  Pulse:  [86-97] 87  Resp:  [16-18] 17  SpO2:  [90 %-94 %] 94 %  BP: (119-162)/(59-74) 153/74          Physical Exam  Alert and Oriented, hard of hearing  RRR  No Resp Distress  RLE with dry gangrene of the toes    Significant Labs:  CBC:   Recent Labs  Lab 06/05/18 0515   WBC 13.33*   RBC 4.10   HGB 12.7   HCT 40.2   *   MCV 98   MCH 31.0   MCHC 31.6*     CMP:   Recent Labs  Lab 06/05/18 0515   GLU 86   CALCIUM 7.6*   ALBUMIN 1.6*   PROT 4.1*   *   K 4.1   CO2 26      BUN 4*   CREATININE 0.5   ALKPHOS 72   ALT 7*   AST 17   BILITOT 0.3       Significant Diagnostics:  I have reviewed all pertinent imaging results/findings within the past 24 hours.    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 D/w patient and her daughters yesterday and they wish to proceed with RLE amputation      - to OR today  -  Type and Screen yesterday, Hgb 12  - Risks, benefits, and alternatives were discussed with the patient and her family, and full informed consent was obtained prior to the procedure.               Dain Martin MD  Vascular Surgery  [facility]

## 2018-06-05 NOTE — ANESTHESIA PROCEDURE NOTES
[facility]    Patient location during procedure: pre-op   Block not for primary anesthetic.  Reason for block: at surgeon's request and post-op pain management   Post-op Pain Location: right LE pain   Start time: 6/5/2018 2:39 PM  Timeout: 6/5/2018 2:38 PM   End time: 6/5/2018 3:33 PM  Surgery related to: right AKA   Staffing  Anesthesiologist: RAYA ALEXIS  Resident/CRNA: TAMAR MADDOX  Performed: resident/CRNA   Preanesthetic Checklist  Completed: patient identified, site marked, surgical consent, pre-op evaluation, timeout performed, IV checked, risks and benefits discussed and monitors and equipment checked  Peripheral Block  Patient position: sitting  Prep: ChloraPrep  Patient monitoring: heart rate, cardiac monitor, continuous pulse ox, continuous capnometry and frequent blood pressure checks  Block type: lateral femoral cutaneous (Erector Spinae Plane Block)  Laterality: right  Injection technique: single shot  Needle  Needle type: Stimuplex   Needle gauge: 22 G  Needle length: 2 in  Needle localization: anatomical landmarks and ultrasound guidance   -ultrasound image captured on disc.  Assessment  Injection assessment: negative aspiration, negative parasthesia and local visualized surrounding nerve  Paresthesia pain: none  Heart rate change: no  Slow fractionated injection: yes  Medications:  Bolus administered: 10 mL of 0.5 ropivacaine  Epinephrine added: 3.75 mcg/mL (1/300,000)  Additional Notes  Patient tolerated well.  See [facility] RN record for vitals.

## 2018-06-05 NOTE — TRANSFER OF CARE
"Anesthesia Transfer of Care Note    Patient: Suzan Villarreal    Procedure(s) Performed: Procedure(s) (LRB):  AMPUTATION, LOWER EXTREMITY, ABOVE KNEE (Right)    Patient location: PACU    Anesthesia Type: general    Transport from OR: Transported from OR on 2-3 L/min O2 by NC with adequate spontaneous ventilation    Post pain: adequate analgesia    Post assessment: no apparent anesthetic complications and tolerated procedure well    Post vital signs: stable    Level of consciousness: awake, alert and oriented    Nausea/Vomiting: no nausea/vomiting    Complications: none    Transfer of care protocol was followed      Last vitals:   Visit Vitals  BP (!) 161/72 (BP Location: Left arm, Patient Position: Lying)   Pulse 86   Temp 36.5 °C (97.7 °F)   Resp 16   Ht 4' 10" (1.473 m)   Wt 49.9 kg (110 lb)   SpO2 (!) 91%   Breastfeeding? No   BMI 22.99 kg/m²     "

## 2018-06-05 NOTE — SUBJECTIVE & OBJECTIVE
Interval History:   Patient seen and examined, no acute events overnight  To OR today for AKA  Minimal TTP at lower aspect of incision    Medications:  Continuous Infusions:  Scheduled Meds:   cephALEXin  500 mg Oral Q8H    levothyroxine  100 mcg Oral Before breakfast    oxyCODONE  5 mg Oral QID    pantoprazole  40 mg Oral Daily    rosuvastatin  20 mg Oral QHS    senna-docusate 8.6-50 mg  1 tablet Oral BID     PRN Meds:acetaminophen, ondansetron, oxyCODONE, prochlorperazine, ramelteon, sodium chloride 0.9%     Review of patient's allergies indicates:   Allergen Reactions    Sulfa (sulfonamide antibiotics) Swelling    Asa [aspirin] Itching     Objective:     Vital Signs (Most Recent):  Temp: 96.6 °F (35.9 °C) (06/05/18 0758)  Pulse: 82 (06/05/18 0758)  Resp: 18 (06/05/18 0758)  BP: (!) 154/72 (06/05/18 0758)  SpO2: (!) 90 % (06/05/18 0758) Vital Signs (24h Range):  Temp:  [96.6 °F (35.9 °C)-98.1 °F (36.7 °C)] 96.6 °F (35.9 °C)  Pulse:  [82-97] 82  Resp:  [16-18] 18  SpO2:  [90 %-94 %] 90 %  BP: (119-162)/(59-74) 154/72     Weight: 49.9 kg (110 lb)  Body mass index is 22.99 kg/m².    Intake/Output - Last 3 Shifts       06/03 0700 - 06/04 0659 06/04 0700 - 06/05 0659 06/05 0700 - 06/06 0659    P.O. 950 360     Total Intake(mL/kg) 950 (19) 360 (7.2)     Urine (mL/kg/hr)  400 (0.3)     Stool 200 (0.2) 200 (0.2)     Total Output 200 600      Net +750 -240             Urine Occurrence 3 x 3 x     Emesis Occurrence  0 x           Physical Exam   Constitutional: She appears well-developed and well-nourished. No distress.   HENT:   Head: Normocephalic and atraumatic.   Cardiovascular: Normal rate and regular rhythm.    Pulmonary/Chest: Effort normal. No respiratory distress.   Abdominal:   Soft, incision with staples in place - minimal erythema noted at inferior portion of incision, no drainage, minimal TTP  Ostomy with stool and gas in bag       Significant Labs:  CBC:   Recent Labs  Lab 06/05/18  0515   WBC 13.33*    RBC 4.10   HGB 12.7   HCT 40.2   *   MCV 98   MCH 31.0   MCHC 31.6*     BMP:   Recent Labs  Lab 06/05/18  0515   GLU 86   *   K 4.1      CO2 26   BUN 4*   CREATININE 0.5   CALCIUM 7.6*   MG 1.7     CMP:   Recent Labs  Lab 06/05/18  0515   GLU 86   CALCIUM 7.6*   ALBUMIN 1.6*   PROT 4.1*   *   K 4.1   CO2 26      BUN 4*   CREATININE 0.5   ALKPHOS 72   ALT 7*   AST 17   BILITOT 0.3     LFTs:   Recent Labs  Lab 06/05/18  0515   ALT 7*   AST 17   ALKPHOS 72   BILITOT 0.3   PROT 4.1*   ALBUMIN 1.6*     Coagulation:   Recent Labs  Lab 06/03/18  1100   LABPROT 10.1   INR 1.0   APTT 25.6

## 2018-06-05 NOTE — ASSESSMENT & PLAN NOTE
89 yo female s/p right hemicolectomy, ileostomy 5/24/18, to OR today for AKA    -continue keflex  -will discuss with staff timing of staple removal  -medical management per primary

## 2018-06-05 NOTE — ASSESSMENT & PLAN NOTE
Severe rest pain causing readmission. D/w patient and her daughters yesterday and they wish to proceed with RLE amputation      - to OR today  - Type and Screen yesterday, [facility] 12  - Risks, benefits, and alternatives were discussed with the patient and her family, and full informed consent was obtained prior to the procedure.

## 2018-06-05 NOTE — ANESTHESIA PROCEDURE NOTES
Femoral Nerve Catheter    Patient location during procedure: pre-op   Block not for primary anesthetic.  Reason for block: at surgeon's request and post-op pain management   Post-op Pain Location: right LE pain   Start time: 6/5/2018 2:39 PM  Timeout: 6/5/2018 2:38 PM   End time: 6/5/2018 3:33 PM  Surgery related to: right AKA   Staffing  Anesthesiologist: RAYA ALEXIS  Resident/CRNA: TAMAR MADDOX  Performed: resident/CRNA   Preanesthetic Checklist  Completed: patient identified, site marked, surgical consent, pre-op evaluation, timeout performed, IV checked, risks and benefits discussed and monitors and equipment checked  Peripheral Block  Patient position: supine  Prep: ChloraPrep and site prepped and draped  Patient monitoring: heart rate, cardiac monitor, continuous pulse ox, continuous capnometry and frequent blood pressure checks  Block type: femoral  Laterality: right  Injection technique: continuous  Needle  Needle type: Tuohy   Needle gauge: 17 G  Needle length: 3.5 in  Needle localization: anatomical landmarks and ultrasound guidance  Catheter type: spring wound  Catheter size: 19 G  Test dose: lidocaine 1.5% with Epi 1-to-200,000 and negative   -ultrasound image captured on disc.  Assessment  Injection assessment: negative aspiration, negative parasthesia and local visualized surrounding nerve  Paresthesia pain: none  Heart rate change: no  Slow fractionated injection: yes  Medications:  Bolus administered: 20 mL of 0.25 and 0.5 ropivacaine  Epinephrine added: 3.75 mcg/mL (1/300,000)  Additional Notes  VSS.  [facility] RN monitoring vitals throughout procedure.  Patient tolerated procedure well.

## 2018-06-05 NOTE — ASSESSMENT & PLAN NOTE
- Has ASA allergy  - Home clopidogrel held in anticipation of surgery. Can re-start after cleared by vascular sx

## 2018-06-05 NOTE — SUBJECTIVE & OBJECTIVE
Medications:  Continuous Infusions:  Scheduled Meds:   cephALEXin  500 mg Oral Q8H    levothyroxine  100 mcg Oral Before breakfast    oxyCODONE  5 mg Oral QID    pantoprazole  40 mg Oral Daily    rosuvastatin  20 mg Oral QHS    senna-docusate 8.6-50 mg  1 tablet Oral BID     PRN Meds:acetaminophen, ondansetron, oxyCODONE, prochlorperazine, ramelteon, sodium chloride 0.9%     Objective:     Vital Signs (Most Recent):  Temp: 97.7 °F (36.5 °C) (06/05/18 0404)  Pulse: 87 (06/05/18 0404)  Resp: 17 (06/05/18 0404)  BP: (!) 153/74 (06/05/18 0404)  SpO2: (!) 94 % (06/05/18 0404) Vital Signs (24h Range):  Temp:  [97.4 °F (36.3 °C)-98.1 °F (36.7 °C)] 97.7 °F (36.5 °C)  Pulse:  [86-97] 87  Resp:  [16-18] 17  SpO2:  [90 %-94 %] 94 %  BP: (119-162)/(59-74) 153/74          Physical Exam  Alert and Oriented, hard of hearing  RRR  No Resp Distress  RLE with dry gangrene of the toes    Significant Labs:  CBC:   Recent Labs  Lab 06/05/18 0515   WBC 13.33*   RBC 4.10   HGB 12.7   HCT 40.2   *   MCV 98   MCH 31.0   MCHC 31.6*     CMP:   Recent Labs  Lab 06/05/18 0515   GLU 86   CALCIUM 7.6*   ALBUMIN 1.6*   PROT 4.1*   *   K 4.1   CO2 26      BUN 4*   CREATININE 0.5   ALKPHOS 72   ALT 7*   AST 17   BILITOT 0.3       Significant Diagnostics:  I have reviewed all pertinent imaging results/findings within the past 24 hours.

## 2018-06-05 NOTE — PROGRESS NOTES
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 Vilalrreal is a 89 woman with PMHx significant for severe PVD, HTN, HLD, hypothyroidism, GERD and chronic constipation who presents to the ED with severe pain, gangrene of the R foot. Patient with recent admission for perforated viscus, s/p ex-lap and ostomy (please see Discharge Summary from 5/31 for further details of that admission.) Patient's daughter is at bedside and provides much of the history. Patient first developed foot pain 3-4 weeks ago. Then around Mother's Day began developing a black eschar on her distal R great toe. Follows with Dr. Garcia in Vascular Surgery. On 5/16, aortogram with run off performed. R AKA recommended; however, patient and family refused at that time. Pain progressed since that time, with ulceration of the dorsum of her foot and heel developing. Daughter brought patient back to ED due to uncontrolled pain.     Hospital Course:  Patient was admitted to hospital medicine for pain control for right foot gangrene and medication management. Vascular Surgery evaluated patient, plan for right AKA on 6/5. Scheduled percocet 5 started with prn oxycodone 10. Pain controlled overnight.  06/04/2018: Pain overnight, night MD called for IV morphine but patient later refused, discussed with patient and family will change pain meds to scheduled oxy IR 5mg q6 with breakthrough. Awaiting AKA tomorrow. Also placed on 2L supplemental O2 via NC but no SOB, suspect poor pulse ox readings from fingers, will wean today.   06/05/2018 Pain well controlled overnight on  scheduled oxycodone. Plan for AKA today with vascular sx      Interval History: pain well controlled overnight. Plan for AKA today     Review of Systems   Constitutional: Positive for activity change, appetite change and fatigue.   HENT: Negative for trouble swallowing.    Eyes: Negative for visual disturbance.   Respiratory: Negative for cough and shortness of breath.    Cardiovascular: Negative for chest pain.   Gastrointestinal: Negative for abdominal distention and abdominal pain.   Genitourinary: Negative for dysuria.   Musculoskeletal: Positive for gait problem.   Skin: Positive for wound.   Neurological: Negative for headaches.   Psychiatric/Behavioral: Negative for confusion.     Objective:     Vital Signs (Most Recent):  Temp: 98 °F (36.7 °C) (06/05/18 1227)  Pulse: 87 (06/05/18 1227)  Resp: 18 (06/05/18 1227)  BP: (!) 153/68 (06/05/18 1227)  SpO2: 96 % (06/05/18 1227) Vital Signs (24h Range):  Temp:  [96.6 °F (35.9 °C)-98.1 °F (36.7 °C)] 98 °F (36.7 °C)  Pulse:  [82-97] 87  Resp:  [16-18] 18  SpO2:  [90 %-96 %] 96 %  BP: (119-162)/(59-74) 153/68     Weight: 49.9 kg (110 lb)  Body mass index is 22.99 kg/m².    Intake/Output Summary (Last 24 hours) at 06/05/18 1253  Last data filed at 06/05/18 0750   Gross per 24 hour   Intake              360 ml   Output              800 ml   Net             -440 ml      Physical Exam   Constitutional: She is oriented to person, place, and time. No distress.   Elderly, frail woman   HENT:   Head: Normocephalic and atraumatic.   Eyes: Conjunctivae are normal. No scleral icterus.   Neck: Normal range of motion.   Cardiovascular: Normal rate, regular rhythm and normal heart sounds.    Cannot palpate DP/PT pulses   Pulmonary/Chest: Effort normal and breath sounds normal.   Abdominal: Soft. She exhibits no distension. There is no tenderness.   Midline laparotomy scar healing well, c/d/i. Some erythema noted around lap site, no pain   Ostomy bag in place, stool present, no  "erythema/ signs of infection at site   Musculoskeletal: Normal range of motion. She exhibits no edema.   R great toe with black eschar  Multiple areas of erythema and ulceration over dorsum of foot   Neurological: She is alert and oriented to person, place, and time.   Skin: Skin is warm and dry. She is not diaphoretic.   Psychiatric: She has a normal mood and affect. Her behavior is normal.       Significant Labs: All pertinent labs within the past 24 hours have been reviewed.    Significant Imaging: I have reviewed all pertinent imaging results/findings within the past 24 hours.    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 Can re-start after cleared by vascular sx        Hyponatremia    - Mild, chronic, stable  - Will monitor daily        Hypothyroidism    - Continue home levothyroxine        GERD (gastroesophageal reflux disease)    - Home med Nexium, pantoprazole while inpatient        Essential hypertension    - Resume home amlodipine 10 and Toprol 25 as BP has been high   - Will add back benazepril tomorrow             VTE Risk Mitigation         Ordered     IP VTE HIGH RISK PATIENT  Once      06/02/18 8679              Jordy Buitrago MD  Department of Hospital Medicine   [facility]  "

## 2018-06-05 NOTE — ASSESSMENT & PLAN NOTE
- Resume home amlodipine 10 and Toprol 25 as BP has been high   - Will add back benazepril tomorrow

## 2018-06-05 NOTE — CONSULTS
"  [facility]  Adult Nutrition  Consult Note    SUMMARY     Recommendations    Recommendation/Intervention:   1. Advance diet as medically feasible after surgery to cardiac diet + boost plus w/ meals.   2. Encourage po intake.   3. RD following.     Goals: meet >85% of EEN/EPN  Nutrition Goal Status: new  Communication of RD Recs:  (POC)    Reason for Assessment    Reason for Assessment: consult  Diagnosis: other (see comments) (Atherosclerosis of artery of extremity w/ gangrene, Right )  Relevant Medical History: severe PVD, HTN, HLD, hypothyroidism, GERD, chronic constipation   Interdisciplinary Rounds: attended  General Information Comments: S/p right hemicolectomy, ileostomy (18). Currently NPO for surgery today - R AKA. Per chart, pt eating 50% of meals. Pt awake w/ family member's at bedside. Family member stated that pt's appetite was good currently and PTA. Family member stated that pt grazes and eats small meals throughout the day. Denied wt change.   Nutrition Discharge Planning: low Na diet     Nutrition Risk Screen    Nutrition Risk Screen: no indicators present    Nutrition/Diet History    Do you have any cultural, spiritual, Jain conflicts, given your current situation?: none  Factors Affecting Nutritional Intake: None identified at this time    Anthropometrics    Temp: 96.6 °F (35.9 °C)  Height Method: Stated  Height: 4' 10" (147.3 cm)  Height (inches): 58 in  Weight Method: Bed Scale  Weight: 49.9 kg (110 lb)  Weight (lb): 110 lb  Ideal Body Weight (IBW), Female: 90 lb  % Ideal Body Weight, Female (lb): 122.22 lb  BMI (Calculated): 23  BMI Grade: 18.5-24.9 - normal  Usual Body Weight (UBW), k kg (per chart 18)  % Usual Body Weight: 84.75  % Weight Change From Usual Weight: -15.43 %     Lab/Procedures/Meds    Pertinent Labs Reviewed: reviewed  Pertinent Labs Comments: Na 135L, BUN 4L, Phos 2.3L, ALT 7L  Pertinent Medications Reviewed: reviewed  Pertinent " Medications Comments: levothyroxine, statin, pantoprazole, docusate, xycodone     Physical Findings/Assessment    Overall Physical Appearance: nourished  Tubes: other (see comments) (ostomy )  Skin: incision(s), edema, non-healing wound(s)    Estimated/Assessed Needs    Weight Used For Calorie Calculations: 49.9 kg (110 lb 0.2 oz)  Energy Calorie Requirements (kcal): 7401-2349 kcal/day  Energy Need Method: Kcal/kg (25-30 kcal/kg)  Protein Requirements: 55-75 gm/day (1.1-15 gm/kg)  Weight Used For Protein Calculations: 49.9 kg (110 lb 0.2 oz)  Fluid Requirements (mL): 1 mL/kcal or per MD  Fluid Need Method: RDA Method  RDA Method (mL): 1247     Nutrition Prescription Ordered    Current Diet Order: NPO    Evaluation of Received Nutrient/Fluid Intake    % Intake of Estimated Energy Needs: Other: currently NPO, but perivously eating 50% of meals  % Meal Intake: NPO    Nutrition Risk    Level of Risk/Frequency of Follow-up: low (f/u 1 X wk)     Assessment and Plan    Moderate malnutrition    Malnutrition in the context of Social/Environmental Circumstances    Related to (etiology):  Inadequate Oral Intake    Signs and Symptoms (as evidenced by):  Energy Intake: <75% of estimated energy requirement for 1 year.   Weight Loss: 15% x 3 months  Fluid Accumulation: mild    Interventions/Recommendations (treatment strategy):  See recs    Nutrition Diagnosis Status:  New               Monitor and Evaluation    Food and Nutrient Intake: energy intake, food and beverage intake  Food and Nutrient Adminstration: diet order  Physical Activity and Function: nutrition-related ADLs and IADLs  Anthropometric Measurements: weight, weight change, body mass index  Biochemical Data, Medical Tests and Procedures: electrolyte and renal panel, gastrointestinal profile, glucose/endocrine profile, inflammatory profile, lipid profile  Nutrition-Focused Physical Findings: overall appearance     Nutrition Follow-Up    RD Follow-up?: Yes

## 2018-06-05 NOTE — ASSESSMENT & PLAN NOTE
Malnutrition in the context of Social/Environmental Circumstances    Related to (etiology):  Inadequate Oral Intake    Signs and Symptoms (as evidenced by):  Energy Intake: <75% of estimated energy requirement for 1 year.   Weight Loss: 15% x 3 months  Fluid Accumulation: mild    Interventions/Recommendations (treatment strategy):  See recs    Nutrition Diagnosis Status:  New

## 2018-06-05 NOTE — SUBJECTIVE & OBJECTIVE
Interval History: pain well controlled overnight. Plan for AKA today     Review of Systems   Constitutional: Positive for activity change, appetite change and fatigue.   HENT: Negative for trouble swallowing.    Eyes: Negative for visual disturbance.   Respiratory: Negative for cough and shortness of breath.    Cardiovascular: Negative for chest pain.   Gastrointestinal: Negative for abdominal distention and abdominal pain.   Genitourinary: Negative for dysuria.   Musculoskeletal: Positive for gait problem.   Skin: Positive for wound.   Neurological: Negative for headaches.   Psychiatric/Behavioral: Negative for confusion.     Objective:     Vital Signs (Most Recent):  Temp: 98 °F (36.7 °C) (06/05/18 1227)  Pulse: 87 (06/05/18 1227)  Resp: 18 (06/05/18 1227)  BP: (!) 153/68 (06/05/18 1227)  SpO2: 96 % (06/05/18 1227) Vital Signs (24h Range):  Temp:  [96.6 °F (35.9 °C)-98.1 °F (36.7 °C)] 98 °F (36.7 °C)  Pulse:  [82-97] 87  Resp:  [16-18] 18  SpO2:  [90 %-96 %] 96 %  BP: (119-162)/(59-74) 153/68     Weight: 49.9 kg (110 lb)  Body mass index is 22.99 kg/m².    Intake/Output Summary (Last 24 hours) at 06/05/18 1253  Last data filed at 06/05/18 0750   Gross per 24 hour   Intake              360 ml   Output              800 ml   Net             -440 ml      Physical Exam   Constitutional: She is oriented to person, place, and time. No distress.   Elderly, frail woman   HENT:   Head: Normocephalic and atraumatic.   Eyes: Conjunctivae are normal. No scleral icterus.   Neck: Normal range of motion.   Cardiovascular: Normal rate, regular rhythm and normal heart sounds.    Cannot palpate DP/PT pulses   Pulmonary/Chest: Effort normal and breath sounds normal.   Abdominal: Soft. She exhibits no distension. There is no tenderness.   Midline laparotomy scar healing well, c/d/i. Some erythema noted around lap site, no pain   Ostomy bag in place, stool present, no erythema/ signs of infection at site   Musculoskeletal: Normal range  of motion. She exhibits no edema.   R great toe with black eschar  Multiple areas of erythema and ulceration over dorsum of foot   Neurological: She is alert and oriented to person, place, and time.   Skin: Skin is warm and dry. She is not diaphoretic.   Psychiatric: She has a normal mood and affect. Her behavior is normal.       Significant Labs: All pertinent labs within the past 24 hours have been reviewed.    Significant Imaging: I have reviewed all pertinent imaging results/findings within the past 24 hours.

## 2018-06-05 NOTE — ANESTHESIA PROCEDURE NOTES
Sciatic Nerve Catheter    Patient location during procedure: pre-op   Block not for primary anesthetic.  Reason for block: at surgeon's request and post-op pain management   Post-op Pain Location: right LE pain   Start time: 6/5/2018 2:39 PM  Timeout: 6/5/2018 2:38 PM   End time: 6/5/2018 3:33 PM  Surgery related to: Right AKA   Staffing  Anesthesiologist: RAYA ALEXIS  Resident/CRNA: TAMAR MADDOX  Other anesthesia staff: MARIO MATHEW  Performed: anesthesiologist   Preanesthetic Checklist  Completed: patient identified, site marked, surgical consent, pre-op evaluation, timeout performed, IV checked, risks and benefits discussed and monitors and equipment checked  Peripheral Block  Patient position: left lateral decubitus  Prep: ChloraPrep and site prepped and draped  Patient monitoring: heart rate, cardiac monitor, continuous pulse ox, continuous capnometry and frequent blood pressure checks  Block type: sciatic  Laterality: right  Injection technique: continuous  Needle  Needle type: Tuohy   Needle gauge: 17 G  Needle length: 3.5 in  Needle localization: anatomical landmarks and nerve stimulator  Catheter type: stimulating  Catheter size: 19 G  Test dose: lidocaine 1.5% with Epi 1-to-200,000 and negative     Assessment  Injection assessment: negative aspiration and negative parasthesia  Paresthesia pain: none  Heart rate change: no  Slow fractionated injection: yes  Medications:  Bolus administered: 20 mL of 0.5 ropivacaine  Epinephrine added: 3.75 mcg/mL (1/300,000)  Additional Notes  Performed  Labat location.  Drew test with appropriate loss of twitch with injection of local anesthetic.  VSS.  [facility] RN monitoring vitals throughout procedure.  Patient tolerated procedure well.

## 2018-06-05 NOTE — PLAN OF CARE
Problem: Patient Care Overview  Goal: Plan of Care Review  Outcome: Ongoing (interventions implemented as appropriate)  Pt remained free from falls or injuries this shift. Pt refused to reposition q2hrs even when educated on risk for pressure ulcers. Heel protector boots in place.  Pt rested well through the night. RLE pain controlled. Pt NPO since midnight for R AKA. Daughter at bedside through the night

## 2018-06-05 NOTE — ASSESSMENT & PLAN NOTE
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

## 2018-06-06 LAB
ALBUMIN SERPL BCP-MCNC: 1.4 G/DL
ALP SERPL-CCNC: 60 U/L
ALT SERPL W/O P-5'-P-CCNC: 5 U/L
ANION GAP SERPL CALC-SCNC: 4 MMOL/L
AST SERPL-CCNC: 17 U/L
BASOPHILS # BLD AUTO: 0.09 K/UL
BASOPHILS NFR BLD: 0.6 %
BILIRUB SERPL-MCNC: 0.3 MG/DL
BUN SERPL-MCNC: 4 MG/DL
CALCIUM SERPL-MCNC: 7.2 MG/DL
CHLORIDE SERPL-SCNC: 103 MMOL/L
CO2 SERPL-SCNC: 29 MMOL/L
CREAT SERPL-MCNC: 0.5 MG/DL
DIFFERENTIAL METHOD: ABNORMAL
EOSINOPHIL # BLD AUTO: 0.2 K/UL
EOSINOPHIL NFR BLD: 1.1 %
ERYTHROCYTE [DISTWIDTH] IN BLOOD BY AUTOMATED COUNT: 16.2 %
EST. GFR  (AFRICAN AMERICAN): >60 ML/MIN/1.73 M^2
EST. GFR  (NON AFRICAN AMERICAN): >60 ML/MIN/1.73 M^2
GLUCOSE SERPL-MCNC: 77 MG/DL
HCT VFR BLD AUTO: 33.5 %
HGB BLD-MCNC: 10.8 G/DL
IMM GRANULOCYTES # BLD AUTO: 0.17 K/UL
IMM GRANULOCYTES NFR BLD AUTO: 1.1 %
LYMPHOCYTES # BLD AUTO: 1 K/UL
LYMPHOCYTES NFR BLD: 6.6 %
MAGNESIUM SERPL-MCNC: 1.5 MG/DL
MCH RBC QN AUTO: 30.3 PG
MCHC RBC AUTO-ENTMCNC: 32.2 G/DL
MCV RBC AUTO: 94 FL
MONOCYTES # BLD AUTO: 1 K/UL
MONOCYTES NFR BLD: 6.5 %
NEUTROPHILS # BLD AUTO: 12.5 K/UL
NEUTROPHILS NFR BLD: 84.1 %
NRBC BLD-RTO: 0 /100 WBC
PHOSPHATE SERPL-MCNC: 2.9 MG/DL
PLATELET # BLD AUTO: 452 K/UL
PMV BLD AUTO: 8.7 FL
POTASSIUM SERPL-SCNC: 3.7 MMOL/L
PROT SERPL-MCNC: 3.7 G/DL
RBC # BLD AUTO: 3.56 M/UL
SODIUM SERPL-SCNC: 136 MMOL/L
WBC # BLD AUTO: 14.84 K/UL

## 2018-06-06 PROCEDURE — 25000003 PHARM REV CODE 250: Performed by: STUDENT IN AN ORGANIZED HEALTH CARE EDUCATION/TRAINING PROGRAM

## 2018-06-06 PROCEDURE — 80053 COMPREHEN METABOLIC PANEL: CPT

## 2018-06-06 PROCEDURE — 85025 COMPLETE CBC W/AUTO DIFF WBC: CPT

## 2018-06-06 PROCEDURE — 99232 SBSQ HOSP IP/OBS MODERATE 35: CPT | Mod: GC,,, | Performed by: HOSPITALIST

## 2018-06-06 PROCEDURE — 25000003 PHARM REV CODE 250: Performed by: ANESTHESIOLOGY

## 2018-06-06 PROCEDURE — 83735 ASSAY OF MAGNESIUM: CPT

## 2018-06-06 PROCEDURE — 84100 ASSAY OF PHOSPHORUS: CPT

## 2018-06-06 PROCEDURE — 63600175 PHARM REV CODE 636 W HCPCS: Performed by: STUDENT IN AN ORGANIZED HEALTH CARE EDUCATION/TRAINING PROGRAM

## 2018-06-06 PROCEDURE — 11000001 HC ACUTE MED/SURG PRIVATE ROOM

## 2018-06-06 PROCEDURE — 25000003 PHARM REV CODE 250: Performed by: INTERNAL MEDICINE

## 2018-06-06 PROCEDURE — 63600175 PHARM REV CODE 636 W HCPCS: Performed by: ANESTHESIOLOGY

## 2018-06-06 PROCEDURE — 36415 COLL VENOUS BLD VENIPUNCTURE: CPT

## 2018-06-06 PROCEDURE — 99231 SBSQ HOSP IP/OBS SF/LOW 25: CPT | Mod: ,,, | Performed by: ANESTHESIOLOGY

## 2018-06-06 PROCEDURE — 25000003 PHARM REV CODE 250

## 2018-06-06 PROCEDURE — 25000003 PHARM REV CODE 250: Performed by: HOSPITALIST

## 2018-06-06 RX ORDER — BENAZEPRIL HYDROCHLORIDE 20 MG/1
20 TABLET ORAL 2 TIMES DAILY
Status: DISCONTINUED | OUTPATIENT
Start: 2018-06-06 | End: 2018-06-20 | Stop reason: HOSPADM

## 2018-06-06 RX ORDER — MAGNESIUM SULFATE HEPTAHYDRATE 40 MG/ML
2 INJECTION, SOLUTION INTRAVENOUS ONCE
Status: COMPLETED | OUTPATIENT
Start: 2018-06-06 | End: 2018-06-06

## 2018-06-06 RX ORDER — AMOXICILLIN AND CLAVULANATE POTASSIUM 875; 125 MG/1; MG/1
1 TABLET, FILM COATED ORAL EVERY 12 HOURS
Status: DISCONTINUED | OUTPATIENT
Start: 2018-06-06 | End: 2018-06-07

## 2018-06-06 RX ADMIN — ROPIVACAINE HYDROCHLORIDE 5 ML/HR: 2 INJECTION, SOLUTION EPIDURAL; INFILTRATION at 03:06

## 2018-06-06 RX ADMIN — BENAZEPRIL HYDROCHLORIDE 20 MG: 20 TABLET, FILM COATED ORAL at 08:06

## 2018-06-06 RX ADMIN — METOPROLOL SUCCINATE 25 MG: 25 TABLET, EXTENDED RELEASE ORAL at 08:06

## 2018-06-06 RX ADMIN — OXYCODONE HYDROCHLORIDE 5 MG: 5 TABLET ORAL at 05:06

## 2018-06-06 RX ADMIN — PREGABALIN 75 MG: 75 CAPSULE ORAL at 08:06

## 2018-06-06 RX ADMIN — ROSUVASTATIN CALCIUM 20 MG: 20 TABLET, FILM COATED ORAL at 08:06

## 2018-06-06 RX ADMIN — ACETAMINOPHEN 1000 MG: 500 TABLET, FILM COATED ORAL at 06:06

## 2018-06-06 RX ADMIN — STANDARDIZED SENNA CONCENTRATE AND DOCUSATE SODIUM 1 TABLET: 8.6; 5 TABLET, FILM COATED ORAL at 08:06

## 2018-06-06 RX ADMIN — ROPIVACAINE HYDROCHLORIDE 8 ML/HR: 2 INJECTION, SOLUTION EPIDURAL; INFILTRATION at 08:06

## 2018-06-06 RX ADMIN — CEFAZOLIN 1 G: 1 INJECTION, POWDER, FOR SOLUTION INTRAMUSCULAR; INTRAVENOUS at 08:06

## 2018-06-06 RX ADMIN — OXYCODONE HYDROCHLORIDE 5 MG: 5 TABLET ORAL at 08:06

## 2018-06-06 RX ADMIN — CEFAZOLIN 1 G: 1 INJECTION, POWDER, FOR SOLUTION INTRAMUSCULAR; INTRAVENOUS at 01:06

## 2018-06-06 RX ADMIN — ACETAMINOPHEN 1000 MG: 500 TABLET, FILM COATED ORAL at 11:06

## 2018-06-06 RX ADMIN — BENAZEPRIL HYDROCHLORIDE 20 MG: 20 TABLET, FILM COATED ORAL at 10:06

## 2018-06-06 RX ADMIN — AMOXICILLIN AND CLAVULANATE POTASSIUM 1 TABLET: 875; 125 TABLET, FILM COATED ORAL at 08:06

## 2018-06-06 RX ADMIN — MAGNESIUM SULFATE IN WATER 2 G: 40 INJECTION, SOLUTION INTRAVENOUS at 08:06

## 2018-06-06 RX ADMIN — AMLODIPINE BESYLATE 10 MG: 10 TABLET ORAL at 08:06

## 2018-06-06 RX ADMIN — PANTOPRAZOLE SODIUM 40 MG: 40 TABLET, DELAYED RELEASE ORAL at 08:06

## 2018-06-06 RX ADMIN — AMOXICILLIN AND CLAVULANATE POTASSIUM 1 TABLET: 875; 125 TABLET, FILM COATED ORAL at 11:06

## 2018-06-06 RX ADMIN — OXYCODONE HYDROCHLORIDE 5 MG: 5 TABLET ORAL at 07:06

## 2018-06-06 RX ADMIN — ACETAMINOPHEN 1000 MG: 500 TABLET, FILM COATED ORAL at 01:06

## 2018-06-06 RX ADMIN — LEVOTHYROXINE SODIUM 100 MCG: 100 TABLET ORAL at 07:06

## 2018-06-06 RX ADMIN — CEPHALEXIN 500 MG: 500 CAPSULE ORAL at 07:06

## 2018-06-06 NOTE — SUBJECTIVE & OBJECTIVE
Medications:  Continuous Infusions:   ropivacaine (PF) 2 mg/ml (0.2%) 8 mL/hr (06/06/18 0806)    ropivacaine (PF) 2 mg/ml (0.2%) 5 mL/hr (06/05/18 1848)     Scheduled Meds:   acetaminophen  1,000 mg Oral Q6H    amLODIPine  10 mg Oral Daily    benazepril  20 mg Oral BID    ceFAZolin (ANCEF) IVPB  1 g Intravenous Q8H    levothyroxine  100 mcg Oral Before breakfast    metoprolol succinate  25 mg Oral Daily    pantoprazole  40 mg Oral Daily    pregabalin  75 mg Oral QHS    rosuvastatin  20 mg Oral QHS    senna-docusate 8.6-50 mg  1 tablet Oral BID     PRN Meds:ondansetron, oxyCODONE, prochlorperazine, ramelteon, sodium chloride 0.9%     Objective:     Vital Signs (Most Recent):  Temp: 97.2 °F (36.2 °C) (06/06/18 0758)  Pulse: 76 (06/06/18 0758)  Resp: 16 (06/06/18 0758)  BP: (!) 154/70 (06/06/18 0758)  SpO2: 98 % (06/06/18 0758) Vital Signs (24h Range):  Temp:  [96 °F (35.6 °C)-98 °F (36.7 °C)] 97.2 °F (36.2 °C)  Pulse:  [] 76  Resp:  [10-18] 16  SpO2:  [92 %-100 %] 98 %  BP: (123-184)/() 154/70          Physical Exam   Constitutional: She appears well-developed. No distress.   Cardiovascular: Normal rate.    Pulmonary/Chest: Effort normal. No respiratory distress.   Abdominal: Soft. She exhibits no distension.   Neurological: She is alert.   Psychiatric: She has a normal mood and affect.   Wound vac in place over surgical incision    Significant Labs:  CBC:     Recent Labs  Lab 06/06/18 0425   WBC 14.84*   RBC 3.56*   HGB 10.8*   HCT 33.5*   *   MCV 94   MCH 30.3   MCHC 32.2     CMP:     Recent Labs  Lab 06/06/18 0425   GLU 77   CALCIUM 7.2*   ALBUMIN 1.4*   PROT 3.7*      K 3.7   CO2 29      BUN 4*   CREATININE 0.5   ALKPHOS 60   ALT 5*   AST 17   BILITOT 0.3       Significant Diagnostics:  I have reviewed all pertinent imaging results/findings within the past 24 hours.

## 2018-06-06 NOTE — PROGRESS NOTES
[facility]  Vascular Surgery  Progress Note    Patient Name: Suzan Villarreal  MRN: [id number]  Admission Date: 6/2/2018  Primary Care Provider: Aly Rivas MD    Subjective:     Interval History: No acute events overnight. Pt seen and examined at the bedside. Vital signs stable.  Pain well controlled with catheter  No other new complaints or concerns  Patient resting comfortably in bed this morning  Wound vac in place    Post-Op Info:  Procedure(s) (LRB):  AMPUTATION, LOWER EXTREMITY, ABOVE KNEE (Right)   1 Day Post-Op       Medications:  Continuous Infusions:   ropivacaine (PF) 2 mg/ml (0.2%) 8 mL/hr (06/06/18 0806)    ropivacaine (PF) 2 mg/ml (0.2%) 5 mL/hr (06/05/18 1848)     Scheduled Meds:   acetaminophen  1,000 mg Oral Q6H    amLODIPine  10 mg Oral Daily    benazepril  20 mg Oral BID    ceFAZolin (ANCEF) IVPB  1 g Intravenous Q8H    levothyroxine  100 mcg Oral Before breakfast    metoprolol succinate  25 mg Oral Daily    pantoprazole  40 mg Oral Daily    pregabalin  75 mg Oral QHS    rosuvastatin  20 mg Oral QHS    senna-docusate 8.6-50 mg  1 tablet Oral BID     PRN Meds:ondansetron, oxyCODONE, prochlorperazine, ramelteon, sodium chloride 0.9%     Objective:     Vital Signs (Most Recent):  Temp: 97.2 °F (36.2 °C) (06/06/18 0758)  Pulse: 76 (06/06/18 0758)  Resp: 16 (06/06/18 0758)  BP: (!) 154/70 (06/06/18 0758)  SpO2: 98 % (06/06/18 0758) Vital Signs (24h Range):  Temp:  [96 °F (35.6 °C)-98 °F (36.7 °C)] 97.2 °F (36.2 °C)  Pulse:  [] 76  Resp:  [10-18] 16  SpO2:  [92 %-100 %] 98 %  BP: (123-184)/() 154/70          Physical Exam   Constitutional: She appears well-developed. No distress.   Cardiovascular: Normal rate.    Pulmonary/Chest: Effort normal. No respiratory distress.   Abdominal: Soft. She exhibits no distension.   Neurological: She is alert.   Psychiatric: She has a normal mood and affect.   Wound vac in place over surgical incision    Significant  Labs:  CBC:     Recent Labs  Lab 06/06/18  0425   WBC 14.84*   RBC 3.56*   HGB 10.8*   HCT 33.5*   *   MCV 94   MCH 30.3   MCHC 32.2     CMP:     Recent Labs  Lab 06/06/18  0425   GLU 77   CALCIUM 7.2*   ALBUMIN 1.4*   PROT 3.7*      K 3.7   CO2 29      BUN 4*   CREATININE 0.5   ALKPHOS 60   ALT 5*   AST 17   BILITOT 0.3       Significant Diagnostics:  I have reviewed all pertinent imaging results/findings within the past 24 hours.    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 female s/p right AKA     - wound vac to RLE aka wound, will take down approximately 5-7 days postop  - pain control per regional team    Will continue to follow along            Dain Almaraz MD  Vascular Surgery  [facility]

## 2018-06-06 NOTE — NURSING TRANSFER
Nursing Transfer Note      6/5/2018     Transfer 530    Transfer viabed    Transfer with perinural    Transported by pct, rn    Medicines sent:no    Chart send with patient: yes    Notified:daughter

## 2018-06-06 NOTE — PROGRESS NOTES
[facility]  General Surgery  Progress Note    Subjective:     History of Present Illness:  Ms. Villarreal is a 89 woman with PMHx significant for severe PVD, HTN, HLD, hypothyroidism, GERD and chronic constipation who presents to the ED with severe pain, gangrene of the R foot. Well known to Dr. Anderson's service - recent admission for perforated viscus, s/p ex-lap and ostomy (please see Discharge Summary from 5/31 for further details of that admission.)  Patient first developed foot pain 3-4 weeks ago. Then around Mother's Day began developing a black eschar on her distal R great toe. Follows with Dr. Garcia in Vascular Surgery. On 5/16, aortogram with run off performed. R AKA recommended, planned for tomorrow 6/5/18. Patient denies abdominal pain, ostomy functioning well. Primary team concerned about erythema noticed at incision site.     Post-Op Info:  Procedure(s) (LRB):  AMPUTATION, LOWER EXTREMITY, ABOVE KNEE (Right)   1 Day Post-Op     Interval History:   Patient seen and examined  S/p right AKA POD 1  Resting comfortably in bed at this time    Medications:  Continuous Infusions:   ropivacaine (PF) 2 mg/ml (0.2%) 8 mL/hr (06/05/18 1857)    ropivacaine (PF) 2 mg/ml (0.2%) 5 mL/hr (06/05/18 1848)     Scheduled Meds:   acetaminophen  1,000 mg Oral Q6H    amLODIPine  10 mg Oral Daily    ceFAZolin (ANCEF) IVPB  1 g Intravenous Q8H    cephALEXin  500 mg Oral Q8H    levothyroxine  100 mcg Oral Before breakfast    metoprolol succinate  25 mg Oral Daily    pantoprazole  40 mg Oral Daily    pregabalin  75 mg Oral QHS    rosuvastatin  20 mg Oral QHS    senna-docusate 8.6-50 mg  1 tablet Oral BID     PRN Meds:ondansetron, oxyCODONE, prochlorperazine, ramelteon, sodium chloride 0.9%     Review of patient's allergies indicates:   Allergen Reactions    Sulfa (sulfonamide antibiotics) Swelling    Asa [aspirin] Itching     Objective:     Vital Signs (Most Recent):  Temp: 97.5 °F (36.4 °C)  (06/06/18 0030)  Pulse: 77 (06/06/18 0030)  Resp: 16 (06/06/18 0030)  BP: 133/61 (06/06/18 0030)  SpO2: (!) 94 % (06/06/18 0030) Vital Signs (24h Range):  Temp:  [96 °F (35.6 °C)-98 °F (36.7 °C)] 97.5 °F (36.4 °C)  Pulse:  [] 77  Resp:  [10-18] 16  SpO2:  [90 %-100 %] 94 %  BP: (123-184)/() 133/61     Weight: 49.9 kg (110 lb)  Body mass index is 22.99 kg/m².    Intake/Output - Last 3 Shifts       06/04 0700 - 06/05 0659 06/05 0700 - 06/06 0659    P.O. 360     I.V. (mL/kg)  1100 (22)    Total Intake(mL/kg) 360 (7.2) 1100 (22)    Urine (mL/kg/hr) 400 (0.3) 200 (0.2)    Stool 200 (0.2)     Total Output 600 200    Net -240 +900          Urine Occurrence 3 x     Stool Occurrence  1 x    Emesis Occurrence 0 x           Physical Exam   Constitutional: She appears well-developed and well-nourished. No distress.   HENT:   Head: Normocephalic and atraumatic.   Cardiovascular: Normal rate and regular rhythm.    Pulmonary/Chest: Effort normal. No respiratory distress.   Abdominal:   Soft, incision with staples in place - incisional erythems increasing, no drainage, minimal TTP  Ostomy with stool and gas in bag       Significant Labs:  CBC:   Recent Labs  Lab 06/06/18 0425   WBC 14.84*   RBC 3.56*   HGB 10.8*   HCT 33.5*   *   MCV 94   MCH 30.3   MCHC 32.2     BMP:   Recent Labs  Lab 06/06/18 0425   GLU 77      K 3.7      CO2 29   BUN 4*   CREATININE 0.5   CALCIUM 7.2*   MG 1.5*     CMP:   Recent Labs  Lab 06/06/18 0425   GLU 77   CALCIUM 7.2*   ALBUMIN 1.4*   PROT 3.7*      K 3.7   CO2 29      BUN 4*   CREATININE 0.5   ALKPHOS 60   ALT 5*   AST 17   BILITOT 0.3     LFTs:   Recent Labs  Lab 06/06/18  0425   ALT 5*   AST 17   ALKPHOS 60   BILITOT 0.3   PROT 3.7*   ALBUMIN 1.4*     Coagulation:   Recent Labs  Lab 06/03/18  1100   LABPROT 10.1   INR 1.0   APTT 25.6     Assessment/Plan:     S/P right hemicolectomy    89 yo female s/p right hemicolectomy, ileostomy 5/24/18, s/p right AKA  6/5/18    -recommend changing abx to augmentin   -will remove a few staples and pack wound if necessary  -medical management per primary            Obdulia Gonsalves PA-C   [id number]  General Surgery  [facility]

## 2018-06-06 NOTE — ASSESSMENT & PLAN NOTE
Suzan Villarreal is a 89 y.o. female s/p right AKA     - wound vac to RLE aka wound, will take down approximately 5-7 days postop  - pain control per regional team    Will continue to follow along

## 2018-06-06 NOTE — ANESTHESIA POST-OP PAIN MANAGEMENT
Acute Pain Service Progress Note    Suzan Villarreal is a 89 y.o., female, [id number].    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]  Yes    Moderate malnutrition [E44.0]  Unknown    Delayed surgical wound healing [T81.89XA]  Yes    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]  Yes     Chronic    S/P right hemicolectomy [Z90.49]  Not Applicable    Gangrene of toe of right foot [I96]  Yes    PVD (peripheral vascular disease) [I73.9]  Yes     Chronic    Hypothyroidism [E03.9]  Yes     Chronic    Hyponatremia [E87.1]  Yes    Essential hypertension [I10]  Yes    GERD (gastroesophageal reflux disease) [K21.9]  Yes     Chronic      Resolved Hospital Problems    Diagnosis Date Resolved POA   No resolved problems to display.       Subjective:     General appearance of alert, oriented, no complaints   Pain with rest: 1    Numbers   Pain with movement: 1    Numbers   Side Effects    1. Pruritis No    2. Nausea No    3. Motor Blockade No, 0=Ability to raise lower extremities off bed    4. Sedation No, 1=awake and alert    Objective:       Catheter site clean, dry, intact         Vitals   Vitals:    06/06/18 1609   BP: 136/63   Pulse: 76   Resp: 18   Temp: 36.1 °C (97 °F)        Labs    Admission on 06/02/2018   No results displayed because visit has over 200 results.           Meds   Current Facility-Administered Medications   Medication Dose Route Frequency Provider Last Rate Last Dose    acetaminophen tablet 1,000 mg  1,000 mg Oral Q6H Tushar Otero Jr., MD   1,000 mg at 06/06/18 1142    amLODIPine tablet 10 mg  10 mg Oral Daily Jordy Buitrago MD   10 mg at 06/06/18 0804     amoxicillin-clavulanate 875-125mg per tablet 1 tablet  1 tablet Oral Q12H April Castellanos MD   1 tablet at 06/06/18 1142    benazepril tablet 20 mg  20 mg Oral BID April Castellanos MD   20 mg at 06/06/18 1021    levothyroxine tablet 100 mcg  100 mcg Oral Before breakfast April Castellanos MD   100 mcg at 06/06/18 0700    metoprolol succinate (TOPROL-XL) 24 hr tablet 25 mg  25 mg Oral Daily Jordy Buitrago MD   25 mg at 06/06/18 0805    ondansetron disintegrating tablet 8 mg  8 mg Oral Q8H PRN Kenji Watson MD        oxyCODONE immediate release tablet 5 mg  5 mg Oral Q4H PRN Tushar Otero Jr., MD   5 mg at 06/06/18 0700    pantoprazole EC tablet 40 mg  40 mg Oral Daily Kenji Watson MD   40 mg at 06/06/18 0805    pregabalin capsule 75 mg  75 mg Oral QHS Tushar Otero Jr., MD   75 mg at 06/05/18 2200    prochlorperazine injection Soln 5 mg  5 mg Intravenous Q6H PRN Kenji Watson MD        ramelteon tablet 8 mg  8 mg Oral Nightly PRN April Castellanos MD        ropivacaine (PF) 2 mg/ml (0.2%) infusion  8 mL/hr Perineural Continuous Tamela Little MD 8 mL/hr at 06/06/18 0806 8 mL/hr at 06/06/18 0806    ropivacaine (PF) 2 mg/ml (0.2%) infusion  5 mL/hr Perineural Continuous Tamela Little MD 5 mL/hr at 06/06/18 1528 5 mL/hr at 06/06/18 1528    rosuvastatin tablet 20 mg  20 mg Oral QHS April Castellanos MD   20 mg at 06/05/18 2200    senna-docusate 8.6-50 mg per tablet 1 tablet  1 tablet Oral BID Kenji Watson MD   1 tablet at 06/05/18 2200    sodium chloride 0.9% flush 3 mL  3 mL Intravenous PRN Kenji Watson MD               Assessment:     Pain control adequate    Plan:     Patient doing well, continue present treatment.    Evaluator Tushar Otero Jr.

## 2018-06-06 NOTE — PROGRESS NOTES
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 Villarreal is a 89 woman with PMHx significant for severe PVD, HTN, HLD, hypothyroidism, GERD and chronic constipation who presents to the ED with severe pain, gangrene of the R foot. Patient with recent admission for perforated viscus, s/p ex-lap and ostomy (please see Discharge Summary from 5/31 for further details of that admission.) Patient's daughter is at bedside and provides much of the history. Patient first developed foot pain 3-4 weeks ago. Then around Mother's Day began developing a black eschar on her distal R great toe. Follows with Dr. Garcia in Vascular Surgery. On 5/16, aortogram with run off performed. R AKA recommended; however, patient and family refused at that time. Pain progressed since that time, with ulceration of the dorsum of her foot and heel developing. Daughter brought patient back to ED due to uncontrolled pain.     Hospital Course:  Patient was admitted to hospital medicine for pain control for right foot gangrene and medication management. Vascular Surgery evaluated patient, plan for right AKA on 6/5. Scheduled percocet 5 started with prn oxycodone 10. Pain controlled overnight.  06/04/2018: Pain overnight, night MD called for IV morphine but patient later refused, discussed with patient and family will change pain meds to scheduled oxy IR 5mg q6 with breakthrough. Awaiting AKA tomorrow. Also placed on 2L supplemental O2 via NC but no SOB, suspect poor pulse ox readings from fingers, will wean today.   06/05/2018: Pain well controlled overnight on  scheduled oxycodone. Plan for AKA today with vascular sx.  06/06/2018: AKA yesterday, denies pain. No subjective complaints. VSS, afebrile.       Review of Systems   Constitutional: Positive for activity change, appetite change and fatigue.   HENT: Negative for trouble swallowing.    Eyes: Negative for visual disturbance.   Respiratory: Negative for cough and shortness of breath.    Cardiovascular: Negative for chest pain.   Gastrointestinal: Negative for abdominal distention and abdominal pain.   Genitourinary: Negative for dysuria.   Musculoskeletal: Positive for gait problem.   Skin: Positive for wound.   Neurological: Negative for headaches.   Psychiatric/Behavioral: Negative for confusion.     Objective:     Vital Signs (Most Recent):  Temp: 97.2 °F (36.2 °C) (06/06/18 0758)  Pulse: 76 (06/06/18 0758)  Resp: 16 (06/06/18 0758)  BP: (!) 154/70 (06/06/18 0758)  SpO2: 98 % (06/06/18 0758) Vital Signs (24h Range):  Temp:  [96 °F (35.6 °C)-98 °F (36.7 °C)] 97.2 °F (36.2 °C)  Pulse:  [] 76  Resp:  [10-18] 16  SpO2:  [92 %-100 %] 98 %  BP: (123-184)/() 154/70     Weight: 49.9 kg (110 lb)  Body mass index is 22.99 kg/m².    Intake/Output Summary (Last 24 hours) at 06/06/18 0841  Last data filed at 06/05/18 1727   Gross per 24 hour   Intake             1100 ml   Output                0 ml   Net             1100 ml      Physical Exam   Constitutional: She is oriented to person, place, and time. No distress.   Elderly, frail woman   HENT:   Head: Normocephalic and atraumatic.   Eyes: Conjunctivae are normal. No scleral icterus.   Neck: Normal range of motion.   Cardiovascular: Normal rate, regular rhythm and normal heart sounds.    Pulmonary/Chest: Effort normal and breath sounds normal.   Abdominal: Soft. She exhibits no distension. There is no tenderness.   Midline laparotomy scar with staples in place, some erythema around lower portion of scar  Ostomy bag in place, stool present, no erythema/ signs of  "infection at site   Musculoskeletal: Normal range of motion. She exhibits no edema.   R AKA wound bandaged, c/d/i  Perineural catheter in place   Neurological: She is alert and oriented to person, place, and time.   Skin: Skin is warm and dry. She is not diaphoretic.   Psychiatric: She has a normal mood and affect. Her behavior is normal.       Significant Labs:   CBC:     Recent Labs  Lab 06/05/18  0515 06/05/18  1838 06/06/18  0425   WBC 13.33* 19.42* 14.84*   HGB 12.7 13.7 10.8*   HCT 40.2 41.4 33.5*   * 489* 452*     CMP:     Recent Labs  Lab 06/05/18  0515 06/06/18  0425   * 136   K 4.1 3.7    103   CO2 26 29   GLU 86 77   BUN 4* 4*   CREATININE 0.5 0.5   CALCIUM 7.6* 7.2*   PROT 4.1* 3.7*   ALBUMIN 1.6* 1.4*   BILITOT 0.3 0.3   ALKPHOS 72 60   AST 17 17   ALT 7* 5*   ANIONGAP 7* 4*   EGFRNONAA >60.0 >60.0     All pertinent labs within the past 24 hours have been reviewed.    Significant Imaging: I have reviewed all pertinent imaging results/findings within the past 24 hours.    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, Right Foot    - Vascular surgery consulted, appreciate recs  - S/p AKA 6/5/2018  - Continue post op care as per Vascular  - Perineural catheter in place, pain well controlled        Primary adenocarcinoma of transverse colon    - Has not started treatment, will need Oncology follow up and staging scans on discharge  - Family wanting to move patient to [place], may arrange follow up there        S/P right hemicolectomy    - Pathology returned: "MODERATELY TO POORLY DIFFERENTIATED ADENOCARCINOMA WITH INFILTRATION THROUGH THE MUSCULARIS INTO PERICOLONIC ADIPOSE TISSUE, WITH MUCH LYMPHOVASCULAR INVASION, AND WITH METASTASES TO 7 OF 22 LYMPH NODES"  - Patient and family aware of cancer diagnosis   - Has not started treatment, will need Oncology follow up and staging scans on discharge  - Family wanting to move patient to [place], may arrange follow " up there        PVD (peripheral vascular disease)    - Has ASA allergy  - Home clopidogrel held in anticipation of surgery. Can re-start after cleared by vascular sx        Hyponatremia    - Mild, chronic, stable  - Will monitor daily        Hypothyroidism    - Continue home levothyroxine        GERD (gastroesophageal reflux disease)    - Home med Nexium, pantoprazole while inpatient        Essential hypertension    - Continue home amlodipine 10 and Toprol 25 as BP has been high   - Resume home benazepril 20mg          VTE Risk Mitigation         Ordered     IP VTE HIGH RISK PATIENT  Once      06/02/18 2492              April Castellanos MD  Department of Hospital Medicine   [facility]

## 2018-06-06 NOTE — SUBJECTIVE & OBJECTIVE
Interval History:   Patient seen and examined  S/p right AKA POD 1  Resting comfortably in bed at this time    Medications:  Continuous Infusions:   ropivacaine (PF) 2 mg/ml (0.2%) 8 mL/hr (06/05/18 1857)    ropivacaine (PF) 2 mg/ml (0.2%) 5 mL/hr (06/05/18 1848)     Scheduled Meds:   acetaminophen  1,000 mg Oral Q6H    amLODIPine  10 mg Oral Daily    ceFAZolin (ANCEF) IVPB  1 g Intravenous Q8H    cephALEXin  500 mg Oral Q8H    levothyroxine  100 mcg Oral Before breakfast    metoprolol succinate  25 mg Oral Daily    pantoprazole  40 mg Oral Daily    pregabalin  75 mg Oral QHS    rosuvastatin  20 mg Oral QHS    senna-docusate 8.6-50 mg  1 tablet Oral BID     PRN Meds:ondansetron, oxyCODONE, prochlorperazine, ramelteon, sodium chloride 0.9%     Review of patient's allergies indicates:   Allergen Reactions    Sulfa (sulfonamide antibiotics) Swelling    Asa [aspirin] Itching     Objective:     Vital Signs (Most Recent):  Temp: 97.5 °F (36.4 °C) (06/06/18 0030)  Pulse: 77 (06/06/18 0030)  Resp: 16 (06/06/18 0030)  BP: 133/61 (06/06/18 0030)  SpO2: (!) 94 % (06/06/18 0030) Vital Signs (24h Range):  Temp:  [96 °F (35.6 °C)-98 °F (36.7 °C)] 97.5 °F (36.4 °C)  Pulse:  [] 77  Resp:  [10-18] 16  SpO2:  [90 %-100 %] 94 %  BP: (123-184)/() 133/61     Weight: 49.9 kg (110 lb)  Body mass index is 22.99 kg/m².    Intake/Output - Last 3 Shifts       06/04 0700 - 06/05 0659 06/05 0700 - 06/06 0659    P.O. 360     I.V. (mL/kg)  1100 (22)    Total Intake(mL/kg) 360 (7.2) 1100 (22)    Urine (mL/kg/hr) 400 (0.3) 200 (0.2)    Stool 200 (0.2)     Total Output 600 200    Net -240 +900          Urine Occurrence 3 x     Stool Occurrence  1 x    Emesis Occurrence 0 x           Physical Exam   Constitutional: She appears well-developed and well-nourished. No distress.   HENT:   Head: Normocephalic and atraumatic.   Cardiovascular: Normal rate and regular rhythm.    Pulmonary/Chest: Effort normal. No respiratory  distress.   Abdominal:   Soft, incision with staples in place - incisional erythems increasing, no drainage, minimal TTP  Ostomy with stool and gas in bag       Significant Labs:  CBC:   Recent Labs  Lab 06/06/18 0425   WBC 14.84*   RBC 3.56*   HGB 10.8*   HCT 33.5*   *   MCV 94   MCH 30.3   MCHC 32.2     BMP:   Recent Labs  Lab 06/06/18 0425   GLU 77      K 3.7      CO2 29   BUN 4*   CREATININE 0.5   CALCIUM 7.2*   MG 1.5*     CMP:   Recent Labs  Lab 06/06/18 0425   GLU 77   CALCIUM 7.2*   ALBUMIN 1.4*   PROT 3.7*      K 3.7   CO2 29      BUN 4*   CREATININE 0.5   ALKPHOS 60   ALT 5*   AST 17   BILITOT 0.3     LFTs:   Recent Labs  Lab 06/06/18  0425   ALT 5*   AST 17   ALKPHOS 60   BILITOT 0.3   PROT 3.7*   ALBUMIN 1.4*     Coagulation:   Recent Labs  Lab 06/03/18  1100   LABPROT 10.1   INR 1.0   APTT 25.6

## 2018-06-06 NOTE — ASSESSMENT & PLAN NOTE
- Vascular surgery consulted, appreciate recs  - S/p AKA 6/5/2018  - Continue post op care as per Vascular  - Perineural catheter in place, pain well controlled

## 2018-06-06 NOTE — ASSESSMENT & PLAN NOTE
89 yo female s/p right hemicolectomy, ileostomy 5/24/18, s/p right AKA 6/5/18    -recommend changing abx to augmentin   -will remove a few staples and pack wound if necessary  -medical management per primary

## 2018-06-06 NOTE — ASSESSMENT & PLAN NOTE
- Has not started treatment, will need Oncology follow up and staging scans on discharge  - Family wanting to move patient to [place], may arrange follow up there

## 2018-06-06 NOTE — PHARMACY MED REC
"Admission Medication Reconciliation - Pharmacy Consult Note    The home medication history was taken by Kera Burciaga, pharmacy technician.  Based on information gathered and subsequent review by the clinical pharmacist, the items below may need attention.     You may go to "Admission" then "Reconcile Home Medications" tabs to review and/or act upon these items.     Potentially problematic discrepancies with current MAR  o Patient IS taking the following which was not ordered upon admit  o Clopidogrel 75 mg QD   o Salmeterol inh 50 mcg/dose- 1 puff BID     Please address this information as you see fit.  Feel free to contact us if you have any questions or require assistance.    Jonna Felton  EXT [phone]     .    .            "

## 2018-06-06 NOTE — ASSESSMENT & PLAN NOTE
"- Pathology returned: "MODERATELY TO POORLY DIFFERENTIATED ADENOCARCINOMA WITH INFILTRATION THROUGH THE MUSCULARIS INTO PERICOLONIC ADIPOSE TISSUE, WITH MUCH LYMPHOVASCULAR INVASION, AND WITH METASTASES TO 7 OF 22 LYMPH NODES"  - Patient and family aware of cancer diagnosis   - Referred to Oncology on discharge  - Will need staging scans, will discuss completing some imaging while admitted  "

## 2018-06-06 NOTE — ASSESSMENT & PLAN NOTE
"- Pathology returned: "MODERATELY TO POORLY DIFFERENTIATED ADENOCARCINOMA WITH INFILTRATION THROUGH THE MUSCULARIS INTO PERICOLONIC ADIPOSE TISSUE, WITH MUCH LYMPHOVASCULAR INVASION, AND WITH METASTASES TO 7 OF 22 LYMPH NODES"  - Patient and family aware of cancer diagnosis   - Has not started treatment, will need Oncology follow up and staging scans on discharge  - Family wanting to move patient to [place], may arrange follow up there  "

## 2018-06-06 NOTE — PLAN OF CARE
Problem: Patient Care Overview  Goal: Plan of Care Review  Outcome: Ongoing (interventions implemented as appropriate)  Pt and family verbalize understanding of on going  care

## 2018-06-06 NOTE — NURSING
Pt in bed awake, alert resp even and unlabored.Skin warm and dry to touch. Ostomy care performed.

## 2018-06-06 NOTE — SUBJECTIVE & OBJECTIVE
Review of Systems   Constitutional: Positive for activity change, appetite change and fatigue.   HENT: Negative for trouble swallowing.    Eyes: Negative for visual disturbance.   Respiratory: Negative for cough and shortness of breath.    Cardiovascular: Negative for chest pain.   Gastrointestinal: Negative for abdominal distention and abdominal pain.   Genitourinary: Negative for dysuria.   Musculoskeletal: Positive for gait problem.   Skin: Positive for wound.   Neurological: Negative for headaches.   Psychiatric/Behavioral: Negative for confusion.     Objective:     Vital Signs (Most Recent):  Temp: 97.2 °F (36.2 °C) (06/06/18 0758)  Pulse: 76 (06/06/18 0758)  Resp: 16 (06/06/18 0758)  BP: (!) 154/70 (06/06/18 0758)  SpO2: 98 % (06/06/18 0758) Vital Signs (24h Range):  Temp:  [96 °F (35.6 °C)-98 °F (36.7 °C)] 97.2 °F (36.2 °C)  Pulse:  [] 76  Resp:  [10-18] 16  SpO2:  [92 %-100 %] 98 %  BP: (123-184)/() 154/70     Weight: 49.9 kg (110 lb)  Body mass index is 22.99 kg/m².    Intake/Output Summary (Last 24 hours) at 06/06/18 0841  Last data filed at 06/05/18 1727   Gross per 24 hour   Intake             1100 ml   Output                0 ml   Net             1100 ml      Physical Exam   Constitutional: She is oriented to person, place, and time. No distress.   Elderly, frail woman   HENT:   Head: Normocephalic and atraumatic.   Eyes: Conjunctivae are normal. No scleral icterus.   Neck: Normal range of motion.   Cardiovascular: Normal rate, regular rhythm and normal heart sounds.    Pulmonary/Chest: Effort normal and breath sounds normal.   Abdominal: Soft. She exhibits no distension. There is no tenderness.   Midline laparotomy scar with staples in place, some erythema around lower portion of scar  Ostomy bag in place, stool present, no erythema/ signs of infection at site   Musculoskeletal: Normal range of motion. She exhibits no edema.   R AKA wound bandaged, c/d/i  Perineural catheter in place    Neurological: She is alert and oriented to person, place, and time.   Skin: Skin is warm and dry. She is not diaphoretic.   Psychiatric: She has a normal mood and affect. Her behavior is normal.       Significant Labs:   CBC:     Recent Labs  Lab 06/05/18  0515 06/05/18  1838 06/06/18  0425   WBC 13.33* 19.42* 14.84*   HGB 12.7 13.7 10.8*   HCT 40.2 41.4 33.5*   * 489* 452*     CMP:     Recent Labs  Lab 06/05/18  0515 06/06/18  0425   * 136   K 4.1 3.7    103   CO2 26 29   GLU 86 77   BUN 4* 4*   CREATININE 0.5 0.5   CALCIUM 7.6* 7.2*   PROT 4.1* 3.7*   ALBUMIN 1.6* 1.4*   BILITOT 0.3 0.3   ALKPHOS 72 60   AST 17 17   ALT 7* 5*   ANIONGAP 7* 4*   EGFRNONAA >60.0 >60.0     All pertinent labs within the past 24 hours have been reviewed.    Significant Imaging: I have reviewed all pertinent imaging results/findings within the past 24 hours.

## 2018-06-07 LAB
ABO + RH BLD: NORMAL
ALBUMIN SERPL BCP-MCNC: 1.5 G/DL
ALP SERPL-CCNC: 77 U/L
ALT SERPL W/O P-5'-P-CCNC: <5 U/L
ANION GAP SERPL CALC-SCNC: 7 MMOL/L
ANISOCYTOSIS BLD QL SMEAR: SLIGHT
AST SERPL-CCNC: 16 U/L
BACTERIA BLD CULT: NORMAL
BASOPHILS # BLD AUTO: 0.07 K/UL
BASOPHILS NFR BLD: 0.3 %
BILIRUB SERPL-MCNC: 0.4 MG/DL
BLD GP AB SCN CELLS X3 SERPL QL: NORMAL
BUN SERPL-MCNC: 6 MG/DL
CALCIUM SERPL-MCNC: 7.7 MG/DL
CHLORIDE SERPL-SCNC: 101 MMOL/L
CO2 SERPL-SCNC: 25 MMOL/L
CREAT SERPL-MCNC: 0.4 MG/DL
DIFFERENTIAL METHOD: ABNORMAL
EOSINOPHIL # BLD AUTO: 0.1 K/UL
EOSINOPHIL NFR BLD: 0.4 %
ERYTHROCYTE [DISTWIDTH] IN BLOOD BY AUTOMATED COUNT: 16.2 %
EST. GFR  (AFRICAN AMERICAN): >60 ML/MIN/1.73 M^2
EST. GFR  (NON AFRICAN AMERICAN): >60 ML/MIN/1.73 M^2
GLUCOSE SERPL-MCNC: 82 MG/DL
HCT VFR BLD AUTO: 34.6 %
HGB BLD-MCNC: 11.5 G/DL
IMM GRANULOCYTES # BLD AUTO: 0.18 K/UL
IMM GRANULOCYTES NFR BLD AUTO: 0.7 %
LYMPHOCYTES # BLD AUTO: 1.1 K/UL
LYMPHOCYTES NFR BLD: 4.5 %
MAGNESIUM SERPL-MCNC: 1.6 MG/DL
MCH RBC QN AUTO: 30.8 PG
MCHC RBC AUTO-ENTMCNC: 33.2 G/DL
MCV RBC AUTO: 93 FL
MONOCYTES # BLD AUTO: 1.2 K/UL
MONOCYTES NFR BLD: 5 %
NEUTROPHILS # BLD AUTO: 22 K/UL
NEUTROPHILS NFR BLD: 89.1 %
NRBC BLD-RTO: 0 /100 WBC
PHOSPHATE SERPL-MCNC: 1.9 MG/DL
PLATELET # BLD AUTO: 452 K/UL
PLATELET BLD QL SMEAR: ABNORMAL
PMV BLD AUTO: 9.4 FL
POTASSIUM SERPL-SCNC: 4.1 MMOL/L
PROT SERPL-MCNC: 4.4 G/DL
RBC # BLD AUTO: 3.73 M/UL
SODIUM SERPL-SCNC: 133 MMOL/L
WBC # BLD AUTO: 24.71 K/UL

## 2018-06-07 PROCEDURE — 86850 RBC ANTIBODY SCREEN: CPT

## 2018-06-07 PROCEDURE — 80053 COMPREHEN METABOLIC PANEL: CPT

## 2018-06-07 PROCEDURE — 25000003 PHARM REV CODE 250: Performed by: HOSPITALIST

## 2018-06-07 PROCEDURE — 84100 ASSAY OF PHOSPHORUS: CPT

## 2018-06-07 PROCEDURE — 83735 ASSAY OF MAGNESIUM: CPT

## 2018-06-07 PROCEDURE — 25000003 PHARM REV CODE 250: Performed by: INTERNAL MEDICINE

## 2018-06-07 PROCEDURE — 25000003 PHARM REV CODE 250: Performed by: STUDENT IN AN ORGANIZED HEALTH CARE EDUCATION/TRAINING PROGRAM

## 2018-06-07 PROCEDURE — 63600175 PHARM REV CODE 636 W HCPCS: Performed by: STUDENT IN AN ORGANIZED HEALTH CARE EDUCATION/TRAINING PROGRAM

## 2018-06-07 PROCEDURE — 85025 COMPLETE CBC W/AUTO DIFF WBC: CPT

## 2018-06-07 PROCEDURE — 36415 COLL VENOUS BLD VENIPUNCTURE: CPT

## 2018-06-07 PROCEDURE — 11000001 HC ACUTE MED/SURG PRIVATE ROOM

## 2018-06-07 PROCEDURE — 99232 SBSQ HOSP IP/OBS MODERATE 35: CPT | Mod: GC,,, | Performed by: HOSPITALIST

## 2018-06-07 PROCEDURE — 99231 SBSQ HOSP IP/OBS SF/LOW 25: CPT | Mod: ,,, | Performed by: ANESTHESIOLOGY

## 2018-06-07 PROCEDURE — 25000003 PHARM REV CODE 250

## 2018-06-07 PROCEDURE — 25000003 PHARM REV CODE 250: Performed by: ANESTHESIOLOGY

## 2018-06-07 PROCEDURE — 63600175 PHARM REV CODE 636 W HCPCS: Performed by: ANESTHESIOLOGY

## 2018-06-07 RX ORDER — ACETAMINOPHEN 500 MG
1000 TABLET ORAL EVERY 8 HOURS
Status: DISCONTINUED | OUTPATIENT
Start: 2018-06-07 | End: 2018-06-14

## 2018-06-07 RX ORDER — MAGNESIUM SULFATE HEPTAHYDRATE 40 MG/ML
2 INJECTION, SOLUTION INTRAVENOUS
Status: DISPENSED | OUTPATIENT
Start: 2018-06-07 | End: 2018-06-07

## 2018-06-07 RX ADMIN — ACETAMINOPHEN 1000 MG: 500 TABLET, FILM COATED ORAL at 11:06

## 2018-06-07 RX ADMIN — BENAZEPRIL HYDROCHLORIDE 20 MG: 20 TABLET, FILM COATED ORAL at 11:06

## 2018-06-07 RX ADMIN — STANDARDIZED SENNA CONCENTRATE AND DOCUSATE SODIUM 1 TABLET: 8.6; 5 TABLET, FILM COATED ORAL at 01:06

## 2018-06-07 RX ADMIN — AMPICILLIN AND SULBACTAM 1.5 G: 1; .5 INJECTION, POWDER, FOR SOLUTION INTRAVENOUS at 11:06

## 2018-06-07 RX ADMIN — METOPROLOL SUCCINATE 25 MG: 25 TABLET, EXTENDED RELEASE ORAL at 11:06

## 2018-06-07 RX ADMIN — AMPICILLIN AND SULBACTAM 1.5 G: 1; .5 INJECTION, POWDER, FOR SOLUTION INTRAVENOUS at 10:06

## 2018-06-07 RX ADMIN — LEVOTHYROXINE SODIUM 100 MCG: 100 TABLET ORAL at 01:06

## 2018-06-07 RX ADMIN — ACETAMINOPHEN 1000 MG: 500 TABLET, FILM COATED ORAL at 10:06

## 2018-06-07 RX ADMIN — STANDARDIZED SENNA CONCENTRATE AND DOCUSATE SODIUM 1 TABLET: 8.6; 5 TABLET, FILM COATED ORAL at 10:06

## 2018-06-07 RX ADMIN — ROPIVACAINE HYDROCHLORIDE 8 ML/HR: 2 INJECTION, SOLUTION EPIDURAL; INFILTRATION at 08:06

## 2018-06-07 RX ADMIN — AMLODIPINE BESYLATE 10 MG: 10 TABLET ORAL at 11:06

## 2018-06-07 RX ADMIN — ROSUVASTATIN CALCIUM 20 MG: 20 TABLET, FILM COATED ORAL at 10:06

## 2018-06-07 RX ADMIN — ROPIVACAINE HYDROCHLORIDE 5 ML/HR: 2 INJECTION, SOLUTION EPIDURAL; INFILTRATION at 11:06

## 2018-06-07 RX ADMIN — PANTOPRAZOLE SODIUM 40 MG: 40 TABLET, DELAYED RELEASE ORAL at 01:06

## 2018-06-07 RX ADMIN — PREGABALIN 75 MG: 75 CAPSULE ORAL at 10:06

## 2018-06-07 RX ADMIN — BENAZEPRIL HYDROCHLORIDE 20 MG: 20 TABLET, FILM COATED ORAL at 10:06

## 2018-06-07 RX ADMIN — MAGNESIUM SULFATE IN WATER 2 G: 40 INJECTION, SOLUTION INTRAVENOUS at 09:06

## 2018-06-07 RX ADMIN — ACETAMINOPHEN 1000 MG: 500 TABLET, FILM COATED ORAL at 02:06

## 2018-06-07 RX ADMIN — OXYCODONE HYDROCHLORIDE 5 MG: 5 TABLET ORAL at 10:06

## 2018-06-07 RX ADMIN — VANCOMYCIN HYDROCHLORIDE 750 MG: 750 INJECTION, POWDER, LYOPHILIZED, FOR SOLUTION INTRAVENOUS at 01:06

## 2018-06-07 NOTE — SUBJECTIVE & OBJECTIVE
Review of Systems   Constitutional: Positive for activity change, appetite change and fatigue.   HENT: Negative for trouble swallowing.    Eyes: Negative for visual disturbance.   Respiratory: Negative for cough and shortness of breath.    Cardiovascular: Negative for chest pain.   Gastrointestinal: Negative for abdominal distention and abdominal pain.   Genitourinary: Negative for dysuria.   Musculoskeletal: Positive for gait problem.   Skin: Positive for wound.   Neurological: Negative for headaches.   Psychiatric/Behavioral: Negative for confusion.     Objective:     Vital Signs (Most Recent):  Temp: 99.8 °F (37.7 °C) (06/07/18 0806)  Pulse: 87 (06/07/18 0806)  Resp: 16 (06/07/18 0806)  BP: (!) 153/70 (06/07/18 0806)  SpO2: 96 % (06/07/18 0806) Vital Signs (24h Range):  Temp:  [97 °F (36.1 °C)-100.1 °F (37.8 °C)] 99.8 °F (37.7 °C)  Pulse:  [76-94] 87  Resp:  [16-21] 16  SpO2:  [92 %-96 %] 96 %  BP: (136-156)/(63-72) 153/70     Weight: 49.9 kg (110 lb)  Body mass index is 22.99 kg/m².    Intake/Output Summary (Last 24 hours) at 06/07/18 0931  Last data filed at 06/06/18 1800   Gross per 24 hour   Intake              600 ml   Output                2 ml   Net              598 ml      Physical Exam   Constitutional: She is oriented to person, place, and time. No distress.   Elderly, frail woman   HENT:   Head: Normocephalic and atraumatic.   Eyes: Conjunctivae are normal. No scleral icterus.   Neck: Normal range of motion.   Cardiovascular: Normal rate, regular rhythm and normal heart sounds.    Pulmonary/Chest: Effort normal and breath sounds normal.   Abdominal: Soft. She exhibits no distension. There is no tenderness.   Midline laparotomy scar with staples in place, some erythema around lower portion of scar  Ostomy bag in place, stool present, no erythema/ signs of infection at site   Musculoskeletal: Normal range of motion. She exhibits no edema.   R AKA wound bandaged, c/d/i  Perineural catheter in place    Neurological: She is alert and oriented to person, place, and time.   Skin: Skin is warm and dry. She is not diaphoretic.   Psychiatric: She has a normal mood and affect. Her behavior is normal.       Significant Labs:   CBC:     Recent Labs  Lab 06/05/18  1838 06/06/18  0425 06/07/18  0352   WBC 19.42* 14.84* 24.71*   HGB 13.7 10.8* 11.5*   HCT 41.4 33.5* 34.6*   * 452* 452*     CMP:     Recent Labs  Lab 06/06/18 0425 06/07/18  0352    133*   K 3.7 4.1    101   CO2 29 25   GLU 77 82   BUN 4* 6*   CREATININE 0.5 0.4*   CALCIUM 7.2* 7.7*   PROT 3.7* 4.4*   ALBUMIN 1.4* 1.5*   BILITOT 0.3 0.4   ALKPHOS 60 77   AST 17 16   ALT 5* <5*   ANIONGAP 4* 7*   EGFRNONAA >60.0 >60.0     All pertinent labs within the past 24 hours have been reviewed.    Significant Imaging: I have reviewed all pertinent imaging results/findings within the past 24 hours.

## 2018-06-07 NOTE — ADDENDUM NOTE
Addendum  created 06/07/18 1344 by Saurav Hilton MD    Charge Capture section accepted, Cosign clinical note with attestation

## 2018-06-07 NOTE — PROGRESS NOTES
[facility] Hospital Medicine  Progress Note    Patient Name: Suzan Villarreal  MRN: [id number]  Patient Class: IP- Inpatient   Admission Date: 6/2/2018  Length of Stay: 5 days  Attending Physician: Neeta Walton MD  Primary Care Provider: Aly Rivas MD    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 Villarreal is a 89 woman with PMHx significant for severe PVD, HTN, HLD, hypothyroidism, GERD and chronic constipation who presents to the ED with severe pain, gangrene of the R foot. Patient with recent admission for perforated viscus, s/p ex-lap and ostomy (please see Discharge Summary from 5/31 for further details of that admission.) Patient's daughter is at bedside and provides much of the history. Patient first developed foot pain 3-4 weeks ago. Then around Mother's Day began developing a black eschar on her distal R great toe. Follows with Dr. Garcia in Vascular Surgery. On 5/16, aortogram with run off performed. R AKA recommended; however, patient and family refused at that time. Pain progressed since that time, with ulceration of the dorsum of her foot and heel developing. Daughter brought patient back to ED due to uncontrolled pain.     Hospital Course:  Patient was admitted to hospital medicine for pain control for right foot gangrene and medication management. Vascular Surgery evaluated patient, plan for right AKA on 6/5. Scheduled percocet 5 started with prn oxycodone 10. Pain controlled overnight.  06/04/2018: Pain overnight, night MD called for IV morphine but patient later refused, discussed with patient and family will change pain meds to scheduled oxy IR 5mg q6 with breakthrough. Awaiting AKA tomorrow. Also placed on 2L supplemental O2 via NC but no SOB, suspect poor pulse ox readings from fingers, will wean today.   06/05/2018: Pain well controlled overnight on  scheduled oxycodone. Plan for AKA today with vascular sx.  06/06/2018: AKA yesterday, denies pain. No subjective complaints. VSS, afebrile.   06/07/2018: NAEON. Pain well controlled.       Review of Systems   Constitutional: Positive for activity change, appetite change and fatigue.   HENT: Negative for trouble swallowing.    Eyes: Negative for visual disturbance.   Respiratory: Negative for cough and shortness of breath.    Cardiovascular: Negative for chest pain.   Gastrointestinal: Negative for abdominal distention and abdominal pain.   Genitourinary: Negative for dysuria.   Musculoskeletal: Positive for gait problem.   Skin: Positive for wound.   Neurological: Negative for headaches.   Psychiatric/Behavioral: Negative for confusion.     Objective:     Vital Signs (Most Recent):  Temp: 99.8 °F (37.7 °C) (06/07/18 0806)  Pulse: 87 (06/07/18 0806)  Resp: 16 (06/07/18 0806)  BP: (!) 153/70 (06/07/18 0806)  SpO2: 96 % (06/07/18 0806) Vital Signs (24h Range):  Temp:  [97 °F (36.1 °C)-100.1 °F (37.8 °C)] 99.8 °F (37.7 °C)  Pulse:  [76-94] 87  Resp:  [16-21] 16  SpO2:  [92 %-96 %] 96 %  BP: (136-156)/(63-72) 153/70     Weight: 49.9 kg (110 lb)  Body mass index is 22.99 kg/m².    Intake/Output Summary (Last 24 hours) at 06/07/18 0931  Last data filed at 06/06/18 1800   Gross per 24 hour   Intake              600 ml   Output                2 ml   Net              598 ml      Physical Exam   Constitutional: She is oriented to person, place, and time. No distress.   Elderly, frail woman   HENT:   Head: Normocephalic and atraumatic.   Eyes: Conjunctivae are normal. No scleral icterus.   Neck: Normal range of motion.   Cardiovascular: Normal rate, regular rhythm and normal heart sounds.    Pulmonary/Chest: Effort normal and breath sounds normal.   Abdominal: Soft. She exhibits no distension. There is no tenderness.   Midline laparotomy scar with staples in place, some erythema around lower portion of scar  Ostomy bag in  "place, stool present, no erythema/ signs of infection at site   Musculoskeletal: Normal range of motion. She exhibits no edema.   R AKA wound bandaged, c/d/i  Perineural catheter in place   Neurological: She is alert and oriented to person, place, and time.   Skin: Skin is warm and dry. She is not diaphoretic.   Psychiatric: She has a normal mood and affect. Her behavior is normal.       Significant Labs:   CBC:     Recent Labs  Lab 06/05/18  1838 06/06/18  0425 06/07/18  0352   WBC 19.42* 14.84* 24.71*   HGB 13.7 10.8* 11.5*   HCT 41.4 33.5* 34.6*   * 452* 452*     CMP:     Recent Labs  Lab 06/06/18 0425 06/07/18  0352    133*   K 3.7 4.1    101   CO2 29 25   GLU 77 82   BUN 4* 6*   CREATININE 0.5 0.4*   CALCIUM 7.2* 7.7*   PROT 3.7* 4.4*   ALBUMIN 1.4* 1.5*   BILITOT 0.3 0.4   ALKPHOS 60 77   AST 17 16   ALT 5* <5*   ANIONGAP 4* 7*   EGFRNONAA >60.0 >60.0     All pertinent labs within the past 24 hours have been reviewed.    Significant Imaging: I have reviewed all pertinent imaging results/findings within the past 24 hours.    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, Right Foot    - Vascular surgery consulted, appreciate recs  - S/p AKA 6/5/2018  - Continue post op care as per Vascular  - Perineural catheter in place, pain well controlled        Primary adenocarcinoma of transverse colon    - Has not started treatment, will need Oncology follow up and staging scans on discharge  - Family wanting to move patient to [place], may arrange follow up there        Delayed surgical wound healing    - Appreciate Gen Surg and wound care consults  - Continue wound care  - Will change abx to Unasyn and vanc to cover skin dionne and anaerobes, concern that stool from stoma may have contaminated wound        S/P right hemicolectomy    - Pathology returned: "MODERATELY TO POORLY DIFFERENTIATED ADENOCARCINOMA WITH INFILTRATION THROUGH THE MUSCULARIS INTO PERICOLONIC ADIPOSE " "TISSUE, WITH MUCH LYMPHOVASCULAR INVASION, AND WITH METASTASES TO 7 OF 22 LYMPH NODES"  - Patient and family aware of cancer diagnosis   - Has not started treatment, will need Oncology follow up and staging scans on discharge  - Family wanting to move patient to [place], may arrange follow up there        PVD (peripheral vascular disease)    - Has ASA allergy  - Home clopidogrel held in anticipation of surgery. Can re-start after cleared by vascular sx        Hyponatremia    - Mild, chronic, stable  - Will monitor daily        Hypothyroidism    - Continue home levothyroxine        GERD (gastroesophageal reflux disease)    - Home med Nexium, pantoprazole while inpatient        Essential hypertension    - Continue home amlodipine 10 and Toprol 25 as BP has been high   - Resume home benazepril 20mg          VTE Risk Mitigation         Ordered     IP VTE HIGH RISK PATIENT  Once      06/02/18 3919              April Castellanos MD  Department of Hospital Medicine   [facility]  "

## 2018-06-07 NOTE — ASSESSMENT & PLAN NOTE
89 yo female s/p right hemicolectomy, ileostomy 5/24/18, s/p right AKA 6/5/18    -removed staples from inferior portion and packed incision  -ok to remove packing to shower, change packing daily  -recommend aggressive IV abx  -remainder of care per primary

## 2018-06-07 NOTE — NURSING
Rounded on pt to given scheduled med. Pt was very drowsy but she responded to painful stimuli. Vital signs were stable. Notified Dr. Alaniz. Order given to hold until pt is more alert.

## 2018-06-07 NOTE — ANESTHESIA POSTPROCEDURE EVALUATION
"Anesthesia Post Evaluation    Patient: Suzan Villarreal    Procedure(s) Performed: Procedure(s) (LRB):  AMPUTATION, LOWER EXTREMITY, ABOVE KNEE (Right)    Final Anesthesia Type: regional  Patient location during evaluation: floor  Patient participation: Yes- Able to Participate  Level of consciousness: awake  Post-procedure vital signs: reviewed and stable  Pain management: adequate  Airway patency: patent  PONV status at discharge: No PONV  Anesthetic complications: no      Cardiovascular status: hemodynamically stable  Respiratory status: unassisted  Hydration status: euvolemic  Follow-up not needed.        Visit Vitals  /64 (BP Location: Left arm, Patient Position: Lying)   Pulse 93   Temp 37.8 °C (100.1 °F) (Oral)   Resp 18   Ht 4' 10" (1.473 m)   Wt 49.9 kg (110 lb)   SpO2 (!) 93%   Breastfeeding? No   BMI 22.99 kg/m²       Pain/Lucía Score: Pain Assessment Performed: Yes (6/7/2018  3:39 AM)  Presence of Pain: non-verbal indicators absent (6/7/2018  3:39 AM)  Pain Rating Prior to Med Admin: 10 (6/6/2018  8:52 PM)  Pain Rating Post Med Admin: 2 (6/6/2018  4:00 PM)  Lucía Score: 10 (6/6/2018  7:45 AM)  Modified Lucía Score: 19 (6/6/2018  7:45 AM)      "

## 2018-06-07 NOTE — ANESTHESIA POST-OP PAIN MANAGEMENT
Acute Pain Service Progress Note    Suzan Villarreal is a 89 y.o., female, [id number]. No acute events ovneright.     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, Right Foot [I70.269]  Yes    Moderate malnutrition [E44.0]  Unknown    Delayed surgical wound healing [T81.89XA]  Yes    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]  Yes     Chronic    S/P right hemicolectomy [Z90.49]  Not Applicable    Gangrene of toe of right foot [I96]  Yes    PVD (peripheral vascular disease) [I73.9]  Yes     Chronic    Hypothyroidism [E03.9]  Yes     Chronic    Hyponatremia [E87.1]  Yes    Essential hypertension [I10]  Yes    GERD (gastroesophageal reflux disease) [K21.9]  Yes     Chronic      Resolved Hospital Problems    Diagnosis Date Resolved POA   No resolved problems to display.       Subjective:     General appearance of resting   Pain with rest: 1    Faces   Pain with movement: 1    Faces   Side Effects    1. Pruritis No    2. Nausea No    3. Motor Blockade No, 0=Ability to raise lower extremities off bed    4. Sedation No, S=sleep, easy to arouse    Objective:       Catheter site clean, dry, intact         Vitals   Vitals:    06/07/18 1139   BP: (!) 157/67   Pulse: 91   Resp: 16   Temp: 37.6 °C (99.6 °F)        Labs    Admission on 06/02/2018   No results displayed because visit has over 200 results.           Meds   Current Facility-Administered Medications   Medication Dose Route Frequency Provider Last Rate Last Dose    acetaminophen tablet 1,000 mg  1,000 mg Oral Q8H Neeta Walton MD   1,000 mg at 06/07/18 1136    amLODIPine tablet 10 mg  10 mg Oral Daily Jordy Buitrago MD   10 mg at 06/07/18 1138     ampicillin-sulbactam 1.5 g in sodium chloride 0.9 % 100 mL IVPB (ready to mix system)  1.5 g Intravenous Q8H April Castellanos  mL/hr at 06/07/18 1155 1.5 g at 06/07/18 1155    benazepril tablet 20 mg  20 mg Oral BID April Castellanos MD   20 mg at 06/07/18 1138    levothyroxine tablet 100 mcg  100 mcg Oral Before breakfast April Castellanos MD   Stopped at 06/07/18 0600    magnesium sulfate 2g in water 50mL IVPB (premix)  2 g Intravenous Q2H April Castellanos MD   2 g at 06/07/18 0954    metoprolol succinate (TOPROL-XL) 24 hr tablet 25 mg  25 mg Oral Daily Jordy Buitrago MD   25 mg at 06/07/18 1137    ondansetron disintegrating tablet 8 mg  8 mg Oral Q8H PRN Kenji Watson MD        oxyCODONE immediate release tablet 5 mg  5 mg Oral Q4H PRN Tushar Otero Jr., MD   5 mg at 06/06/18 2048    pantoprazole EC tablet 40 mg  40 mg Oral Daily Kenji Watson MD   Stopped at 06/07/18 0900    pregabalin capsule 75 mg  75 mg Oral QHS Tushar Otero Jr., MD   75 mg at 06/06/18 2048    prochlorperazine injection Soln 5 mg  5 mg Intravenous Q6H PRN Kenji Watson MD        ramelteon tablet 8 mg  8 mg Oral Nightly PRN April Castellanos MD        ropivacaine (PF) 2 mg/ml (0.2%) infusion  8 mL/hr Perineural Continuous Tamela Little MD 8 mL/hr at 06/07/18 0855 8 mL/hr at 06/07/18 0855    ropivacaine (PF) 2 mg/ml (0.2%) infusion  5 mL/hr Perineural Continuous Tamela Little MD 5 mL/hr at 06/07/18 1148 5 mL/hr at 06/07/18 1148    rosuvastatin tablet 20 mg  20 mg Oral QHS April Castellanos MD   20 mg at 06/06/18 2048    senna-docusate 8.6-50 mg per tablet 1 tablet  1 tablet Oral BID Kenji Watson MD   Stopped at 06/07/18 0900    sodium chloride 0.9% flush 3 mL  3 mL Intravenous PRN Kenji Watson MD        vancomycin 750 mg in normal saline 250 mL IVPB (ready to mix system)  750 mg Intravenous Q24H April Castellanos MD               Assessment:     Pain control adequate    Plan:     Patient doing well,  continue present treatment. Will pause the catheter this afternoon with plans to d/c if pain is well controlled. Plan for patient to be discharged to SNF tomorrow.     Evaluator Tushar Otero Jr.

## 2018-06-07 NOTE — PROGRESS NOTES
Subjective:  2 Days Post-Op AKA. AFVSS. Doing well, sleepy this morning. Pain controlled        Objective:  Physical Exam  Somnolent  RRR  No resp Distress  Incisional vac c/d/i with good suction    Vitals:    06/06/18 1609 06/06/18 1930 06/07/18 0339 06/07/18 0624   BP: 136/63 (!) 143/65 138/64 (!) 153/70   BP Location:  Left arm Left arm    Patient Position: Lying Lying Lying    Pulse: 76 81 93 94   Resp: 18 18  (!) 21   Temp: 97 °F (36.1 °C) 97.3 °F (36.3 °C) 100.1 °F (37.8 °C) 98.1 °F (36.7 °C)   TempSrc: Oral Oral Axillary Oral   SpO2: (!) 93% (!) 94% (!) 93% 95%   Weight:       Height:             LABS:  CBC:   Recent Labs  Lab 06/07/18  0352   WBC 24.71*   RBC 3.73*   HGB 11.5*   HCT 34.6*   *   MCV 93   MCH 30.8   MCHC 33.2       A/P:  89 y/o f s/p AKA. Doing well overall, although a little somnolent this morning    - rec delirium precautions per primary  - pain control per regional team  - incisional vac until at least POD5-7    Dain Martin MD   [phone]  General Surgery PGY-1  [facility]

## 2018-06-07 NOTE — NURSING
Spoke to  On call regarding DNR. Family upset about moving to SNF wants to speak to  Called  UZMA Gary regarding this issue will contact.

## 2018-06-07 NOTE — PROGRESS NOTES
Pt resting comfortably.  Right femoral and scaiticPNCs have been paused. Dressings CDI.  Catheters discontinued, tips intact.  Pt tolerated well.  Educated regarding continued pain management.  Understanding verbalized.

## 2018-06-07 NOTE — PROGRESS NOTES
[facility]  General Surgery  Progress Note    Subjective:     History of Present Illness:  Ms. Villarreal is a 89 woman with PMHx significant for severe PVD, HTN, HLD, hypothyroidism, GERD and chronic constipation who presents to the ED with severe pain, gangrene of the R foot. Well known to Dr. Anderson's service - recent admission for perforated viscus, s/p ex-lap and ostomy (please see Discharge Summary from 5/31 for further details of that admission.)  Patient first developed foot pain 3-4 weeks ago. Then around Mother's Day began developing a black eschar on her distal R great toe. Follows with Dr. Garcia in Vascular Surgery. On 5/16, aortogram with run off performed. R AKA recommended, planned for tomorrow 6/5/18. Patient denies abdominal pain, ostomy functioning well. Primary team concerned about erythema noticed at incision site.     Post-Op Info:  Procedure(s) (LRB):  AMPUTATION, LOWER EXTREMITY, ABOVE KNEE (Right)   2 Days Post-Op     Interval History:   Patient seen and examined, no acute events overnight  Issues with pain overnight, somewhat lethargic this AM  Pain well controlled    Medications:  Continuous Infusions:   ropivacaine (PF) 2 mg/ml (0.2%) 8 mL/hr (06/06/18 2052)    ropivacaine (PF) 2 mg/ml (0.2%) 5 mL/hr (06/06/18 1528)     Scheduled Meds:   acetaminophen  1,000 mg Oral Q6H    amLODIPine  10 mg Oral Daily    amoxicillin-clavulanate 875-125mg  1 tablet Oral Q12H    benazepril  20 mg Oral BID    levothyroxine  100 mcg Oral Before breakfast    metoprolol succinate  25 mg Oral Daily    pantoprazole  40 mg Oral Daily    pregabalin  75 mg Oral QHS    rosuvastatin  20 mg Oral QHS    senna-docusate 8.6-50 mg  1 tablet Oral BID     PRN Meds:ondansetron, oxyCODONE, prochlorperazine, ramelteon, sodium chloride 0.9%     Review of patient's allergies indicates:   Allergen Reactions    Sulfa (sulfonamide antibiotics) Swelling    Asa [aspirin] Itching     Objective:     Vital  Signs (Most Recent):  Temp: 98.1 °F (36.7 °C) (06/07/18 0624)  Pulse: 94 (06/07/18 0624)  Resp: (!) 21 (06/07/18 0624)  BP: (!) 153/70 (06/07/18 0624)  SpO2: 95 % (06/07/18 0624) Vital Signs (24h Range):  Temp:  [97 °F (36.1 °C)-100.1 °F (37.8 °C)] 98.1 °F (36.7 °C)  Pulse:  [76-94] 94  Resp:  [18-21] 21  SpO2:  [92 %-95 %] 95 %  BP: (136-156)/(63-72) 153/70     Weight: 49.9 kg (110 lb)  Body mass index is 22.99 kg/m².    Intake/Output - Last 3 Shifts       06/05 0700 - 06/06 0659 06/06 0700 - 06/07 0659 06/07 0700 - 06/08 0659    P.O.  600     I.V. (mL/kg) 1100 (22)      Total Intake(mL/kg) 1100 (22) 600 (12)     Urine (mL/kg/hr) 200 (0.2)      Stool  2 (0)     Total Output 200 2      Net +900 +598             Urine Occurrence  1 x     Stool Occurrence 1 x 3 x           Physical Exam   Constitutional: She appears well-developed and well-nourished. No distress.   HENT:   Head: Normocephalic and atraumatic.   Cardiovascular: Normal rate and regular rhythm.    Pulmonary/Chest: Effort normal. No respiratory distress.   Abdominal:   Soft, incision with staples in place - incisional erythema stable, seropurulent drainage noted from inferior portion of incision  Ostomy with stool and gas in bag       Significant Labs:  CBC:   Recent Labs  Lab 06/07/18  0352   WBC 24.71*   RBC 3.73*   HGB 11.5*   HCT 34.6*   *   MCV 93   MCH 30.8   MCHC 33.2     BMP:   Recent Labs  Lab 06/07/18  0352   GLU 82   *   K 4.1      CO2 25   BUN 6*   CREATININE 0.4*   CALCIUM 7.7*   MG 1.6     CMP:   Recent Labs  Lab 06/07/18  0352   GLU 82   CALCIUM 7.7*   ALBUMIN 1.5*   PROT 4.4*   *   K 4.1   CO2 25      BUN 6*   CREATININE 0.4*   ALKPHOS 77   ALT <5*   AST 16   BILITOT 0.4     LFTs:   Recent Labs  Lab 06/07/18  0352   ALT <5*   AST 16   ALKPHOS 77   BILITOT 0.4   PROT 4.4*   ALBUMIN 1.5*     Coagulation:   Recent Labs  Lab 06/03/18  1100   LABPROT 10.1   INR 1.0   APTT 25.6     Assessment/Plan:     S/P right  hemicolectomy    89 yo female s/p right hemicolectomy, ileostomy 5/24/18, s/p right AKA 6/5/18    -removed staples from inferior portion and packed incision  -ok to remove packing to shower, change packing daily  -recommend aggressive IV abx  -remainder of care per primary            Obdulia Gonsalves PA-C   [id number]  General Surgery  [facility]

## 2018-06-07 NOTE — ASSESSMENT & PLAN NOTE
- Appreciate Gen Surg and wound care consults  - Continue wound care  - Will change abx to Unasyn and vanc to cover skin dionne and anaerobes, concern that stool from stoma may have contaminated wound

## 2018-06-07 NOTE — PT/OT/SLP PROGRESS
Physical Therapy      Patient Name:  Suzan Villarreal   MRN:  [id number]    Patient not seen today secondary to  (pt. sleeping soundly x2 attempts; family requesting to allow pt. to sleep). Will follow-up tomorrow.    Dain Moe, PT   6/7/2018

## 2018-06-07 NOTE — PLAN OF CARE
Problem: Patient Care Overview  Goal: Plan of Care Review  Outcome: Ongoing (interventions implemented as appropriate)  Pt more alert this afternoon, Family at bedside. PNC were DC Plan to go to SNF tomorrow. Q 2 hour monitoring for pain and safety. Call light in reach.

## 2018-06-07 NOTE — ADDENDUM NOTE
Addendum  created 06/07/18 1446 by Nisa Marie RN    Anesthesia Event edited, LDA properties accepted, Order list changed, Sign clinical note

## 2018-06-07 NOTE — PT/OT/SLP PROGRESS
Occupational Therapy      Patient Name:  Suzan Villarreal   MRN:  [id number]    Patient not seen today secondary to Patient fatigue. Pt sleeping upon AM attempt and difficult to arouse. At PM attempt, family report pt was awake for a short time but pt fell asleep after. Family request to let pt sleep and attempt tomorrow. Will follow-up tomorrow.    Ryann Crawley, OT  6/7/2018

## 2018-06-07 NOTE — SUBJECTIVE & OBJECTIVE
Interval History:   Patient seen and examined, no acute events overnight  Issues with pain overnight, somewhat lethargic this AM  Pain well controlled    Medications:  Continuous Infusions:   ropivacaine (PF) 2 mg/ml (0.2%) 8 mL/hr (06/06/18 2052)    ropivacaine (PF) 2 mg/ml (0.2%) 5 mL/hr (06/06/18 1528)     Scheduled Meds:   acetaminophen  1,000 mg Oral Q6H    amLODIPine  10 mg Oral Daily    amoxicillin-clavulanate 875-125mg  1 tablet Oral Q12H    benazepril  20 mg Oral BID    levothyroxine  100 mcg Oral Before breakfast    metoprolol succinate  25 mg Oral Daily    pantoprazole  40 mg Oral Daily    pregabalin  75 mg Oral QHS    rosuvastatin  20 mg Oral QHS    senna-docusate 8.6-50 mg  1 tablet Oral BID     PRN Meds:ondansetron, oxyCODONE, prochlorperazine, ramelteon, sodium chloride 0.9%     Review of patient's allergies indicates:   Allergen Reactions    Sulfa (sulfonamide antibiotics) Swelling    Asa [aspirin] Itching     Objective:     Vital Signs (Most Recent):  Temp: 98.1 °F (36.7 °C) (06/07/18 0624)  Pulse: 94 (06/07/18 0624)  Resp: (!) 21 (06/07/18 0624)  BP: (!) 153/70 (06/07/18 0624)  SpO2: 95 % (06/07/18 0624) Vital Signs (24h Range):  Temp:  [97 °F (36.1 °C)-100.1 °F (37.8 °C)] 98.1 °F (36.7 °C)  Pulse:  [76-94] 94  Resp:  [18-21] 21  SpO2:  [92 %-95 %] 95 %  BP: (136-156)/(63-72) 153/70     Weight: 49.9 kg (110 lb)  Body mass index is 22.99 kg/m².    Intake/Output - Last 3 Shifts       06/05 0700 - 06/06 0659 06/06 0700 - 06/07 0659 06/07 0700 - 06/08 0659    P.O.  600     I.V. (mL/kg) 1100 (22)      Total Intake(mL/kg) 1100 (22) 600 (12)     Urine (mL/kg/hr) 200 (0.2)      Stool  2 (0)     Total Output 200 2      Net +900 +598             Urine Occurrence  1 x     Stool Occurrence 1 x 3 x           Physical Exam   Constitutional: She appears well-developed and well-nourished. No distress.   HENT:   Head: Normocephalic and atraumatic.   Cardiovascular: Normal rate and regular rhythm.     Pulmonary/Chest: Effort normal. No respiratory distress.   Abdominal:   Soft, incision with staples in place - incisional erythema stable, seropurulent drainage noted from inferior portion of incision  Ostomy with stool and gas in bag       Significant Labs:  CBC:   Recent Labs  Lab 06/07/18  0352   WBC 24.71*   RBC 3.73*   HGB 11.5*   HCT 34.6*   *   MCV 93   MCH 30.8   MCHC 33.2     BMP:   Recent Labs  Lab 06/07/18  0352   GLU 82   *   K 4.1      CO2 25   BUN 6*   CREATININE 0.4*   CALCIUM 7.7*   MG 1.6     CMP:   Recent Labs  Lab 06/07/18  0352   GLU 82   CALCIUM 7.7*   ALBUMIN 1.5*   PROT 4.4*   *   K 4.1   CO2 25      BUN 6*   CREATININE 0.4*   ALKPHOS 77   ALT <5*   AST 16   BILITOT 0.4     LFTs:   Recent Labs  Lab 06/07/18  0352   ALT <5*   AST 16   ALKPHOS 77   BILITOT 0.4   PROT 4.4*   ALBUMIN 1.5*     Coagulation:   Recent Labs  Lab 06/03/18  1100   LABPROT 10.1   INR 1.0   APTT 25.6

## 2018-06-08 LAB
ALBUMIN SERPL BCP-MCNC: 1.4 G/DL
ALP SERPL-CCNC: 91 U/L
ALT SERPL W/O P-5'-P-CCNC: <5 U/L
ANION GAP SERPL CALC-SCNC: 7 MMOL/L
AST SERPL-CCNC: 13 U/L
BACTERIA BLD CULT: NORMAL
BASOPHILS # BLD AUTO: 0.08 K/UL
BASOPHILS NFR BLD: 0.4 %
BILIRUB SERPL-MCNC: 0.4 MG/DL
BUN SERPL-MCNC: 6 MG/DL
CALCIUM SERPL-MCNC: 7.8 MG/DL
CHLORIDE SERPL-SCNC: 104 MMOL/L
CO2 SERPL-SCNC: 23 MMOL/L
CREAT SERPL-MCNC: 0.5 MG/DL
DIFFERENTIAL METHOD: ABNORMAL
EOSINOPHIL # BLD AUTO: 0.1 K/UL
EOSINOPHIL NFR BLD: 0.6 %
ERYTHROCYTE [DISTWIDTH] IN BLOOD BY AUTOMATED COUNT: 16.6 %
EST. GFR  (AFRICAN AMERICAN): >60 ML/MIN/1.73 M^2
EST. GFR  (NON AFRICAN AMERICAN): >60 ML/MIN/1.73 M^2
GLUCOSE SERPL-MCNC: 64 MG/DL
HCT VFR BLD AUTO: 36 %
HGB BLD-MCNC: 11.6 G/DL
IMM GRANULOCYTES # BLD AUTO: 0.24 K/UL
IMM GRANULOCYTES NFR BLD AUTO: 1.1 %
LYMPHOCYTES # BLD AUTO: 1.2 K/UL
LYMPHOCYTES NFR BLD: 5.7 %
MAGNESIUM SERPL-MCNC: 2 MG/DL
MCH RBC QN AUTO: 30.7 PG
MCHC RBC AUTO-ENTMCNC: 32.2 G/DL
MCV RBC AUTO: 95 FL
MONOCYTES # BLD AUTO: 1.3 K/UL
MONOCYTES NFR BLD: 6 %
NEUTROPHILS # BLD AUTO: 18.2 K/UL
NEUTROPHILS NFR BLD: 86.2 %
NRBC BLD-RTO: 0 /100 WBC
PHOSPHATE SERPL-MCNC: 2.6 MG/DL
PLATELET # BLD AUTO: 328 K/UL
PMV BLD AUTO: 9.6 FL
POCT GLUCOSE: 83 MG/DL (ref 70–110)
POTASSIUM SERPL-SCNC: 4.3 MMOL/L
PROT SERPL-MCNC: 4.3 G/DL
RBC # BLD AUTO: 3.78 M/UL
SODIUM SERPL-SCNC: 134 MMOL/L
WBC # BLD AUTO: 21.14 K/UL

## 2018-06-08 PROCEDURE — 84100 ASSAY OF PHOSPHORUS: CPT

## 2018-06-08 PROCEDURE — 25000003 PHARM REV CODE 250: Performed by: ANESTHESIOLOGY

## 2018-06-08 PROCEDURE — 97530 THERAPEUTIC ACTIVITIES: CPT

## 2018-06-08 PROCEDURE — 80053 COMPREHEN METABOLIC PANEL: CPT

## 2018-06-08 PROCEDURE — 97161 PT EVAL LOW COMPLEX 20 MIN: CPT

## 2018-06-08 PROCEDURE — G8987 SELF CARE CURRENT STATUS: HCPCS | Mod: CM

## 2018-06-08 PROCEDURE — 25000003 PHARM REV CODE 250: Performed by: STUDENT IN AN ORGANIZED HEALTH CARE EDUCATION/TRAINING PROGRAM

## 2018-06-08 PROCEDURE — 85025 COMPLETE CBC W/AUTO DIFF WBC: CPT

## 2018-06-08 PROCEDURE — 97165 OT EVAL LOW COMPLEX 30 MIN: CPT

## 2018-06-08 PROCEDURE — G8988 SELF CARE GOAL STATUS: HCPCS | Mod: CL

## 2018-06-08 PROCEDURE — 25000003 PHARM REV CODE 250

## 2018-06-08 PROCEDURE — 36415 COLL VENOUS BLD VENIPUNCTURE: CPT

## 2018-06-08 PROCEDURE — 25000003 PHARM REV CODE 250: Performed by: HOSPITALIST

## 2018-06-08 PROCEDURE — 83735 ASSAY OF MAGNESIUM: CPT

## 2018-06-08 PROCEDURE — 63600175 PHARM REV CODE 636 W HCPCS: Performed by: STUDENT IN AN ORGANIZED HEALTH CARE EDUCATION/TRAINING PROGRAM

## 2018-06-08 PROCEDURE — 11000001 HC ACUTE MED/SURG PRIVATE ROOM

## 2018-06-08 PROCEDURE — 25000003 PHARM REV CODE 250: Performed by: INTERNAL MEDICINE

## 2018-06-08 PROCEDURE — 99232 SBSQ HOSP IP/OBS MODERATE 35: CPT | Mod: GC,,, | Performed by: HOSPITALIST

## 2018-06-08 RX ORDER — GLUCAGON 1 MG
1 KIT INJECTION
Status: DISCONTINUED | OUTPATIENT
Start: 2018-06-08 | End: 2018-06-20 | Stop reason: HOSPADM

## 2018-06-08 RX ORDER — IBUPROFEN 200 MG
16 TABLET ORAL
Status: DISCONTINUED | OUTPATIENT
Start: 2018-06-08 | End: 2018-06-20 | Stop reason: HOSPADM

## 2018-06-08 RX ORDER — OXYCODONE HYDROCHLORIDE 5 MG/1
5 TABLET ORAL ONCE
Status: COMPLETED | OUTPATIENT
Start: 2018-06-08 | End: 2018-06-08

## 2018-06-08 RX ORDER — IBUPROFEN 200 MG
24 TABLET ORAL
Status: DISCONTINUED | OUTPATIENT
Start: 2018-06-08 | End: 2018-06-20 | Stop reason: HOSPADM

## 2018-06-08 RX ADMIN — PANTOPRAZOLE SODIUM 40 MG: 40 TABLET, DELAYED RELEASE ORAL at 09:06

## 2018-06-08 RX ADMIN — BENAZEPRIL HYDROCHLORIDE 20 MG: 20 TABLET, FILM COATED ORAL at 09:06

## 2018-06-08 RX ADMIN — AMPICILLIN AND SULBACTAM 1.5 G: 1; .5 INJECTION, POWDER, FOR SOLUTION INTRAVENOUS at 10:06

## 2018-06-08 RX ADMIN — ACETAMINOPHEN 1000 MG: 500 TABLET, FILM COATED ORAL at 08:06

## 2018-06-08 RX ADMIN — OXYCODONE HYDROCHLORIDE 5 MG: 5 TABLET ORAL at 09:06

## 2018-06-08 RX ADMIN — METOPROLOL SUCCINATE 25 MG: 25 TABLET, EXTENDED RELEASE ORAL at 09:06

## 2018-06-08 RX ADMIN — ACETAMINOPHEN 1000 MG: 500 TABLET, FILM COATED ORAL at 02:06

## 2018-06-08 RX ADMIN — PREGABALIN 75 MG: 75 CAPSULE ORAL at 10:06

## 2018-06-08 RX ADMIN — STANDARDIZED SENNA CONCENTRATE AND DOCUSATE SODIUM 1 TABLET: 8.6; 5 TABLET, FILM COATED ORAL at 09:06

## 2018-06-08 RX ADMIN — AMLODIPINE BESYLATE 10 MG: 10 TABLET ORAL at 09:06

## 2018-06-08 RX ADMIN — BENAZEPRIL HYDROCHLORIDE 20 MG: 20 TABLET, FILM COATED ORAL at 10:06

## 2018-06-08 RX ADMIN — AMPICILLIN AND SULBACTAM 1.5 G: 1; .5 INJECTION, POWDER, FOR SOLUTION INTRAVENOUS at 06:06

## 2018-06-08 RX ADMIN — LEVOTHYROXINE SODIUM 100 MCG: 100 TABLET ORAL at 06:06

## 2018-06-08 RX ADMIN — OXYCODONE HYDROCHLORIDE 5 MG: 5 TABLET ORAL at 05:06

## 2018-06-08 RX ADMIN — VANCOMYCIN HYDROCHLORIDE 750 MG: 750 INJECTION, POWDER, LYOPHILIZED, FOR SOLUTION INTRAVENOUS at 10:06

## 2018-06-08 RX ADMIN — OXYCODONE HYDROCHLORIDE 5 MG: 5 TABLET ORAL at 07:06

## 2018-06-08 RX ADMIN — AMPICILLIN AND SULBACTAM 1.5 G: 1; .5 INJECTION, POWDER, FOR SOLUTION INTRAVENOUS at 02:06

## 2018-06-08 RX ADMIN — ACETAMINOPHEN 1000 MG: 500 TABLET, FILM COATED ORAL at 11:06

## 2018-06-08 RX ADMIN — ROSUVASTATIN CALCIUM 20 MG: 20 TABLET, FILM COATED ORAL at 10:06

## 2018-06-08 NOTE — ASSESSMENT & PLAN NOTE
89 yo female s/p right hemicolectomy, ileostomy 5/24/18, s/p right AKA 6/5/18    -continue IV abx  -ok to remove packing to shower, change packing daily  -will likely remove staples next week if Ms. Villarreal is still at [facility], otherwise, will ask [facility] to remove staples at [facility]  -remainder of care per primary

## 2018-06-08 NOTE — PLAN OF CARE
CM met again with patient and family at bedside. Both daughters and a granddaughter were present. Family again with plan to SNF until 6/23/18 and transfer to [place] SNF at that time; methods of transport discussed, including medical flight. Daughter, Christen, to check into. First choice remains O-SNF but if unable then preferences were given for [facility], [facility], and [facility]. ADAM/FRANCA will send Monday am. Will follow.      6/9/18 10:40am - rec'd message from family. Patient does not want [facility]; will take off preference list.

## 2018-06-08 NOTE — PT/OT/SLP EVAL
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  Previous level of function:  Prior to first hospitalization, pt was ambulating with rollator and was mod (I) with t/f. Pt's daughter, Haley, has been primary caregiver for ~6 years.   Roles and Routines: mother, grandmother  Equipment Owned:  walker, rolling, wheelchair  Assistance upon Discharge: TBD; plan was for pt to d/c to [facility] for 1-2 weeks before moving to NH in [place]. However pt current functional status has led family to reconsider d/c options.    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 OR [address] [place] [address];  Service: General;  Laterality: N/A;    HIP SURGERY  8-2013    right    HYSTERECTOMY      ILEOSTOMY  5/23/2018    Procedure: ILEOSTOMY;  Surgeon: Tushar Anderson MD;  Location: [facility]  "OR 2ND FLR;  Service: General;;    KNEE ARTHROSCOPY Right 7-6-15    TK    RIGHT HEMICOLECTOMY  5/23/2018    Procedure: COLECTOMY-RIGHT;  Surgeon: Tushar Anderson MD;  Location: [facility] OR 2ND FLR;  Service: General;;       Subjective     Chief Complaint: pain  Patient/Family stated goals: comfort; increase in functioning as able  Communicated with: RN prior to session.  Pain/Comfort:  · Pain Rating 1:  (Pt did not rate)  · Location - Side 1: Right  · Location - Orientation 1: generalized  · Location 1: leg  · Pain Addressed 1: Cessation of Activity  · Pain Rating Post-Intervention 1:  (Pt did not rate)    Patients cultural, spiritual, Roman Catholic conflicts given the current situation: none reported    Objective:     Patient found with: oxygen, pressure relief boots, telemetry    General Precautions: Standard, fall   Orthopedic Precautions: (R AKA)   Braces: N/A     Occupational Performance:    Pt declined all functional mobility this date 2/2 pain, however evaluation completed to better understand family's goals for pt regarding therapy. Daughter reports pt was "screaming in pain" during rolling with RN for toileting, but was sitting up in bed ~2 days ago. Pt has been lethargic and difficult to arouse during OT attempts 6/7 and 6/8    Cognitive/Visual Perceptual:  Cognitive/Psychosocial Skills:     -       Oriented to: Person, Place and Situation   -       Follows Commands/attention:Follows one-step commands  -       Communication: clear/fluent  -       Memory: No Deficits noted  -       Safety awareness/insight to disability: intact   -       Mood/Affect/Coping skills/emotional control: Lethargic  Visual/Perceptual:      -Pt is hard of hearing, wears B hearing aids; wears glasses    Physical Exam:  Balance:    -       HOLLIE this date 2/2 pt refusing OOB activity  Postural examination/scapula alignment:    -       No postural abnormalities identified  -       Pt assessed in supine 2/2 refusing OOB activity  Skin " integrity: Visible skin intact  Edema:  None noted  Sensation:    -       Intact  Dominant hand:    -       R hand  Upper Extremity Range of Motion:     -       Right Upper Extremity: shoulder flexion to 30; elbow ROM is WFL  -       Left Upper Extremity: shoulder flexion to ~80; elbow ROM is WFL  Upper Extremity Strength:    -       Right Upper Extremity: shoulder flexion 2-/5; elbow is WFL  -       Left Upper Extremity: shoulder flexion 2+/5; elbow is WFL   Strength:    -       Right Upper Extremity: WFL  -       Left Upper Extremity: WFL  Fine Motor Coordination:    -       Intact  Gross motor coordination:   BUE and LLE are WFL    Patient left supine with all lines intact, call button in reach and daughter present    AMPA 6 Click:  AMPA Total Score: 9    Treatment & Education:  Pt educated on role of OT/POC  *Daughter educated on therapy options for pt while in current condition; family concerned for pt comfort, OT/PT discuss benefits of EOB/OOB activity to pt's comfort level. Daughter state she would like therapy to continue as pt allows.  *CM Jaqueline Carlson called to notify of daughter's uncertainty regarding d/c placement as plan was to go to SNF. Concerned with pt ability to tolerate SNF and not sure if move to [place] is still a viable option given pt's current condition. CM stated she would follow up with pt and family  White board/communication board updated  Education:    Assessment:     Suzan Villarreal is a 89 y.o. 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  She presents with pain limiting participation this date. Pt lethargic and unwilling to attempt bed mobility or EOB.  Performance deficits affecting function are weakness, impaired endurance, impaired functional mobilty, impaired self care skills, impaired balance, gait instability, decreased lower extremity function, orthopedic precautions, pain, decreased ROM, impaired cardiopulmonary response to activity,  "decreased upper extremity function.      Rehab Prognosis:  Fair; patient would benefit from acute skilled OT services to address these deficits and reach maximum level of function.         Clinical Decision Makin.  OT Low:  "Pt evaluation falls under low complexity for evaluation coding due to performance deficits noted in 1-3 areas as stated above and 0 co-morbities affecting current functional status. Data obtained from problem focused assessments. No modifications or assistance was required for completion of evaluation. Only brief occupational profile and history review completed."     Plan:     Patient to be seen 3 x/week to address the above listed problems via self-care/home management, therapeutic activities, therapeutic exercises  · Plan of Care Expires: 18  · Plan of Care Reviewed with: patient, daughter    This Plan of care has been discussed with the patient who was involved in its development and understands and is in agreement with the identified goals and treatment plan    GOALS:    Occupational Therapy Goals        Problem: Occupational Therapy Goal    Goal Priority Disciplines Outcome Interventions   Occupational Therapy Goal     OT, PT/OT Ongoing (interventions implemented as appropriate)    Description:  Goals to be met by: 18     Patient will increase functional independence with ADLs by performing:    Feeding with Set-up Assistance while seated EOB/UIC.  UE Dressing with Minimal Assistance.  Grooming while EOB with Minimal Assistance.  Toileting from bedside commode with Minimal Assistance for hygiene and clothing management.   Sitting at edge of bed x5 minutes with Stand-by Assistance.  Rolling to Bilateral with Moderate Assistance.   Supine to sit with Moderate Assistance.  Stand pivot transfers with Moderate Assistance.  Toilet transfer to bedside commode with Moderate Assistance.                      Time Tracking:     OT Date of Treatment: 18  OT Start Time: 836  OT " Stop Time: 0855  OT Total Time (min): 19 min    Billable Minutes:Evaluation 10  Therapeutic Activity 9    Ryann Crawley OT  6/8/2018

## 2018-06-08 NOTE — PT/OT/SLP EVAL
Physical Therapy Evaluation    Patient Name:  Suzan Villarreal   MRN:  [id number]    Recommendations:     Discharge Recommendations:   (TBD by family)   Discharge Equipment Recommendations:  (TBD)   Barriers to discharge: Decreased caregiver support    Assessment:     Suzan Villarreal is a 89 y.o. 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  She presents with the following impairments/functional limitations:  weakness, impaired endurance, impaired self care skills, impaired functional mobilty, gait instability, impaired balance, impaired cardiopulmonary response to activity, pain, decreased safety awareness, decreased lower extremity function. Pt was limited by increased pain today and decreased alertness with daughter present for session and requested to hold off on functional mobility for these reasons. Daughter was concerned about prognosis and is considering SNF placement pending pt tolerance.     Rehab Prognosis:  fair; patient would benefit from acute skilled PT services to address these deficits and reach maximum level of function.      Recent Surgery: Procedure(s) (LRB):  AMPUTATION, LOWER EXTREMITY, ABOVE KNEE (Right) 3 Days Post-Op    Plan:     During this hospitalization, patient to be seen 3 x/week to address the above listed problems via gait training, therapeutic activities, therapeutic exercises, neuromuscular re-education  · Plan of Care Expires:  06/27/18   Plan of Care Reviewed with: patient, daughter    Subjective     Communicated with pt, nurse, and daughter prior to session.  Patient found supine in bed with daughter present upon PT entry to room, agreeable to evaluation.      Chief Complaint: pain in RLE  Patient comments/goals: to decrease pain  Pain/Comfort:  · Pain Rating 1: 6/10  · Location - Side 1: Right  · Location - Orientation 1: generalized  · Location 1: leg  · Pain Addressed 1: Cessation of Activity  · Pain Rating Post-Intervention 1:  6/10    Patients cultural, spiritual, Jainism conflicts given the current situation: no    Living Environment:  Pt lives with daughter in [facility] with no [place]  Prior to admission, patients level of function was mod I using rollator prior to first admission. Daughter serves as caregiver.  Patient has the following equipment: walker, rolling, wheelchair.  DME owned (not currently used): none.  Upon discharge, patient will have assistance from daughter but may be considering hospice or SNF placement.    Objective:     Patient found with: telemetry, oxygen, pressure relief boots     General Precautions: Standard, fall   Orthopedic Precautions: (R TKA)   Braces: N/A     Exams:  · Cognitive Exam:  Patient is oriented to Person, Place, Time and Situation and follows 100% of single commands   · Gross Motor Coordination:  WFL  · Sensation:    · -       Intact  · Skin Integrity/Edema:      · -       Skin integrity: Visible skin intact  · -       Edema: None noted LLE  · LLE ROM: WFL  · LLE Strength: Deficits: 4/5 gross LE strength    Functional Mobility:  · Not assessed on this visit due to increased pain and decreased alertness     AM-PAC 6 CLICK MOBILITY  Total Score:7       Therapeutic Activities and Exercises:   PT provided pt and daughter education on:   -role of PT and POC   -importance of OOB activity    -importance of participation in therapy    Patient left supine with call button in reach and daughter present.    GOALS:    Physical Therapy Goals        Problem: Physical Therapy Goal    Goal Priority Disciplines Outcome Goal Variances Interventions   Physical Therapy Goal     PT/OT, PT Ongoing (interventions implemented as appropriate)     Description:  Goals to be met by: 6/15/18     Patient will increase functional independence with mobility by performin. Supine to sit with Maximum Assistance  2. Sit to supine with Maximum Assistance  3. Rolling to Left and Right with Maximum Assistance.  4. Sit to stand  transfer with Maximum Assistance  5. Bed to chair transfer with Maximum Assistance using Rolling Walker  6. 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    S/P hysterectomy        Past Surgical History:   Procedure Laterality Date    ABOVE-KNEE AMPUTATION Right 6/5/2018    Procedure: AMPUTATION, LOWER EXTREMITY, ABOVE KNEE;  Surgeon: Mary Ann Garcia MD;  Location: [facility] OR [address] [place] [address];  Service: Peripheral Vascular;  Laterality: Right;    APPENDECTOMY      CHOLECYSTECTOMY      EXPLORATORY LAPAROTOMY N/A 5/23/2018    Procedure: EXPLORATORY-LAPAROTOMY;  Surgeon: Tushar Anderson MD;  Location: [facility] OR [address] [place] [address];  Service: General;  Laterality: N/A;    HIP SURGERY  8-2013    right    HYSTERECTOMY      ILEOSTOMY  5/23/2018    Procedure: ILEOSTOMY;  Surgeon: Tushar Anderson MD;  Location: [facility] OR [address] [place] [address];  Service: General;;    KNEE ARTHROSCOPY Right 7-6-15    TK    RIGHT HEMICOLECTOMY  5/23/2018    Procedure: COLECTOMY-RIGHT;  Surgeon: Tushar Anderson MD;  Location: [facility] OR [address] [place] [address];  Service: General;;       Clinical Decision Making:     History  Co-morbidities and personal factors that may impact the plan of care Examination  Body Structures and Functions, activity limitations and participation restrictions that may impact the plan of care Clinical Presentation   Decision Making/ Complexity Score   Co-morbidities:   [] Time since onset of injury / illness / exacerbation  [x] Status of current condition  []Patient's cognitive status and safety  concerns    [x] Multiple Medical Problems (see med hx)  Personal Factors:   [x] Patient's age  [] Prior Level of function   [] Patient's home situation (environment and family support)  [x] Patient's level of motivation  [x] Expected progression of patient      HISTORY:(criteria)    [] 73299 - no personal factors/history    [] 30777 - has 1-2 personal factor/comorbidity     [x] 63688 - has >3 personal factor/comorbidity     Body Regions:  [] Objective examination findings  [] Head     []  Neck  [] Trunk   [] Upper Extremity  [x] Lower Extremity    Body Systems:  [] For communication ability, affect, cognition, language, and learning style: the assessment of the ability to make needs known, consciousness, orientation (person, place, and time), expected emotional /behavioral responses, and learning preferences (eg, learning barriers, education  needs)  [x] For the neuromuscular system: a general assessment of gross coordinated movement (eg, balance, gait, locomotion, transfers, and transitions) and motor function  (motor control and motor learning)  [x] For the musculoskeletal system: the assessment of gross symmetry, gross range of motion, gross strength, height, and weight  [] For the integumentary system: the assessment of pliability(texture), presence of scar formation, skin color, and skin integrity  [] For cardiovascular/pulmonary system: the assessment of heart rate, respiratory rate, blood pressure, and edema     Activity limitations:    [] Patient's cognitive status and saf ety concerns          [x] Status of current condition      [] Weight bearing restriction  [] Cardiopulmunary Restriction    Participation Restrictions:   [] Goals and goal agreement with the patient     [x] Rehab potential (prognosis) and probable outcome      Examination of Body System: (criteria)    [] 02173 - addressing 1-2 elements    [x] 37990 - addressing a total of 3 or more elements     [] 13137 -  Addressing a total of 4 or more  elements         Clinical Presentation: (criteria)  Stable - 08963     On examination of body system using standardized tests and measures patient presents with 1-2 elements from any of the following: body structures and functions, activity limitations, and/or participation restrictions.  Leading to a clinical presentation that is considered stable and/or uncomplicated                              Clinical Decision Making  (Eval Complexity):  Low- 83767     Time Tracking:     PT Received On: 06/08/18  PT Start Time: 0837     PT Stop Time: 0857  PT Total Time (min): 20 min     Billable Minutes: Evaluation 20      Blessing Corbett, PT  06/08/2018

## 2018-06-08 NOTE — ASSESSMENT & PLAN NOTE
- Vascular surgery consulted, appreciate recs  - S/p AKA 6/5/2018  - Continue post op care as per Vascular  - Perineural catheter removed 6/7, reports some pain but controlled with oral meds  - Medically stable ready for discharge to SNF

## 2018-06-08 NOTE — PROGRESS NOTES
[facility]  General Surgery  Progress Note    Subjective:     History of Present Illness:  Ms. Villarreal is a 89 woman with PMHx significant for severe PVD, HTN, HLD, hypothyroidism, GERD and chronic constipation who presents to the ED with severe pain, gangrene of the R foot. Well known to Dr. Anderson's service - recent admission for perforated viscus, s/p ex-lap and ostomy (please see Discharge Summary from 5/31 for further details of that admission.)  Patient first developed foot pain 3-4 weeks ago. Then around Mother's Day began developing a black eschar on her distal R great toe. Follows with Dr. Garcia in Vascular Surgery. On 5/16, aortogram with run off performed. R AKA recommended, planned for tomorrow 6/5/18. Patient denies abdominal pain, ostomy functioning well. Primary team concerned about erythema noticed at incision site.     Post-Op Info:  Procedure(s) (LRB):  AMPUTATION, LOWER EXTREMITY, ABOVE KNEE (Right)   3 Days Post-Op     Interval History:   Patient seen and examined, no acute events overnight  More responsive this AM, pain well controlled      Medications:  Continuous Infusions:  Scheduled Meds:   acetaminophen  1,000 mg Oral Q8H    amLODIPine  10 mg Oral Daily    ampicillin-sulbactam 1.5 g in sodium chloride 0.9 % 100 mL IVPB (ready to mix system)  1.5 g Intravenous Q8H    benazepril  20 mg Oral BID    levothyroxine  100 mcg Oral Before breakfast    metoprolol succinate  25 mg Oral Daily    pantoprazole  40 mg Oral Daily    pregabalin  75 mg Oral QHS    rosuvastatin  20 mg Oral QHS    senna-docusate 8.6-50 mg  1 tablet Oral BID    vancomycin (VANCOCIN) IVPB  750 mg Intravenous Q24H     PRN Meds:ondansetron, oxyCODONE, prochlorperazine, ramelteon, sodium chloride 0.9%     Review of patient's allergies indicates:   Allergen Reactions    Sulfa (sulfonamide antibiotics) Swelling    Asa [aspirin] Itching     Objective:     Vital Signs (Most Recent):  Temp: 96.2 °F (35.7  °C) (06/08/18 0513)  Pulse: 74 (06/08/18 0513)  Resp: 17 (06/08/18 0513)  BP: (!) 149/67 (06/08/18 0513)  SpO2: 95 % (06/08/18 0513) Vital Signs (24h Range):  Temp:  [96.2 °F (35.7 °C)-99.8 °F (37.7 °C)] 96.2 °F (35.7 °C)  Pulse:  [68-91] 74  Resp:  [16-17] 17  SpO2:  [86 %-96 %] 95 %  BP: (120-157)/(56-70) 149/67     Weight: 49.9 kg (110 lb)  Body mass index is 22.99 kg/m².    Intake/Output - Last 3 Shifts       06/06 0700 - 06/07 0659 06/07 0700 - 06/08 0659 06/08 0700 - 06/09 0659    P.O. 600 100     Other  200     IV Piggyback  100     Total Intake(mL/kg) 600 (12) 400 (8)     Other  0     Stool 2 150     Total Output 2 150      Net +598 +250             Urine Occurrence 1 x 3 x     Stool Occurrence 3 x 0 x     Emesis Occurrence  0 x           Physical Exam   Constitutional: She appears well-developed and well-nourished. No distress.   HENT:   Head: Normocephalic and atraumatic.   Cardiovascular: Normal rate and regular rhythm.    Pulmonary/Chest: Effort normal. No respiratory distress.   Abdominal:   Soft, incision with staples in place - incisional erythema improved, packing in place  Ostomy with stool and gas in bag       Significant Labs:  CBC:   Recent Labs  Lab 06/08/18  0408   WBC 21.14*   RBC 3.78*   HGB 11.6*   HCT 36.0*      MCV 95   MCH 30.7   MCHC 32.2     BMP:   Recent Labs  Lab 06/08/18  0408   GLU 64*   *   K 4.3      CO2 23   BUN 6*   CREATININE 0.5   CALCIUM 7.8*   MG 2.0     CMP:   Recent Labs  Lab 06/08/18  0408   GLU 64*   CALCIUM 7.8*   ALBUMIN 1.4*   PROT 4.3*   *   K 4.3   CO2 23      BUN 6*   CREATININE 0.5   ALKPHOS 91   ALT <5*   AST 13   BILITOT 0.4     LFTs:   Recent Labs  Lab 06/08/18  0408   ALT <5*   AST 13   ALKPHOS 91   BILITOT 0.4   PROT 4.3*   ALBUMIN 1.4*     Coagulation:   Recent Labs  Lab 06/03/18  1100   LABPROT 10.1   INR 1.0   APTT 25.6     Assessment/Plan:     S/P right hemicolectomy    89 yo female s/p right hemicolectomy, ileostomy  5/24/18, s/p right AKA 6/5/18    -continue IV abx  -ok to remove packing to shower, change packing daily  -will likely remove staples next week if Ms. Villarreal is still at [facility], otherwise, will ask [facility] to remove staples at [facility]  -remainder of care per primary            Obdulia Gonsalves PA-C   [id number]  General Surgery  [facility]

## 2018-06-08 NOTE — PROGRESS NOTES
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 Villarreal is a 89 woman with PMHx significant for severe PVD, HTN, HLD, hypothyroidism, GERD and chronic constipation who presents to the ED with severe pain, gangrene of the R foot. Patient with recent admission for perforated viscus, s/p ex-lap and ostomy (please see Discharge Summary from 5/31 for further details of that admission.) Patient's daughter is at bedside and provides much of the history. Patient first developed foot pain 3-4 weeks ago. Then around Mother's Day began developing a black eschar on her distal R great toe. Follows with Dr. Garcia in Vascular Surgery. On 5/16, aortogram with run off performed. R AKA recommended; however, patient and family refused at that time. Pain progressed since that time, with ulceration of the dorsum of her foot and heel developing. Daughter brought patient back to ED due to uncontrolled pain.     Hospital Course:  Patient was admitted to hospital medicine for pain control for right foot gangrene and medication management. Vascular Surgery evaluated patient, plan for right AKA on 6/5. Scheduled percocet 5 started with prn oxycodone 10. Pain controlled overnight.  06/04/2018: Pain overnight, night MD called for IV morphine but patient later refused, discussed with patient and family will change pain meds to scheduled oxy IR 5mg q6 with breakthrough. Awaiting AKA tomorrow. Also placed on 2L supplemental O2 via NC but no SOB, suspect poor pulse ox readings from fingers, will wean today.   06/05/2018: Pain well controlled overnight on  scheduled oxycodone. Plan for AKA today with vascular sx.  06/06/2018: AKA yesterday, denies pain. No subjective complaints. VSS, afebrile.   06/07/2018: ISH. Pain well controlled.   06/08/2018: RICHABIRGIT. Perineural catheter removed, reports some pain but controlled with oral medication.      Review of Systems   Constitutional: Positive for activity change, appetite change and fatigue.   HENT: Negative for trouble swallowing.    Eyes: Negative for visual disturbance.   Respiratory: Negative for cough and shortness of breath.    Cardiovascular: Negative for chest pain.   Gastrointestinal: Negative for abdominal distention and abdominal pain.   Genitourinary: Negative for dysuria.   Musculoskeletal: Positive for gait problem.   Skin: Positive for wound.   Neurological: Negative for headaches.   Psychiatric/Behavioral: Negative for confusion.     Objective:     Vital Signs (Most Recent):  Temp: 96.2 °F (35.7 °C) (06/08/18 0513)  Pulse: 74 (06/08/18 0513)  Resp: 17 (06/08/18 0513)  BP: (!) 149/67 (06/08/18 0513)  SpO2: 95 % (06/08/18 0513) Vital Signs (24h Range):  Temp:  [96.2 °F (35.7 °C)-99.6 °F (37.6 °C)] 96.2 °F (35.7 °C)  Pulse:  [68-91] 74  Resp:  [16-17] 17  SpO2:  [86 %-95 %] 95 %  BP: (120-157)/(56-67) 149/67     Weight: 49.9 kg (110 lb)  Body mass index is 22.99 kg/m².    Intake/Output Summary (Last 24 hours) at 06/08/18 0847  Last data filed at 06/08/18 0600   Gross per 24 hour   Intake              400 ml   Output              250 ml   Net              150 ml      Physical Exam   Constitutional: She is oriented to person, place, and time. No distress.   Elderly, frail woman   HENT:   Head: Normocephalic and atraumatic.   Eyes: Conjunctivae are normal. No scleral icterus.   Neck: Normal range of motion.   Cardiovascular: Normal rate, regular rhythm and normal heart sounds.    Pulmonary/Chest: Effort normal and breath sounds normal.   Abdominal: Soft. She exhibits no distension. There is no tenderness.    Midline laparotomy scar with staples in place, some erythema around lower portion of scar  Ostomy bag in place, stool present, no erythema/ signs of infection at site   Musculoskeletal: Normal range of motion. She exhibits no edema.   R AKA wound bandaged, c/d/i  Perineural catheter in place   Neurological: She is alert and oriented to person, place, and time.   Skin: Skin is warm and dry. She is not diaphoretic.   Psychiatric: She has a normal mood and affect. Her behavior is normal.       Significant Labs:   CBC:     Recent Labs  Lab 06/07/18  0352 06/08/18  0408   WBC 24.71* 21.14*   HGB 11.5* 11.6*   HCT 34.6* 36.0*   * 328     CMP:     Recent Labs  Lab 06/07/18  0352 06/08/18  0408   * 134*   K 4.1 4.3    104   CO2 25 23   GLU 82 64*   BUN 6* 6*   CREATININE 0.4* 0.5   CALCIUM 7.7* 7.8*   PROT 4.4* 4.3*   ALBUMIN 1.5* 1.4*   BILITOT 0.4 0.4   ALKPHOS 77 91   AST 16 13   ALT <5* <5*   ANIONGAP 7* 7*   EGFRNONAA >60.0 >60.0     All pertinent labs within the past 24 hours have been reviewed.    Significant Imaging: I have reviewed all pertinent imaging results/findings within the past 24 hours.    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, Right Foot    - Vascular surgery consulted, appreciate recs  - S/p AKA 6/5/2018  - Continue post op care as per Vascular  - Perineural catheter removed 6/7, reports some pain but controlled with oral meds  - Medically stable ready for discharge to SNF         Primary adenocarcinoma of transverse colon    - Has not started treatment, will need Oncology follow up and staging scans on discharge  - Family wanting to move patient to [place], may arrange follow up there        Delayed surgical wound healing    - Appreciate Gen Surg and wound care consults  - Continue wound care  - Will change abx to Unasyn and vanc to cover skin dionne and anaerobes, concern that stool from stoma may have contaminated wound        S/P right  "hemicolectomy    - Pathology returned: "MODERATELY TO POORLY DIFFERENTIATED ADENOCARCINOMA WITH INFILTRATION THROUGH THE MUSCULARIS INTO PERICOLONIC ADIPOSE TISSUE, WITH MUCH LYMPHOVASCULAR INVASION, AND WITH METASTASES TO 7 OF 22 LYMPH NODES"  - Patient and family aware of cancer diagnosis   - Has not started treatment, will need Oncology follow up and staging scans on discharge  - Family wanting to move patient to [place], may arrange follow up there        PVD (peripheral vascular disease)    - Has ASA allergy  - Home clopidogrel held in anticipation of surgery. Can re-start after cleared by vascular sx        Hyponatremia    - Mild, chronic, stable  - Will monitor daily        Hypothyroidism    - Continue home levothyroxine        GERD (gastroesophageal reflux disease)    - Home med Nexium, pantoprazole while inpatient        Essential hypertension    - Continue home amlodipine 10 and Toprol 25 as BP has been high   - Resume home benazepril 20mg          VTE Risk Mitigation         Ordered     IP VTE HIGH RISK PATIENT  Once      06/02/18 7158              April Castellanos MD  Department of Hospital Medicine   [facility]  "

## 2018-06-08 NOTE — SUBJECTIVE & OBJECTIVE
Review of Systems   Constitutional: Positive for activity change, appetite change and fatigue.   HENT: Negative for trouble swallowing.    Eyes: Negative for visual disturbance.   Respiratory: Negative for cough and shortness of breath.    Cardiovascular: Negative for chest pain.   Gastrointestinal: Negative for abdominal distention and abdominal pain.   Genitourinary: Negative for dysuria.   Musculoskeletal: Positive for gait problem.   Skin: Positive for wound.   Neurological: Negative for headaches.   Psychiatric/Behavioral: Negative for confusion.     Objective:     Vital Signs (Most Recent):  Temp: 96.2 °F (35.7 °C) (06/08/18 0513)  Pulse: 74 (06/08/18 0513)  Resp: 17 (06/08/18 0513)  BP: (!) 149/67 (06/08/18 0513)  SpO2: 95 % (06/08/18 0513) Vital Signs (24h Range):  Temp:  [96.2 °F (35.7 °C)-99.6 °F (37.6 °C)] 96.2 °F (35.7 °C)  Pulse:  [68-91] 74  Resp:  [16-17] 17  SpO2:  [86 %-95 %] 95 %  BP: (120-157)/(56-67) 149/67     Weight: 49.9 kg (110 lb)  Body mass index is 22.99 kg/m².    Intake/Output Summary (Last 24 hours) at 06/08/18 0847  Last data filed at 06/08/18 0600   Gross per 24 hour   Intake              400 ml   Output              250 ml   Net              150 ml      Physical Exam   Constitutional: She is oriented to person, place, and time. No distress.   Elderly, frail woman   HENT:   Head: Normocephalic and atraumatic.   Eyes: Conjunctivae are normal. No scleral icterus.   Neck: Normal range of motion.   Cardiovascular: Normal rate, regular rhythm and normal heart sounds.    Pulmonary/Chest: Effort normal and breath sounds normal.   Abdominal: Soft. She exhibits no distension. There is no tenderness.   Midline laparotomy scar with staples in place, some erythema around lower portion of scar  Ostomy bag in place, stool present, no erythema/ signs of infection at site   Musculoskeletal: Normal range of motion. She exhibits no edema.   R AKA wound bandaged, c/d/i  Perineural catheter in place    Neurological: She is alert and oriented to person, place, and time.   Skin: Skin is warm and dry. She is not diaphoretic.   Psychiatric: She has a normal mood and affect. Her behavior is normal.       Significant Labs:   CBC:     Recent Labs  Lab 06/07/18  0352 06/08/18  0408   WBC 24.71* 21.14*   HGB 11.5* 11.6*   HCT 34.6* 36.0*   * 328     CMP:     Recent Labs  Lab 06/07/18  0352 06/08/18  0408   * 134*   K 4.1 4.3    104   CO2 25 23   GLU 82 64*   BUN 6* 6*   CREATININE 0.4* 0.5   CALCIUM 7.7* 7.8*   PROT 4.4* 4.3*   ALBUMIN 1.5* 1.4*   BILITOT 0.4 0.4   ALKPHOS 77 91   AST 16 13   ALT <5* <5*   ANIONGAP 7* 7*   EGFRNONAA >60.0 >60.0     All pertinent labs within the past 24 hours have been reviewed.    Significant Imaging: I have reviewed all pertinent imaging results/findings within the past 24 hours.

## 2018-06-08 NOTE — PLAN OF CARE
"CM rec'd call from Ryann SIDDIQUI early this am stating family's discharge plan has changed. They don't know if patient is appropriate for skilled anymore; she may need hospice. CM met with patient and daughter, Christen at bedside. Patient very alert and oriented, sitting up in bed. Patient states that she feels fine today, no pain/pain controlled, appetite is good. Daughter agrees saying patient is looking better but they "thought she wasn't going to make it through the weekend" yesterday. CM confirmed that it was only the daughter who felt that way, no physician ever said anything like that. CM reminded daughter that patient has had 2 major surgeries in a short time period, recovery can take a while. Family would still like O-SNF if patient can tolerate it, but are willing to discuss NH SNF. CM will call Ruben with [facility] in [place] to see about the possibility of transferring SNF to SNF. Will follow.   "

## 2018-06-08 NOTE — PLAN OF CARE
Problem: Occupational Therapy Goal  Goal: Occupational Therapy Goal  Goals to be met by: 6/22/18     Patient will increase functional independence with ADLs by performing:    Feeding with Set-up Assistance while seated EOB/UIC.  UE Dressing with Minimal Assistance.  Grooming while EOB with Minimal Assistance.  Toileting from bedside commode with Minimal Assistance for hygiene and clothing management.   Sitting at edge of bed x5 minutes with Stand-by Assistance.  Rolling to Bilateral with Moderate Assistance.   Supine to sit with Moderate Assistance.  Stand pivot transfers with Moderate Assistance.  Toilet transfer to bedside commode with Moderate Assistance.    Outcome: Ongoing (interventions implemented as appropriate)  Eval completed; goals established    Comments: Initiate OT GOYO Crawley OT  6/8/2018

## 2018-06-08 NOTE — SUBJECTIVE & OBJECTIVE
Interval History:   Patient seen and examined, no acute events overnight  More responsive this AM, pain well controlled      Medications:  Continuous Infusions:  Scheduled Meds:   acetaminophen  1,000 mg Oral Q8H    amLODIPine  10 mg Oral Daily    ampicillin-sulbactam 1.5 g in sodium chloride 0.9 % 100 mL IVPB (ready to mix system)  1.5 g Intravenous Q8H    benazepril  20 mg Oral BID    levothyroxine  100 mcg Oral Before breakfast    metoprolol succinate  25 mg Oral Daily    pantoprazole  40 mg Oral Daily    pregabalin  75 mg Oral QHS    rosuvastatin  20 mg Oral QHS    senna-docusate 8.6-50 mg  1 tablet Oral BID    vancomycin (VANCOCIN) IVPB  750 mg Intravenous Q24H     PRN Meds:ondansetron, oxyCODONE, prochlorperazine, ramelteon, sodium chloride 0.9%     Review of patient's allergies indicates:   Allergen Reactions    Sulfa (sulfonamide antibiotics) Swelling    Asa [aspirin] Itching     Objective:     Vital Signs (Most Recent):  Temp: 96.2 °F (35.7 °C) (06/08/18 0513)  Pulse: 74 (06/08/18 0513)  Resp: 17 (06/08/18 0513)  BP: (!) 149/67 (06/08/18 0513)  SpO2: 95 % (06/08/18 0513) Vital Signs (24h Range):  Temp:  [96.2 °F (35.7 °C)-99.8 °F (37.7 °C)] 96.2 °F (35.7 °C)  Pulse:  [68-91] 74  Resp:  [16-17] 17  SpO2:  [86 %-96 %] 95 %  BP: (120-157)/(56-70) 149/67     Weight: 49.9 kg (110 lb)  Body mass index is 22.99 kg/m².    Intake/Output - Last 3 Shifts       06/06 0700 - 06/07 0659 06/07 0700 - 06/08 0659 06/08 0700 - 06/09 0659    P.O. 600 100     Other  200     IV Piggyback  100     Total Intake(mL/kg) 600 (12) 400 (8)     Other  0     Stool 2 150     Total Output 2 150      Net +598 +250             Urine Occurrence 1 x 3 x     Stool Occurrence 3 x 0 x     Emesis Occurrence  0 x           Physical Exam   Constitutional: She appears well-developed and well-nourished. No distress.   HENT:   Head: Normocephalic and atraumatic.   Cardiovascular: Normal rate and regular rhythm.    Pulmonary/Chest: Effort  normal. No respiratory distress.   Abdominal:   Soft, incision with staples in place - incisional erythema improved, packing in place  Ostomy with stool and gas in bag       Significant Labs:  CBC:   Recent Labs  Lab 06/08/18  0408   WBC 21.14*   RBC 3.78*   HGB 11.6*   HCT 36.0*      MCV 95   MCH 30.7   MCHC 32.2     BMP:   Recent Labs  Lab 06/08/18  0408   GLU 64*   *   K 4.3      CO2 23   BUN 6*   CREATININE 0.5   CALCIUM 7.8*   MG 2.0     CMP:   Recent Labs  Lab 06/08/18  0408   GLU 64*   CALCIUM 7.8*   ALBUMIN 1.4*   PROT 4.3*   *   K 4.3   CO2 23      BUN 6*   CREATININE 0.5   ALKPHOS 91   ALT <5*   AST 13   BILITOT 0.4     LFTs:   Recent Labs  Lab 06/08/18  0408   ALT <5*   AST 13   ALKPHOS 91   BILITOT 0.4   PROT 4.3*   ALBUMIN 1.4*     Coagulation:   Recent Labs  Lab 06/03/18  1100   LABPROT 10.1   INR 1.0   APTT 25.6

## 2018-06-08 NOTE — PLAN OF CARE
Problem: Physical Therapy Goal  Goal: Physical Therapy Goal  Goals to be met by: 6/15/18     Patient will increase functional independence with mobility by performin. Supine to sit with Maximum Assistance  2. Sit to supine with Maximum Assistance  3. Rolling to Left and Right with Maximum Assistance.  4. Sit to stand transfer with Maximum Assistance  5. Bed to chair transfer with Maximum Assistance using Rolling Walker  6. Sitting at edge of bed x5 minutes with Moderate Assistance    Outcome: Ongoing (interventions implemented as appropriate)  Goals set today

## 2018-06-08 NOTE — PLAN OF CARE
Problem: Patient Care Overview  Goal: Plan of Care Review  Outcome: Ongoing (interventions implemented as appropriate)  Patient resting in bed comfortably. IV intact and free of infection and irration,  fall precautions maintained no falls noted. Call light in reach bed locked and in lowest position. Heel protector on L leg, assessed scaral area skin intact, Patient instructed to call for assistance. Skin integrity maintained as patient is assisted with positioning, C/o pain managed with PRN meds, No other complaints or concerns. Progressing towards goals. Will continue to monitor and follow careplan of care.

## 2018-06-08 NOTE — ASSESSMENT & PLAN NOTE
Suzan Villarreal is a 89 y.o. female s/p right AKA     - incisional vac to RLE aka wound, will take down prior to discharge or POD5, whichever comes first  - H/H stable  - pain control per regional team  - no need for ABx from Vascular team's perspective   - Please page Vascular surgery to remove the vac if the patient is discharging     Will continue to follow along

## 2018-06-08 NOTE — PROGRESS NOTES
[facility]  Vascular Surgery  Progress Note    Patient Name: Suzan Villarreal  MRN: [id number]  Admission Date: 6/2/2018  Primary Care Provider: Aly Rivas MD    Subjective:     Interval History: AFVSS. NAEON. Still with some pain, but controlled overall    Post-Op Info:  Procedure(s) (LRB):  AMPUTATION, LOWER EXTREMITY, ABOVE KNEE (Right)   3 Days Post-Op       Medications:  Continuous Infusions:    Scheduled Meds:   acetaminophen  1,000 mg Oral Q8H    amLODIPine  10 mg Oral Daily    ampicillin-sulbactam 1.5 g in sodium chloride 0.9 % 100 mL IVPB (ready to mix system)  1.5 g Intravenous Q8H    benazepril  20 mg Oral BID    levothyroxine  100 mcg Oral Before breakfast    metoprolol succinate  25 mg Oral Daily    pantoprazole  40 mg Oral Daily    pregabalin  75 mg Oral QHS    rosuvastatin  20 mg Oral QHS    senna-docusate 8.6-50 mg  1 tablet Oral BID    vancomycin (VANCOCIN) IVPB  750 mg Intravenous Q24H     PRN Meds:ondansetron, oxyCODONE, prochlorperazine, ramelteon, sodium chloride 0.9%     Objective:     Vital Signs (Most Recent):  Temp: 96.2 °F (35.7 °C) (06/08/18 0513)  Pulse: 74 (06/08/18 0513)  Resp: 17 (06/08/18 0513)  BP: (!) 149/67 (06/08/18 0513)  SpO2: 95 % (06/08/18 0513) Vital Signs (24h Range):  Temp:  [96.2 °F (35.7 °C)-99.8 °F (37.7 °C)] 96.2 °F (35.7 °C)  Pulse:  [68-91] 74  Resp:  [16-17] 17  SpO2:  [86 %-96 %] 95 %  BP: (120-157)/(56-70) 149/67          Physical Exam   Constitutional: She appears well-developed. No distress.   Cardiovascular: Normal rate.    Pulmonary/Chest: Effort normal. No respiratory distress.   Abdominal: Soft. She exhibits no distension.   Neurological: She is alert.   Psychiatric: She has a normal mood and affect.   Wound vac in place over surgical incision    Significant Labs:  CBC:     Recent Labs  Lab 06/08/18  0408   WBC 21.14*   RBC 3.78*   HGB 11.6*   HCT 36.0*      MCV 95   MCH 30.7   MCHC 32.2     CMP:     Recent Labs  Lab  06/08/18  0408   GLU 64*   CALCIUM 7.8*   ALBUMIN 1.4*   PROT 4.3*   *   K 4.3   CO2 23      BUN 6*   CREATININE 0.5   ALKPHOS 91   ALT <5*   AST 13   BILITOT 0.4       Significant Diagnostics:  I have reviewed all pertinent imaging results/findings within the past 24 hours.    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 female s/p right AKA     - incisional vac to RLE aka wound, will take down prior to discharge or POD5, whichever comes first  - H/H stable  - pain control per regional team  - no need for ABx from Vascular team's perspective   - Please page Vascular surgery to remove the vac if the patient is discharging     Will continue to follow along            Dain Martin MD  Vascular Surgery  [facility]

## 2018-06-09 LAB
ALBUMIN SERPL BCP-MCNC: 1.3 G/DL
ALP SERPL-CCNC: 79 U/L
ALT SERPL W/O P-5'-P-CCNC: <5 U/L
ANION GAP SERPL CALC-SCNC: 6 MMOL/L
AST SERPL-CCNC: 16 U/L
BASOPHILS # BLD AUTO: 0.07 K/UL
BASOPHILS NFR BLD: 0.5 %
BILIRUB SERPL-MCNC: 0.3 MG/DL
BUN SERPL-MCNC: 5 MG/DL
CALCIUM SERPL-MCNC: 7.5 MG/DL
CHLORIDE SERPL-SCNC: 101 MMOL/L
CO2 SERPL-SCNC: 26 MMOL/L
CREAT SERPL-MCNC: 0.5 MG/DL
DIFFERENTIAL METHOD: ABNORMAL
EOSINOPHIL # BLD AUTO: 0.2 K/UL
EOSINOPHIL NFR BLD: 1.5 %
ERYTHROCYTE [DISTWIDTH] IN BLOOD BY AUTOMATED COUNT: 16.3 %
EST. GFR  (AFRICAN AMERICAN): >60 ML/MIN/1.73 M^2
EST. GFR  (NON AFRICAN AMERICAN): >60 ML/MIN/1.73 M^2
GLUCOSE SERPL-MCNC: 78 MG/DL
HCT VFR BLD AUTO: 34.1 %
HGB BLD-MCNC: 11.3 G/DL
IMM GRANULOCYTES # BLD AUTO: 0.14 K/UL
IMM GRANULOCYTES NFR BLD AUTO: 1 %
LYMPHOCYTES # BLD AUTO: 0.9 K/UL
LYMPHOCYTES NFR BLD: 6.2 %
MAGNESIUM SERPL-MCNC: 1.6 MG/DL
MCH RBC QN AUTO: 30.5 PG
MCHC RBC AUTO-ENTMCNC: 33.1 G/DL
MCV RBC AUTO: 92 FL
MONOCYTES # BLD AUTO: 0.8 K/UL
MONOCYTES NFR BLD: 5.5 %
NEUTROPHILS # BLD AUTO: 12.4 K/UL
NEUTROPHILS NFR BLD: 85.3 %
NRBC BLD-RTO: 0 /100 WBC
PHOSPHATE SERPL-MCNC: 2.4 MG/DL
PLATELET # BLD AUTO: 428 K/UL
PMV BLD AUTO: 9.1 FL
POTASSIUM SERPL-SCNC: 4 MMOL/L
PROT SERPL-MCNC: 4.2 G/DL
RBC # BLD AUTO: 3.7 M/UL
SODIUM SERPL-SCNC: 133 MMOL/L
WBC # BLD AUTO: 14.49 K/UL

## 2018-06-09 PROCEDURE — 25000003 PHARM REV CODE 250: Performed by: HOSPITALIST

## 2018-06-09 PROCEDURE — 25000003 PHARM REV CODE 250: Performed by: ANESTHESIOLOGY

## 2018-06-09 PROCEDURE — 84100 ASSAY OF PHOSPHORUS: CPT

## 2018-06-09 PROCEDURE — 36415 COLL VENOUS BLD VENIPUNCTURE: CPT

## 2018-06-09 PROCEDURE — 99232 SBSQ HOSP IP/OBS MODERATE 35: CPT | Mod: GC,,, | Performed by: HOSPITALIST

## 2018-06-09 PROCEDURE — 80053 COMPREHEN METABOLIC PANEL: CPT

## 2018-06-09 PROCEDURE — 25000003 PHARM REV CODE 250: Performed by: STUDENT IN AN ORGANIZED HEALTH CARE EDUCATION/TRAINING PROGRAM

## 2018-06-09 PROCEDURE — 83735 ASSAY OF MAGNESIUM: CPT

## 2018-06-09 PROCEDURE — 63600175 PHARM REV CODE 636 W HCPCS: Performed by: STUDENT IN AN ORGANIZED HEALTH CARE EDUCATION/TRAINING PROGRAM

## 2018-06-09 PROCEDURE — 25000003 PHARM REV CODE 250: Performed by: INTERNAL MEDICINE

## 2018-06-09 PROCEDURE — 25000003 PHARM REV CODE 250

## 2018-06-09 PROCEDURE — 85025 COMPLETE CBC W/AUTO DIFF WBC: CPT

## 2018-06-09 PROCEDURE — 11000001 HC ACUTE MED/SURG PRIVATE ROOM

## 2018-06-09 RX ORDER — ENOXAPARIN SODIUM 100 MG/ML
40 INJECTION SUBCUTANEOUS EVERY 24 HOURS
Status: DISCONTINUED | OUTPATIENT
Start: 2018-06-10 | End: 2018-06-20 | Stop reason: HOSPADM

## 2018-06-09 RX ORDER — MAGNESIUM SULFATE HEPTAHYDRATE 40 MG/ML
2 INJECTION, SOLUTION INTRAVENOUS ONCE
Status: COMPLETED | OUTPATIENT
Start: 2018-06-09 | End: 2018-06-09

## 2018-06-09 RX ADMIN — OXYCODONE HYDROCHLORIDE 5 MG: 5 TABLET ORAL at 09:06

## 2018-06-09 RX ADMIN — OXYCODONE HYDROCHLORIDE 5 MG: 5 TABLET ORAL at 05:06

## 2018-06-09 RX ADMIN — STANDARDIZED SENNA CONCENTRATE AND DOCUSATE SODIUM 1 TABLET: 8.6; 5 TABLET, FILM COATED ORAL at 09:06

## 2018-06-09 RX ADMIN — AMPICILLIN AND SULBACTAM 1.5 G: 1; .5 INJECTION, POWDER, FOR SOLUTION INTRAVENOUS at 05:06

## 2018-06-09 RX ADMIN — LEVOTHYROXINE SODIUM 100 MCG: 100 TABLET ORAL at 05:06

## 2018-06-09 RX ADMIN — VANCOMYCIN HYDROCHLORIDE 750 MG: 750 INJECTION, POWDER, LYOPHILIZED, FOR SOLUTION INTRAVENOUS at 09:06

## 2018-06-09 RX ADMIN — PANTOPRAZOLE SODIUM 40 MG: 40 TABLET, DELAYED RELEASE ORAL at 09:06

## 2018-06-09 RX ADMIN — BENAZEPRIL HYDROCHLORIDE 20 MG: 20 TABLET, FILM COATED ORAL at 09:06

## 2018-06-09 RX ADMIN — ACETAMINOPHEN 1000 MG: 500 TABLET, FILM COATED ORAL at 09:06

## 2018-06-09 RX ADMIN — ROSUVASTATIN CALCIUM 20 MG: 20 TABLET, FILM COATED ORAL at 09:06

## 2018-06-09 RX ADMIN — MAGNESIUM SULFATE IN WATER 2 G: 40 INJECTION, SOLUTION INTRAVENOUS at 09:06

## 2018-06-09 RX ADMIN — OXYCODONE HYDROCHLORIDE 5 MG: 5 TABLET ORAL at 01:06

## 2018-06-09 RX ADMIN — AMPICILLIN AND SULBACTAM 1.5 G: 1; .5 INJECTION, POWDER, FOR SOLUTION INTRAVENOUS at 09:06

## 2018-06-09 RX ADMIN — ACETAMINOPHEN 1000 MG: 500 TABLET, FILM COATED ORAL at 05:06

## 2018-06-09 RX ADMIN — OXYCODONE HYDROCHLORIDE 5 MG: 5 TABLET ORAL at 12:06

## 2018-06-09 RX ADMIN — PREGABALIN 75 MG: 75 CAPSULE ORAL at 09:06

## 2018-06-09 RX ADMIN — METOPROLOL SUCCINATE 25 MG: 25 TABLET, EXTENDED RELEASE ORAL at 09:06

## 2018-06-09 RX ADMIN — AMLODIPINE BESYLATE 10 MG: 10 TABLET ORAL at 09:06

## 2018-06-09 NOTE — PLAN OF CARE
OT spoke with pt's daughter, Haley, in [facility] this AM. Daughter stated that, contrary to information given to OT/PT at time of eval, plan is to admit pt to SNF for 1-2 weeks before t/f to NH in [place]. CM updated on 6/8 regarding family having different opinions on prognosis and discharge planning. Daughter stated goals for therapy for pt include t/f to [facility] and t/f to electric WC. OT to continue to follow and increase therapy frequency if appropriate.    Ryann Crawley OT   6/9/2018

## 2018-06-09 NOTE — ASSESSMENT & PLAN NOTE
- Appreciate Gen Surg and wound care consults  - Continue wound care  - Will change abx to Unasyn and vanc to cover skin dionne and anaerobes, concern that stool from stoma may have contaminated wound, vanc trough before Sunday's dose of vanc

## 2018-06-09 NOTE — SUBJECTIVE & OBJECTIVE
Review of Systems   Constitutional: Positive for activity change, appetite change and fatigue.   HENT: Negative for trouble swallowing.    Eyes: Negative for visual disturbance.   Respiratory: Negative for cough and shortness of breath.    Cardiovascular: Negative for chest pain.   Gastrointestinal: Negative for abdominal distention and abdominal pain.   Genitourinary: Negative for dysuria.   Musculoskeletal: Positive for gait problem.   Skin: Positive for wound.   Neurological: Negative for headaches.   Psychiatric/Behavioral: Negative for confusion.     Objective:     Vital Signs (Most Recent):  Temp: 96.8 °F (36 °C) (06/09/18 0808)  Pulse: 83 (06/09/18 0808)  Resp: 18 (06/09/18 0808)  BP: (!) 146/67 (06/09/18 0808)  SpO2: (!) 91 % (06/09/18 0808) Vital Signs (24h Range):  Temp:  [96.5 °F (35.8 °C)-97.8 °F (36.6 °C)] 96.8 °F (36 °C)  Pulse:  [72-83] 83  Resp:  [16-18] 18  SpO2:  [90 %-94 %] 91 %  BP: (129-153)/(60-70) 146/67     Weight: 49.9 kg (110 lb)  Body mass index is 22.99 kg/m².    Intake/Output Summary (Last 24 hours) at 06/09/18 0846  Last data filed at 06/09/18 0757   Gross per 24 hour   Intake             1230 ml   Output              925 ml   Net              305 ml      Physical Exam   Constitutional: She is oriented to person, place, and time. No distress.   Elderly, frail woman   HENT:   Head: Normocephalic and atraumatic.   Eyes: Conjunctivae are normal. No scleral icterus.   Neck: Normal range of motion.   Cardiovascular: Normal rate, regular rhythm and normal heart sounds.    Pulmonary/Chest: Effort normal and breath sounds normal.   Abdominal: Soft. She exhibits no distension. There is no tenderness.   Midline laparotomy scar with staples in place (removed from lower portion of scar), some erythema around lower portion of scar   Ostomy bag in place, stool present, no erythema/ signs of infection at site   Musculoskeletal: Normal range of motion. She exhibits no edema.   R AKA wound bandaged,  c/d/i  Perineural catheter in place   Neurological: She is alert and oriented to person, place, and time.   Skin: Skin is warm and dry. She is not diaphoretic.   Psychiatric: She has a normal mood and affect. Her behavior is normal.       Significant Labs:   CBC:     Recent Labs  Lab 06/08/18  0408 06/09/18  0349   WBC 21.14* 14.49*   HGB 11.6* 11.3*   HCT 36.0* 34.1*    428*     CMP:     Recent Labs  Lab 06/08/18 0408 06/09/18  0349   * 133*   K 4.3 4.0    101   CO2 23 26   GLU 64* 78   BUN 6* 5*   CREATININE 0.5 0.5   CALCIUM 7.8* 7.5*   PROT 4.3* 4.2*   ALBUMIN 1.4* 1.3*   BILITOT 0.4 0.3   ALKPHOS 91 79   AST 13 16   ALT <5* <5*   ANIONGAP 7* 6*   EGFRNONAA >60.0 >60.0     All pertinent labs within the past 24 hours have been reviewed.    Significant Imaging: I have reviewed all pertinent imaging results/findings within the past 24 hours.

## 2018-06-09 NOTE — PROGRESS NOTES
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 Villarreal is a 89 woman with PMHx significant for severe PVD, HTN, HLD, hypothyroidism, GERD and chronic constipation who presents to the ED with severe pain, gangrene of the R foot. Patient with recent admission for perforated viscus, s/p ex-lap and ostomy (please see Discharge Summary from 5/31 for further details of that admission.) Patient's daughter is at bedside and provides much of the history. Patient first developed foot pain 3-4 weeks ago. Then around Mother's Day began developing a black eschar on her distal R great toe. Follows with Dr. Garcia in Vascular Surgery. On 5/16, aortogram with run off performed. R AKA recommended; however, patient and family refused at that time. Pain progressed since that time, with ulceration of the dorsum of her foot and heel developing. Daughter brought patient back to ED due to uncontrolled pain.     Hospital Course:  Patient was admitted to hospital medicine for pain control for right foot gangrene and medication management. Vascular Surgery evaluated patient, plan for right AKA on 6/5. Scheduled percocet 5 started with prn oxycodone 10. Pain controlled overnight.  06/04/2018: Pain overnight, night MD called for IV morphine but patient later refused, discussed with patient and family will change pain meds to scheduled oxy IR 5mg q6 with breakthrough. Awaiting AKA tomorrow. Also placed on 2L supplemental O2 via NC but no SOB, suspect poor pulse ox readings from fingers, will wean today.   06/05/2018: Pain well controlled overnight on  scheduled oxycodone. Plan for AKA today with vascular sx.  06/06/2018: AKA yesterday, denies pain. No subjective complaints. VSS, afebrile.   06/07/2018: ISH. Pain well controlled.   06/08/2018: RICHABIRGIT. Perineural catheter removed, reports some pain but controlled with oral medication.  06/09/2018: Some pain overnight, responded to one dose of oxycodone. VSS, afebrile.       Review of Systems   Constitutional: Positive for activity change, appetite change and fatigue.   HENT: Negative for trouble swallowing.    Eyes: Negative for visual disturbance.   Respiratory: Negative for cough and shortness of breath.    Cardiovascular: Negative for chest pain.   Gastrointestinal: Negative for abdominal distention and abdominal pain.   Genitourinary: Negative for dysuria.   Musculoskeletal: Positive for gait problem.   Skin: Positive for wound.   Neurological: Negative for headaches.   Psychiatric/Behavioral: Negative for confusion.     Objective:     Vital Signs (Most Recent):  Temp: 96.8 °F (36 °C) (06/09/18 0808)  Pulse: 83 (06/09/18 0808)  Resp: 18 (06/09/18 0808)  BP: (!) 146/67 (06/09/18 0808)  SpO2: (!) 91 % (06/09/18 0808) Vital Signs (24h Range):  Temp:  [96.5 °F (35.8 °C)-97.8 °F (36.6 °C)] 96.8 °F (36 °C)  Pulse:  [72-83] 83  Resp:  [16-18] 18  SpO2:  [90 %-94 %] 91 %  BP: (129-153)/(60-70) 146/67     Weight: 49.9 kg (110 lb)  Body mass index is 22.99 kg/m².    Intake/Output Summary (Last 24 hours) at 06/09/18 0846  Last data filed at 06/09/18 0757   Gross per 24 hour   Intake             1230 ml   Output              925 ml   Net              305 ml      Physical Exam   Constitutional: She is oriented to person, place, and time. No distress.   Elderly, frail woman   HENT:   Head: Normocephalic and atraumatic.   Eyes: Conjunctivae are normal. No scleral icterus.   Neck: Normal range of motion.   Cardiovascular: Normal rate, regular rhythm and normal heart sounds.    Pulmonary/Chest: Effort normal and breath sounds  normal.   Abdominal: Soft. She exhibits no distension. There is no tenderness.   Midline laparotomy scar with staples in place (removed from lower portion of scar), some erythema around lower portion of scar   Ostomy bag in place, stool present, no erythema/ signs of infection at site   Musculoskeletal: Normal range of motion. She exhibits no edema.   R AKA wound bandaged, c/d/i  Perineural catheter in place   Neurological: She is alert and oriented to person, place, and time.   Skin: Skin is warm and dry. She is not diaphoretic.   Psychiatric: She has a normal mood and affect. Her behavior is normal.       Significant Labs:   CBC:     Recent Labs  Lab 06/08/18  0408 06/09/18  0349   WBC 21.14* 14.49*   HGB 11.6* 11.3*   HCT 36.0* 34.1*    428*     CMP:     Recent Labs  Lab 06/08/18  0408 06/09/18  0349   * 133*   K 4.3 4.0    101   CO2 23 26   GLU 64* 78   BUN 6* 5*   CREATININE 0.5 0.5   CALCIUM 7.8* 7.5*   PROT 4.3* 4.2*   ALBUMIN 1.4* 1.3*   BILITOT 0.4 0.3   ALKPHOS 91 79   AST 13 16   ALT <5* <5*   ANIONGAP 7* 6*   EGFRNONAA >60.0 >60.0     All pertinent labs within the past 24 hours have been reviewed.    Significant Imaging: I have reviewed all pertinent imaging results/findings within the past 24 hours.    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, Right Foot    - Vascular surgery consulted, appreciate recs  - S/p AKA 6/5/2018  - Continue post op care as per Vascular  - Perineural catheter removed 6/7, reports some pain but controlled with oral meds  - Medically stable ready for discharge to SNF         Primary adenocarcinoma of transverse colon    - Has not started treatment, will need Oncology follow up and staging scans on discharge  - Family wanting to move patient to [place], may arrange follow up there        Delayed surgical wound healing    - Appreciate Gen Surg and wound care consults  - Continue wound care  - Will change abx to Unasyn and vanc to  "cover skin dionne and anaerobes, concern that stool from stoma may have contaminated wound, vanc trough before Sunday's dose of vanc        S/P right hemicolectomy    - Pathology returned: "MODERATELY TO POORLY DIFFERENTIATED ADENOCARCINOMA WITH INFILTRATION THROUGH THE MUSCULARIS INTO PERICOLONIC ADIPOSE TISSUE, WITH MUCH LYMPHOVASCULAR INVASION, AND WITH METASTASES TO 7 OF 22 LYMPH NODES"  - Patient and family aware of cancer diagnosis   - Has not started treatment, will need Oncology follow up and staging scans on discharge  - Family wanting to move patient to [place], may arrange follow up there        PVD (peripheral vascular disease)    - Has ASA allergy  - Home clopidogrel held in anticipation of surgery. Can re-start after cleared by vascular sx        Hyponatremia    - Mild, chronic, stable  - Will monitor daily        Hypothyroidism    - Continue home levothyroxine        GERD (gastroesophageal reflux disease)    - Home med Nexium, pantoprazole while inpatient        Essential hypertension    - Continue home amlodipine 10 and Toprol 25 as BP has been high   - Resume home benazepril 20mg          VTE Risk Mitigation         Ordered     enoxaparin injection 40 mg  Daily      06/09/18 0850     IP VTE HIGH RISK PATIENT  Once      06/02/18 2463              April Castellanos MD  Department of Hospital Medicine   [facility]  "

## 2018-06-10 LAB
ALBUMIN SERPL BCP-MCNC: 1.3 G/DL
ALP SERPL-CCNC: 93 U/L
ALT SERPL W/O P-5'-P-CCNC: 5 U/L
ANION GAP SERPL CALC-SCNC: 6 MMOL/L
AST SERPL-CCNC: 15 U/L
BASOPHILS # BLD AUTO: 0.11 K/UL
BASOPHILS NFR BLD: 1.1 %
BILIRUB SERPL-MCNC: 0.3 MG/DL
BUN SERPL-MCNC: 5 MG/DL
CALCIUM SERPL-MCNC: 7.7 MG/DL
CHLORIDE SERPL-SCNC: 104 MMOL/L
CO2 SERPL-SCNC: 24 MMOL/L
CREAT SERPL-MCNC: 0.5 MG/DL
DIFFERENTIAL METHOD: ABNORMAL
EOSINOPHIL # BLD AUTO: 0.3 K/UL
EOSINOPHIL NFR BLD: 3.2 %
ERYTHROCYTE [DISTWIDTH] IN BLOOD BY AUTOMATED COUNT: 16.4 %
EST. GFR  (AFRICAN AMERICAN): >60 ML/MIN/1.73 M^2
EST. GFR  (NON AFRICAN AMERICAN): >60 ML/MIN/1.73 M^2
GLUCOSE SERPL-MCNC: 73 MG/DL
HCT VFR BLD AUTO: 37.1 %
HGB BLD-MCNC: 11.6 G/DL
IMM GRANULOCYTES # BLD AUTO: 0.1 K/UL
IMM GRANULOCYTES NFR BLD AUTO: 1 %
LYMPHOCYTES # BLD AUTO: 1.3 K/UL
LYMPHOCYTES NFR BLD: 13.5 %
MAGNESIUM SERPL-MCNC: 1.7 MG/DL
MCH RBC QN AUTO: 30.7 PG
MCHC RBC AUTO-ENTMCNC: 31.3 G/DL
MCV RBC AUTO: 98 FL
MONOCYTES # BLD AUTO: 0.8 K/UL
MONOCYTES NFR BLD: 8.2 %
NEUTROPHILS # BLD AUTO: 7.2 K/UL
NEUTROPHILS NFR BLD: 73 %
NRBC BLD-RTO: 0 /100 WBC
PHOSPHATE SERPL-MCNC: 2.6 MG/DL
PLATELET # BLD AUTO: 323 K/UL
PMV BLD AUTO: 9.2 FL
POTASSIUM SERPL-SCNC: 4.1 MMOL/L
PROT SERPL-MCNC: 4.2 G/DL
RBC # BLD AUTO: 3.78 M/UL
SODIUM SERPL-SCNC: 134 MMOL/L
WBC # BLD AUTO: 9.81 K/UL

## 2018-06-10 PROCEDURE — 85025 COMPLETE CBC W/AUTO DIFF WBC: CPT

## 2018-06-10 PROCEDURE — 80053 COMPREHEN METABOLIC PANEL: CPT

## 2018-06-10 PROCEDURE — 25000003 PHARM REV CODE 250: Performed by: STUDENT IN AN ORGANIZED HEALTH CARE EDUCATION/TRAINING PROGRAM

## 2018-06-10 PROCEDURE — 83735 ASSAY OF MAGNESIUM: CPT

## 2018-06-10 PROCEDURE — 25000003 PHARM REV CODE 250: Performed by: ANESTHESIOLOGY

## 2018-06-10 PROCEDURE — 36415 COLL VENOUS BLD VENIPUNCTURE: CPT

## 2018-06-10 PROCEDURE — 11000001 HC ACUTE MED/SURG PRIVATE ROOM

## 2018-06-10 PROCEDURE — 99231 SBSQ HOSP IP/OBS SF/LOW 25: CPT | Mod: GC,,, | Performed by: HOSPITALIST

## 2018-06-10 PROCEDURE — 84100 ASSAY OF PHOSPHORUS: CPT

## 2018-06-10 PROCEDURE — 25000003 PHARM REV CODE 250

## 2018-06-10 PROCEDURE — 25000003 PHARM REV CODE 250: Performed by: INTERNAL MEDICINE

## 2018-06-10 PROCEDURE — 25000003 PHARM REV CODE 250: Performed by: HOSPITALIST

## 2018-06-10 PROCEDURE — 63600175 PHARM REV CODE 636 W HCPCS: Performed by: STUDENT IN AN ORGANIZED HEALTH CARE EDUCATION/TRAINING PROGRAM

## 2018-06-10 RX ORDER — AMOXICILLIN AND CLAVULANATE POTASSIUM 875; 125 MG/1; MG/1
1 TABLET, FILM COATED ORAL EVERY 12 HOURS
Status: COMPLETED | OUTPATIENT
Start: 2018-06-10 | End: 2018-06-16

## 2018-06-10 RX ORDER — DOXYCYCLINE HYCLATE 100 MG
100 TABLET ORAL EVERY 12 HOURS
Status: COMPLETED | OUTPATIENT
Start: 2018-06-11 | End: 2018-06-16

## 2018-06-10 RX ADMIN — PANTOPRAZOLE SODIUM 40 MG: 40 TABLET, DELAYED RELEASE ORAL at 08:06

## 2018-06-10 RX ADMIN — ACETAMINOPHEN 1000 MG: 500 TABLET, FILM COATED ORAL at 10:06

## 2018-06-10 RX ADMIN — OXYCODONE HYDROCHLORIDE 5 MG: 5 TABLET ORAL at 09:06

## 2018-06-10 RX ADMIN — METOPROLOL SUCCINATE 25 MG: 25 TABLET, EXTENDED RELEASE ORAL at 08:06

## 2018-06-10 RX ADMIN — AMPICILLIN AND SULBACTAM 1.5 G: 1; .5 INJECTION, POWDER, FOR SOLUTION INTRAVENOUS at 05:06

## 2018-06-10 RX ADMIN — LEVOTHYROXINE SODIUM 100 MCG: 100 TABLET ORAL at 05:06

## 2018-06-10 RX ADMIN — OXYCODONE HYDROCHLORIDE 5 MG: 5 TABLET ORAL at 06:06

## 2018-06-10 RX ADMIN — OXYCODONE HYDROCHLORIDE 5 MG: 5 TABLET ORAL at 03:06

## 2018-06-10 RX ADMIN — BENAZEPRIL HYDROCHLORIDE 20 MG: 20 TABLET, FILM COATED ORAL at 09:06

## 2018-06-10 RX ADMIN — OXYCODONE HYDROCHLORIDE 5 MG: 5 TABLET ORAL at 10:06

## 2018-06-10 RX ADMIN — AMOXICILLIN AND CLAVULANATE POTASSIUM 1 TABLET: 875; 125 TABLET, FILM COATED ORAL at 09:06

## 2018-06-10 RX ADMIN — BENAZEPRIL HYDROCHLORIDE 20 MG: 20 TABLET, FILM COATED ORAL at 08:06

## 2018-06-10 RX ADMIN — AMLODIPINE BESYLATE 10 MG: 10 TABLET ORAL at 08:06

## 2018-06-10 RX ADMIN — PREGABALIN 75 MG: 75 CAPSULE ORAL at 09:06

## 2018-06-10 RX ADMIN — ROSUVASTATIN CALCIUM 20 MG: 20 TABLET, FILM COATED ORAL at 09:06

## 2018-06-10 RX ADMIN — ENOXAPARIN SODIUM 40 MG: 100 INJECTION SUBCUTANEOUS at 05:06

## 2018-06-10 RX ADMIN — ACETAMINOPHEN 1000 MG: 500 TABLET, FILM COATED ORAL at 05:06

## 2018-06-10 RX ADMIN — ACETAMINOPHEN 1000 MG: 500 TABLET, FILM COATED ORAL at 03:06

## 2018-06-10 NOTE — SUBJECTIVE & OBJECTIVE
Interval History: no events overnight. Patient reports pain is well controlled. She is alert and oriented    Review of Systems   Constitutional: Positive for activity change, appetite change and fatigue.   HENT: Negative for trouble swallowing.    Eyes: Negative for visual disturbance.   Respiratory: Negative for cough and shortness of breath.    Cardiovascular: Negative for chest pain.   Gastrointestinal: Negative for abdominal distention and abdominal pain.   Genitourinary: Negative for dysuria.   Musculoskeletal: Positive for gait problem.   Skin: Positive for wound.   Neurological: Negative for headaches.   Psychiatric/Behavioral: Negative for confusion.     Objective:     Vital Signs (Most Recent):  Temp: 97.5 °F (36.4 °C) (06/10/18 1101)  Pulse: 77 (06/10/18 1101)  Resp: 16 (06/10/18 1101)  BP: (!) 145/67 (06/10/18 1101)  SpO2: (!) 93 % (06/10/18 1101) Vital Signs (24h Range):  Temp:  [95.6 °F (35.3 °C)-98.7 °F (37.1 °C)] 97.5 °F (36.4 °C)  Pulse:  [74-88] 77  Resp:  [16-18] 16  SpO2:  [92 %-96 %] 93 %  BP: (126-145)/(58-67) 145/67     Weight: 49.9 kg (110 lb)  Body mass index is 22.99 kg/m².    Intake/Output Summary (Last 24 hours) at 06/10/18 1307  Last data filed at 06/10/18 0930   Gross per 24 hour   Intake                0 ml   Output              350 ml   Net             -350 ml      Physical Exam   Constitutional: She is oriented to person, place, and time. No distress.   Elderly, frail woman   HENT:   Head: Normocephalic and atraumatic.   Eyes: Conjunctivae are normal. No scleral icterus.   Neck: Normal range of motion.   Cardiovascular: Normal rate, regular rhythm and normal heart sounds.    Pulmonary/Chest: Effort normal and breath sounds normal.   Abdominal: Soft. She exhibits no distension. There is no tenderness.   Midline laparotomy scar with staples in place (removed from lower portion of scar), some erythema around lower portion of scar   Ostomy bag in place, stool present, no erythema/ signs  of infection at site   Musculoskeletal: Normal range of motion. She exhibits no edema.   R AKA wound bandaged, c/d/i  Perineural catheter in place   Neurological: She is alert and oriented to person, place, and time.   Skin: Skin is warm and dry. She is not diaphoretic.   Psychiatric: She has a normal mood and affect. Her behavior is normal.       Significant Labs: All pertinent labs within the past 24 hours have been reviewed.    Significant Imaging: I have reviewed all pertinent imaging results/findings within the past 24 hours.

## 2018-06-10 NOTE — PROGRESS NOTES
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 Villarreal is a 89 woman with PMHx significant for severe PVD, HTN, HLD, hypothyroidism, GERD and chronic constipation who presents to the ED with severe pain, gangrene of the R foot. Patient with recent admission for perforated viscus, s/p ex-lap and ostomy (please see Discharge Summary from 5/31 for further details of that admission.) Patient's daughter is at bedside and provides much of the history. Patient first developed foot pain 3-4 weeks ago. Then around Mother's Day began developing a black eschar on her distal R great toe. Follows with Dr. Garcia in Vascular Surgery. On 5/16, aortogram with run off performed. R AKA recommended; however, patient and family refused at that time. Pain progressed since that time, with ulceration of the dorsum of her foot and heel developing. Daughter brought patient back to ED due to uncontrolled pain.     Hospital Course:  Patient was admitted to hospital medicine for pain control for right foot gangrene and medication management. Vascular Surgery evaluated patient, plan for right AKA on 6/5. Scheduled percocet 5 started with prn oxycodone 10. Pain controlled overnight.  06/04/2018: Pain overnight, night MD called for IV morphine but patient later refused, discussed with patient and family will change pain meds to scheduled oxy IR 5mg q6 with breakthrough. Awaiting AKA tomorrow. Also placed on 2L supplemental O2 via NC but no SOB, suspect poor pulse ox readings from fingers, will wean today.   06/05/2018: Pain well controlled overnight on  scheduled oxycodone. Plan for AKA today with vascular sx.  06/06/2018: AKA yesterday, denies pain. No subjective complaints. VSS, afebrile.   06/07/2018: ISH. Pain well controlled.   06/08/2018: JULIETAON. Perineural catheter removed, reports some pain but controlled with oral medication.  06/09/2018: Some pain overnight, responded to one dose of oxycodone. VSS, afebrile.     Interval History: no events overnight. Patient reports pain is well controlled. She is alert and oriented    Review of Systems   Constitutional: Positive for activity change, appetite change and fatigue.   HENT: Negative for trouble swallowing.    Eyes: Negative for visual disturbance.   Respiratory: Negative for cough and shortness of breath.    Cardiovascular: Negative for chest pain.   Gastrointestinal: Negative for abdominal distention and abdominal pain.   Genitourinary: Negative for dysuria.   Musculoskeletal: Positive for gait problem.   Skin: Positive for wound.   Neurological: Negative for headaches.   Psychiatric/Behavioral: Negative for confusion.     Objective:     Vital Signs (Most Recent):  Temp: 97.5 °F (36.4 °C) (06/10/18 1101)  Pulse: 77 (06/10/18 1101)  Resp: 16 (06/10/18 1101)  BP: (!) 145/67 (06/10/18 1101)  SpO2: (!) 93 % (06/10/18 1101) Vital Signs (24h Range):  Temp:  [95.6 °F (35.3 °C)-98.7 °F (37.1 °C)] 97.5 °F (36.4 °C)  Pulse:  [74-88] 77  Resp:  [16-18] 16  SpO2:  [92 %-96 %] 93 %  BP: (126-145)/(58-67) 145/67     Weight: 49.9 kg (110 lb)  Body mass index is 22.99 kg/m².    Intake/Output Summary (Last 24 hours) at 06/10/18 1307  Last data filed at 06/10/18 0930   Gross per 24 hour   Intake                0 ml   Output              350 ml   Net             -350 ml      Physical Exam   Constitutional: She is oriented to person, place, and time. No distress.   Elderly, frail woman   HENT:   Head: Normocephalic and atraumatic.   Eyes: Conjunctivae are normal. No scleral icterus.   Neck: Normal range of motion.  "  Cardiovascular: Normal rate, regular rhythm and normal heart sounds.    Pulmonary/Chest: Effort normal and breath sounds normal.   Abdominal: Soft. She exhibits no distension. There is no tenderness.   Midline laparotomy scar with staples in place (removed from lower portion of scar), some erythema around lower portion of scar   Ostomy bag in place, stool present, no erythema/ signs of infection at site   Musculoskeletal: Normal range of motion. She exhibits no edema.   R AKA wound bandaged, c/d/i  Perineural catheter in place   Neurological: She is alert and oriented to person, place, and time.   Skin: Skin is warm and dry. She is not diaphoretic.   Psychiatric: She has a normal mood and affect. Her behavior is normal.       Significant Labs: All pertinent labs within the past 24 hours have been reviewed.    Significant Imaging: I have reviewed all pertinent imaging results/findings within the past 24 hours.    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, Right Foot    - Vascular surgery consulted, appreciate recs  - S/p AKA 6/5/2018  - Continue post op care as per Vascular  - Perineural catheter removed 6/7, reports some pain but controlled with oral meds  - Medically stable ready for discharge to          Primary adenocarcinoma of transverse colon    - Has not started treatment, will need Oncology follow up and staging scans on discharge  - Family wanting to move patient to [place], may arrange follow up there        Delayed surgical wound healing    - Appreciate Gen Surg and wound care consults  - Continue wound care  - Transition abx to doxy and augmentin         S/P right hemicolectomy    - Pathology returned: "MODERATELY TO POORLY DIFFERENTIATED ADENOCARCINOMA WITH INFILTRATION THROUGH THE MUSCULARIS INTO PERICOLONIC ADIPOSE TISSUE, WITH MUCH LYMPHOVASCULAR INVASION, AND WITH METASTASES TO 7 OF 22 LYMPH NODES"  - Patient and family aware of cancer diagnosis   - Has not " started treatment, will need Oncology follow up and staging scans on discharge  - Family wanting to move patient to [place], may arrange follow up there        PVD (peripheral vascular disease)    - Has ASA allergy  - Home clopidogrel held in anticipation of surgery. Can re-start after cleared by vascular sx        Hyponatremia    - Mild, chronic, stable  - Will monitor daily        Hypothyroidism    - Continue home levothyroxine        GERD (gastroesophageal reflux disease)    - Home med Nexium, pantoprazole while inpatient        Essential hypertension    - Continue home amlodipine 10 and Toprol 25 as BP has been high   - Resume home benazepril 20mg          VTE Risk Mitigation         Ordered     enoxaparin injection 40 mg  Daily      06/09/18 0850     IP VTE HIGH RISK PATIENT  Once      06/02/18 9162              Jordy Buitrago MD  Department of Hospital Medicine   [facility]

## 2018-06-10 NOTE — PLAN OF CARE
Problem: Patient Care Overview  Goal: Plan of Care Review  Outcome: Ongoing (interventions implemented as appropriate)  Pt AAOx4 and VSS. Pt is progressing with plan of care. Free of skin breakdown as the pt positioned/repositioned with wedge. Clean, dry and intact dressing noted on R knee. Wound vac in place and functioning. Clean, dry, and intact staple noted on abdomen. Bottom incision opened. Clean and redressing. Colostomy bag in place. Moon boot on L foot. Incentive spirometer at bedside and pt instructed on its use. Pain controlled well with PRN meds. Frequent rounds made to assess pain and safety and no complaints at this time noted. Side rails up x 2. Bed locked. Call light within reach. No falls noted. Will continue to monitor.

## 2018-06-10 NOTE — PLAN OF CARE
Problem: Patient Care Overview  Goal: Plan of Care Review  Outcome: Ongoing (interventions implemented as appropriate)  Patient AAOx4. No complaints of pain. VS stable, afrebrile. Neurovascular intact. Skin intact, with exception of Right AKA. Free from falls. Frequent rounds made for safety, pain and comfort. Bed at lowest position, call light within reach, side rails up x2.

## 2018-06-10 NOTE — ASSESSMENT & PLAN NOTE
- Appreciate Gen Surg and wound care consults  - Continue wound care  - Transition abx to doxy and augmentin

## 2018-06-11 LAB
ALBUMIN SERPL BCP-MCNC: 1.3 G/DL
ALP SERPL-CCNC: 78 U/L
ALT SERPL W/O P-5'-P-CCNC: 6 U/L
ANION GAP SERPL CALC-SCNC: 7 MMOL/L
AST SERPL-CCNC: 19 U/L
BASOPHILS # BLD AUTO: 0.22 K/UL
BASOPHILS NFR BLD: 2 %
BILIRUB SERPL-MCNC: 0.2 MG/DL
BUN SERPL-MCNC: 5 MG/DL
CALCIUM SERPL-MCNC: 7.8 MG/DL
CHLORIDE SERPL-SCNC: 104 MMOL/L
CO2 SERPL-SCNC: 25 MMOL/L
CREAT SERPL-MCNC: 0.4 MG/DL
DIFFERENTIAL METHOD: ABNORMAL
EOSINOPHIL # BLD AUTO: 0.5 K/UL
EOSINOPHIL NFR BLD: 4.3 %
ERYTHROCYTE [DISTWIDTH] IN BLOOD BY AUTOMATED COUNT: 16.3 %
EST. GFR  (AFRICAN AMERICAN): >60 ML/MIN/1.73 M^2
EST. GFR  (NON AFRICAN AMERICAN): >60 ML/MIN/1.73 M^2
GLUCOSE SERPL-MCNC: 77 MG/DL
HCT VFR BLD AUTO: 40.1 %
HGB BLD-MCNC: 12.7 G/DL
IMM GRANULOCYTES # BLD AUTO: 0.24 K/UL
IMM GRANULOCYTES NFR BLD AUTO: 2.1 %
LYMPHOCYTES # BLD AUTO: 1.9 K/UL
LYMPHOCYTES NFR BLD: 16.6 %
MAGNESIUM SERPL-MCNC: 1.4 MG/DL
MCH RBC QN AUTO: 30.6 PG
MCHC RBC AUTO-ENTMCNC: 31.7 G/DL
MCV RBC AUTO: 97 FL
MONOCYTES # BLD AUTO: 1 K/UL
MONOCYTES NFR BLD: 8.9 %
NEUTROPHILS # BLD AUTO: 7.4 K/UL
NEUTROPHILS NFR BLD: 66.1 %
NRBC BLD-RTO: 0 /100 WBC
PHOSPHATE SERPL-MCNC: 2.9 MG/DL
PLATELET # BLD AUTO: 472 K/UL
PMV BLD AUTO: 8.8 FL
POTASSIUM SERPL-SCNC: 4.2 MMOL/L
PROT SERPL-MCNC: 4.2 G/DL
RBC # BLD AUTO: 4.15 M/UL
SODIUM SERPL-SCNC: 136 MMOL/L
WBC # BLD AUTO: 11.18 K/UL

## 2018-06-11 PROCEDURE — 99232 SBSQ HOSP IP/OBS MODERATE 35: CPT | Mod: GC,,, | Performed by: HOSPITALIST

## 2018-06-11 PROCEDURE — 25000003 PHARM REV CODE 250: Performed by: INTERNAL MEDICINE

## 2018-06-11 PROCEDURE — 25000003 PHARM REV CODE 250: Performed by: STUDENT IN AN ORGANIZED HEALTH CARE EDUCATION/TRAINING PROGRAM

## 2018-06-11 PROCEDURE — G8988 SELF CARE GOAL STATUS: HCPCS | Mod: CK

## 2018-06-11 PROCEDURE — 80053 COMPREHEN METABOLIC PANEL: CPT

## 2018-06-11 PROCEDURE — 94799 UNLISTED PULMONARY SVC/PX: CPT

## 2018-06-11 PROCEDURE — 94664 DEMO&/EVAL PT USE INHALER: CPT

## 2018-06-11 PROCEDURE — 25000003 PHARM REV CODE 250: Performed by: HOSPITALIST

## 2018-06-11 PROCEDURE — 36415 COLL VENOUS BLD VENIPUNCTURE: CPT

## 2018-06-11 PROCEDURE — 97535 SELF CARE MNGMENT TRAINING: CPT

## 2018-06-11 PROCEDURE — 94761 N-INVAS EAR/PLS OXIMETRY MLT: CPT

## 2018-06-11 PROCEDURE — 85025 COMPLETE CBC W/AUTO DIFF WBC: CPT

## 2018-06-11 PROCEDURE — G8987 SELF CARE CURRENT STATUS: HCPCS | Mod: CL

## 2018-06-11 PROCEDURE — 25000003 PHARM REV CODE 250: Performed by: ANESTHESIOLOGY

## 2018-06-11 PROCEDURE — 97530 THERAPEUTIC ACTIVITIES: CPT

## 2018-06-11 PROCEDURE — 84100 ASSAY OF PHOSPHORUS: CPT

## 2018-06-11 PROCEDURE — 11000001 HC ACUTE MED/SURG PRIVATE ROOM

## 2018-06-11 PROCEDURE — 63600175 PHARM REV CODE 636 W HCPCS: Performed by: STUDENT IN AN ORGANIZED HEALTH CARE EDUCATION/TRAINING PROGRAM

## 2018-06-11 PROCEDURE — 83735 ASSAY OF MAGNESIUM: CPT

## 2018-06-11 PROCEDURE — 25000003 PHARM REV CODE 250

## 2018-06-11 PROCEDURE — 99900035 HC TECH TIME PER 15 MIN (STAT)

## 2018-06-11 PROCEDURE — 97112 NEUROMUSCULAR REEDUCATION: CPT

## 2018-06-11 PROCEDURE — 27000646 HC AEROBIKA DEVICE

## 2018-06-11 RX ORDER — MAGNESIUM SULFATE HEPTAHYDRATE 40 MG/ML
2 INJECTION, SOLUTION INTRAVENOUS ONCE
Status: COMPLETED | OUTPATIENT
Start: 2018-06-11 | End: 2018-06-11

## 2018-06-11 RX ORDER — CLOPIDOGREL BISULFATE 75 MG/1
75 TABLET ORAL DAILY
Status: DISCONTINUED | OUTPATIENT
Start: 2018-06-11 | End: 2018-06-20 | Stop reason: HOSPADM

## 2018-06-11 RX ADMIN — AMOXICILLIN AND CLAVULANATE POTASSIUM 1 TABLET: 875; 125 TABLET, FILM COATED ORAL at 09:06

## 2018-06-11 RX ADMIN — BENAZEPRIL HYDROCHLORIDE 20 MG: 20 TABLET, FILM COATED ORAL at 09:06

## 2018-06-11 RX ADMIN — STANDARDIZED SENNA CONCENTRATE AND DOCUSATE SODIUM 1 TABLET: 8.6; 5 TABLET, FILM COATED ORAL at 09:06

## 2018-06-11 RX ADMIN — CLOPIDOGREL 75 MG: 75 TABLET, FILM COATED ORAL at 09:06

## 2018-06-11 RX ADMIN — OXYCODONE HYDROCHLORIDE 5 MG: 5 TABLET ORAL at 09:06

## 2018-06-11 RX ADMIN — OXYCODONE HYDROCHLORIDE 5 MG: 5 TABLET ORAL at 04:06

## 2018-06-11 RX ADMIN — PREGABALIN 75 MG: 75 CAPSULE ORAL at 09:06

## 2018-06-11 RX ADMIN — ACETAMINOPHEN 1000 MG: 500 TABLET, FILM COATED ORAL at 06:06

## 2018-06-11 RX ADMIN — MAGNESIUM SULFATE IN WATER 2 G: 40 INJECTION, SOLUTION INTRAVENOUS at 09:06

## 2018-06-11 RX ADMIN — AMLODIPINE BESYLATE 10 MG: 10 TABLET ORAL at 09:06

## 2018-06-11 RX ADMIN — LEVOTHYROXINE SODIUM 100 MCG: 100 TABLET ORAL at 06:06

## 2018-06-11 RX ADMIN — ACETAMINOPHEN 1000 MG: 500 TABLET, FILM COATED ORAL at 09:06

## 2018-06-11 RX ADMIN — ROSUVASTATIN CALCIUM 20 MG: 20 TABLET, FILM COATED ORAL at 09:06

## 2018-06-11 RX ADMIN — METOPROLOL SUCCINATE 25 MG: 25 TABLET, EXTENDED RELEASE ORAL at 09:06

## 2018-06-11 RX ADMIN — DOXYCYCLINE HYCLATE 100 MG: 100 TABLET, COATED ORAL at 09:06

## 2018-06-11 RX ADMIN — ACETAMINOPHEN 1000 MG: 500 TABLET, FILM COATED ORAL at 02:06

## 2018-06-11 RX ADMIN — PANTOPRAZOLE SODIUM 40 MG: 40 TABLET, DELAYED RELEASE ORAL at 09:06

## 2018-06-11 RX ADMIN — OXYCODONE HYDROCHLORIDE 5 MG: 5 TABLET ORAL at 05:06

## 2018-06-11 RX ADMIN — OXYCODONE HYDROCHLORIDE 5 MG: 5 TABLET ORAL at 12:06

## 2018-06-11 RX ADMIN — ENOXAPARIN SODIUM 40 MG: 100 INJECTION SUBCUTANEOUS at 04:06

## 2018-06-11 NOTE — ASSESSMENT & PLAN NOTE
- Has not started treatment, will need Oncology follow up and staging scans on discharge  - Family wanting to move patient to [place], may arrange follow up there at [facility]

## 2018-06-11 NOTE — SUBJECTIVE & OBJECTIVE
Medications:  Continuous Infusions:    Scheduled Meds:   acetaminophen  1,000 mg Oral Q8H    amLODIPine  10 mg Oral Daily    amoxicillin-clavulanate 875-125mg  1 tablet Oral Q12H    benazepril  20 mg Oral BID    doxycycline  100 mg Oral Q12H    enoxaparin  40 mg Subcutaneous Daily    levothyroxine  100 mcg Oral Before breakfast    metoprolol succinate  25 mg Oral Daily    pantoprazole  40 mg Oral Daily    pregabalin  75 mg Oral QHS    rosuvastatin  20 mg Oral QHS    senna-docusate 8.6-50 mg  1 tablet Oral BID     PRN Meds:dextrose 50%, dextrose 50%, glucagon (human recombinant), glucose, glucose, ondansetron, oxyCODONE, prochlorperazine, ramelteon, sodium chloride 0.9%     Objective:     Vital Signs (Most Recent):  Temp: 97.8 °F (36.6 °C) (06/11/18 0557)  Pulse: 84 (06/11/18 0557)  Resp: 17 (06/11/18 0557)  BP: (!) 151/70 (06/11/18 0557)  SpO2: (!) 94 % (06/11/18 0557) Vital Signs (24h Range):  Temp:  [95.6 °F (35.3 °C)-98.1 °F (36.7 °C)] 97.8 °F (36.6 °C)  Pulse:  [77-90] 84  Resp:  [16-17] 17  SpO2:  [91 %-94 %] 94 %  BP: (128-151)/(61-70) 151/70          Physical Exam   Constitutional: She appears well-developed. No distress.   Cardiovascular: Normal rate.    Pulmonary/Chest: Effort normal. No respiratory distress.   Abdominal: Soft. She exhibits no distension.   Neurological: She is alert.   Psychiatric: She has a normal mood and affect.   incisional vac removed, small skin opening at medial aspect of incision. Well healing overall. No ecchymosis, drainage, or erythema      Significant Labs:  CBC:     Recent Labs  Lab 06/10/18  0606   WBC 9.81   RBC 3.78*   HGB 11.6*   HCT 37.1      MCV 98   MCH 30.7   MCHC 31.3*     CMP:     Recent Labs  Lab 06/11/18  0547   GLU 77   CALCIUM 7.8*   ALBUMIN 1.3*   PROT 4.2*      K 4.2   CO2 25      BUN 5*   CREATININE 0.4*   ALKPHOS 78   ALT 6*   AST 19   BILITOT 0.2       Significant Diagnostics:  I have reviewed all pertinent imaging  results/findings within the past 24 hours.

## 2018-06-11 NOTE — PT/OT/SLP PROGRESS
Physical Therapy Treatment    Patient Name:  Suzan Villarreal   MRN:  [id number]    Recommendations:     Discharge Recommendations:  nursing facility, skilled   Discharge Equipment Recommendations:  (TBD)   Barriers to discharge: Decreased caregiver support    Assessment:     Suzan Villarreal is a 89 y.o. 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  She presents with the following impairments/functional limitations:  weakness, impaired endurance, impaired self care skills, impaired functional mobilty, gait instability, impaired balance, decreased lower extremity function, pain, orthopedic precautions requiring max assist for bed mobility, sitting balance, and transfers using RW. Pt tolerated session better today with improved alertness and active participation.    Rehab Prognosis:  good; patient would benefit from acute skilled PT services to address these deficits and reach maximum level of function.      Recent Surgery: Procedure(s) (LRB):  AMPUTATION, LOWER EXTREMITY, ABOVE KNEE (Right) 6 Days Post-Op    Plan:     During this hospitalization, patient to be seen 3 x/week to address the above listed problems via gait training, therapeutic activities, therapeutic exercises, neuromuscular re-education  · Plan of Care Expires:  06/27/18   Plan of Care Reviewed with: patient, daughter    Subjective     Communicated with pt and nurse prior to session.  Patient found supine in bed upon PT entry to room, agreeable to treatment.      Chief Complaint: pain in RLE with mobility  Patient comments/goals: to improve mobility   Pain/Comfort:  · Pain Rating 1: 0/10  · Location - Side 1: Right  · Location - Orientation 1: generalized  · Location 1: leg  · Pain Addressed 1: Reposition, Cessation of Activity, Distraction  · Pain Rating Post-Intervention 1: 10/10    Patients cultural, spiritual, Religion conflicts given the current situation: no    Objective:     Patient found with:  peripheral IV     General Precautions: Standard, fall   Orthopedic Precautions: (R AKA)   Braces: N/A     Functional Mobility:  · Bed Mobility:     · Rolling Left:  moderate assistance  · Rolling Right: moderate assistance  · Scooting: total assistance and of 2 persons  · Supine to Sit: maximal assistance  · Sit to Supine: maximal assistance  · Transfers:     · Sit to Stand:  maximal assistance and of 2 persons with rolling walker  · Balance: pt requires max assist to maintain sitting balance EOB using BUE support due to posterior lean with tolerance for approx 20 min. pt requires max assist x2 for static standing using RW for approx 10 sec with increased trunk flexion      AM-PAC 6 CLICK MOBILITY  Turning over in bed (including adjusting bedclothes, sheets and blankets)?: 2  Sitting down on and standing up from a chair with arms (e.g., wheelchair, bedside commode, etc.): 2  Moving from lying on back to sitting on the side of the bed?: 2  Moving to and from a bed to a chair (including a wheelchair)?: 1  Need to walk in hospital room?: 1  Climbing 3-5 steps with a railing?: 1  Total Score: 9       Therapeutic Activities and Exercises:   PT provided pt education on:   -role of PT and POC   -importance of OOB activity   -encourage amb in hallway with assistance from spouse/staff   -importance of participation in therapy      Patient left supine with call button in reach and daughter and PCT present..    GOALS:    Physical Therapy Goals        Problem: Physical Therapy Goal    Goal Priority Disciplines Outcome Goal Variances Interventions   Physical Therapy Goal     PT/OT, PT Ongoing (interventions implemented as appropriate)     Description:  Goals to be met by: 6/15/18     Patient will increase functional independence with mobility by performin. Supine to sit with Maximum Assistance Met   Upgrade: supine to sit with min assist  2. Sit to supine with Maximum Assistance Met    Upgrade: sit to supine with min  assist  3. Rolling to Left and Right with Maximum Assistance. Met   Upgrade: rolling L and R with min assist  4. Sit to stand transfer with Maximum Assistance  5. Bed to chair transfer with Maximum Assistance using Rolling Walker  6. Sitting at edge of bed x5 minutes with Moderate Assistance                       Time Tracking:     PT Received On: 06/11/18  PT Start Time: 1020     PT Stop Time: 1048  PT Total Time (min): 28 min     Billable Minutes: Therapeutic Activity 10 and Neuromuscular Re-education 18    Treatment Type: Treatment  PT/PTA: PT     PTA Visit Number: 0     Blessing Corbett, PT  06/11/2018

## 2018-06-11 NOTE — ASSESSMENT & PLAN NOTE
- Appreciate Gen Surg and wound care consults  - Continue wound care  - 6/10: Transitioned abx from vanc & Unasyn to doxy and Augmentin  - 6/11: Still some yellow discharge from wound, will ask Gen Surg to review again

## 2018-06-11 NOTE — ASSESSMENT & PLAN NOTE
- Continue home amlodipine 10, Toprol 25 andbenazepril 20mg   Currently on Vitamin D and MVI with Fe; receiving a total of 400 IU Vitamin D.  Peak Alk P 544 on 5/19. Most recent alkaline phos 355 on 5/30. 6/1 Alk phos 395.   Plan: Continue vitamin D and follow alk phos prn.

## 2018-06-11 NOTE — SUBJECTIVE & OBJECTIVE
Interval History:   Patient seen and examined, no acute events overnight  Pain well controlled    Medications:  Continuous Infusions:  Scheduled Meds:   acetaminophen  1,000 mg Oral Q8H    amLODIPine  10 mg Oral Daily    amoxicillin-clavulanate 875-125mg  1 tablet Oral Q12H    benazepril  20 mg Oral BID    clopidogrel  75 mg Oral Daily    doxycycline  100 mg Oral Q12H    enoxaparin  40 mg Subcutaneous Daily    levothyroxine  100 mcg Oral Before breakfast    magnesium sulfate IVPB  2 g Intravenous Once    metoprolol succinate  25 mg Oral Daily    pantoprazole  40 mg Oral Daily    pregabalin  75 mg Oral QHS    rosuvastatin  20 mg Oral QHS    senna-docusate 8.6-50 mg  1 tablet Oral BID     PRN Meds:dextrose 50%, dextrose 50%, glucagon (human recombinant), glucose, glucose, ondansetron, oxyCODONE, prochlorperazine, ramelteon, sodium chloride 0.9%     Review of patient's allergies indicates:   Allergen Reactions    Sulfa (sulfonamide antibiotics) Swelling    Asa [aspirin] Itching     Objective:     Vital Signs (Most Recent):  Temp: 96.1 °F (35.6 °C) (06/11/18 0908)  Pulse: 95 (06/11/18 0908)  Resp: 16 (06/11/18 0908)  BP: 139/74 (06/11/18 0908)  SpO2: (!) 94 % (06/11/18 0908) Vital Signs (24h Range):  Temp:  [96.1 °F (35.6 °C)-98.1 °F (36.7 °C)] 96.1 °F (35.6 °C)  Pulse:  [77-95] 95  Resp:  [16-17] 16  SpO2:  [91 %-94 %] 94 %  BP: (128-151)/(61-74) 139/74     Weight: 49.9 kg (110 lb)  Body mass index is 22.99 kg/m².    Intake/Output - Last 3 Shifts       06/09 0700 - 06/10 0659 06/10 0700 - 06/11 0659 06/11 0700 - 06/12 0659    P.O.  320     Total Intake(mL/kg)  320 (6.4)     Other 0      Stool 800 275     Total Output 800 275      Net -800 +45             Urine Occurrence  4 x           Physical Exam   Constitutional: She appears well-developed and well-nourished. No distress.   HENT:   Head: Normocephalic and atraumatic.   Cardiovascular: Normal rate and regular rhythm.    Pulmonary/Chest: Effort  normal. No respiratory distress.   Abdominal:   Soft, NTND  Incision  - staples in place, inferior staples removed, incision packed, small amount of serous drainage noted  Ostomy functioning well       Significant Labs:  CBC:   Recent Labs  Lab 06/11/18  0755   WBC 11.18   RBC 4.15   HGB 12.7   HCT 40.1   *   MCV 97   MCH 30.6   MCHC 31.7*     BMP:   Recent Labs  Lab 06/11/18  0547   GLU 77      K 4.2      CO2 25   BUN 5*   CREATININE 0.4*   CALCIUM 7.8*   MG 1.4*     CMP:   Recent Labs  Lab 06/11/18  0547   GLU 77   CALCIUM 7.8*   ALBUMIN 1.3*   PROT 4.2*      K 4.2   CO2 25      BUN 5*   CREATININE 0.4*   ALKPHOS 78   ALT 6*   AST 19   BILITOT 0.2     LFTs:   Recent Labs  Lab 06/11/18  0547   ALT 6*   AST 19   ALKPHOS 78   BILITOT 0.2   PROT 4.2*   ALBUMIN 1.3*

## 2018-06-11 NOTE — ASSESSMENT & PLAN NOTE
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 Anderson in clinic as follow up

## 2018-06-11 NOTE — PT/OT/SLP PROGRESS
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  Pain/Comfort:  · Pain Rating 1: 0/10  · Location - Side 1: Right  · Location - Orientation 1: generalized  · Location 1: leg  · Pain Addressed 1: Reposition, Distraction, Cessation of Activity  · Pain Rating Post-Intervention 1: 10/10    Patients cultural, spiritual, Baptism conflicts given the current situation: none reported    Objective:     Patient found with: peripheral IV    General Precautions: Standard, fall   Orthopedic Precautions: (R AKA)   Braces: N/A     Occupational Performance:    Bed Mobility:    · Patient completed Rolling/Turning to Left with  moderate assistance and with side rail  · Patient completed Rolling/Turning to Right with moderate assistance and with side rail  · Patient completed Scooting/Bridging to EOB with maximal assistance; dependent x 2 drawsheet t/f to HOB   · Patient completed Supine to Sit with maximal assistance  · Patient completed Sit to Supine with maximal assistance   · Pt seated EOB for ~20 min with max A for balance    Functional Mobility/Transfers:  · Patient completed Sit <> Stand Transfer with maximal assistance and of 2 persons with rolling walker; Pt able to stand for ~10 sec  · Functional Mobility: NT    Activities of Daily Living:  · Grooming: maximal assistance for balance while seated EOB for grooming; setup assistance to brush teeth and wash face while seated EOB  · LB Dressing: total assistance to don L sock while seated EOB with max A for balance  · Toileting: total assistance for clothing management and hygiene while supine    Patient left HOB elevated with all lines intact, call button in  reach and daughter present    [facility] 6 Click:  [facility] Total Score: 12    Treatment & Education:  Pt educated on role of OT/POC  Pt educated on use of RW for safe t/f  Pt educated on use of log roll technique for bed mobility  Pt educated on importance of EOB activity and standing attempts  White board/communication board updated  Education:    Assessment:     Suzan Villarreal is a 89 y.o. 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  She presents with pain in RLE and generalized weakness limiting participation in functional mobility and self care. Pt requiring max A for balance but able to complete self-care with setup while EOB.  Performance deficits affecting function are weakness, impaired endurance, impaired self care skills, gait instability, impaired functional mobilty, impaired balance, decreased lower extremity function, decreased ROM, decreased upper extremity function, pain, orthopedic precautions.      Rehab Prognosis:  Fair; patient would benefit from acute skilled OT services to address these deficits and reach maximum level of function.       Plan:     Patient to be seen 3 x/week to address the above listed problems via self-care/home management, therapeutic activities, therapeutic exercises  · Plan of Care Expires: 07/08/18  · Plan of Care Reviewed with: patient, daughter    This Plan of care has been discussed with the patient who was involved in its development and understands and is in agreement with the identified goals and treatment plan    GOALS:    Occupational Therapy Goals        Problem: Occupational Therapy Goal    Goal Priority Disciplines Outcome Interventions   Occupational Therapy Goal     OT, PT/OT Ongoing (interventions implemented as appropriate)    Description:  Goals to be met by: 6/22/18     Patient will increase functional independence with ADLs by performing:    Feeding with Set-up Assistance while seated EOB/UIC.  UE Dressing with Minimal  Assistance.  Grooming while EOB with Minimal Assistance.  Toileting from bedside commode with Minimal Assistance for hygiene and clothing management.   Sitting at edge of bed x5 minutes with Stand-by Assistance.  Rolling to Bilateral with Moderate Assistance. GOAL MET 6/11   REVISED: Rolling to Bilateral with Minimal Assistance  Supine to sit with Moderate Assistance.  Stand pivot transfers with Moderate Assistance.  Toilet transfer to bedside commode with Moderate Assistance.                       Time Tracking:     OT Date of Treatment: 06/11/18  OT Start Time: 1020  OT Stop Time: 1046  OT Total Time (min): 26 min    Billable Minutes:Self Care/Home Management 10  Therapeutic Activity 16    Ryann Crawley OT  6/11/2018

## 2018-06-11 NOTE — PROGRESS NOTES
[facility]  General Surgery  Progress Note    Subjective:     History of Present Illness:  Ms. Villarreal is a 89 woman with PMHx significant for severe PVD, HTN, HLD, hypothyroidism, GERD and chronic constipation who presents to the ED with severe pain, gangrene of the R foot. Well known to Dr. Anderson's service - recent admission for perforated viscus, s/p ex-lap and ostomy (please see Discharge Summary from 5/31 for further details of that admission.)  Patient first developed foot pain 3-4 weeks ago. Then around Mother's Day began developing a black eschar on her distal R great toe. Follows with Dr. Garcia in Vascular Surgery. On 5/16, aortogram with run off performed. R AKA recommended, planned for tomorrow 6/5/18. Patient denies abdominal pain, ostomy functioning well. Primary team concerned about erythema noticed at incision site.     Post-Op Info:  Procedure(s) (LRB):  AMPUTATION, LOWER EXTREMITY, ABOVE KNEE (Right)   6 Days Post-Op     Interval History:   Patient seen and examined, no acute events overnight  Pain well controlled    Medications:  Continuous Infusions:  Scheduled Meds:   acetaminophen  1,000 mg Oral Q8H    amLODIPine  10 mg Oral Daily    amoxicillin-clavulanate 875-125mg  1 tablet Oral Q12H    benazepril  20 mg Oral BID    clopidogrel  75 mg Oral Daily    doxycycline  100 mg Oral Q12H    enoxaparin  40 mg Subcutaneous Daily    levothyroxine  100 mcg Oral Before breakfast    magnesium sulfate IVPB  2 g Intravenous Once    metoprolol succinate  25 mg Oral Daily    pantoprazole  40 mg Oral Daily    pregabalin  75 mg Oral QHS    rosuvastatin  20 mg Oral QHS    senna-docusate 8.6-50 mg  1 tablet Oral BID     PRN Meds:dextrose 50%, dextrose 50%, glucagon (human recombinant), glucose, glucose, ondansetron, oxyCODONE, prochlorperazine, ramelteon, sodium chloride 0.9%     Review of patient's allergies indicates:   Allergen Reactions    Sulfa (sulfonamide antibiotics)  Swelling    Asa [aspirin] Itching     Objective:     Vital Signs (Most Recent):  Temp: 96.1 °F (35.6 °C) (06/11/18 0908)  Pulse: 95 (06/11/18 0908)  Resp: 16 (06/11/18 0908)  BP: 139/74 (06/11/18 0908)  SpO2: (!) 94 % (06/11/18 0908) Vital Signs (24h Range):  Temp:  [96.1 °F (35.6 °C)-98.1 °F (36.7 °C)] 96.1 °F (35.6 °C)  Pulse:  [77-95] 95  Resp:  [16-17] 16  SpO2:  [91 %-94 %] 94 %  BP: (128-151)/(61-74) 139/74     Weight: 49.9 kg (110 lb)  Body mass index is 22.99 kg/m².    Intake/Output - Last 3 Shifts       06/09 0700 - 06/10 0659 06/10 0700 - 06/11 0659 06/11 0700 - 06/12 0659    P.O.  320     Total Intake(mL/kg)  320 (6.4)     Other 0      Stool 800 275     Total Output 800 275      Net -800 +45             Urine Occurrence  4 x           Physical Exam   Constitutional: She appears well-developed and well-nourished. No distress.   HENT:   Head: Normocephalic and atraumatic.   Cardiovascular: Normal rate and regular rhythm.    Pulmonary/Chest: Effort normal. No respiratory distress.   Abdominal:   Soft, NTND  Incision  - staples in place, inferior staples removed, incision packed, small amount of serous drainage noted  Ostomy functioning well       Significant Labs:  CBC:   Recent Labs  Lab 06/11/18  0755   WBC 11.18   RBC 4.15   HGB 12.7   HCT 40.1   *   MCV 97   MCH 30.6   MCHC 31.7*     BMP:   Recent Labs  Lab 06/11/18  0547   GLU 77      K 4.2      CO2 25   BUN 5*   CREATININE 0.4*   CALCIUM 7.8*   MG 1.4*     CMP:   Recent Labs  Lab 06/11/18  0547   GLU 77   CALCIUM 7.8*   ALBUMIN 1.3*   PROT 4.2*      K 4.2   CO2 25      BUN 5*   CREATININE 0.4*   ALKPHOS 78   ALT 6*   AST 19   BILITOT 0.2     LFTs:   Recent Labs  Lab 06/11/18  0547   ALT 6*   AST 19   ALKPHOS 78   BILITOT 0.2   PROT 4.2*   ALBUMIN 1.3*     Assessment/Plan:     S/P right hemicolectomy    89 yo female s/p right hemicolectomy, ileostomy 5/24/18, s/p right AKA 6/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 Anderson in clinic as follow up            Obdulia Gonsalves PA-C   [id number]  General Surgery  [facility]

## 2018-06-11 NOTE — PLAN OF CARE
Problem: Physical Therapy Goal  Goal: Physical Therapy Goal  Goals to be met by: 6/15/18     Patient will increase functional independence with mobility by performin. Supine to sit with Maximum Assistance Met   Upgrade: supine to sit with min assist  2. Sit to supine with Maximum Assistance Met    Upgrade: sit to supine with min assist  3. Rolling to Left and Right with Maximum Assistance. Met   Upgrade: rolling L and R with min assist  4. Sit to stand transfer with Maximum Assistance  5. Bed to chair transfer with Maximum Assistance using Rolling Walker  6. Sitting at edge of bed x5 minutes with Moderate Assistance     Outcome: Ongoing (interventions implemented as appropriate)  3 goals met and upgraded

## 2018-06-11 NOTE — PLAN OF CARE
Problem: Pressure Ulcer Risk (Wellington Scale) (Adult,Obstetrics,Pediatric)  Goal: Identify Related Risk Factors and Signs and Symptoms  Related risk factors and signs and symptoms are identified upon initiation of Human Response Clinical Practice Guideline (CPG)   Outcome: Ongoing (interventions implemented as appropriate)  Pt and daughter verbalizes understanding of ongoing plan of care

## 2018-06-11 NOTE — SUBJECTIVE & OBJECTIVE
Review of Systems   Constitutional: Positive for activity change, appetite change and fatigue.   HENT: Negative for trouble swallowing.    Eyes: Negative for visual disturbance.   Respiratory: Negative for cough and shortness of breath.    Cardiovascular: Negative for chest pain.   Gastrointestinal: Negative for abdominal distention and abdominal pain.   Genitourinary: Negative for dysuria.   Musculoskeletal: Positive for gait problem.   Skin: Positive for wound.   Neurological: Negative for headaches.   Psychiatric/Behavioral: Negative for confusion.     Objective:     Vital Signs (Most Recent):  Temp: 97.8 °F (36.6 °C) (06/11/18 0557)  Pulse: 84 (06/11/18 0557)  Resp: 17 (06/11/18 0557)  BP: (!) 151/70 (06/11/18 0557)  SpO2: (!) 94 % (06/11/18 0557) Vital Signs (24h Range):  Temp:  [96.2 °F (35.7 °C)-98.1 °F (36.7 °C)] 97.8 °F (36.6 °C)  Pulse:  [77-90] 84  Resp:  [16-17] 17  SpO2:  [91 %-94 %] 94 %  BP: (128-151)/(61-70) 151/70     Weight: 49.9 kg (110 lb)  Body mass index is 22.99 kg/m².    Intake/Output Summary (Last 24 hours) at 06/11/18 0843  Last data filed at 06/11/18 0600   Gross per 24 hour   Intake              320 ml   Output              275 ml   Net               45 ml      Physical Exam   Constitutional: She is oriented to person, place, and time. No distress.   Elderly, frail woman   HENT:   Head: Normocephalic and atraumatic.   Eyes: Conjunctivae are normal. No scleral icterus.   Neck: Normal range of motion.   Cardiovascular: Normal rate, regular rhythm and normal heart sounds.    Pulmonary/Chest: Effort normal and breath sounds normal.   Abdominal: Soft. She exhibits no distension. There is no tenderness.   Midline laparotomy scar with staples in place (removed from lower portion of scar), some erythema around lower portion of scar   Ostomy bag in place, stool present, no erythema/ signs of infection at site   Musculoskeletal: Normal range of motion. She exhibits no edema.   R AKA wound  bandaged, c/d/i  Perineural catheter in place   Neurological: She is alert and oriented to person, place, and time.   Skin: Skin is warm and dry. She is not diaphoretic.   Psychiatric: She has a normal mood and affect. Her behavior is normal.       Significant Labs:   CBC:     Recent Labs  Lab 06/10/18  0606 06/11/18  0755   WBC 9.81 11.18   HGB 11.6* 12.7   HCT 37.1 40.1    472*     CMP:     Recent Labs  Lab 06/10/18  0606 06/11/18  0547   * 136   K 4.1 4.2    104   CO2 24 25   GLU 73 77   BUN 5* 5*   CREATININE 0.5 0.4*   CALCIUM 7.7* 7.8*   PROT 4.2* 4.2*   ALBUMIN 1.3* 1.3*   BILITOT 0.3 0.2   ALKPHOS 93 78   AST 15 19   ALT 5* 6*   ANIONGAP 6* 7*   EGFRNONAA >60.0 >60.0     All pertinent labs within the past 24 hours have been reviewed.    Significant Imaging: I have reviewed all pertinent imaging results/findings within the past 24 hours.

## 2018-06-11 NOTE — PLAN OF CARE
Problem: Occupational Therapy Goal  Goal: Occupational Therapy Goal  Goals to be met by: 6/22/18     Patient will increase functional independence with ADLs by performing:    Feeding with Set-up Assistance while seated EOB/UIC.  UE Dressing with Minimal Assistance.  Grooming while EOB with Minimal Assistance.  Toileting from bedside commode with Minimal Assistance for hygiene and clothing management.   Sitting at edge of bed x5 minutes with Stand-by Assistance.  Rolling to Bilateral with Moderate Assistance. GOAL MET 6/11   REVISED: Rolling to Bilateral with Minimal Assistance  Supine to sit with Moderate Assistance.  Stand pivot transfers with Moderate Assistance.  Toilet transfer to bedside commode with Moderate Assistance.     Outcome: Ongoing (interventions implemented as appropriate)  Goal met for rolling, revised to min A; pt progressing toward remaining goals    Comments: Continue OT POC    Ryann Crawley OT  6/11/2018

## 2018-06-11 NOTE — PROGRESS NOTES
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 Villarreal is a 89 woman with PMHx significant for severe PVD, HTN, HLD, hypothyroidism, GERD and chronic constipation who presents to the ED with severe pain, gangrene of the R foot. Patient with recent admission for perforated viscus, s/p ex-lap and ostomy (please see Discharge Summary from 5/31 for further details of that admission.) Patient's daughter is at bedside and provides much of the history. Patient first developed foot pain 3-4 weeks ago. Then around Mother's Day began developing a black eschar on her distal R great toe. Follows with Dr. Garcia in Vascular Surgery. On 5/16, aortogram with run off performed. R AKA recommended; however, patient and family refused at that time. Pain progressed since that time, with ulceration of the dorsum of her foot and heel developing. Daughter brought patient back to ED due to uncontrolled pain.     Hospital Course:  Patient was admitted to hospital medicine for pain control for right foot gangrene and medication management. Vascular Surgery evaluated patient, plan for right AKA on 6/5. Scheduled percocet 5 started with prn oxycodone 10. Pain controlled overnight.  06/04/2018: Pain overnight, night MD called for IV morphine but patient later refused, discussed with patient and family will change pain meds to scheduled oxy IR 5mg q6 with breakthrough. Awaiting AKA tomorrow. Also placed on 2L supplemental O2 via NC but no SOB, suspect poor pulse ox readings from fingers, will wean today.   06/05/2018: Pain well controlled overnight on  scheduled oxycodone. Plan for AKA today with vascular sx.  06/06/2018: AKA yesterday, denies pain. No subjective complaints. VSS, afebrile.   06/07/2018: ISH. Pain well controlled.   06/08/2018: JULIETAON. Perineural catheter removed, reports some pain but controlled with oral medication.  06/09/2018: Some pain overnight, responded to one dose of oxycodone. VSS, afebrile.   06/10/2018: No events overnight. Patient reports pain is well controlled. She is alert and oriented.  06/11/2018: ISH. Medically stable, ready for transfer to SNF.       Review of Systems   Constitutional: Positive for activity change, appetite change and fatigue.   HENT: Negative for trouble swallowing.    Eyes: Negative for visual disturbance.   Respiratory: Negative for cough and shortness of breath.    Cardiovascular: Negative for chest pain.   Gastrointestinal: Negative for abdominal distention and abdominal pain.   Genitourinary: Negative for dysuria.   Musculoskeletal: Positive for gait problem.   Skin: Positive for wound.   Neurological: Negative for headaches.   Psychiatric/Behavioral: Negative for confusion.     Objective:     Vital Signs (Most Recent):  Temp: 97.8 °F (36.6 °C) (06/11/18 0557)  Pulse: 84 (06/11/18 0557)  Resp: 17 (06/11/18 0557)  BP: (!) 151/70 (06/11/18 0557)  SpO2: (!) 94 % (06/11/18 0557) Vital Signs (24h Range):  Temp:  [96.2 °F (35.7 °C)-98.1 °F (36.7 °C)] 97.8 °F (36.6 °C)  Pulse:  [77-90] 84  Resp:  [16-17] 17  SpO2:  [91 %-94 %] 94 %  BP: (128-151)/(61-70) 151/70     Weight: 49.9 kg (110 lb)  Body mass index is 22.99 kg/m².    Intake/Output Summary (Last 24 hours) at 06/11/18 0843  Last data filed at 06/11/18 0600   Gross per 24 hour   Intake              320 ml   Output              275 ml   Net               45 ml      Physical Exam   Constitutional: She is oriented to person, place, and time. No distress.   Elderly, frail woman   HENT:   Head: Normocephalic and atraumatic.   Eyes: Conjunctivae are normal. No  scleral icterus.   Neck: Normal range of motion.   Cardiovascular: Normal rate, regular rhythm and normal heart sounds.    Pulmonary/Chest: Effort normal and breath sounds normal.   Abdominal: Soft. She exhibits no distension. There is no tenderness.   Midline laparotomy scar with staples in place (removed from lower portion of scar), some erythema around lower portion of scar   Ostomy bag in place, stool present, no erythema/ signs of infection at site   Musculoskeletal: Normal range of motion. She exhibits no edema.   R AKA wound bandaged, c/d/i  Perineural catheter in place   Neurological: She is alert and oriented to person, place, and time.   Skin: Skin is warm and dry. She is not diaphoretic.   Psychiatric: She has a normal mood and affect. Her behavior is normal.       Significant Labs:   CBC:     Recent Labs  Lab 06/10/18  0606 06/11/18  0755   WBC 9.81 11.18   HGB 11.6* 12.7   HCT 37.1 40.1    472*     CMP:     Recent Labs  Lab 06/10/18  0606 06/11/18  0547   * 136   K 4.1 4.2    104   CO2 24 25   GLU 73 77   BUN 5* 5*   CREATININE 0.5 0.4*   CALCIUM 7.7* 7.8*   PROT 4.2* 4.2*   ALBUMIN 1.3* 1.3*   BILITOT 0.3 0.2   ALKPHOS 93 78   AST 15 19   ALT 5* 6*   ANIONGAP 6* 7*   EGFRNONAA >60.0 >60.0     All pertinent labs within the past 24 hours have been reviewed.    Significant Imaging: I have reviewed all pertinent imaging results/findings within the past 24 hours.    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         Primary adenocarcinoma of transverse colon    - Has not started treatment, will need Oncology follow up and staging scans on discharge  - Family wanting to move patient to [place], may arrange  "follow up there at [facility]        Delayed surgical wound healing    - Appreciate Gen Surg and wound care consults  - Continue wound care  - 6/10: Transitioned abx from vanc & Unasyn to doxy and Augmentin  - 6/11: Still some yellow discharge from wound, will ask Gen Surg to review again        S/P right hemicolectomy    - Pathology returned: "MODERATELY TO POORLY DIFFERENTIATED ADENOCARCINOMA WITH INFILTRATION THROUGH THE MUSCULARIS INTO PERICOLONIC ADIPOSE TISSUE, WITH MUCH LYMPHOVASCULAR INVASION, AND WITH METASTASES TO 7 OF 22 LYMPH NODES"  - Patient and family aware of cancer diagnosis   - Has not started treatment, will need Oncology follow up and staging scans on discharge  - Family wanting to move patient to [place], may arrange follow up there        PVD (peripheral vascular disease)    - Has ASA allergy  - Home clopidogrel held in anticipation of surgery  - 6/11: restarted clopidogrel        Hyponatremia    - Mild, chronic, stable  - Will monitor daily        Hypothyroidism    - Continue home levothyroxine        GERD (gastroesophageal reflux disease)    - Home med Nexium, pantoprazole while inpatient        Essential hypertension    - Continue home amlodipine 10, Toprol 25 andbenazepril 20mg          VTE Risk Mitigation         Ordered     enoxaparin injection 40 mg  Daily      06/09/18 0850     IP VTE HIGH RISK PATIENT  Once      06/02/18 8956              April Castellanos MD  Department of Hospital Medicine   [facility]  "

## 2018-06-11 NOTE — PROGRESS NOTES
[facility]  Vascular Surgery  Progress Note    Patient Name: Suzan Villarreal  MRN: [id number]  Admission Date: 6/2/2018  Primary Care Provider: Aly Rivas MD    Subjective:     Interval History: NAEON. Pain controlled. No complaints.     Post-Op Info:  Procedure(s) (LRB):  AMPUTATION, LOWER EXTREMITY, ABOVE KNEE (Right)   6 Days Post-Op       Medications:  Continuous Infusions:    Scheduled Meds:   acetaminophen  1,000 mg Oral Q8H    amLODIPine  10 mg Oral Daily    amoxicillin-clavulanate 875-125mg  1 tablet Oral Q12H    benazepril  20 mg Oral BID    doxycycline  100 mg Oral Q12H    enoxaparin  40 mg Subcutaneous Daily    levothyroxine  100 mcg Oral Before breakfast    metoprolol succinate  25 mg Oral Daily    pantoprazole  40 mg Oral Daily    pregabalin  75 mg Oral QHS    rosuvastatin  20 mg Oral QHS    senna-docusate 8.6-50 mg  1 tablet Oral BID     PRN Meds:dextrose 50%, dextrose 50%, glucagon (human recombinant), glucose, glucose, ondansetron, oxyCODONE, prochlorperazine, ramelteon, sodium chloride 0.9%     Objective:     Vital Signs (Most Recent):  Temp: 97.8 °F (36.6 °C) (06/11/18 0557)  Pulse: 84 (06/11/18 0557)  Resp: 17 (06/11/18 0557)  BP: (!) 151/70 (06/11/18 0557)  SpO2: (!) 94 % (06/11/18 0557) Vital Signs (24h Range):  Temp:  [95.6 °F (35.3 °C)-98.1 °F (36.7 °C)] 97.8 °F (36.6 °C)  Pulse:  [77-90] 84  Resp:  [16-17] 17  SpO2:  [91 %-94 %] 94 %  BP: (128-151)/(61-70) 151/70          Physical Exam   Constitutional: She appears well-developed. No distress.   Cardiovascular: Normal rate.    Pulmonary/Chest: Effort normal. No respiratory distress.   Abdominal: Soft. She exhibits no distension.   Neurological: She is alert.   Psychiatric: She has a normal mood and affect.   incisional vac removed, small skin opening at medial aspect of incision. Well healing overall. No ecchymosis, drainage, or erythema      Significant Labs:  CBC:     Recent Labs  Lab 06/10/18  0606    WBC 9.81   RBC 3.78*   HGB 11.6*   HCT 37.1      MCV 98   MCH 30.7   MCHC 31.3*     CMP:     Recent Labs  Lab 06/11/18  0547   GLU 77   CALCIUM 7.8*   ALBUMIN 1.3*   PROT 4.2*      K 4.2   CO2 25      BUN 5*   CREATININE 0.4*   ALKPHOS 78   ALT 6*   AST 19   BILITOT 0.2       Significant Diagnostics:  I have reviewed all pertinent imaging results/findings within the past 24 hours.    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 female s/p right AKA     - incisional vac removed today  - H/H stable, ok to restart Plavix  - pain control per primary  - Daily dressing changes to AKA site (order placed)    Will continue to follow along            Dain Martin MD  Vascular Surgery  [facility]

## 2018-06-11 NOTE — ASSESSMENT & PLAN NOTE
Suzan Villarreal is a 89 y.o. female s/p right AKA     - incisional vac removed today  - H/H stable, ok to restart Plavix  - pain control per primary  - Daily dressing changes to AKA site (order placed)    Will continue to follow along

## 2018-06-12 PROCEDURE — 63600175 PHARM REV CODE 636 W HCPCS: Performed by: STUDENT IN AN ORGANIZED HEALTH CARE EDUCATION/TRAINING PROGRAM

## 2018-06-12 PROCEDURE — 97112 NEUROMUSCULAR REEDUCATION: CPT

## 2018-06-12 PROCEDURE — 99232 SBSQ HOSP IP/OBS MODERATE 35: CPT | Mod: GC,,, | Performed by: HOSPITALIST

## 2018-06-12 PROCEDURE — 11000001 HC ACUTE MED/SURG PRIVATE ROOM

## 2018-06-12 PROCEDURE — 94799 UNLISTED PULMONARY SVC/PX: CPT

## 2018-06-12 PROCEDURE — 25000003 PHARM REV CODE 250: Performed by: HOSPITALIST

## 2018-06-12 PROCEDURE — 25000003 PHARM REV CODE 250: Performed by: STUDENT IN AN ORGANIZED HEALTH CARE EDUCATION/TRAINING PROGRAM

## 2018-06-12 PROCEDURE — 25000003 PHARM REV CODE 250: Performed by: ANESTHESIOLOGY

## 2018-06-12 PROCEDURE — G8987 SELF CARE CURRENT STATUS: HCPCS | Mod: CL

## 2018-06-12 PROCEDURE — 97110 THERAPEUTIC EXERCISES: CPT

## 2018-06-12 PROCEDURE — 25000003 PHARM REV CODE 250

## 2018-06-12 PROCEDURE — G8988 SELF CARE GOAL STATUS: HCPCS | Mod: CK

## 2018-06-12 PROCEDURE — 25000003 PHARM REV CODE 250: Performed by: INTERNAL MEDICINE

## 2018-06-12 RX ADMIN — OXYCODONE HYDROCHLORIDE 5 MG: 5 TABLET ORAL at 10:06

## 2018-06-12 RX ADMIN — OXYCODONE HYDROCHLORIDE 5 MG: 5 TABLET ORAL at 09:06

## 2018-06-12 RX ADMIN — BENAZEPRIL HYDROCHLORIDE 20 MG: 20 TABLET, FILM COATED ORAL at 09:06

## 2018-06-12 RX ADMIN — ROSUVASTATIN CALCIUM 20 MG: 20 TABLET, FILM COATED ORAL at 09:06

## 2018-06-12 RX ADMIN — STANDARDIZED SENNA CONCENTRATE AND DOCUSATE SODIUM 1 TABLET: 8.6; 5 TABLET, FILM COATED ORAL at 09:06

## 2018-06-12 RX ADMIN — OXYCODONE HYDROCHLORIDE 5 MG: 5 TABLET ORAL at 03:06

## 2018-06-12 RX ADMIN — PANTOPRAZOLE SODIUM 40 MG: 40 TABLET, DELAYED RELEASE ORAL at 09:06

## 2018-06-12 RX ADMIN — ENOXAPARIN SODIUM 40 MG: 100 INJECTION SUBCUTANEOUS at 05:06

## 2018-06-12 RX ADMIN — AMLODIPINE BESYLATE 10 MG: 10 TABLET ORAL at 09:06

## 2018-06-12 RX ADMIN — CLOPIDOGREL 75 MG: 75 TABLET, FILM COATED ORAL at 09:06

## 2018-06-12 RX ADMIN — METOPROLOL SUCCINATE 25 MG: 25 TABLET, EXTENDED RELEASE ORAL at 09:06

## 2018-06-12 RX ADMIN — ACETAMINOPHEN 1000 MG: 500 TABLET, FILM COATED ORAL at 09:06

## 2018-06-12 RX ADMIN — OXYCODONE HYDROCHLORIDE 5 MG: 5 TABLET ORAL at 06:06

## 2018-06-12 RX ADMIN — AMOXICILLIN AND CLAVULANATE POTASSIUM 1 TABLET: 875; 125 TABLET, FILM COATED ORAL at 09:06

## 2018-06-12 RX ADMIN — PREGABALIN 75 MG: 75 CAPSULE ORAL at 09:06

## 2018-06-12 RX ADMIN — LEVOTHYROXINE SODIUM 100 MCG: 100 TABLET ORAL at 06:06

## 2018-06-12 RX ADMIN — DOXYCYCLINE HYCLATE 100 MG: 100 TABLET, COATED ORAL at 09:06

## 2018-06-12 RX ADMIN — ACETAMINOPHEN 1000 MG: 500 TABLET, FILM COATED ORAL at 06:06

## 2018-06-12 RX ADMIN — ACETAMINOPHEN 1000 MG: 500 TABLET, FILM COATED ORAL at 03:06

## 2018-06-12 NOTE — ASSESSMENT & PLAN NOTE
- Appreciate Gen Surg and wound care consults  - 6/10: Transitioned abx from vanc & Unasyn to doxy and Augmentin  - 6/11: Still some yellow discharge from wound, will ask Gen Surg to review again: no signs of worsening infection, recommended continuing wound care with Medihoney and daily packing changes, continue PO abx, staples removed  - Continue abx, wound care

## 2018-06-12 NOTE — PT/OT/SLP PROGRESS
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  Pain/Comfort:  · Pain Rating 1: 0/10  · Location - Side 1: Right  · Location - Orientation 1: generalized  · Location 1: leg  · Pain Addressed 1: Reposition, Distraction, Cessation of Activity  · Pain Rating Post-Intervention 1: 0/10    Patients cultural, spiritual, Congregation conflicts given the current situation: none reported    Objective:     Patient found with: pressure relief boots    General Precautions: Standard, fall   Orthopedic Precautions: (R AKA)   Braces: N/A     Occupational Performance:    Bed Mobility:    · Patient completed Rolling/Turning to Left with  moderate assistance and with side rail  · Patient completed Scooting/Bridging with dependent and 2 persons for drawsheet t/f to HOB  · Patient completed Supine to Sit with maximal assistance  · Patient completed Sit to Supine with minimum assistance   · Pt seated EOB for ~20 min with SBA for balance and use of 1 UE for prop     Functional Mobility/Transfers:  NT    Activities of Daily Living:  Pt decline    Patient left HOB elevated with all lines intact, call button in reach and daughter present    [facility] 6 Click:  [facility] Total Score: 14    Treatment & Education:  BUE AROM exercises 10 x 1 for shoulder flex, elbow flex/ext, and chest press while seated EOB; R shoulder flex only to 30 2/2 arthritis  Pt educated on role of OT/POC  White board/communication board updated  Education:    Pt POC increased to 4x/week d/t change in pt d/c plan and improved pt alertness and participation  Assessment:     Suzan Villarreal is a 89 y.o. female with a medical diagnosis of Atherosclerosis of artery of  extremity with gangrene.  She presents with balance deficits, ROM deficits, and generalized weakness impairing participation in functional mobility and self care. Pt progressing with sitting balance and bed mobility.  Performance deficits affecting function are weakness, impaired endurance, impaired self care skills, impaired functional mobilty, gait instability, impaired balance, decreased lower extremity function, pain, orthopedic precautions.      Rehab Prognosis:  Fair; patient would benefit from acute skilled OT services to address these deficits and reach maximum level of function.       Plan:     Patient to be seen 4 x/week to address the above listed problems via self-care/home management, therapeutic activities, therapeutic exercises  · Plan of Care Expires: 07/08/18  · Plan of Care Reviewed with: patient, daughter    This Plan of care has been discussed with the patient who was involved in its development and understands and is in agreement with the identified goals and treatment plan    GOALS:    Occupational Therapy Goals        Problem: Occupational Therapy Goal    Goal Priority Disciplines Outcome Interventions   Occupational Therapy Goal     OT, PT/OT Ongoing (interventions implemented as appropriate)    Description:  Goals to be met by: 6/22/18     Patient will increase functional independence with ADLs by performing:    Feeding with Set-up Assistance while seated EOB/UIC.  UE Dressing with Minimal Assistance.  Grooming while EOB with Minimal Assistance.  Toileting from bedside commode with Minimal Assistance for hygiene and clothing management.   Sitting at edge of bed x5 minutes with Stand-by Assistance. GOAL MET 6/12  Rolling to Bilateral with Moderate Assistance. GOAL MET 6/11   REVISED: Rolling to Bilateral with Minimal Assistance  Supine to sit with Moderate Assistance.  Stand pivot transfers with Moderate Assistance.  Toilet transfer to bedside commode with Moderate Assistance.                         Time Tracking:     OT Date of Treatment: 06/12/18  OT Start Time: 1351  OT Stop Time: 1414  OT Total Time (min): 23 min    Billable Minutes:Therapeutic Exercise 8  Neuromuscular Re-education 15    Ryann Crawley OT  6/12/2018

## 2018-06-12 NOTE — CONSULTS
Attempted to see patient for wound care consult. Patient family is on the phone having important conversation with patient and person on the phone. Will return at another time to assess. Nursing to continue care.

## 2018-06-12 NOTE — SUBJECTIVE & OBJECTIVE
Review of Systems   Constitutional: Positive for activity change, appetite change and fatigue.   HENT: Negative for trouble swallowing.    Eyes: Negative for visual disturbance.   Respiratory: Negative for cough and shortness of breath.    Cardiovascular: Negative for chest pain.   Gastrointestinal: Negative for abdominal distention and abdominal pain.   Genitourinary: Negative for dysuria.   Musculoskeletal: Positive for gait problem.   Skin: Positive for color change and wound.   Neurological: Negative for headaches.   Psychiatric/Behavioral: Negative for confusion.     Objective:     Vital Signs (Most Recent):  Temp: 96.8 °F (36 °C) (06/12/18 0759)  Pulse: 88 (06/12/18 0759)  Resp: 18 (06/12/18 0759)  BP: 128/62 (06/12/18 0759)  SpO2: (!) 94 % (06/12/18 0759) Vital Signs (24h Range):  Temp:  [96.1 °F (35.6 °C)-97.3 °F (36.3 °C)] 96.8 °F (36 °C)  Pulse:  [77-95] 88  Resp:  [16-19] 18  SpO2:  [92 %-94 %] 94 %  BP: (119-141)/(62-74) 128/62     Weight: 49.9 kg (110 lb)  Body mass index is 22.99 kg/m².    Intake/Output Summary (Last 24 hours) at 06/12/18 0819  Last data filed at 06/12/18 0600   Gross per 24 hour   Intake             1440 ml   Output              300 ml   Net             1140 ml      Physical Exam   Constitutional: She is oriented to person, place, and time. No distress.   Elderly, frail woman   HENT:   Head: Normocephalic and atraumatic.   Eyes: Conjunctivae are normal. No scleral icterus.   Neck: Normal range of motion.   Cardiovascular: Normal rate, regular rhythm and normal heart sounds.    Pulmonary/Chest: Effort normal and breath sounds normal.   Abdominal: Soft. She exhibits no distension. There is no tenderness.   Midline laparotomy scar with some erythema around lower portion of scar   Ostomy bag in place, stool present, no erythema/ signs of infection at site   Musculoskeletal: Normal range of motion. She exhibits no edema.   R AKA wound bandaged, c/d/i   Neurological: She is alert and  oriented to person, place, and time.   Skin: Skin is warm and dry. She is not diaphoretic.   Black discoloration of skin over left heel  Skin breakdown over left toes   Psychiatric: She has a normal mood and affect. Her behavior is normal.       Significant Labs:   CBC:     Recent Labs  Lab 06/11/18  0755   WBC 11.18   HGB 12.7   HCT 40.1   *     CMP:     Recent Labs  Lab 06/11/18  0547      K 4.2      CO2 25   GLU 77   BUN 5*   CREATININE 0.4*   CALCIUM 7.8*   PROT 4.2*   ALBUMIN 1.3*   BILITOT 0.2   ALKPHOS 78   AST 19   ALT 6*   ANIONGAP 7*   EGFRNONAA >60.0     All pertinent labs within the past 24 hours have been reviewed.    Significant Imaging: I have reviewed all pertinent imaging results/findings within the past 24 hours.

## 2018-06-12 NOTE — PROGRESS NOTES
" [facility]  Adult Nutrition  Progress Note    SUMMARY       Recommendations    Recommendation/Intervention:   1. Continue cardiac diet.   2. Per pt request, discontinue boost plus.   3. Encourage po intake.   4. RD following.    Goals: meet >85% of EEN/EPN  Nutrition Goal Status: progressing towards goal  Communication of RD Recs:  (POC)    Reason for Assessment    Reason for Assessment: RD follow-up  Diagnosis: other (see comments) (Atherosclerosis of artery of extremity w/ gangrene, Right )  Relevant Medical History: severe PVD, HTN, HLD, hypothyroidism, GERD, chronic constipation   Interdisciplinary Rounds: attended  General Information Comments: Per MD, medically stable ready for discharge to SNF. S/p right hemicolectomy, ileostomy (18). POD7 R AKA. Pt w/ family member at bedside. Family member reported pt appetite good and eating 50-75% of meals. Pt does not drink boost plus because she does not like it. Per family member, pt drinks ensure at home.   Nutrition Discharge Planning: low Na diet    Nutrition Risk Screen    Nutrition Risk Screen: no indicators present    Nutrition/Diet History    Do you have any cultural, spiritual, Nondenominational conflicts, given your current situation?: no  Supplemental Drinks or Food Habits: Ensure Plus  Factors Affecting Nutritional Intake: None identified at this time    Anthropometrics    Temp: 96.9 °F (36.1 °C)  Height Method: Stated  Height: 4' 10" (147.3 cm)  Height (inches): 58 in  Weight Method: Bed Scale  Weight: 49.9 kg (110 lb)  Weight (lb): 110 lb  Ideal Body Weight (IBW), Female: 90 lb  % Ideal Body Weight, Female (lb): 122.22 lb  BMI (Calculated): 23  BMI Grade: 18.5-24.9 - normal  Usual Body Weight (UBW), k kg (per chart 18)  % Usual Body Weight: 84.75  % Weight Change From Usual Weight: -15.43 %  Amputation %: 16  Total Amputation %: 16  Amputation Ideal Body Weight (IBW), Female (lb): 74 lb  Amputee BMI (kg/m2): 27.44 kg/m2   "   Lab/Procedures/Meds    Pertinent Labs Reviewed: reviewed  Pertinent Labs Comments: Bun 5L, Cr 0.4L, Mg 1.4L, ALT 6L  (from 6/11/18)  Pertinent Medications Reviewed: reviewed  Pertinent Medications Comments: levothyroxine, statin, pantoprazole, docusate, amlodipine, pantoprazole    Physical Findings/Assessment    Overall Physical Appearance: nourished  Tubes: other (see comments) (ostomy )  Skin: incision(s), edema    Estimated/Assessed Needs    Weight Used For Calorie Calculations: 49.9 kg (110 lb 0.2 oz)  Energy Calorie Requirements (kcal): 9682-1058 kcal/day  Energy Need Method: Kcal/kg (25-30 kcal/kg)  Protein Requirements: 55-75 gm/day (1.1-15 gm/kg)  Weight Used For Protein Calculations: 49.9 kg (110 lb 0.2 oz)  Fluid Requirements (mL): 1 mL/kcal or per MD  Fluid Need Method: RDA Method  RDA Method (mL): 1247     Nutrition Prescription Ordered    Current Diet Order: NPO  Oral Nutrition Supplement: boost plus (pt not drinking)    Evaluation of Received Nutrient/Fluid Intake    % Intake of Estimated Energy Needs: 50 - 75 %  % Meal Intake: 50 - 75 %    Nutrition Risk    Level of Risk/Frequency of Follow-up: low (f/u 1 X wk)     Assessment and Plan    Moderate malnutrition    Malnutrition in the context of Social/Environmental Circumstances    Related to (etiology):  Inadequate Oral Intake    Signs and Symptoms (as evidenced by):  Energy Intake: <75% of estimated energy requirement for 1 year.   Weight Loss: 15% x 3 months  Fluid Accumulation: mild    Interventions/Recommendations (treatment strategy):  See recs    Nutrition Diagnosis Status:  Continues               Monitor and Evaluation    Food and Nutrient Intake: energy intake, food and beverage intake  Food and Nutrient Adminstration: diet order  Physical Activity and Function: nutrition-related ADLs and IADLs  Anthropometric Measurements: weight, weight change, body mass index  Biochemical Data, Medical Tests and Procedures: electrolyte and renal panel,  gastrointestinal profile, glucose/endocrine profile, inflammatory profile, lipid profile  Nutrition-Focused Physical Findings: overall appearance     Nutrition Follow-Up    RD Follow-up?: Yes

## 2018-06-12 NOTE — PROGRESS NOTES
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 Villarreal is a 89 woman with PMHx significant for severe PVD, HTN, HLD, hypothyroidism, GERD and chronic constipation who presents to the ED with severe pain, gangrene of the R foot. Patient with recent admission for perforated viscus, s/p ex-lap and ostomy (please see Discharge Summary from 5/31 for further details of that admission.) Patient's daughter is at bedside and provides much of the history. Patient first developed foot pain 3-4 weeks ago. Then around Mother's Day began developing a black eschar on her distal R great toe. Follows with Dr. Garcia in Vascular Surgery. On 5/16, aortogram with run off performed. R AKA recommended; however, patient and family refused at that time. Pain progressed since that time, with ulceration of the dorsum of her foot and heel developing. Daughter brought patient back to ED due to uncontrolled pain.     Hospital Course:  Patient was admitted to hospital medicine for pain control for right foot gangrene and medication management. Vascular Surgery evaluated patient, plan for right AKA on 6/5. Scheduled percocet 5 started with prn oxycodone 10. Pain controlled overnight.  06/04/2018: Pain overnight, night MD called for IV morphine but patient later refused, discussed with patient and family will change pain meds to scheduled oxy IR 5mg q6 with breakthrough. Awaiting AKA tomorrow. Also placed on 2L supplemental O2 via NC but no SOB, suspect poor pulse ox readings from fingers, will wean today.   06/05/2018: Pain well controlled overnight on  scheduled oxycodone. Plan for AKA today with vascular sx.  06/06/2018: AKA yesterday, denies pain. No subjective complaints. VSS, afebrile.   06/07/2018: ISH. Pain well controlled.   06/08/2018: ISH. Perineural catheter removed, reports some pain but controlled with oral medication.  06/09/2018: Some pain overnight, responded to one dose of oxycodone. VSS, afebrile.   06/10/2018: No events overnight. Patient reports pain is well controlled. She is alert and oriented.  06/11/2018: ISH. Medically stable, ready for transfer to SNF.   06/12/2018: ISH. Some discoloration of skin of left heel, will ask wound care to see.       Review of Systems   Constitutional: Positive for activity change, appetite change and fatigue.   HENT: Negative for trouble swallowing.    Eyes: Negative for visual disturbance.   Respiratory: Negative for cough and shortness of breath.    Cardiovascular: Negative for chest pain.   Gastrointestinal: Negative for abdominal distention and abdominal pain.   Genitourinary: Negative for dysuria.   Musculoskeletal: Positive for gait problem.   Skin: Positive for color change and wound.   Neurological: Negative for headaches.   Psychiatric/Behavioral: Negative for confusion.     Objective:     Vital Signs (Most Recent):  Temp: 96.8 °F (36 °C) (06/12/18 0759)  Pulse: 88 (06/12/18 0759)  Resp: 18 (06/12/18 0759)  BP: 128/62 (06/12/18 0759)  SpO2: (!) 94 % (06/12/18 0759) Vital Signs (24h Range):  Temp:  [96.1 °F (35.6 °C)-97.3 °F (36.3 °C)] 96.8 °F (36 °C)  Pulse:  [77-95] 88  Resp:  [16-19] 18  SpO2:  [92 %-94 %] 94 %  BP: (119-141)/(62-74) 128/62     Weight: 49.9 kg (110 lb)  Body mass index is 22.99 kg/m².    Intake/Output Summary (Last 24 hours) at 06/12/18 0819  Last data filed at 06/12/18 0600   Gross per 24 hour   Intake             1440 ml   Output              300 ml   Net             1140 ml      Physical Exam   Constitutional: She is oriented to person, place, and time. No distress.    Elderly, frail woman   HENT:   Head: Normocephalic and atraumatic.   Eyes: Conjunctivae are normal. No scleral icterus.   Neck: Normal range of motion.   Cardiovascular: Normal rate, regular rhythm and normal heart sounds.    Pulmonary/Chest: Effort normal and breath sounds normal.   Abdominal: Soft. She exhibits no distension. There is no tenderness.   Midline laparotomy scar with some erythema around lower portion of scar   Ostomy bag in place, stool present, no erythema/ signs of infection at site   Musculoskeletal: Normal range of motion. She exhibits no edema.   R AKA wound bandaged, c/d/i   Neurological: She is alert and oriented to person, place, and time.   Skin: Skin is warm and dry. She is not diaphoretic.   Black discoloration of skin over left heel  Skin breakdown over left toes   Psychiatric: She has a normal mood and affect. Her behavior is normal.       Significant Labs:   CBC:     Recent Labs  Lab 06/11/18  0755   WBC 11.18   HGB 12.7   HCT 40.1   *     CMP:     Recent Labs  Lab 06/11/18  0547      K 4.2      CO2 25   GLU 77   BUN 5*   CREATININE 0.4*   CALCIUM 7.8*   PROT 4.2*   ALBUMIN 1.3*   BILITOT 0.2   ALKPHOS 78   AST 19   ALT 6*   ANIONGAP 7*   EGFRNONAA >60.0     All pertinent labs within the past 24 hours have been reviewed.    Significant Imaging: I have reviewed all pertinent imaging results/findings within the past 24 hours.    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, Right Foot    - Vascular surgery consulted, appreciate recs  - S/p AKA 6/5/2018  - Continue post op care as per Vascular  - Perineural catheter removed 6/7, reports some pain but controlled with oral meds  - Medically stable ready for discharge to [facility]         Primary adenocarcinoma of transverse colon    - Has not started treatment, will need Oncology follow up and staging scans on discharge  - Family wanting to move patient to [place], may arrange follow up there at  "[facility]        Delayed surgical wound healing    - Appreciate Gen Surg and wound care consults  - 6/10: Transitioned abx from vanc & Unasyn to doxy and Augmentin  - 6/11: Still some yellow discharge from wound, will ask Gen Surg to review again: no signs of worsening infection, recommended continuing wound care with Medihoney and daily packing changes, continue PO abx, staples removed  - Continue abx, wound care        S/P right hemicolectomy    - Pathology returned: "MODERATELY TO POORLY DIFFERENTIATED ADENOCARCINOMA WITH INFILTRATION THROUGH THE MUSCULARIS INTO PERICOLONIC ADIPOSE TISSUE, WITH MUCH LYMPHOVASCULAR INVASION, AND WITH METASTASES TO 7 OF 22 LYMPH NODES"  - Patient and family aware of cancer diagnosis   - Has not started treatment, will need Oncology follow up and staging scans on discharge  - Family wanting to move patient to [place], may arrange follow up there        PVD (peripheral vascular disease)    - Has ASA allergy  - Home clopidogrel held in anticipation of surgery  - 6/11: restarted clopidogrel  - Wound of left heel, will ask wound care to review        Hyponatremia    - Mild, chronic, stable  - Will monitor daily        Hypothyroidism    - Continue home levothyroxine        GERD (gastroesophageal reflux disease)    - Home med Nexium, pantoprazole while inpatient        Essential hypertension    - Continue home amlodipine 10, Toprol 25 and benazepril 20mg          VTE Risk Mitigation         Ordered     enoxaparin injection 40 mg  Daily      06/09/18 0850     IP VTE HIGH RISK PATIENT  Once      06/02/18 2515              April Castellanos MD  Department of Hospital Medicine   [facility]  "

## 2018-06-12 NOTE — ASSESSMENT & PLAN NOTE
- Has ASA allergy  - Home clopidogrel held in anticipation of surgery  - 6/11: restarted clopidogrel  - Wound of left heel, will ask wound care to review

## 2018-06-12 NOTE — PLAN OF CARE
CM spoke with daughter, Christen, in [place], updated on patient status. Will attempt to contact Ruben at [facility] in [place] as daughter has not been able to. No SNF acceptance at this time. Will follow.

## 2018-06-12 NOTE — PLAN OF CARE
Problem: Occupational Therapy Goal  Goal: Occupational Therapy Goal  Goals to be met by: 6/22/18     Patient will increase functional independence with ADLs by performing:    Feeding with Set-up Assistance while seated EOB/UIC.  UE Dressing with Minimal Assistance.  Grooming while EOB with Minimal Assistance.  Toileting from bedside commode with Minimal Assistance for hygiene and clothing management.   Sitting at edge of bed x5 minutes with Stand-by Assistance. GOAL MET 6/12  Rolling to Bilateral with Moderate Assistance. GOAL MET 6/11   REVISED: Rolling to Bilateral with Minimal Assistance  Supine to sit with Moderate Assistance.  Stand pivot transfers with Moderate Assistance.  Toilet transfer to bedside commode with Moderate Assistance.      Outcome: Ongoing (interventions implemented as appropriate)  Goal me for EOB sitting; pt progressing toward remaining goals    Comments: Continue OT POC    Ryann Crawley OT  6/12/2018

## 2018-06-12 NOTE — ASSESSMENT & PLAN NOTE
Malnutrition in the context of Social/Environmental Circumstances    Related to (etiology):  Inadequate Oral Intake    Signs and Symptoms (as evidenced by):  Energy Intake: <75% of estimated energy requirement for 1 year.   Weight Loss: 15% x 3 months  Fluid Accumulation: mild    Interventions/Recommendations (treatment strategy):  See recs    Nutrition Diagnosis Status:  Continues

## 2018-06-12 NOTE — PLAN OF CARE
Problem: Patient Care Overview  Goal: Plan of Care Review      Recommendations    Recommendation/Intervention:   1. Continue cardiac diet.   2. Per pt request, discontinue boost plus.   3. Encourage po intake.   4. RD following.    Goals: meet >85% of EEN/EPN  Nutrition Goal Status: progressing towards goal

## 2018-06-13 PROBLEM — L89.96 PRESSURE INJURY OF DEEP TISSUE: Status: ACTIVE | Noted: 2018-06-13

## 2018-06-13 PROCEDURE — 99231 SBSQ HOSP IP/OBS SF/LOW 25: CPT | Mod: GC,,, | Performed by: HOSPITALIST

## 2018-06-13 PROCEDURE — 11000001 HC ACUTE MED/SURG PRIVATE ROOM

## 2018-06-13 PROCEDURE — 97110 THERAPEUTIC EXERCISES: CPT

## 2018-06-13 PROCEDURE — G8987 SELF CARE CURRENT STATUS: HCPCS | Mod: CL

## 2018-06-13 PROCEDURE — 25000003 PHARM REV CODE 250: Performed by: HOSPITALIST

## 2018-06-13 PROCEDURE — 97112 NEUROMUSCULAR REEDUCATION: CPT

## 2018-06-13 PROCEDURE — 63600175 PHARM REV CODE 636 W HCPCS: Performed by: STUDENT IN AN ORGANIZED HEALTH CARE EDUCATION/TRAINING PROGRAM

## 2018-06-13 PROCEDURE — 25000003 PHARM REV CODE 250: Performed by: ANESTHESIOLOGY

## 2018-06-13 PROCEDURE — 25000003 PHARM REV CODE 250: Performed by: STUDENT IN AN ORGANIZED HEALTH CARE EDUCATION/TRAINING PROGRAM

## 2018-06-13 PROCEDURE — 25000003 PHARM REV CODE 250: Performed by: INTERNAL MEDICINE

## 2018-06-13 PROCEDURE — G8988 SELF CARE GOAL STATUS: HCPCS | Mod: CK

## 2018-06-13 PROCEDURE — 25000003 PHARM REV CODE 250

## 2018-06-13 PROCEDURE — 97535 SELF CARE MNGMENT TRAINING: CPT

## 2018-06-13 PROCEDURE — 97530 THERAPEUTIC ACTIVITIES: CPT

## 2018-06-13 RX ADMIN — OXYCODONE HYDROCHLORIDE 5 MG: 5 TABLET ORAL at 02:06

## 2018-06-13 RX ADMIN — METOPROLOL SUCCINATE 25 MG: 25 TABLET, EXTENDED RELEASE ORAL at 09:06

## 2018-06-13 RX ADMIN — AMLODIPINE BESYLATE 10 MG: 10 TABLET ORAL at 09:06

## 2018-06-13 RX ADMIN — PREGABALIN 75 MG: 75 CAPSULE ORAL at 08:06

## 2018-06-13 RX ADMIN — STANDARDIZED SENNA CONCENTRATE AND DOCUSATE SODIUM 1 TABLET: 8.6; 5 TABLET, FILM COATED ORAL at 09:06

## 2018-06-13 RX ADMIN — PANTOPRAZOLE SODIUM 40 MG: 40 TABLET, DELAYED RELEASE ORAL at 09:06

## 2018-06-13 RX ADMIN — AMOXICILLIN AND CLAVULANATE POTASSIUM 1 TABLET: 875; 125 TABLET, FILM COATED ORAL at 08:06

## 2018-06-13 RX ADMIN — AMOXICILLIN AND CLAVULANATE POTASSIUM 1 TABLET: 875; 125 TABLET, FILM COATED ORAL at 09:06

## 2018-06-13 RX ADMIN — ACETAMINOPHEN 1000 MG: 500 TABLET, FILM COATED ORAL at 10:06

## 2018-06-13 RX ADMIN — DOXYCYCLINE HYCLATE 100 MG: 100 TABLET, COATED ORAL at 09:06

## 2018-06-13 RX ADMIN — DOXYCYCLINE HYCLATE 100 MG: 100 TABLET, COATED ORAL at 08:06

## 2018-06-13 RX ADMIN — STANDARDIZED SENNA CONCENTRATE AND DOCUSATE SODIUM 1 TABLET: 8.6; 5 TABLET, FILM COATED ORAL at 08:06

## 2018-06-13 RX ADMIN — ROSUVASTATIN CALCIUM 20 MG: 20 TABLET, FILM COATED ORAL at 08:06

## 2018-06-13 RX ADMIN — ENOXAPARIN SODIUM 40 MG: 100 INJECTION SUBCUTANEOUS at 06:06

## 2018-06-13 RX ADMIN — ACETAMINOPHEN 1000 MG: 500 TABLET, FILM COATED ORAL at 05:06

## 2018-06-13 RX ADMIN — OXYCODONE HYDROCHLORIDE 5 MG: 5 TABLET ORAL at 07:06

## 2018-06-13 RX ADMIN — OXYCODONE HYDROCHLORIDE 5 MG: 5 TABLET ORAL at 05:06

## 2018-06-13 RX ADMIN — BENAZEPRIL HYDROCHLORIDE 20 MG: 20 TABLET, FILM COATED ORAL at 09:06

## 2018-06-13 RX ADMIN — BENAZEPRIL HYDROCHLORIDE 20 MG: 20 TABLET, FILM COATED ORAL at 08:06

## 2018-06-13 RX ADMIN — LEVOTHYROXINE SODIUM 100 MCG: 100 TABLET ORAL at 05:06

## 2018-06-13 RX ADMIN — CLOPIDOGREL 75 MG: 75 TABLET, FILM COATED ORAL at 09:06

## 2018-06-13 RX ADMIN — ACETAMINOPHEN 1000 MG: 500 TABLET, FILM COATED ORAL at 02:06

## 2018-06-13 NOTE — PLAN OF CARE
Problem: Pressure Ulcer Risk (Wellington Scale) (Adult,Obstetrics,Pediatric)  Intervention: Turn/Reposition Often  Turn/reposition pt often.

## 2018-06-13 NOTE — ASSESSMENT & PLAN NOTE
- Appreciate Gen Surg and wound care consults  - 6/10: Transitioned abx from vanc & Unasyn to doxy and Augmentin  - 6/11: Still some yellow discharge from wound, Gen Surg to reviewed again: no signs of worsening infection, recommended continuing wound care with Medihoney and daily packing changes, continue PO abx, staples removed  - Continue abx, wound care

## 2018-06-13 NOTE — PLAN OF CARE
Problem: Pain, Acute (Adult)  Intervention: Mutually Develop/Implement Acute Pain Management Plan  Assume pain is present during activity. Aka phantom pain  Management.

## 2018-06-13 NOTE — PLAN OF CARE
Problem: Patient Care Overview  Goal: Plan of Care Review  Outcome: Ongoing (interventions implemented as appropriate)  No acute distress noted.  No complaints of pain voiced at this time.  Delirium precautions in progress.  Call light in reach.  Fall precautions in place.  Vitals stable at this time.

## 2018-06-13 NOTE — PLAN OF CARE
Problem: Physical Therapy Goal  Goal: Physical Therapy Goal  Goals to be met by: 6/15/18     Patient will increase functional independence with mobility by performin. Supine to sit with Maximum Assistance Met   Upgrade: supine to sit with min assist  2. Sit to supine with Maximum Assistance Met    Upgrade: sit to supine with min assist  3. Rolling to Left and Right with Maximum Assistance. Met   Upgrade: rolling L and R with min assist  4. Sit to stand transfer with Maximum Assistance  5. Bed to chair transfer with Maximum Assistance using Rolling Walker  6. Sitting at edge of bed x5 minutes with Moderate Assistance      Pt progressing towards goals. continue with PT POC.Goals remain appropriate.  Maverick Guajardo PTA  2018

## 2018-06-13 NOTE — PT/OT/SLP PROGRESS
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  Pain/Comfort:  · Pain Rating 1: 0/10  · Location - Side 1: Right  · Location - Orientation 1: generalized  · Location 1: leg  · Pain Addressed 1: Reposition, Distraction, Cessation of Activity  · Pain Rating Post-Intervention 1:  (Pt did not rate)    Patients cultural, spiritual, Voodoo conflicts given the current situation: none reported    Objective:     Patient found with: pressure relief boots    General Precautions: Standard, fall   Orthopedic Precautions: (R AKA)   Braces: N/A     Occupational Performance:    Bed Mobility:    · Patient completed Rolling/Turning to Left with  moderate assistance and with side rail  · Patient completed Scooting/Bridging with dependent and 2 persons for drawsheet t/f to HOB  · Patient completed Supine to Sit with maximal assistance  · Patient completed Sit to Supine with minimum assistance   · Pt seated EOB for ~15 min with SBA to max A; more assist required during activities to prevent posterior lean    Functional Mobility/Transfers:  · Patient completed Sit <> Stand Transfer with maximal assistance and of 2 persons  with  rolling walker   · Functional Mobility: NT    Activities of Daily Living:  · Grooming: setup assistance to brush teeth and wash face in supine with HOB elevated  · UB Dressing: minimum assistance to doff/don gown while seated EOB  · LB Dressing: maximal assistance to adjust L sock in supine    Patient left HOB elevated with all lines intact and call button in reach    AMPA 6 Click:  [facility] Total Score: 14    Treatment & Education:  Pt educated on role of OT/POC  Pt educated on use of RW for  safe t/f and correct hand placement   White board/communication board updated  Education:    Assessment:     Suzan Villarreal is a 89 y.o. 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  She presents with ROM deficits, sitting balance deficits, and generalized weakness impairing safe functional mobility and self-care.  Performance deficits affecting function are weakness, impaired endurance, impaired functional mobilty, impaired self care skills, impaired balance, gait instability, decreased lower extremity function, decreased upper extremity function, decreased ROM, pain, impaired joint extensibility.      Rehab Prognosis:  Fair; patient would benefit from acute skilled OT services to address these deficits and reach maximum level of function.       Plan:     Patient to be seen 4 x/week to address the above listed problems via self-care/home management, therapeutic activities, therapeutic exercises  · Plan of Care Expires: 07/08/18  · Plan of Care Reviewed with: patient    This Plan of care has been discussed with the patient who was involved in its development and understands and is in agreement with the identified goals and treatment plan    GOALS:    Occupational Therapy Goals        Problem: Occupational Therapy Goal    Goal Priority Disciplines Outcome Interventions   Occupational Therapy Goal     OT, PT/OT Ongoing (interventions implemented as appropriate)    Description:  Goals to be met by: 6/22/18     Patient will increase functional independence with ADLs by performing:    Feeding with Set-up Assistance while seated EOB/UIC.  UE Dressing with Minimal Assistance. GOAL MET 6/13   REVISED: UE Dressing with Setup Assistance  Grooming while EOB with Minimal Assistance  Toileting from bedside commode with Minimal Assistance for hygiene and clothing management.   Sitting at edge of bed x5 minutes with Stand-by Assistance.   Rolling to Bilateral with Moderate Assistance. GOAL  MET 6/11   REVISED: Rolling to Bilateral with Minimal Assistance  Supine to sit with Moderate Assistance.  Stand pivot transfers with Moderate Assistance.  Toilet transfer to bedside commode with Moderate Assistance.                         Time Tracking:     OT Date of Treatment: 06/13/18  OT Start Time: 1041  OT Stop Time: 1106  OT Total Time (min): 25 min    Billable Minutes:Self Care/Home Management 10  Neuromuscular Re-education 15    Ryann Crawley, OT  6/13/2018

## 2018-06-13 NOTE — PT/OT/SLP PROGRESS
Physical Therapy Treatment    Patient Name:  Suzan Villarreal   MRN:  [id number]    Recommendations:     Discharge Recommendations:  nursing facility, skilled   Discharge Equipment Recommendations:  (TBD)   Barriers to discharge: None    Assessment:     Suzan Villarreal is a 89 y.o. 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  She presents with the following impairments/functional limitations:  weakness, impaired endurance, gait instability, impaired functional mobilty, impaired balance, impaired joint extensibility, pain, decreased ROM, decreased upper extremity function, decreased lower extremity function .Pt  motivated and cooperative with treatment session. Pt Progressing with PT Intervention.  Pt would continue to benefit from skilled PT to address overall functional mobility and goals. Goals remain appropriate      Rehab Prognosis:  good; patient would benefit from acute skilled PT services to address these deficits and reach maximum level of function.      Recent Surgery: Procedure(s) (LRB):  AMPUTATION, LOWER EXTREMITY, ABOVE KNEE (Right) 8 Days Post-Op    Plan:     During this hospitalization, patient to be seen 3 x/week to address the above listed problems via gait training, therapeutic activities, therapeutic exercises, neuromuscular re-education  · Plan of Care Expires:  06/27/18   Plan of Care Reviewed with: patient    Subjective     Communicated with RN prior to session.  Patient found supine upon PT entry to room, agreeable to treatment.      Pain/Comfort:  · Pain Rating 1: 0/10  · Pain Rating Post-Intervention 1: 0/10    Patients cultural, spiritual, Pentecostalism conflicts given the current situation: none noted    Objective:     Patient found with: pressure relief boots     General Precautions: Standard, fall   Orthopedic Precautions: (R AKA)   Braces: N/A     Functional Mobility:  · Bed Mobility:     · Rolling Left:  moderate assistance  · Rolling Right:  moderate assistance  · Scooting: total assistance and of 2 persons  · Supine to Sit: maximal assistance  · Sit to Supine: min assistance  · Transfers:     · Sit to Stand:  maximal assistance and of 2 persons with rolling walker  · Balance: pt requires SBA to max assist to maintain sitting balance EOB using BUE support due to posterior lean with tolerance for approx 15 min. pt requires max assist x2 for static standing using RW for approx 10 sec with increased trunk flexion      AM-PAC 6 CLICK MOBILITY  Turning over in bed (including adjusting bedclothes, sheets and blankets)?: 2  Sitting down on and standing up from a chair with arms (e.g., wheelchair, bedside commode, etc.): 2  Moving from lying on back to sitting on the side of the bed?: 2  Moving to and from a bed to a chair (including a wheelchair)?: 1  Need to walk in hospital room?: 1       Therapeutic Activities and Exercises:   Discussed/educated patient on progress, safety, importance of OOB mobility for improving outcomes , PT POC   Patient performed therex X 15 reps seated/supine L LE AROM AP, LAQ,  And B LE Hip Flexion, Hip Abd/Add   White board updated      Patient left supine with all lines intact, call button in reach and nsg notified..    GOALS:    Physical Therapy Goals        Problem: Physical Therapy Goal    Goal Priority Disciplines Outcome Goal Variances Interventions   Physical Therapy Goal     PT/OT, PT Ongoing (interventions implemented as appropriate)     Description:  Goals to be met by: 6/15/18     Patient will increase functional independence with mobility by performin. Supine to sit with Maximum Assistance Met   Upgrade: supine to sit with min assist  2. Sit to supine with Maximum Assistance Met    Upgrade: sit to supine with min assist  3. Rolling to Left and Right with Maximum Assistance. Met   Upgrade: rolling L and R with min assist  4. Sit to stand transfer with Maximum Assistance  5. Bed to chair transfer with Maximum  Assistance using Rolling Walker  6. Sitting at edge of bed x5 minutes with Moderate Assistance                       Time Tracking:     PT Received On: 06/13/18  PT Start Time: 1040     PT Stop Time: 1105  PT Total Time (min): 25 min     Billable Minutes: Therapeutic Activity 15 and Therapeutic Exercise 10    Treatment Type: Treatment  PT/PTA: PTA     PTA Visit Number: 1     Maverick Guajardo, PTA  06/13/2018

## 2018-06-13 NOTE — PROGRESS NOTES
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 Villarreal is a 89 woman with PMHx significant for severe PVD, HTN, HLD, hypothyroidism, GERD and chronic constipation who presents to the ED with severe pain, gangrene of the R foot. Patient with recent admission for perforated viscus, s/p ex-lap and ostomy (please see Discharge Summary from 5/31 for further details of that admission.) Patient's daughter is at bedside and provides much of the history. Patient first developed foot pain 3-4 weeks ago. Then around Mother's Day began developing a black eschar on her distal R great toe. Follows with Dr. Garcia in Vascular Surgery. On 5/16, aortogram with run off performed. R AKA recommended; however, patient and family refused at that time. Pain progressed since that time, with ulceration of the dorsum of her foot and heel developing. Daughter brought patient back to ED due to uncontrolled pain.     Hospital Course:  Patient was admitted to hospital medicine for pain control for right foot gangrene and medication management. Vascular Surgery evaluated patient, plan for right AKA on 6/5. Scheduled percocet 5 started with prn oxycodone 10. Pain controlled overnight.  06/04/2018: Pain overnight, night MD called for IV morphine but patient later refused, discussed with patient and family will change pain meds to scheduled oxy IR 5mg q6 with breakthrough. Awaiting AKA tomorrow. Also placed on 2L supplemental O2 via NC but no SOB, suspect poor pulse ox readings from fingers, will wean today.   06/05/2018: Pain well controlled overnight on  scheduled oxycodone. Plan for AKA today with vascular sx.  06/06/2018: AKA yesterday, denies pain. No subjective complaints. VSS, afebrile.   06/07/2018: ISH. Pain well controlled.   06/08/2018: ISH. Perineural catheter removed, reports some pain but controlled with oral medication.  06/09/2018: Some pain overnight, responded to one dose of oxycodone. VSS, afebrile.   06/10/2018: No events overnight. Patient reports pain is well controlled. She is alert and oriented.  06/11/2018: ISH. Medically stable, ready for transfer to SNF.   06/12/2018: ISH. Some discoloration of skin of left heel, will ask wound care to see.   06/13/2018: ISH. Medically stable, awaiting SNF placement.      Review of Systems   Constitutional: Positive for activity change, appetite change and fatigue.   HENT: Negative for trouble swallowing.    Eyes: Negative for visual disturbance.   Respiratory: Negative for cough and shortness of breath.    Cardiovascular: Negative for chest pain.   Gastrointestinal: Negative for abdominal distention and abdominal pain.   Genitourinary: Negative for dysuria.   Musculoskeletal: Positive for gait problem.   Skin: Positive for color change and wound.   Neurological: Negative for headaches.   Psychiatric/Behavioral: Negative for confusion.     Objective:     Vital Signs (Most Recent):  Temp: 96.3 °F (35.7 °C) (06/13/18 0702)  Pulse: 83 (06/13/18 0702)  Resp: 19 (06/13/18 0702)  BP: 126/65 (06/13/18 0702)  SpO2: (!) 94 % (06/13/18 0702) Vital Signs (24h Range):  Temp:  [96.3 °F (35.7 °C)-98.1 °F (36.7 °C)] 96.3 °F (35.7 °C)  Pulse:  [80-87] 83  Resp:  [16-19] 19  SpO2:  [91 %-95 %] 94 %  BP: (120-153)/(65-74) 126/65     Weight: 49.9 kg (110 lb)  Body mass index is 22.99 kg/m².    Intake/Output Summary (Last 24 hours) at 06/13/18 0905  Last data filed at 06/13/18 0605   Gross per 24 hour   Intake             1040 ml   Output              350 ml   Net              690 ml      Physical Exam    Constitutional: She is oriented to person, place, and time. No distress.   Elderly, frail woman   HENT:   Head: Normocephalic and atraumatic.   Eyes: Conjunctivae are normal. No scleral icterus.   Neck: Normal range of motion.   Cardiovascular: Normal rate, regular rhythm and normal heart sounds.    Pulmonary/Chest: Effort normal and breath sounds normal.   Abdominal: Soft. She exhibits no distension. There is no tenderness.   Midline laparotomy scar with some erythema around lower portion of scar   Ostomy bag in place, stool present, no erythema/ signs of infection at site   Musculoskeletal: Normal range of motion. She exhibits no edema.   R AKA wound bandaged, c/d/i   Neurological: She is alert and oriented to person, place, and time.   Skin: Skin is warm and dry. She is not diaphoretic.   Black discoloration of skin over left heel  Skin breakdown over left toes   Psychiatric: She has a normal mood and affect. Her behavior is normal.       Significant Labs:   CBC:   No results for input(s): WBC, HGB, HCT, PLT in the last 48 hours.  CMP:   No results for input(s): NA, K, CL, CO2, GLU, BUN, CREATININE, CALCIUM, PROT, ALBUMIN, BILITOT, ALKPHOS, AST, ALT, ANIONGAP, EGFRNONAA in the last 48 hours.    Invalid input(s): ESTGF[place]  All pertinent labs within the past 24 hours have been reviewed.    Significant Imaging: I have reviewed all pertinent imaging results/findings within the past 24 hours.    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         Primary adenocarcinoma of transverse colon    - Has not started treatment, will need Oncology follow up and staging scans on discharge  - Family wanting to move patient to [place], may  "arrange follow up there at [facility]        Delayed surgical wound healing    - Appreciate Gen Surg and wound care consults  - 6/10: Transitioned abx from vanc & Unasyn to doxy and Augmentin  - 6/11: Still some yellow discharge from wound, Gen Surg to reviewed again: no signs of worsening infection, recommended continuing wound care with Medihoney and daily packing changes, continue PO abx, staples removed  - Continue abx, wound care        S/P right hemicolectomy    - Pathology returned: "MODERATELY TO POORLY DIFFERENTIATED ADENOCARCINOMA WITH INFILTRATION THROUGH THE MUSCULARIS INTO PERICOLONIC ADIPOSE TISSUE, WITH MUCH LYMPHOVASCULAR INVASION, AND WITH METASTASES TO 7 OF 22 LYMPH NODES"  - Patient and family aware of cancer diagnosis   - Has not started treatment, will need Oncology follow up and staging scans on discharge  - Family wanting to move patient to [place], may arrange follow up there        PVD (peripheral vascular disease)    - Has ASA allergy  - 6/11: restarted clopidogrel        Hyponatremia    - Mild, chronic, stable  - Will monitor daily        Hypothyroidism    - Continue home levothyroxine        GERD (gastroesophageal reflux disease)    - Home med Nexium, pantoprazole while inpatient        Essential hypertension    - Continue home amlodipine 10, Toprol 25 and benazepril 20mg          VTE Risk Mitigation         Ordered     enoxaparin injection 40 mg  Daily      06/09/18 0850     IP VTE HIGH RISK PATIENT  Once      06/02/18 0493              April Castellanos MD  Department of Hospital Medicine   [facility]  "

## 2018-06-13 NOTE — SUBJECTIVE & OBJECTIVE
Review of Systems   Constitutional: Positive for activity change, appetite change and fatigue.   HENT: Negative for trouble swallowing.    Eyes: Negative for visual disturbance.   Respiratory: Negative for cough and shortness of breath.    Cardiovascular: Negative for chest pain.   Gastrointestinal: Negative for abdominal distention and abdominal pain.   Genitourinary: Negative for dysuria.   Musculoskeletal: Positive for gait problem.   Skin: Positive for color change and wound.   Neurological: Negative for headaches.   Psychiatric/Behavioral: Negative for confusion.     Objective:     Vital Signs (Most Recent):  Temp: 96.3 °F (35.7 °C) (06/13/18 0702)  Pulse: 83 (06/13/18 0702)  Resp: 19 (06/13/18 0702)  BP: 126/65 (06/13/18 0702)  SpO2: (!) 94 % (06/13/18 0702) Vital Signs (24h Range):  Temp:  [96.3 °F (35.7 °C)-98.1 °F (36.7 °C)] 96.3 °F (35.7 °C)  Pulse:  [80-87] 83  Resp:  [16-19] 19  SpO2:  [91 %-95 %] 94 %  BP: (120-153)/(65-74) 126/65     Weight: 49.9 kg (110 lb)  Body mass index is 22.99 kg/m².    Intake/Output Summary (Last 24 hours) at 06/13/18 0905  Last data filed at 06/13/18 0605   Gross per 24 hour   Intake             1040 ml   Output              350 ml   Net              690 ml      Physical Exam   Constitutional: She is oriented to person, place, and time. No distress.   Elderly, frail woman   HENT:   Head: Normocephalic and atraumatic.   Eyes: Conjunctivae are normal. No scleral icterus.   Neck: Normal range of motion.   Cardiovascular: Normal rate, regular rhythm and normal heart sounds.    Pulmonary/Chest: Effort normal and breath sounds normal.   Abdominal: Soft. She exhibits no distension. There is no tenderness.   Midline laparotomy scar with some erythema around lower portion of scar   Ostomy bag in place, stool present, no erythema/ signs of infection at site   Musculoskeletal: Normal range of motion. She exhibits no edema.   R AKA wound bandaged, c/d/i   Neurological: She is alert and  oriented to person, place, and time.   Skin: Skin is warm and dry. She is not diaphoretic.   Black discoloration of skin over left heel  Skin breakdown over left toes   Psychiatric: She has a normal mood and affect. Her behavior is normal.       Significant Labs:   CBC:   No results for input(s): WBC, HGB, HCT, PLT in the last 48 hours.  CMP:   No results for input(s): NA, K, CL, CO2, GLU, BUN, CREATININE, CALCIUM, PROT, ALBUMIN, BILITOT, ALKPHOS, AST, ALT, ANIONGAP, EGFRNONAA in the last 48 hours.    Invalid input(s): ESTGFA[place]  All pertinent labs within the past 24 hours have been reviewed.    Significant Imaging: I have reviewed all pertinent imaging results/findings within the past 24 hours.

## 2018-06-13 NOTE — PROGRESS NOTES
Consult received on patient's left heel. Deep tissue pressure injury noted to left heel. Recommend painting daily with betadine, keep elevated with heel boots. Scattered areas of blanchable redness noted to dorsal side of left foot.   Lower midline abdominal wound appears to be healing well. Orders already in place for daily medihoney dressing packing. Recommend continuing daily medihoney packings.  Discussed with Dr. April Castellanos, telephone order received for nursing. Nursing to continue care.       06/13/18 1248       Incision/Site 05/23/18 1405 Abdomen   Date First Assessed/Time First Assessed: 05/23/18 1405   Location: Abdomen   Wound Image    Incision WDL ex   Drainage Amount Scant   Drainage Characteristics/Odor Serous;No odor   Appearance Moist;Pink;Slough;Adipose  (scant slough noted)   Periwound Area Intact   Wound Edges Open   Wound Length (cm) 3 cm   Wound Width (cm) 1 cm   Wound Depth (cm) 0.4 cm   Wound Volume (cm^3) 1.2 cm^3   Care Cleansed with:;Sterile normal saline   Dressing Changed  (hydrafera blue ready foam)   Dressing Change Due 06/14/18       Pressure Injury 06/13/18 1248 Left Heel Deep tissue injury   Date First Assessed/Time First Assessed: 06/13/18 1248   Pressure Injury Present on Admission: no  Side: Left  Location: Heel  Staging: Deep tissue injury   Wound Image    Staging D   Drainage Amount None   Drainage Characteristics/Odor No odor   Appearance Purple;Maroon   Periwound Area Redness   Wound Length (cm) 2 cm   Wound Width (cm) 1 cm   Wound Depth (cm) 0 cm   Wound Volume (cm^3) 0 cm^3   Care Applied:;Povidone iodine   Dressing Change Due 06/14/18

## 2018-06-13 NOTE — ASSESSMENT & PLAN NOTE
- Has not started treatment, will need Oncology follow up and staging scans on discharge  - Family wanting to move patient to [place], may arrange follow up there at [facility]

## 2018-06-13 NOTE — PLAN OF CARE
Problem: Occupational Therapy Goal  Goal: Occupational Therapy Goal  Goals to be met by: 6/22/18     Patient will increase functional independence with ADLs by performing:    Feeding with Set-up Assistance while seated EOB/UIC.  UE Dressing with Minimal Assistance. GOAL MET 6/13   REVISED: UE Dressing with Setup Assistance  Grooming while EOB with Minimal Assistance  Toileting from bedside commode with Minimal Assistance for hygiene and clothing management.   Sitting at edge of bed x5 minutes with Stand-by Assistance.   Rolling to Bilateral with Moderate Assistance. GOAL MET 6/11   REVISED: Rolling to Bilateral with Minimal Assistance  Supine to sit with Moderate Assistance.  Stand pivot transfers with Moderate Assistance.  Toilet transfer to bedside commode with Moderate Assistance.       Outcome: Ongoing (interventions implemented as appropriate)  Goal met for UE dressing, revised to setup assist; pt progressing toward remaining goals     Comments: Continue OT GOYO Crawley OT  6/13/2018

## 2018-06-14 PROBLEM — L30.4 INTERTRIGO: Status: ACTIVE | Noted: 2018-06-14

## 2018-06-14 PROBLEM — L89.91 PRESSURE INJURY OF SKIN, STAGE 1: Status: ACTIVE | Noted: 2018-06-14

## 2018-06-14 PROCEDURE — 63600175 PHARM REV CODE 636 W HCPCS: Performed by: STUDENT IN AN ORGANIZED HEALTH CARE EDUCATION/TRAINING PROGRAM

## 2018-06-14 PROCEDURE — 25000003 PHARM REV CODE 250: Performed by: HOSPITALIST

## 2018-06-14 PROCEDURE — 11000001 HC ACUTE MED/SURG PRIVATE ROOM

## 2018-06-14 PROCEDURE — 25000003 PHARM REV CODE 250

## 2018-06-14 PROCEDURE — 25000003 PHARM REV CODE 250: Performed by: INTERNAL MEDICINE

## 2018-06-14 PROCEDURE — 25000003 PHARM REV CODE 250: Performed by: ANESTHESIOLOGY

## 2018-06-14 PROCEDURE — 94761 N-INVAS EAR/PLS OXIMETRY MLT: CPT

## 2018-06-14 PROCEDURE — 25000003 PHARM REV CODE 250: Performed by: STUDENT IN AN ORGANIZED HEALTH CARE EDUCATION/TRAINING PROGRAM

## 2018-06-14 PROCEDURE — 99231 SBSQ HOSP IP/OBS SF/LOW 25: CPT | Mod: GC,,, | Performed by: HOSPITALIST

## 2018-06-14 RX ORDER — MICONAZOLE NITRATE 2 %
POWDER (GRAM) TOPICAL 2 TIMES DAILY
Status: DISCONTINUED | OUTPATIENT
Start: 2018-06-14 | End: 2018-06-20 | Stop reason: HOSPADM

## 2018-06-14 RX ORDER — ACETAMINOPHEN 500 MG
1000 TABLET ORAL EVERY 8 HOURS PRN
Status: DISCONTINUED | OUTPATIENT
Start: 2018-06-14 | End: 2018-06-20 | Stop reason: HOSPADM

## 2018-06-14 RX ADMIN — AMLODIPINE BESYLATE 10 MG: 10 TABLET ORAL at 08:06

## 2018-06-14 RX ADMIN — METOPROLOL SUCCINATE 25 MG: 25 TABLET, EXTENDED RELEASE ORAL at 08:06

## 2018-06-14 RX ADMIN — LEVOTHYROXINE SODIUM 100 MCG: 100 TABLET ORAL at 05:06

## 2018-06-14 RX ADMIN — AMOXICILLIN AND CLAVULANATE POTASSIUM 1 TABLET: 875; 125 TABLET, FILM COATED ORAL at 08:06

## 2018-06-14 RX ADMIN — OXYCODONE HYDROCHLORIDE 5 MG: 5 TABLET ORAL at 12:06

## 2018-06-14 RX ADMIN — ACETAMINOPHEN 1000 MG: 500 TABLET, FILM COATED ORAL at 05:06

## 2018-06-14 RX ADMIN — STANDARDIZED SENNA CONCENTRATE AND DOCUSATE SODIUM 1 TABLET: 8.6; 5 TABLET, FILM COATED ORAL at 08:06

## 2018-06-14 RX ADMIN — DOXYCYCLINE HYCLATE 100 MG: 100 TABLET, COATED ORAL at 08:06

## 2018-06-14 RX ADMIN — BENAZEPRIL HYDROCHLORIDE 20 MG: 20 TABLET, FILM COATED ORAL at 08:06

## 2018-06-14 RX ADMIN — PANTOPRAZOLE SODIUM 40 MG: 40 TABLET, DELAYED RELEASE ORAL at 08:06

## 2018-06-14 RX ADMIN — OXYCODONE HYDROCHLORIDE 5 MG: 5 TABLET ORAL at 03:06

## 2018-06-14 RX ADMIN — PREGABALIN 75 MG: 75 CAPSULE ORAL at 08:06

## 2018-06-14 RX ADMIN — ACETAMINOPHEN 1000 MG: 500 TABLET ORAL at 06:06

## 2018-06-14 RX ADMIN — ENOXAPARIN SODIUM 40 MG: 100 INJECTION SUBCUTANEOUS at 04:06

## 2018-06-14 RX ADMIN — ROSUVASTATIN CALCIUM 20 MG: 20 TABLET, FILM COATED ORAL at 08:06

## 2018-06-14 RX ADMIN — CLOPIDOGREL 75 MG: 75 TABLET, FILM COATED ORAL at 08:06

## 2018-06-14 RX ADMIN — OXYCODONE HYDROCHLORIDE 5 MG: 5 TABLET ORAL at 11:06

## 2018-06-14 RX ADMIN — ONDANSETRON 8 MG: 8 TABLET, ORALLY DISINTEGRATING ORAL at 06:06

## 2018-06-14 RX ADMIN — MICONAZOLE NITRATE: 20 POWDER TOPICAL at 09:06

## 2018-06-14 NOTE — PLAN OF CARE
Covering SW left message at [facility] to f/u on referral.    Jenni Garcia LMSW

## 2018-06-14 NOTE — NURSING
Skin breakdown noted to pt perineum and sacral area. Brief removed and blue deneen pads applied due to incontinence. Pt turned on left side and positioned on wedge. Wound care to left heel, midline incision, and RLE incision site per md orders. Pina boon applied to LLE. mepilex applied to sacrum area. Wound care consult ordered. Will cont to turn during q2 hourly rounding. Will cont to monitor.

## 2018-06-14 NOTE — PROGRESS NOTES
Wound care consult received for sacrum pressure injury.  Pt known to wound care team.  Today nurse identified sacrum with non-blanchable redness.  Nurse did remove diaper and repositioned pt noting redness now less red.  Spoke with Medicine team 3 and will place orders for [employer] Clear along with spr pump to provide existing mattress with VERNELL properties.  Explained to pt that she needs to be repositioned every 2 hours.  Noted continued rash and redness to groin and armpits.  Ordered miconazole powder approved by Dr. Adrian.    Nurse notified of care provided for pt.  [id number]       06/14/18 1659       Wound 05/29/18 Intertrigo Perineum   Date First Assessed: 05/29/18   Primary Wound Type: Intertrigo  Location: (c) Perineum   Wound Image    Wound WDL ex   Dressing Appearance No dressing   Drainage Amount None   Drainage Characteristics/Odor No odor   Appearance Red;Other (see comments)  (yeast-like rash)   Care Cleansed with:;Sterile normal saline   Dressing Change Due 06/14/18       Pressure Injury 06/14/18 Sacral spine Stage 1   Date First Assessed: 06/14/18   Pressure Injury Present on Admission: no  Location: Sacral spine  Staging: Stage 1   Wound Image    Staging 1   Dressing Appearance No dressing   Drainage Amount None   Drainage Characteristics/Odor No odor   Appearance Red;Other (see comments)  (non-blanchable)   Wound Length (cm) 6 cm   Wound Width (cm) 5 cm   Wound Depth (cm) 0 cm   Wound Volume (cm^3) 0 cm^3   Care Cleansed with:;Sterile normal saline;Applied:;Skin Barrier   Dressing Change Due 06/14/18

## 2018-06-14 NOTE — ASSESSMENT & PLAN NOTE
- Has not started treatment, will need Oncology follow up and staging scans on discharge  - Family wanting to move patient to [place], may arrange follow up there at [facility]

## 2018-06-14 NOTE — PROGRESS NOTES
Upon changing pt and applying blue pad to pt, daughter insists we remove the blue pad and apply pt home briefs. Nurse explained to daughter the blue pad rhona away the moisture as the briefs trap it in. Daughter still insisting pt to have brief on. Isabel care done and brief applied per family and pt request.

## 2018-06-14 NOTE — SUBJECTIVE & OBJECTIVE
Review of Systems   Constitutional: Positive for activity change, appetite change and fatigue.   HENT: Negative for trouble swallowing.    Eyes: Negative for visual disturbance.   Respiratory: Negative for cough and shortness of breath.    Cardiovascular: Negative for chest pain.   Gastrointestinal: Negative for abdominal distention and abdominal pain.   Genitourinary: Negative for dysuria.   Musculoskeletal: Positive for gait problem.   Skin: Positive for color change and wound.   Neurological: Negative for headaches.   Psychiatric/Behavioral: Negative for confusion.     Objective:     Vital Signs (Most Recent):  Temp: 97.3 °F (36.3 °C) (06/14/18 0735)  Pulse: 89 (06/14/18 0735)  Resp: 16 (06/14/18 0735)  BP: 137/73 (06/14/18 0735)  SpO2: 95 % (06/14/18 0735) Vital Signs (24h Range):  Temp:  [97.3 °F (36.3 °C)-98.3 °F (36.8 °C)] 97.3 °F (36.3 °C)  Pulse:  [73-89] 89  Resp:  [16-18] 16  SpO2:  [92 %-95 %] 95 %  BP: (119-137)/(55-73) 137/73     Weight: 49.9 kg (110 lb)  Body mass index is 22.99 kg/m².    Intake/Output Summary (Last 24 hours) at 06/14/18 1034  Last data filed at 06/14/18 0735   Gross per 24 hour   Intake              480 ml   Output              600 ml   Net             -120 ml      Physical Exam   Constitutional: She is oriented to person, place, and time. No distress.   Elderly, frail woman   HENT:   Head: Normocephalic and atraumatic.   Eyes: Conjunctivae are normal. No scleral icterus.   Neck: Normal range of motion.   Cardiovascular: Normal rate, regular rhythm and normal heart sounds.    Pulmonary/Chest: Effort normal and breath sounds normal.   Abdominal: Soft. She exhibits no distension. There is no tenderness.   Midline laparotomy scar with some erythema around lower portion of scar   Ostomy bag in place, stool present, no erythema/ signs of infection at site   Musculoskeletal: Normal range of motion. She exhibits no edema.   R AKA wound bandaged, c/d/i   Neurological: She is alert and  oriented to person, place, and time.   Skin: Skin is warm and dry. She is not diaphoretic.   DTI of skin over left heel  Skin breakdown over left toes   Psychiatric: She has a normal mood and affect. Her behavior is normal.       Significant Labs:   CBC:   No results for input(s): WBC, HGB, HCT, PLT in the last 48 hours.  CMP:   No results for input(s): NA, K, CL, CO2, GLU, BUN, CREATININE, CALCIUM, PROT, ALBUMIN, BILITOT, ALKPHOS, AST, ALT, ANIONGAP, EGFRNONAA in the last 48 hours.    Invalid input(s): ESTGFAFR[place]  All pertinent labs within the past 24 hours have been reviewed.    Significant Imaging: I have reviewed all pertinent imaging results/findings within the past 24 hours.

## 2018-06-14 NOTE — PLAN OF CARE
Problem: Pressure Ulcer Risk (Wellington Scale) (Adult,Obstetrics,Pediatric)  Intervention: Turn/Reposition Often  Turn and reposition pt every 2 hours.

## 2018-06-14 NOTE — PROGRESS NOTES
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 Villarreal is a 89 woman with PMHx significant for severe PVD, HTN, HLD, hypothyroidism, GERD and chronic constipation who presents to the ED with severe pain, gangrene of the R foot. Patient with recent admission for perforated viscus, s/p ex-lap and ostomy (please see Discharge Summary from 5/31 for further details of that admission.) Patient's daughter is at bedside and provides much of the history. Patient first developed foot pain 3-4 weeks ago. Then around Mother's Day began developing a black eschar on her distal R great toe. Follows with Dr. Garcia in Vascular Surgery. On 5/16, aortogram with run off performed. R AKA recommended; however, patient and family refused at that time. Pain progressed since that time, with ulceration of the dorsum of her foot and heel developing. Daughter brought patient back to ED due to uncontrolled pain.     Hospital Course:  Patient was admitted to hospital medicine for pain control for right foot gangrene and medication management. Vascular Surgery evaluated patient, plan for right AKA on 6/5. Scheduled percocet 5 started with prn oxycodone 10. Pain controlled overnight.  06/04/2018: Pain overnight, night MD called for IV morphine but patient later refused, discussed with patient and family will change pain meds to scheduled oxy IR 5mg q6 with breakthrough. Awaiting AKA tomorrow. Also placed on 2L supplemental O2 via NC but no SOB, suspect poor pulse ox readings from fingers, will wean today.   06/05/2018: Pain well controlled overnight on  scheduled oxycodone. Plan for AKA today with vascular sx.  06/06/2018: AKA yesterday, denies pain. No subjective complaints. VSS, afebrile.   06/07/2018: ISH. Pain well controlled.   06/08/2018: ISH. Perineural catheter removed, reports some pain but controlled with oral medication.  06/09/2018: Some pain overnight, responded to one dose of oxycodone. VSS, afebrile.   06/10/2018: No events overnight. Patient reports pain is well controlled. She is alert and oriented.  06/11/2018: ISH. Medically stable, ready for transfer to SNF.   06/12/2018: ISH. Some discoloration of skin of left heel, will ask wound care to see.   06/13/2018: ISH. Medically stable, awaiting SNF placement.  06/14/2018: Some pain overnight, responded to oxycodone. Changed scheduled Tylenol to PRN as patient has been received 3g/day for last 6 days. Medically stable, awaiting placement.      Review of Systems   Constitutional: Positive for activity change, appetite change and fatigue.   HENT: Negative for trouble swallowing.    Eyes: Negative for visual disturbance.   Respiratory: Negative for cough and shortness of breath.    Cardiovascular: Negative for chest pain.   Gastrointestinal: Negative for abdominal distention and abdominal pain.   Genitourinary: Negative for dysuria.   Musculoskeletal: Positive for gait problem.   Skin: Positive for color change and wound.   Neurological: Negative for headaches.   Psychiatric/Behavioral: Negative for confusion.     Objective:     Vital Signs (Most Recent):  Temp: 97.3 °F (36.3 °C) (06/14/18 0735)  Pulse: 89 (06/14/18 0735)  Resp: 16 (06/14/18 0735)  BP: 137/73 (06/14/18 0735)  SpO2: 95 % (06/14/18 0735) Vital Signs (24h Range):  Temp:  [97.3 °F (36.3 °C)-98.3 °F (36.8 °C)] 97.3 °F (36.3 °C)  Pulse:  [73-89] 89  Resp:  [16-18] 16  SpO2:  [92 %-95 %] 95 %  BP: (119-137)/(55-73) 137/73     Weight: 49.9 kg (110 lb)  Body mass index is 22.99 kg/m².    Intake/Output Summary (Last 24 hours) at 06/14/18  1034  Last data filed at 06/14/18 0735   Gross per 24 hour   Intake              480 ml   Output              600 ml   Net             -120 ml      Physical Exam   Constitutional: She is oriented to person, place, and time. No distress.   Elderly, frail woman   HENT:   Head: Normocephalic and atraumatic.   Eyes: Conjunctivae are normal. No scleral icterus.   Neck: Normal range of motion.   Cardiovascular: Normal rate, regular rhythm and normal heart sounds.    Pulmonary/Chest: Effort normal and breath sounds normal.   Abdominal: Soft. She exhibits no distension. There is no tenderness.   Midline laparotomy scar with some erythema around lower portion of scar   Ostomy bag in place, stool present, no erythema/ signs of infection at site   Musculoskeletal: Normal range of motion. She exhibits no edema.   R AKA wound bandaged, c/d/i   Neurological: She is alert and oriented to person, place, and time.   Skin: Skin is warm and dry. She is not diaphoretic.   DTI of skin over left heel  Skin breakdown over left toes   Psychiatric: She has a normal mood and affect. Her behavior is normal.       Significant Labs:   CBC:   No results for input(s): WBC, HGB, HCT, PLT in the last 48 hours.  CMP:   No results for input(s): NA, K, CL, CO2, GLU, BUN, CREATININE, CALCIUM, PROT, ALBUMIN, BILITOT, ALKPHOS, AST, ALT, ANIONGAP, EGFRNONAA in the last 48 hours.    Invalid input(s): ESTGFAFRICA  All pertinent labs within the past 24 hours have been reviewed.    Significant Imaging: I have reviewed all pertinent imaging results/findings within the past 24 hours.    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, Right Foot    - Vascular surgery consulted, appreciate recs  - S/p AKA 6/5/2018  - Continue post op care as per Vascular  - Perineural catheter removed 6/7, reports some pain but controlled with oral meds  - Medically stable ready for discharge to SNF         Pressure injury of deep tissue    - Left  "heel  - Appreciate wound care recs  - Continue Betadine soaks daily   - Continue boots        Primary adenocarcinoma of transverse colon    - Has not started treatment, will need Oncology follow up and staging scans on discharge  - Family wanting to move patient to [place], may arrange follow up there at [facility]        Delayed surgical wound healing    - Appreciate Gen Surg and wound care consults  - 6/10: Transitioned abx from vanc & Unasyn to doxy and Augmentin  - 6/11: Still some yellow discharge from wound, Gen Surg to reviewed again: no signs of worsening infection, recommended continuing wound care with Medihoney and daily packing changes, continue PO abx, staples removed  - 6/14: Improving  - Continue abx, wound care        S/P right hemicolectomy    - Pathology returned: "MODERATELY TO POORLY DIFFERENTIATED ADENOCARCINOMA WITH INFILTRATION THROUGH THE MUSCULARIS INTO PERICOLONIC ADIPOSE TISSUE, WITH MUCH LYMPHOVASCULAR INVASION, AND WITH METASTASES TO 7 OF 22 LYMPH NODES"  - Patient and family aware of cancer diagnosis   - Has not started treatment, will need Oncology follow up and staging scans on discharge  - Family wanting to move patient to [place], may arrange follow up there        PVD (peripheral vascular disease)    - Has ASA allergy  - 6/11: restarted clopidogrel        Hyponatremia    - Mild, chronic, stable  - Will monitor daily        Hypothyroidism    - Continue home levothyroxine        GERD (gastroesophageal reflux disease)    - Home med Nexium, pantoprazole while inpatient        Essential hypertension    - Continue home amlodipine 10, Toprol 25 and benazepril 20mg          VTE Risk Mitigation         Ordered     enoxaparin injection 40 mg  Daily      06/09/18 0850     IP VTE HIGH RISK PATIENT  Once      06/02/18 3478              April Castellanos MD  Department of Hospital Medicine   [facility]  "

## 2018-06-14 NOTE — PLAN OF CARE
Problem: Patient Care Overview  Goal: Plan of Care Review  Outcome: Ongoing (interventions implemented as appropriate)  Patient AAOx4. No complaints of pain. VS stable, afrebrile. Neurovascular intact. Skin intact, with exception of incisions and drains. Free from falls. Frequent rounds made for safety, pain and comfort. Bed at lowest position, call light within reach, side rails up x2.

## 2018-06-14 NOTE — PLAN OF CARE
CM called and spoke with Ruben at [facility] in [place]. Ruben has been off 2/2 ill but she requested this CM to fax clinicals to her covering coworkers. Face sheet, H&P, OP note, several days of therapy notes sent via . CM also [facility] to get waiver form to have insurance cover out or network in [place]. Patient's  [facility] will start 7/1/18. Currently the waiver phone line ([phone]) is having technical difficulties. Will continue to attempt.

## 2018-06-14 NOTE — ASSESSMENT & PLAN NOTE
- Appreciate Gen Surg and wound care consults  - 6/10: Transitioned abx from vanc & Unasyn to doxy and Augmentin  - 6/11: Still some yellow discharge from wound, Gen Surg to reviewed again: no signs of worsening infection, recommended continuing wound care with Medihoney and daily packing changes, continue PO abx, staples removed  - 6/14: Improving  - Continue abx, wound care

## 2018-06-14 NOTE — PLAN OF CARE
Problem: Amputation, Lower Extremity (Adult)  Intervention: Prevent DVT/VTE, Manage Emboli Risk  Promote physical activity.

## 2018-06-15 PROCEDURE — 97535 SELF CARE MNGMENT TRAINING: CPT

## 2018-06-15 PROCEDURE — 25000003 PHARM REV CODE 250

## 2018-06-15 PROCEDURE — 25000003 PHARM REV CODE 250: Performed by: STUDENT IN AN ORGANIZED HEALTH CARE EDUCATION/TRAINING PROGRAM

## 2018-06-15 PROCEDURE — G8987 SELF CARE CURRENT STATUS: HCPCS | Mod: CK

## 2018-06-15 PROCEDURE — 25000003 PHARM REV CODE 250: Performed by: INTERNAL MEDICINE

## 2018-06-15 PROCEDURE — G8988 SELF CARE GOAL STATUS: HCPCS | Mod: CJ

## 2018-06-15 PROCEDURE — 25000003 PHARM REV CODE 250: Performed by: ANESTHESIOLOGY

## 2018-06-15 PROCEDURE — 63600175 PHARM REV CODE 636 W HCPCS: Performed by: STUDENT IN AN ORGANIZED HEALTH CARE EDUCATION/TRAINING PROGRAM

## 2018-06-15 PROCEDURE — 97110 THERAPEUTIC EXERCISES: CPT

## 2018-06-15 PROCEDURE — 11000001 HC ACUTE MED/SURG PRIVATE ROOM

## 2018-06-15 PROCEDURE — 99231 SBSQ HOSP IP/OBS SF/LOW 25: CPT | Mod: GC,,, | Performed by: HOSPITALIST

## 2018-06-15 RX ADMIN — ROSUVASTATIN CALCIUM 20 MG: 20 TABLET, FILM COATED ORAL at 09:06

## 2018-06-15 RX ADMIN — ENOXAPARIN SODIUM 40 MG: 100 INJECTION SUBCUTANEOUS at 05:06

## 2018-06-15 RX ADMIN — LEVOTHYROXINE SODIUM 100 MCG: 100 TABLET ORAL at 06:06

## 2018-06-15 RX ADMIN — ACETAMINOPHEN 1000 MG: 500 TABLET ORAL at 01:06

## 2018-06-15 RX ADMIN — PREGABALIN 75 MG: 75 CAPSULE ORAL at 09:06

## 2018-06-15 RX ADMIN — AMLODIPINE BESYLATE 10 MG: 10 TABLET ORAL at 09:06

## 2018-06-15 RX ADMIN — OXYCODONE HYDROCHLORIDE 5 MG: 5 TABLET ORAL at 11:06

## 2018-06-15 RX ADMIN — AMOXICILLIN AND CLAVULANATE POTASSIUM 1 TABLET: 875; 125 TABLET, FILM COATED ORAL at 09:06

## 2018-06-15 RX ADMIN — OXYCODONE HYDROCHLORIDE 5 MG: 5 TABLET ORAL at 09:06

## 2018-06-15 RX ADMIN — MICONAZOLE NITRATE: 20 POWDER TOPICAL at 09:06

## 2018-06-15 RX ADMIN — DOXYCYCLINE HYCLATE 100 MG: 100 TABLET, COATED ORAL at 09:06

## 2018-06-15 RX ADMIN — METOPROLOL SUCCINATE 25 MG: 25 TABLET, EXTENDED RELEASE ORAL at 09:06

## 2018-06-15 RX ADMIN — OXYCODONE HYDROCHLORIDE 5 MG: 5 TABLET ORAL at 06:06

## 2018-06-15 RX ADMIN — STANDARDIZED SENNA CONCENTRATE AND DOCUSATE SODIUM 1 TABLET: 8.6; 5 TABLET, FILM COATED ORAL at 09:06

## 2018-06-15 RX ADMIN — PANTOPRAZOLE SODIUM 40 MG: 40 TABLET, DELAYED RELEASE ORAL at 09:06

## 2018-06-15 RX ADMIN — BENAZEPRIL HYDROCHLORIDE 20 MG: 20 TABLET, FILM COATED ORAL at 09:06

## 2018-06-15 RX ADMIN — CLOPIDOGREL 75 MG: 75 TABLET, FILM COATED ORAL at 09:06

## 2018-06-15 NOTE — PT/OT/SLP PROGRESS
Physical Therapy Treatment    Patient Name:  Suzan Villarreal   MRN:  [id number]    Recommendations:     Discharge Recommendations:  nursing facility, skilled   Discharge Equipment Recommendations:  (TBD)   Barriers to discharge: None    Assessment:     Suzan Villarreal is a 89 y.o. 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  She presents with the following impairments/functional limitations:  weakness, impaired endurance, impaired self care skills, gait instability, impaired functional mobilty, decreased ROM, pain, decreased lower extremity function .Pt motivated and cooperative with treatment session. Pt Progressing with PT Intervention.Pt would continue to benefit from skilled PT to address overall functional mobility and goals. Goals remain appropriate.      Rehab Prognosis:  good; patient would benefit from acute skilled PT services to address these deficits and reach maximum level of function.      Recent Surgery: Procedure(s) (LRB):  AMPUTATION, LOWER EXTREMITY, ABOVE KNEE (Right) 10 Days Post-Op    Plan:     During this hospitalization, patient to be seen 3 x/week to address the above listed problems via gait training, therapeutic activities, therapeutic exercises, neuromuscular re-education  · Plan of Care Expires:  06/27/18   Plan of Care Reviewed with: patient, daughter    Subjective     Communicated with RN prior to session.  Patient found  upon PT entry to room, agreeable to treatment.      Pain/Comfort:  · Pain Rating 1: 0/10  · Location - Side 1: Right  · Location - Orientation 1: generalized  · Location 1: leg  · Pain Addressed 1: Reposition, Distraction, Cessation of Activity, Nurse notified  · Pain Rating Post-Intervention 1:  (did not rate)    Patients cultural, spiritual, Ep[employer]alian conflicts given the current situation: none noted    Objective:     Patient found with: pressure relief boots     General Precautions: Standard, fall   Orthopedic  Precautions:N/A   Braces: N/A     Functional Mobility:  · Rolling Left:  minimum assistance and with side rail  · Rolling Right: minimum assistance and with side rail  · Scooting: total assistance and of 2 persons  · Supine to Sit: maximal assistance  · Sit to Supine: min assistance  · Transfers:     · Sit to Stand:  maximal assistance and of 2 persons with rolling walker  Balance: pt requires SBA to mod assist to maintain sitting balance EOB using BUE support due to posterior lean with tolerance for approx 15 min. pt requires max assist x2 for static standing using RW x 3 trials    AM-PAC 6 CLICK MOBILITY  Turning over in bed (including adjusting bedclothes, sheets and blankets)?: 2  Sitting down on and standing up from a chair with arms (e.g., wheelchair, bedside commode, etc.): 2  Moving from lying on back to sitting on the side of the bed?: 2  Moving to and from a bed to a chair (including a wheelchair)?: 1  Need to walk in hospital room?: 1  Climbing 3-5 steps with a railing?: 1  Total Score: 9       Therapeutic Activities and Exercises:   Discussed/educated patient on progress, safety, importance of OOB mobility for improving outcomes , PT POC   Patient performed therex X 15 reps seated/supine L LE AROM AP, LAQ,  And B LE Hip Flexion, Hip Abd/Add   White board updated        Patient left supine with all lines intact, call button in reach and nsg notified..      GOALS:    Physical Therapy Goals        Problem: Physical Therapy Goal    Goal Priority Disciplines Outcome Goal Variances Interventions   Physical Therapy Goal     PT/OT, PT Ongoing (interventions implemented as appropriate)     Description:  Goals to be met by: 6/15/18     Patient will increase functional independence with mobility by performin. Supine to sit with Maximum Assistance Met   Upgrade: supine to sit with min assist  2. Sit to supine with Maximum Assistance Met    Upgrade: sit to supine with min assist  3. Rolling to Left and Right  with Maximum Assistance. Met   Upgrade: rolling L and R with min assist  4. Sit to stand transfer with Maximum Assistance  5. Bed to chair transfer with Maximum Assistance using Rolling Walker  6. Sitting at edge of bed x5 minutes with Moderate Assistance                       Time Tracking:     PT Received On: 06/15/18  PT Start Time: 0925     PT Stop Time: 0950  PT Total Time (min): 25 min     Billable Minutes: Therapeutic Activity 15 and Therapeutic Exercise 10    Treatment Type: Treatment  PT/PTA: PTA     PTA Visit Number: 2     Maverick Guajardo, FERNANDO  06/15/2018

## 2018-06-15 NOTE — PLAN OF CARE
Problem: Occupational Therapy Goal  Goal: Occupational Therapy Goal  Goals to be met by: 6/22/18     Patient will increase functional independence with ADLs by performing:    Feeding with Set-up Assistance while seated EOB/UIC.  UE Dressing with Minimal Assistance. GOAL MET 6/13   REVISED: UE Dressing with Setup Assistance  Grooming while EOB with Minimal Assistance GOAL MET 6/15  Toileting from bedside commode with Minimal Assistance for hygiene and clothing management.   Sitting at edge of bed x5 minutes with Stand-by Assistance.   Rolling to Bilateral with Moderate Assistance. GOAL MET 6/11   REVISED: Rolling to Bilateral with Minimal Assistance GOAL MET 6/15   REVISED: Rolling to Bilateral with CGA  Supine to sit with Moderate Assistance.  Stand pivot transfers with Moderate Assistance.  Toilet transfer to bedside commode with Moderate Assistance.        Outcome: Ongoing (interventions implemented as appropriate)  Pt met goals from grooming and rolling, rolling goal revised to CGA level; pt progressing toward remaining goals     Comments: Continue OT GOYO Crawley OT  6/15/2018

## 2018-06-15 NOTE — ASSESSMENT & PLAN NOTE
- Appreciate Gen Surg and wound care consults  - 6/10: Transitioned abx from vanc & Unasyn to doxy and Augmentin  - 6/11: Still some yellow discharge from wound, Gen Surg to reviewed again: no signs of worsening infection, recommended continuing wound care with Medihoney and daily packing changes, continue PO abx, staples removed  - 6/14: Improving  - Continue abx (end date 6/16), wound care

## 2018-06-15 NOTE — NURSING
Pt awake, alert, resp even  And unlabored. Skin warm and dry to touch.  Able to make her needs known.  Incont of bladder, family prefers that pt wears her briefs instead of just the pad.

## 2018-06-15 NOTE — SUBJECTIVE & OBJECTIVE
Interval History: no acute events. Pain well controlled. Had some nausea overnight    Review of Systems   Constitutional: Positive for activity change, appetite change and fatigue.   HENT: Negative for trouble swallowing.    Eyes: Negative for visual disturbance.   Respiratory: Negative for cough and shortness of breath.    Cardiovascular: Negative for chest pain.   Gastrointestinal: Negative for abdominal distention and abdominal pain.   Genitourinary: Negative for dysuria.   Musculoskeletal: Positive for gait problem.   Skin: Positive for color change and wound.   Neurological: Negative for headaches.   Psychiatric/Behavioral: Negative for confusion.     Objective:     Vital Signs (Most Recent):  Temp: 96.5 °F (35.8 °C) (06/15/18 1555)  Pulse: 90 (06/15/18 1555)  Resp: 18 (06/15/18 1555)  BP: 131/67 (06/15/18 1555)  SpO2: (!) 90 % (06/15/18 1555) Vital Signs (24h Range):  Temp:  [95.8 °F (35.4 °C)-98.3 °F (36.8 °C)] 96.5 °F (35.8 °C)  Pulse:  [74-96] 90  Resp:  [16-18] 18  SpO2:  [89 %-94 %] 90 %  BP: (112-138)/(59-78) 131/67     Weight: 49.9 kg (110 lb)  Body mass index is 22.99 kg/m².    Intake/Output Summary (Last 24 hours) at 06/15/18 1639  Last data filed at 06/15/18 0630   Gross per 24 hour   Intake              720 ml   Output              300 ml   Net              420 ml      Physical Exam   Constitutional: She is oriented to person, place, and time. No distress.   Elderly, frail woman   HENT:   Head: Normocephalic and atraumatic.   Eyes: Conjunctivae are normal. No scleral icterus.   Neck: Normal range of motion.   Cardiovascular: Normal rate, regular rhythm and normal heart sounds.    Pulmonary/Chest: Effort normal and breath sounds normal.   Abdominal: Soft. She exhibits no distension. There is no tenderness.   Midline laparotomy scar with some erythema around lower portion of scar   Ostomy bag in place, stool present, no erythema/ signs of infection at site   Musculoskeletal: Normal range of motion.  She exhibits no edema.   R AKA wound bandaged, c/d/i   Neurological: She is alert and oriented to person, place, and time.   Skin: Skin is warm and dry. She is not diaphoretic.   DTI of skin over left heel  Skin breakdown over left toes   Psychiatric: She has a normal mood and affect. Her behavior is normal.       Significant Labs: All pertinent labs within the past 24 hours have been reviewed.    Significant Imaging: I have reviewed all pertinent imaging results/findings within the past 24 hours.

## 2018-06-15 NOTE — PLAN OF CARE
Problem: Physical Therapy Goal  Goal: Physical Therapy Goal  Goals to be met by: 6/15/18     Patient will increase functional independence with mobility by performin. Supine to sit with Maximum Assistance Met   Upgrade: supine to sit with min assist  2. Sit to supine with Maximum Assistance Met    Upgrade: sit to supine with min assist  3. Rolling to Left and Right with Maximum Assistance. Met   Upgrade: rolling L and R with min assist  4. Sit to stand transfer with Maximum Assistance  5. Bed to chair transfer with Maximum Assistance using Rolling Walker  6. Sitting at edge of bed x5 minutes with Moderate Assistance-met      Pt progressing towards goals. continue with PT POC.Goals remain appropriate.  Maverick Guajardo PTA  6/15/2018

## 2018-06-15 NOTE — PLAN OF CARE
Problem: Amputation, Lower Extremity (Adult)  Intervention: Support Surgical/Anesthesia Recovery  Incentive spir encouraged

## 2018-06-15 NOTE — ASSESSMENT & PLAN NOTE
- Has not started treatment, will need Oncology follow up and staging scans on discharge  - Family wanting to move patient to [place], may arrange follow up there at [facility]

## 2018-06-15 NOTE — PLAN OF CARE
ADAM called and spoke with clinical intake department at [facility] requesting waiver for out of network care. Patient info given, [facility] contact info given. According to [facility], this facility is in network with patient's [facility] insurance. Call reference number is [id number]. Will update family. Will follow.

## 2018-06-15 NOTE — NURSING
Skin breakdown noted to perineum. Pt has been notified and suggested deneen pads only for incontinence. Pt insists on wearing incontinence briefs from home with a sanitary pad placed inside for urine incontinence. Perineal cream applied. Will continue to monitor.

## 2018-06-15 NOTE — PROGRESS NOTES
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 Villarreal is a 89 woman with PMHx significant for severe PVD, HTN, HLD, hypothyroidism, GERD and chronic constipation who presents to the ED with severe pain, gangrene of the R foot. Patient with recent admission for perforated viscus, s/p ex-lap and ostomy (please see Discharge Summary from 5/31 for further details of that admission.) Patient's daughter is at bedside and provides much of the history. Patient first developed foot pain 3-4 weeks ago. Then around Mother's Day began developing a black eschar on her distal R great toe. Follows with Dr. Garcia in Vascular Surgery. On 5/16, aortogram with run off performed. R AKA recommended; however, patient and family refused at that time. Pain progressed since that time, with ulceration of the dorsum of her foot and heel developing. Daughter brought patient back to ED due to uncontrolled pain.     Hospital Course:  Patient was admitted to hospital medicine for pain control for right foot gangrene and medication management. Vascular Surgery evaluated patient, plan for right AKA on 6/5. Scheduled percocet 5 started with prn oxycodone 10. Pain controlled overnight.  06/04/2018: Pain overnight, night MD called for IV morphine but patient later refused, discussed with patient and family will change pain meds to scheduled oxy IR 5mg q6 with breakthrough. Awaiting AKA tomorrow. Also placed on 2L supplemental O2 via NC but no SOB, suspect poor pulse ox readings from fingers, will wean today.   06/05/2018: Pain well controlled overnight on  scheduled oxycodone. Plan for AKA today with vascular sx.  06/06/2018: AKA yesterday, denies pain. No subjective complaints. VSS, afebrile.   06/07/2018: ISH. Pain well controlled.   06/08/2018: ISH. Perineural catheter removed, reports some pain but controlled with oral medication.  06/09/2018: Some pain overnight, responded to one dose of oxycodone. VSS, afebrile.   06/10/2018: No events overnight. Patient reports pain is well controlled. She is alert and oriented.  06/11/2018: ISH. Medically stable, ready for transfer to SNF.   06/12/2018: ISH. Some discoloration of skin of left heel, will ask wound care to see.   06/13/2018: ISH. Medically stable, awaiting SNF placement.  06/14/2018: Some pain overnight, responded to oxycodone. Changed scheduled Tylenol to PRN as patient has been received 3g/day for last 6 days. Medically stable, awaiting placement.    Interval History: no acute events. Pain well controlled. Had some nausea overnight    Review of Systems   Constitutional: Positive for activity change, appetite change and fatigue.   HENT: Negative for trouble swallowing.    Eyes: Negative for visual disturbance.   Respiratory: Negative for cough and shortness of breath.    Cardiovascular: Negative for chest pain.   Gastrointestinal: Negative for abdominal distention and abdominal pain.   Genitourinary: Negative for dysuria.   Musculoskeletal: Positive for gait problem.   Skin: Positive for color change and wound.   Neurological: Negative for headaches.   Psychiatric/Behavioral: Negative for confusion.     Objective:     Vital Signs (Most Recent):  Temp: 96.5 °F (35.8 °C) (06/15/18 1555)  Pulse: 90 (06/15/18 1555)  Resp: 18 (06/15/18 1555)  BP: 131/67 (06/15/18 1555)  SpO2: (!) 90 % (06/15/18 1555) Vital Signs (24h Range):  Temp:  [95.8 °F (35.4 °C)-98.3 °F (36.8 °C)] 96.5 °F (35.8 °C)  Pulse:  [74-96] 90  Resp:  [16-18] 18  SpO2:  [89 %-94 %] 90 %  BP: (112-138)/(59-78) 131/67     Weight: 49.9 kg (110  lb)  Body mass index is 22.99 kg/m².    Intake/Output Summary (Last 24 hours) at 06/15/18 1639  Last data filed at 06/15/18 0630   Gross per 24 hour   Intake              720 ml   Output              300 ml   Net              420 ml      Physical Exam   Constitutional: She is oriented to person, place, and time. No distress.   Elderly, frail woman   HENT:   Head: Normocephalic and atraumatic.   Eyes: Conjunctivae are normal. No scleral icterus.   Neck: Normal range of motion.   Cardiovascular: Normal rate, regular rhythm and normal heart sounds.    Pulmonary/Chest: Effort normal and breath sounds normal.   Abdominal: Soft. She exhibits no distension. There is no tenderness.   Midline laparotomy scar with some erythema around lower portion of scar   Ostomy bag in place, stool present, no erythema/ signs of infection at site   Musculoskeletal: Normal range of motion. She exhibits no edema.   R AKA wound bandaged, c/d/i   Neurological: She is alert and oriented to person, place, and time.   Skin: Skin is warm and dry. She is not diaphoretic.   DTI of skin over left heel  Skin breakdown over left toes   Psychiatric: She has a normal mood and affect. Her behavior is normal.       Significant Labs: All pertinent labs within the past 24 hours have been reviewed.    Significant Imaging: I have reviewed all pertinent imaging results/findings within the past 24 hours.    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         Pressure injury of deep tissue    - Left heel  - Appreciate wound care recs  - Continue Betadine soaks daily   - Continue boots        Primary adenocarcinoma of transverse colon    - Has not started treatment, will need  "Oncology follow up and staging scans on discharge  - Family wanting to move patient to [place], may arrange follow up there at [facility]        Delayed surgical wound healing    - Appreciate Gen Surg and wound care consults  - 6/10: Transitioned abx from vanc & Unasyn to doxy and Augmentin  - 6/11: Still some yellow discharge from wound, Gen Surg to reviewed again: no signs of worsening infection, recommended continuing wound care with Medihoney and daily packing changes, continue PO abx, staples removed  - 6/14: Improving  - Continue abx (end date 6/16), wound care        S/P right hemicolectomy    - Pathology returned: "MODERATELY TO POORLY DIFFERENTIATED ADENOCARCINOMA WITH INFILTRATION THROUGH THE MUSCULARIS INTO PERICOLONIC ADIPOSE TISSUE, WITH MUCH LYMPHOVASCULAR INVASION, AND WITH METASTASES TO 7 OF 22 LYMPH NODES"  - Patient and family aware of cancer diagnosis   - Has not started treatment, will need Oncology follow up and staging scans on discharge  - Family wanting to move patient to [place], may arrange follow up there        PVD (peripheral vascular disease)    - Has ASA allergy  - 6/11: restarted clopidogrel        Hyponatremia    - Mild, chronic, stable  - Will monitor daily        Hypothyroidism    - Continue home levothyroxine        GERD (gastroesophageal reflux disease)    - Home med Nexium, pantoprazole while inpatient        Essential hypertension    - Continue home amlodipine 10, Toprol 25 and benazepril 20mg          VTE Risk Mitigation         Ordered     enoxaparin injection 40 mg  Daily      06/09/18 0850     IP VTE HIGH RISK PATIENT  Once      06/02/18 8770              Jordy Buitrago MD  Department of Hospital Medicine   [facility]  "

## 2018-06-15 NOTE — PT/OT/SLP PROGRESS
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  Pain/Comfort:  · Pain Rating 1: 0/10  · Location - Side 1: Right  · Location - Orientation 1: generalized  · Location 1: leg  · Pain Addressed 1: Reposition, Distraction, Cessation of Activity, Nurse notified  · Pain Rating Post-Intervention 1:  (Pt did not rate, but request pain pill)    Patients cultural, spiritual, Mandaen conflicts given the current situation: none reported    Objective:     Patient found with: pressure relief boots    General Precautions: Standard, fall   Orthopedic Precautions: (R AKA)   Braces: N/A     Occupational Performance:    Bed Mobility:    · Patient completed Rolling/Turning to Left with  minimum assistance and with side rail  · Patient completed Rolling/Turning to Right with minimum assistance and with side rail  · Patient completed Scooting/Bridging with moderate assistance  · Patient completed Supine to Sit with maximal assistance  · Patient completed Sit to Supine with minimum assistance     Functional Mobility/Transfers:  · Patient completed Sit <> Stand Transfer with maximal assistance and of 2 persons  with  standard walker   · Functional Mobility: NT    Activities of Daily Living:  · Grooming: setup assistance seated EOB to wash face  · UB Dressing: setup assistance to doff/don gown while seated EOB   · Toileting: total assistance for hygiene and doff/don brief in supine    Patient left HOB elevated with all lines intact, call button in reach and daughter present    [facility] 6 Click:  [facility] Total Score: 15    Treatment & Education:  BUE AROM exercises 10 x 1 for shoulder flex to 90 and elbow flex/ext while  seated EOB; assist for RUE to reach 90 flex  Pt educated on role of OT/POC  White board/communication board updated  Education:    Assessment:     Suzan Villarreal is a 89 y.o. 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  She presents with balance impairments and ROM deficits preventing participation in functional mobility.  Performance deficits affecting function are weakness, impaired endurance, impaired functional mobilty, impaired self care skills, impaired balance, decreased lower extremity function, pain, decreased ROM.      Rehab Prognosis:  Fair; patient would benefit from acute skilled OT services to address these deficits and reach maximum level of function.       Plan:     Patient to be seen 4 x/week to address the above listed problems via self-care/home management, therapeutic activities, therapeutic exercises  · Plan of Care Expires: 07/08/18  · Plan of Care Reviewed with: patient, daughter    This Plan of care has been discussed with the patient who was involved in its development and understands and is in agreement with the identified goals and treatment plan    GOALS:    Occupational Therapy Goals        Problem: Occupational Therapy Goal    Goal Priority Disciplines Outcome Interventions   Occupational Therapy Goal     OT, PT/OT Ongoing (interventions implemented as appropriate)    Description:  Goals to be met by: 6/22/18     Patient will increase functional independence with ADLs by performing:    Feeding with Set-up Assistance while seated EOB/UIC.  UE Dressing with Minimal Assistance. GOAL MET 6/13   REVISED: UE Dressing with Setup Assistance  Grooming while EOB with Minimal Assistance GOAL MET 6/15  Toileting from bedside commode with Minimal Assistance for hygiene and clothing management.   Sitting at edge of bed x5 minutes with Stand-by Assistance.   Rolling to Bilateral with Moderate Assistance. GOAL MET 6/11   REVISED: Rolling to Bilateral with  Minimal Assistance GOAL MET 6/15   REVISED: Rolling to Bilateral with CGA  Supine to sit with Moderate Assistance.  Stand pivot transfers with Moderate Assistance.  Toilet transfer to bedside commode with Moderate Assistance.                          Time Tracking:     OT Date of Treatment: 06/15/18  OT Start Time: 0925  OT Stop Time: 0951  OT Total Time (min): 26 min    Billable Minutes:Self Care/Home Management 15  Therapeutic Exercise 11    Ryann Crawley, CHEN  6/15/2018

## 2018-06-16 PROCEDURE — 25000003 PHARM REV CODE 250: Performed by: STUDENT IN AN ORGANIZED HEALTH CARE EDUCATION/TRAINING PROGRAM

## 2018-06-16 PROCEDURE — 25000003 PHARM REV CODE 250: Performed by: ANESTHESIOLOGY

## 2018-06-16 PROCEDURE — 25000003 PHARM REV CODE 250: Performed by: INTERNAL MEDICINE

## 2018-06-16 PROCEDURE — 25000003 PHARM REV CODE 250

## 2018-06-16 PROCEDURE — 63600175 PHARM REV CODE 636 W HCPCS: Performed by: STUDENT IN AN ORGANIZED HEALTH CARE EDUCATION/TRAINING PROGRAM

## 2018-06-16 PROCEDURE — 11000001 HC ACUTE MED/SURG PRIVATE ROOM

## 2018-06-16 PROCEDURE — 99231 SBSQ HOSP IP/OBS SF/LOW 25: CPT | Mod: GC,,, | Performed by: HOSPITALIST

## 2018-06-16 RX ADMIN — DOXYCYCLINE HYCLATE 100 MG: 100 TABLET, COATED ORAL at 09:06

## 2018-06-16 RX ADMIN — METOPROLOL SUCCINATE 25 MG: 25 TABLET, EXTENDED RELEASE ORAL at 09:06

## 2018-06-16 RX ADMIN — OXYCODONE HYDROCHLORIDE 5 MG: 5 TABLET ORAL at 06:06

## 2018-06-16 RX ADMIN — OXYCODONE HYDROCHLORIDE 5 MG: 5 TABLET ORAL at 03:06

## 2018-06-16 RX ADMIN — PANTOPRAZOLE SODIUM 40 MG: 40 TABLET, DELAYED RELEASE ORAL at 09:06

## 2018-06-16 RX ADMIN — STANDARDIZED SENNA CONCENTRATE AND DOCUSATE SODIUM 1 TABLET: 8.6; 5 TABLET, FILM COATED ORAL at 09:06

## 2018-06-16 RX ADMIN — BENAZEPRIL HYDROCHLORIDE 20 MG: 20 TABLET, FILM COATED ORAL at 09:06

## 2018-06-16 RX ADMIN — AMLODIPINE BESYLATE 10 MG: 10 TABLET ORAL at 09:06

## 2018-06-16 RX ADMIN — PREGABALIN 75 MG: 75 CAPSULE ORAL at 09:06

## 2018-06-16 RX ADMIN — ROSUVASTATIN CALCIUM 20 MG: 20 TABLET, FILM COATED ORAL at 09:06

## 2018-06-16 RX ADMIN — MICONAZOLE NITRATE: 20 POWDER TOPICAL at 09:06

## 2018-06-16 RX ADMIN — LEVOTHYROXINE SODIUM 100 MCG: 100 TABLET ORAL at 06:06

## 2018-06-16 RX ADMIN — ENOXAPARIN SODIUM 40 MG: 100 INJECTION SUBCUTANEOUS at 05:06

## 2018-06-16 RX ADMIN — AMOXICILLIN AND CLAVULANATE POTASSIUM 1 TABLET: 875; 125 TABLET, FILM COATED ORAL at 09:06

## 2018-06-16 RX ADMIN — CLOPIDOGREL 75 MG: 75 TABLET, FILM COATED ORAL at 09:06

## 2018-06-16 NOTE — NURSING
Pt in bed awake, alert resp even and unlabored. Skin warm and dry to touch. Family at the bedside. Safety measures in place

## 2018-06-16 NOTE — PLAN OF CARE
Problem: Pressure Ulcer Risk (Wellington Scale) (Adult,Obstetrics,Pediatric)  Goal: Identify Related Risk Factors and Signs and Symptoms  Related risk factors and signs and symptoms are identified upon initiation of Human Response Clinical Practice Guideline (CPG)   Outcome: Ongoing (interventions implemented as appropriate)  Pt and family verbalize understanding of ongoing plan of care

## 2018-06-16 NOTE — PLAN OF CARE
CM spoke with daughter's Haley and Christen (2 separate calls). Informed both of [facility] status of nursing home and will need to send for auth Monday or Tuesday if they can accept patient. CM to follow up with Ruben on Monday. Family has also gotten quotes on flights with [employer] ([phone]). Quotes differ based on needs of patient. Christen will forward quotes to this CM and CM will review with MDs. Updated therapy and medicine progress note sent via . Will follow.

## 2018-06-16 NOTE — SUBJECTIVE & OBJECTIVE
Review of Systems   Constitutional: Positive for activity change, appetite change and fatigue.   HENT: Negative for trouble swallowing.    Eyes: Negative for visual disturbance.   Respiratory: Negative for cough and shortness of breath.    Cardiovascular: Negative for chest pain.   Gastrointestinal: Negative for abdominal distention and abdominal pain.   Genitourinary: Negative for dysuria.   Musculoskeletal: Positive for gait problem.   Skin: Positive for color change and wound.   Neurological: Negative for headaches.   Psychiatric/Behavioral: Negative for confusion.     Objective:     Vital Signs (Most Recent):  Temp: 97.3 °F (36.3 °C) (06/16/18 0450)  Pulse: 70 (06/16/18 0450)  Resp: 17 (06/16/18 0450)  BP: 134/63 (06/16/18 0450)  SpO2: 96 % (06/16/18 0450) Vital Signs (24h Range):  Temp:  [95.8 °F (35.4 °C)-97.8 °F (36.6 °C)] 97.3 °F (36.3 °C)  Pulse:  [70-96] 70  Resp:  [16-18] 17  SpO2:  [89 %-96 %] 96 %  BP: (118-138)/(57-78) 134/63     Weight: 49.9 kg (110 lb)  Body mass index is 22.99 kg/m².    Intake/Output Summary (Last 24 hours) at 06/16/18 0729  Last data filed at 06/16/18 0600   Gross per 24 hour   Intake             1310 ml   Output                0 ml   Net             1310 ml      Physical Exam   Constitutional: She is oriented to person, place, and time. No distress.   Elderly, frail woman   HENT:   Head: Normocephalic and atraumatic.   Eyes: Conjunctivae are normal. No scleral icterus.   Neck: Normal range of motion.   Cardiovascular: Normal rate, regular rhythm and normal heart sounds.    Pulmonary/Chest: Effort normal and breath sounds normal.   Abdominal: Soft. She exhibits no distension. There is no tenderness.   Midline laparotomy scar with some erythema around lower portion of scar   Ostomy bag in place, stool present, no erythema/ signs of infection at site   Musculoskeletal: Normal range of motion. She exhibits no edema.   R AKA surgical wound healing with staples in place    Neurological: She is alert and oriented to person, place, and time.   Skin: Skin is warm and dry. She is not diaphoretic.   DTI of skin over left heel  Skin breakdown over left toes   Psychiatric: She has a normal mood and affect. Her behavior is normal.       Significant Labs and Imaging: No labs or imaging.

## 2018-06-16 NOTE — ASSESSMENT & PLAN NOTE
- Has not started treatment, will need Oncology follow up and staging scans on discharge  - Family wanting to move patient to [place], may arrange follow up there at [facility]

## 2018-06-16 NOTE — PROGRESS NOTES
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 Villarreal is a 89 woman with PMHx significant for severe PVD, HTN, HLD, hypothyroidism, GERD and chronic constipation who presents to the ED with severe pain, gangrene of the R foot. Patient with recent admission for perforated viscus, s/p ex-lap and ostomy (please see Discharge Summary from 5/31 for further details of that admission.) Patient's daughter is at bedside and provides much of the history. Patient first developed foot pain 3-4 weeks ago. Then around Mother's Day began developing a black eschar on her distal R great toe. Follows with Dr. Garcia in Vascular Surgery. On 5/16, aortogram with run off performed. R AKA recommended; however, patient and family refused at that time. Pain progressed since that time, with ulceration of the dorsum of her foot and heel developing. Daughter brought patient back to ED due to uncontrolled pain.     Hospital Course:  Patient was admitted to hospital medicine for pain control for right foot gangrene and medication management. Vascular Surgery evaluated patient, plan for right AKA on 6/5. Scheduled percocet 5 started with prn oxycodone 10. Pain controlled overnight.  06/04/2018: Pain overnight, night MD called for IV morphine but patient later refused, discussed with patient and family will change pain meds to scheduled oxy IR 5mg q6 with breakthrough. Awaiting AKA tomorrow. Also placed on 2L supplemental O2 via NC but no SOB, suspect poor pulse ox readings from fingers, will wean today.   06/05/2018: Pain well controlled overnight on  "scheduled oxycodone. Plan for AKA today with vascular sx.  06/06/2018: AKA yesterday, denies pain. No subjective complaints. VSS, afebrile.   06/07/2018: ISH. Pain well controlled.   06/08/2018: ISH. Perineural catheter removed, reports some pain but controlled with oral medication.  06/09/2018: Some pain overnight, responded to one dose of oxycodone. VSS, afebrile.   06/10/2018: No events overnight. Patient reports pain is well controlled. She is alert and oriented.  06/11/2018: ISH. Medically stable, ready for transfer to SNF.   06/12/2018: ISH. Some discoloration of skin of left heel, will ask wound care to see.   06/13/2018: ISH. Medically stable, awaiting SNF placement.  06/14/2018: Some pain overnight, responded to oxycodone. Changed scheduled Tylenol to PRN as patient has been received 3g/day for last 6 days. Medically stable, awaiting placement.  06/15/2018: No acute events. Pain well controlled. Had some nausea overnight.  06/16/2018: ISH. Complains of some "prickling and puffiness" of stump, but normal exam. No other issues. Medically stable, awaiting SNF.        Review of Systems   Constitutional: Positive for activity change, appetite change and fatigue.   HENT: Negative for trouble swallowing.    Eyes: Negative for visual disturbance.   Respiratory: Negative for cough and shortness of breath.    Cardiovascular: Negative for chest pain.   Gastrointestinal: Negative for abdominal distention and abdominal pain.   Genitourinary: Negative for dysuria.   Musculoskeletal: Positive for gait problem.   Skin: Positive for color change and wound.   Neurological: Negative for headaches.   Psychiatric/Behavioral: Negative for confusion.     Objective:     Vital Signs (Most Recent):  Temp: 97.3 °F (36.3 °C) (06/16/18 0450)  Pulse: 70 (06/16/18 0450)  Resp: 17 (06/16/18 0450)  BP: 134/63 (06/16/18 0450)  SpO2: 96 % (06/16/18 0450) Vital Signs (24h Range):  Temp:  [95.8 °F (35.4 °C)-97.8 °F (36.6 °C)] 97.3 " °F (36.3 °C)  Pulse:  [70-96] 70  Resp:  [16-18] 17  SpO2:  [89 %-96 %] 96 %  BP: (118-138)/(57-78) 134/63     Weight: 49.9 kg (110 lb)  Body mass index is 22.99 kg/m².    Intake/Output Summary (Last 24 hours) at 06/16/18 0729  Last data filed at 06/16/18 0600   Gross per 24 hour   Intake             1310 ml   Output                0 ml   Net             1310 ml      Physical Exam   Constitutional: She is oriented to person, place, and time. No distress.   Elderly, frail woman   HENT:   Head: Normocephalic and atraumatic.   Eyes: Conjunctivae are normal. No scleral icterus.   Neck: Normal range of motion.   Cardiovascular: Normal rate, regular rhythm and normal heart sounds.    Pulmonary/Chest: Effort normal and breath sounds normal.   Abdominal: Soft. She exhibits no distension. There is no tenderness.   Midline laparotomy scar with some erythema around lower portion of scar   Ostomy bag in place, stool present, no erythema/ signs of infection at site   Musculoskeletal: Normal range of motion. She exhibits no edema.   R AKA surgical wound healing with staples in place   Neurological: She is alert and oriented to person, place, and time.   Skin: Skin is warm and dry. She is not diaphoretic.   DTI of skin over left heel  Skin breakdown over left toes   Psychiatric: She has a normal mood and affect. Her behavior is normal.       Significant Labs and Imaging: No labs or imaging.     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, Right Foot    - Vascular surgery consulted, appreciate recs  - S/p AKA 6/5/2018  - Continue post op care as per Vascular  - Perineural catheter removed 6/7, reports some pain but controlled with oral meds  - Medically stable ready for discharge to SNF         Intertrigo    - Miconazole powder BID        Pressure injury of deep tissue    - Left heel  - Appreciate wound care recs  - Continue Betadine soaks daily   - Continue boots        Primary adenocarcinoma of  "transverse colon    - Has not started treatment, will need Oncology follow up and staging scans on discharge  - Family wanting to move patient to [place], may arrange follow up there at [facility]        Delayed surgical wound healing    - Appreciate Gen Surg and wound care consults  - 6/10: Transitioned abx from vanc & Unasyn to doxy and Augmentin  - 6/11: Still some yellow discharge from wound, Gen Surg to reviewed again: no signs of worsening infection, recommended continuing wound care with Medihoney and daily packing changes, continue PO abx, staples removed  - 6/14: Improving  - Continue abx (end date 6/16), wound care        S/P right hemicolectomy    - Pathology returned: "MODERATELY TO POORLY DIFFERENTIATED ADENOCARCINOMA WITH INFILTRATION THROUGH THE MUSCULARIS INTO PERICOLONIC ADIPOSE TISSUE, WITH MUCH LYMPHOVASCULAR INVASION, AND WITH METASTASES TO 7 OF 22 LYMPH NODES"  - Patient and family aware of cancer diagnosis   - Has not started treatment, will need Oncology follow up and staging scans on discharge  - Family wanting to move patient to [place], may arrange follow up there        PVD (peripheral vascular disease)    - Has ASA allergy  - 6/11: restarted clopidogrel        Hypothyroidism    - Continue home levothyroxine        GERD (gastroesophageal reflux disease)    - Home med Nexium, pantoprazole while inpatient        Essential hypertension    - Continue home amlodipine 10, Toprol 25 and benazepril 20mg          VTE Risk Mitigation         Ordered     enoxaparin injection 40 mg  Daily      06/09/18 0850     IP VTE HIGH RISK PATIENT  Once      06/02/18 5173              April Castellanos MD  Department of Hospital Medicine   [facility]  "

## 2018-06-17 PROCEDURE — 25000003 PHARM REV CODE 250: Performed by: STUDENT IN AN ORGANIZED HEALTH CARE EDUCATION/TRAINING PROGRAM

## 2018-06-17 PROCEDURE — 25000003 PHARM REV CODE 250: Performed by: ANESTHESIOLOGY

## 2018-06-17 PROCEDURE — 63600175 PHARM REV CODE 636 W HCPCS: Performed by: STUDENT IN AN ORGANIZED HEALTH CARE EDUCATION/TRAINING PROGRAM

## 2018-06-17 PROCEDURE — 25000003 PHARM REV CODE 250: Performed by: INTERNAL MEDICINE

## 2018-06-17 PROCEDURE — 99231 SBSQ HOSP IP/OBS SF/LOW 25: CPT | Mod: GC,,, | Performed by: HOSPITALIST

## 2018-06-17 PROCEDURE — 11000001 HC ACUTE MED/SURG PRIVATE ROOM

## 2018-06-17 PROCEDURE — 25000003 PHARM REV CODE 250

## 2018-06-17 RX ORDER — AMOXICILLIN AND CLAVULANATE POTASSIUM 875; 125 MG/1; MG/1
1 TABLET, FILM COATED ORAL EVERY 12 HOURS
Status: DISCONTINUED | OUTPATIENT
Start: 2018-06-17 | End: 2018-06-20 | Stop reason: HOSPADM

## 2018-06-17 RX ORDER — DOXYCYCLINE HYCLATE 100 MG
100 TABLET ORAL EVERY 12 HOURS
Status: DISCONTINUED | OUTPATIENT
Start: 2018-06-17 | End: 2018-06-20 | Stop reason: HOSPADM

## 2018-06-17 RX ADMIN — BENAZEPRIL HYDROCHLORIDE 20 MG: 20 TABLET, FILM COATED ORAL at 10:06

## 2018-06-17 RX ADMIN — AMOXICILLIN AND CLAVULANATE POTASSIUM 1 TABLET: 875; 125 TABLET, FILM COATED ORAL at 10:06

## 2018-06-17 RX ADMIN — MICONAZOLE NITRATE: 20 POWDER TOPICAL at 08:06

## 2018-06-17 RX ADMIN — OXYCODONE HYDROCHLORIDE 5 MG: 5 TABLET ORAL at 06:06

## 2018-06-17 RX ADMIN — ENOXAPARIN SODIUM 40 MG: 100 INJECTION SUBCUTANEOUS at 05:06

## 2018-06-17 RX ADMIN — DOXYCYCLINE HYCLATE 100 MG: 100 TABLET, COATED ORAL at 08:06

## 2018-06-17 RX ADMIN — STANDARDIZED SENNA CONCENTRATE AND DOCUSATE SODIUM 1 TABLET: 8.6; 5 TABLET, FILM COATED ORAL at 08:06

## 2018-06-17 RX ADMIN — OXYCODONE HYDROCHLORIDE 5 MG: 5 TABLET ORAL at 10:06

## 2018-06-17 RX ADMIN — OXYCODONE HYDROCHLORIDE 5 MG: 5 TABLET ORAL at 08:06

## 2018-06-17 RX ADMIN — ROSUVASTATIN CALCIUM 20 MG: 20 TABLET, FILM COATED ORAL at 08:06

## 2018-06-17 RX ADMIN — AMOXICILLIN AND CLAVULANATE POTASSIUM 1 TABLET: 875; 125 TABLET, FILM COATED ORAL at 08:06

## 2018-06-17 RX ADMIN — BENAZEPRIL HYDROCHLORIDE 20 MG: 20 TABLET, FILM COATED ORAL at 08:06

## 2018-06-17 RX ADMIN — PANTOPRAZOLE SODIUM 40 MG: 40 TABLET, DELAYED RELEASE ORAL at 10:06

## 2018-06-17 RX ADMIN — OXYCODONE HYDROCHLORIDE 5 MG: 5 TABLET ORAL at 03:06

## 2018-06-17 RX ADMIN — LEVOTHYROXINE SODIUM 100 MCG: 100 TABLET ORAL at 06:06

## 2018-06-17 RX ADMIN — CLOPIDOGREL 75 MG: 75 TABLET, FILM COATED ORAL at 10:06

## 2018-06-17 RX ADMIN — AMLODIPINE BESYLATE 10 MG: 10 TABLET ORAL at 10:06

## 2018-06-17 RX ADMIN — PREGABALIN 75 MG: 75 CAPSULE ORAL at 08:06

## 2018-06-17 RX ADMIN — MICONAZOLE NITRATE: 20 POWDER TOPICAL at 10:06

## 2018-06-17 RX ADMIN — DOXYCYCLINE HYCLATE 100 MG: 100 TABLET, COATED ORAL at 10:06

## 2018-06-17 RX ADMIN — METOPROLOL SUCCINATE 25 MG: 25 TABLET, EXTENDED RELEASE ORAL at 10:06

## 2018-06-17 NOTE — PROGRESS NOTES
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 Villarreal is a 89 woman with PMHx significant for severe PVD, HTN, HLD, hypothyroidism, GERD and chronic constipation who presents to the ED with severe pain, gangrene of the R foot. Patient with recent admission for perforated viscus, s/p ex-lap and ostomy (please see Discharge Summary from 5/31 for further details of that admission.) Patient's daughter is at bedside and provides much of the history. Patient first developed foot pain 3-4 weeks ago. Then around Mother's Day began developing a black eschar on her distal R great toe. Follows with Dr. Garcia in Vascular Surgery. On 5/16, aortogram with run off performed. R AKA recommended; however, patient and family refused at that time. Pain progressed since that time, with ulceration of the dorsum of her foot and heel developing. Daughter brought patient back to ED due to uncontrolled pain.     Hospital Course:  Patient was admitted to hospital medicine for pain control for right foot gangrene and medication management. Vascular Surgery evaluated patient, plan for right AKA on 6/5. Scheduled percocet 5 started with prn oxycodone 10. Pain controlled overnight.  06/04/2018: Pain overnight, night MD called for IV morphine but patient later refused, discussed with patient and family will change pain meds to scheduled oxy IR 5mg q6 with breakthrough. Awaiting AKA tomorrow. Also placed on 2L supplemental O2 via NC but no SOB, suspect poor pulse ox readings from fingers, will wean today.   06/05/2018: Pain well controlled overnight on  "scheduled oxycodone. Plan for AKA today with vascular sx.  06/06/2018: AKA yesterday, denies pain. No subjective complaints. VSS, afebrile.   06/07/2018: ISH. Pain well controlled.   06/08/2018: ISH. Perineural catheter removed, reports some pain but controlled with oral medication.  06/09/2018: Some pain overnight, responded to one dose of oxycodone. VSS, afebrile.   06/10/2018: No events overnight. Patient reports pain is well controlled. She is alert and oriented.  06/11/2018: ISH. Medically stable, ready for transfer to SNF.   06/12/2018: ISH. Some discoloration of skin of left heel, will ask wound care to see.   06/13/2018: ISH. Medically stable, awaiting SNF placement.  06/14/2018: Some pain overnight, responded to oxycodone. Changed scheduled Tylenol to PRN as patient has been received 3g/day for last 6 days. Medically stable, awaiting placement.  06/15/2018: No acute events. Pain well controlled. Had some nausea overnight.  06/16/2018: ISH. Complains of some "prickling and puffiness" of stump, but normal exam. No other issues. Medically stable, awaiting SNF.  06/17/2018: No issues. Stable, waiting for placement.      Review of Systems   Constitutional: Positive for activity change, appetite change and fatigue.   HENT: Negative for trouble swallowing.    Eyes: Negative for visual disturbance.   Respiratory: Negative for cough and shortness of breath.    Cardiovascular: Negative for chest pain.   Gastrointestinal: Negative for abdominal distention and abdominal pain.   Genitourinary: Negative for dysuria.   Musculoskeletal: Positive for gait problem.   Skin: Positive for color change and wound.   Neurological: Negative for headaches.   Psychiatric/Behavioral: Negative for confusion.     Objective:     Vital Signs (Most Recent):  Temp: (!) 95.9 °F (35.5 °C) (06/17/18 0753)  Pulse: 83 (06/17/18 0753)  Resp: 18 (06/17/18 0753)  BP: (!) 149/65 (06/17/18 0753)  SpO2: (!) 89 % (06/17/18 0753) Vital Signs " (24h Range):  Temp:  [95.9 °F (35.5 °C)-98.4 °F (36.9 °C)] 95.9 °F (35.5 °C)  Pulse:  [81-86] 83  Resp:  [16-20] 18  SpO2:  [89 %-94 %] 89 %  BP: (122-149)/(58-66) 149/65     Weight: 49.9 kg (110 lb)  Body mass index is 22.99 kg/m².    Intake/Output Summary (Last 24 hours) at 06/17/18 0925  Last data filed at 06/17/18 0400   Gross per 24 hour   Intake             1190 ml   Output              350 ml   Net              840 ml      Physical Exam   Constitutional: She is oriented to person, place, and time. No distress.   Elderly, frail woman   HENT:   Head: Normocephalic and atraumatic.   Eyes: Conjunctivae are normal. No scleral icterus.   Neck: Normal range of motion.   Cardiovascular: Normal rate, regular rhythm and normal heart sounds.    Pulmonary/Chest: Effort normal and breath sounds normal.   Abdominal: Soft. She exhibits no distension. There is no tenderness.   Midline laparotomy scar with some erythema around lower portion of scar   Ostomy bag in place, stool present, no erythema/ signs of infection at site   Musculoskeletal: Normal range of motion. She exhibits no edema.   R AKA surgical wound healing with staples in place   Neurological: She is alert and oriented to person, place, and time.   Skin: Skin is warm and dry. She is not diaphoretic.   DTI of skin over left heel  Skin breakdown over left toes   Psychiatric: She has a normal mood and affect. Her behavior is normal.       Significant Labs and Imaging: No labs or imaging.     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 Garcia in Vascular Surgery clinic in 1 week if still in state  - Perineural catheter removed 6/7, reports some pain but controlled with oral meds  - Medically stable ready for discharge to SNF         Intertrigo    -  "Miconazole powder BID        Pressure injury of deep tissue    - Left heel  - Appreciate wound care recs  - Continue Betadine soaks daily   - Continue boots, improving        Primary adenocarcinoma of transverse colon    - Has not started treatment, will need Oncology follow up and staging scans on discharge  - Family wanting to move patient to [place], may arrange follow up there at [facility]        Delayed surgical wound healing    - Appreciate Gen Surg and wound care consults  - 6/10: Transitioned abx from vanc & Unasyn to doxy and Augmentin  - 6/11: Still some yellow discharge from wound, Gen Surg to reviewed again: no signs of worsening infection, recommended continuing wound care with Medihoney and daily packing changes, continue PO abx, staples removed  - 6/14: Improving  - Continue abx (end date 6/16), wound care        S/P right hemicolectomy    - Pathology returned: "MODERATELY TO POORLY DIFFERENTIATED ADENOCARCINOMA WITH INFILTRATION THROUGH THE MUSCULARIS INTO PERICOLONIC ADIPOSE TISSUE, WITH MUCH LYMPHOVASCULAR INVASION, AND WITH METASTASES TO 7 OF 22 LYMPH NODES"  - Patient and family aware of cancer diagnosis   - Has not started treatment, will need Oncology follow up and staging scans on discharge  - Family wanting to move patient to [place], may arrange follow up there        PVD (peripheral vascular disease)    - Has ASA allergy  - 6/11: restarted clopidogrel        Hypothyroidism    - Continue home levothyroxine        GERD (gastroesophageal reflux disease)    - Home med Nexium, pantoprazole while inpatient        Essential hypertension    - Continue home amlodipine 10, Toprol 25 and benazepril 20mg          VTE Risk Mitigation         Ordered     enoxaparin injection 40 mg  Daily      06/09/18 0850     IP VTE HIGH RISK PATIENT  Once      06/02/18 6105              April Castellanos MD  Department of Hospital Medicine   [facility]  "

## 2018-06-17 NOTE — ASSESSMENT & PLAN NOTE
- Vascular surgery consulted, appreciate recs  - S/p AKA 6/5/2018  - Continue post op care as per Vascular, staples to be removed around 6/26/2018, follow up with Dr. Garcia in Vascular Surgery clinic in 1 week if still in state  - Perineural catheter removed 6/7, reports some pain but controlled with oral meds  - Medically stable ready for discharge to SNF

## 2018-06-17 NOTE — PROGRESS NOTES
[facility]  Vascular Surgery  Progress Note    Patient Name: Suzan Villarreal  MRN: [id number]  Admission Date: 6/2/2018  Primary Care Provider: Aly Rivas MD    Subjective:     Interval History: NAEON. AFVSS. No Complaints    Post-Op Info:  Procedure(s) (LRB):  AMPUTATION, LOWER EXTREMITY, ABOVE KNEE (Right)   12 Days Post-Op       Medications:  Continuous Infusions:    Scheduled Meds:   amLODIPine  10 mg Oral Daily    benazepril  20 mg Oral BID    clopidogrel  75 mg Oral Daily    enoxaparin  40 mg Subcutaneous Daily    levothyroxine  100 mcg Oral Before breakfast    metoprolol succinate  25 mg Oral Daily    miconazole NITRATE 2 %   Topical (Top) BID    pantoprazole  40 mg Oral Daily    pregabalin  75 mg Oral QHS    rosuvastatin  20 mg Oral QHS    senna-docusate 8.6-50 mg  1 tablet Oral BID     PRN Meds:acetaminophen, dextrose 50%, dextrose 50%, glucagon (human recombinant), glucose, glucose, ondansetron, oxyCODONE, prochlorperazine, ramelteon, sodium chloride 0.9%     Objective:     Vital Signs (Most Recent):  Temp: 96 °F (35.6 °C) (06/17/18 0439)  Pulse: 86 (06/17/18 0439)  Resp: 16 (06/17/18 0439)  BP: 137/62 (06/17/18 0439)  SpO2: (!) 94 % (06/17/18 0439) Vital Signs (24h Range):  Temp:  [96 °F (35.6 °C)-98.4 °F (36.9 °C)] 96 °F (35.6 °C)  Pulse:  [81-96] 86  Resp:  [16-20] 16  SpO2:  [91 %-94 %] 94 %  BP: (122-143)/(58-66) 137/62          Physical Exam   Constitutional: She appears well-developed. No distress.   Cardiovascular: Normal rate.    Pulmonary/Chest: Effort normal. No respiratory distress.   Abdominal: Soft. She exhibits no distension.   Neurological: She is alert.   Psychiatric: She has a normal mood and affect.   incision c/d/i no erythema or drainage, no hematoma     Diagnostics:  I have reviewed all pertinent imaging results/findings within the past 24 hours.    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 female 12 Days Post-Op s/p right AKA. Working on [employer] to [place].     - incision looked good today  - cont ASA Plavix  - pain control per primary  - no need for dressing  - Will need staples removed in approx 10 days ( 3 weeks post op)  - follow up with Dr. Garcia for wound check in 1 week if still in state     Will sign off please contact us with questions             Dain Martin MD  Vascular Surgery  [facility]

## 2018-06-17 NOTE — ASSESSMENT & PLAN NOTE
Suzan Villarreal is a 89 y.o. female 12 Days Post-Op s/p right AKA. Working [place] [employer] [place].     - incision looked good today  - cont ASA Plavix  - pain control per primary  - no need for dressing  - Will need staples removed in approx 10 days ( 3 weeks post op)  - follow up with Dr. Garcia for wound check in 2 weeks if still in state     Will sign off please contact us with questions

## 2018-06-17 NOTE — SUBJECTIVE & OBJECTIVE
Medications:  Continuous Infusions:    Scheduled Meds:   amLODIPine  10 mg Oral Daily    benazepril  20 mg Oral BID    clopidogrel  75 mg Oral Daily    enoxaparin  40 mg Subcutaneous Daily    levothyroxine  100 mcg Oral Before breakfast    metoprolol succinate  25 mg Oral Daily    miconazole NITRATE 2 %   Topical (Top) BID    pantoprazole  40 mg Oral Daily    pregabalin  75 mg Oral QHS    rosuvastatin  20 mg Oral QHS    senna-docusate 8.6-50 mg  1 tablet Oral BID     PRN Meds:acetaminophen, dextrose 50%, dextrose 50%, glucagon (human recombinant), glucose, glucose, ondansetron, oxyCODONE, prochlorperazine, ramelteon, sodium chloride 0.9%     Objective:     Vital Signs (Most Recent):  Temp: 96 °F (35.6 °C) (06/17/18 0439)  Pulse: 86 (06/17/18 0439)  Resp: 16 (06/17/18 0439)  BP: 137/62 (06/17/18 0439)  SpO2: (!) 94 % (06/17/18 0439) Vital Signs (24h Range):  Temp:  [96 °F (35.6 °C)-98.4 °F (36.9 °C)] 96 °F (35.6 °C)  Pulse:  [81-96] 86  Resp:  [16-20] 16  SpO2:  [91 %-94 %] 94 %  BP: (122-143)/(58-66) 137/62          Physical Exam   Constitutional: She appears well-developed. No distress.   Cardiovascular: Normal rate.    Pulmonary/Chest: Effort normal. No respiratory distress.   Abdominal: Soft. She exhibits no distension.   Neurological: She is alert.   Psychiatric: She has a normal mood and affect.   incision c/d/i no erythema or drainage, no hematoma     Diagnostics:  I have reviewed all pertinent imaging results/findings within the past 24 hours.

## 2018-06-17 NOTE — ASSESSMENT & PLAN NOTE
- Has not started treatment, will need Oncology follow up and staging scans on discharge  - Family wanting to move patient to [place], may arrange follow up there at [facility]

## 2018-06-17 NOTE — SUBJECTIVE & OBJECTIVE
Review of Systems   Constitutional: Positive for activity change, appetite change and fatigue.   HENT: Negative for trouble swallowing.    Eyes: Negative for visual disturbance.   Respiratory: Negative for cough and shortness of breath.    Cardiovascular: Negative for chest pain.   Gastrointestinal: Negative for abdominal distention and abdominal pain.   Genitourinary: Negative for dysuria.   Musculoskeletal: Positive for gait problem.   Skin: Positive for color change and wound.   Neurological: Negative for headaches.   Psychiatric/Behavioral: Negative for confusion.     Objective:     Vital Signs (Most Recent):  Temp: (!) 95.9 °F (35.5 °C) (06/17/18 0753)  Pulse: 83 (06/17/18 0753)  Resp: 18 (06/17/18 0753)  BP: (!) 149/65 (06/17/18 0753)  SpO2: (!) 89 % (06/17/18 0753) Vital Signs (24h Range):  Temp:  [95.9 °F (35.5 °C)-98.4 °F (36.9 °C)] 95.9 °F (35.5 °C)  Pulse:  [81-86] 83  Resp:  [16-20] 18  SpO2:  [89 %-94 %] 89 %  BP: (122-149)/(58-66) 149/65     Weight: 49.9 kg (110 lb)  Body mass index is 22.99 kg/m².    Intake/Output Summary (Last 24 hours) at 06/17/18 0925  Last data filed at 06/17/18 0400   Gross per 24 hour   Intake             1190 ml   Output              350 ml   Net              840 ml      Physical Exam   Constitutional: She is oriented to person, place, and time. No distress.   Elderly, frail woman   HENT:   Head: Normocephalic and atraumatic.   Eyes: Conjunctivae are normal. No scleral icterus.   Neck: Normal range of motion.   Cardiovascular: Normal rate, regular rhythm and normal heart sounds.    Pulmonary/Chest: Effort normal and breath sounds normal.   Abdominal: Soft. She exhibits no distension. There is no tenderness.   Midline laparotomy scar with some erythema around lower portion of scar   Ostomy bag in place, stool present, no erythema/ signs of infection at site   Musculoskeletal: Normal range of motion. She exhibits no edema.   R AKA surgical wound healing with staples in place    Neurological: She is alert and oriented to person, place, and time.   Skin: Skin is warm and dry. She is not diaphoretic.   DTI of skin over left heel  Skin breakdown over left toes   Psychiatric: She has a normal mood and affect. Her behavior is normal.       Significant Labs and Imaging: No labs or imaging.

## 2018-06-17 NOTE — ASSESSMENT & PLAN NOTE
- Left heel  - Appreciate wound care recs  - Continue Betadine soaks daily   - Continue boots, improving

## 2018-06-17 NOTE — NURSING
Pt 's daughter verbalize concern of right arm edema.  Call placed into . Dr Castellanos returned call, gave orders to elevate right arm and will follow up with patient.  Right arm elevated on a folded blanket and a wedge.

## 2018-06-18 PROCEDURE — 25000003 PHARM REV CODE 250: Performed by: ANESTHESIOLOGY

## 2018-06-18 PROCEDURE — 63600175 PHARM REV CODE 636 W HCPCS: Performed by: STUDENT IN AN ORGANIZED HEALTH CARE EDUCATION/TRAINING PROGRAM

## 2018-06-18 PROCEDURE — 97535 SELF CARE MNGMENT TRAINING: CPT

## 2018-06-18 PROCEDURE — G8988 SELF CARE GOAL STATUS: HCPCS | Mod: CJ

## 2018-06-18 PROCEDURE — 25000003 PHARM REV CODE 250: Performed by: STUDENT IN AN ORGANIZED HEALTH CARE EDUCATION/TRAINING PROGRAM

## 2018-06-18 PROCEDURE — 99231 SBSQ HOSP IP/OBS SF/LOW 25: CPT | Mod: GC,,, | Performed by: HOSPITALIST

## 2018-06-18 PROCEDURE — 11000001 HC ACUTE MED/SURG PRIVATE ROOM

## 2018-06-18 PROCEDURE — 97110 THERAPEUTIC EXERCISES: CPT

## 2018-06-18 PROCEDURE — G8987 SELF CARE CURRENT STATUS: HCPCS | Mod: CK

## 2018-06-18 PROCEDURE — 25000003 PHARM REV CODE 250

## 2018-06-18 PROCEDURE — 25000003 PHARM REV CODE 250: Performed by: INTERNAL MEDICINE

## 2018-06-18 RX ADMIN — DOXYCYCLINE HYCLATE 100 MG: 100 TABLET, COATED ORAL at 08:06

## 2018-06-18 RX ADMIN — ROSUVASTATIN CALCIUM 20 MG: 20 TABLET, FILM COATED ORAL at 08:06

## 2018-06-18 RX ADMIN — AMOXICILLIN AND CLAVULANATE POTASSIUM 1 TABLET: 875; 125 TABLET, FILM COATED ORAL at 08:06

## 2018-06-18 RX ADMIN — BENAZEPRIL HYDROCHLORIDE 20 MG: 20 TABLET, FILM COATED ORAL at 08:06

## 2018-06-18 RX ADMIN — OXYCODONE HYDROCHLORIDE 5 MG: 5 TABLET ORAL at 06:06

## 2018-06-18 RX ADMIN — OXYCODONE HYDROCHLORIDE 5 MG: 5 TABLET ORAL at 05:06

## 2018-06-18 RX ADMIN — STANDARDIZED SENNA CONCENTRATE AND DOCUSATE SODIUM 1 TABLET: 8.6; 5 TABLET, FILM COATED ORAL at 08:06

## 2018-06-18 RX ADMIN — ENOXAPARIN SODIUM 40 MG: 100 INJECTION SUBCUTANEOUS at 04:06

## 2018-06-18 RX ADMIN — CLOPIDOGREL 75 MG: 75 TABLET, FILM COATED ORAL at 08:06

## 2018-06-18 RX ADMIN — PANTOPRAZOLE SODIUM 40 MG: 40 TABLET, DELAYED RELEASE ORAL at 08:06

## 2018-06-18 RX ADMIN — LEVOTHYROXINE SODIUM 100 MCG: 100 TABLET ORAL at 05:06

## 2018-06-18 RX ADMIN — OXYCODONE HYDROCHLORIDE 5 MG: 5 TABLET ORAL at 09:06

## 2018-06-18 RX ADMIN — ACETAMINOPHEN 1000 MG: 500 TABLET ORAL at 08:06

## 2018-06-18 RX ADMIN — OXYCODONE HYDROCHLORIDE 5 MG: 5 TABLET ORAL at 12:06

## 2018-06-18 RX ADMIN — MICONAZOLE NITRATE: 20 POWDER TOPICAL at 08:06

## 2018-06-18 RX ADMIN — PREGABALIN 75 MG: 75 CAPSULE ORAL at 08:06

## 2018-06-18 RX ADMIN — AMLODIPINE BESYLATE 10 MG: 10 TABLET ORAL at 08:06

## 2018-06-18 RX ADMIN — METOPROLOL SUCCINATE 25 MG: 25 TABLET, EXTENDED RELEASE ORAL at 08:06

## 2018-06-18 RX ADMIN — ONDANSETRON 8 MG: 8 TABLET, ORALLY DISINTEGRATING ORAL at 08:06

## 2018-06-18 NOTE — PT/OT/SLP PROGRESS
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  Pain/Comfort:  · Pain Rating 1: 0/10  · Pain Addressed 1: Reposition, Distraction, Cessation of Activity  · Pain Rating Post-Intervention 1: 0/10    Patients cultural, spiritual, Mormon conflicts given the current situation: none reported    Objective:     Patient found with: pressure relief boots    General Precautions: Standard, fall   Orthopedic Precautions: (R AKA)   Braces: N/A     Occupational Performance:    Bed Mobility:    · Patient completed Rolling/Turning to Left with  moderate assistance and with side rail  · Patient completed Rolling/Turning to Right with moderate assistance and with side rail  · Patient completed Scooting/Bridging with contact guard assistance  · Patient completed Supine to Sit with maximal assistance  · Patient completed Sit to Supine with minimum assistance     Functional Mobility/Transfers:  · NT    Activities of Daily Living:  · Grooming: setup assistance to wash face while seated EOB  · Toileting: total assistance to doff/don brief and complete hygiene in supine    Patient left HOB elevated with all lines intact and call button in reach and RN present    [facility] 6 Click:  [facility] Total Score: 15    Treatment & Education:  LUE AROM exercises 10 x 1 for shoulder flex, elbow flex/ext, and chest press while seated EOB  RUE AAROM exercises 10 x 1 for shoulder flex to ~80, elbow flex/ext, and chest press with shoulder abd at 45 while seated EOB  Pt educated on role of OT/POC  White board/communication board updated  Education:    Assessment:     Suzan Villarreal is a 89 y.o. 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  She presents with low activity tolerance and generalized weakness impairing safe functional mobility and self care.  Performance deficits affecting function are weakness, impaired endurance, gait instability, impaired functional mobilty, impaired self care skills, impaired balance, decreased upper extremity function, decreased lower extremity function, edema, pain, decreased ROM.      Rehab Prognosis:  Fair; patient would benefit from acute skilled OT services to address these deficits and reach maximum level of function.       Plan:     Patient to be seen 4 x/week to address the above listed problems via self-care/home management, therapeutic activities, therapeutic exercises  · Plan of Care Expires: 07/08/18  · Plan of Care Reviewed with: patient    This Plan of care has been discussed with the patient who was involved in its development and understands and is in agreement with the identified goals and treatment plan    GOALS:    Occupational Therapy Goals        Problem: Occupational Therapy Goal    Goal Priority Disciplines Outcome Interventions   Occupational Therapy Goal     OT, PT/OT Ongoing (interventions implemented as appropriate)    Description:  Goals to be met by: 6/22/18     Patient will increase functional independence with ADLs by performing:    Feeding with Set-up Assistance while seated EOB/UIC.  UE Dressing with Minimal Assistance. GOAL MET 6/13   REVISED: UE Dressing with Setup Assistance  Grooming while EOB with Minimal Assistance GOAL MET 6/15  Toileting from bedside commode with Minimal Assistance for hygiene and clothing management.   Sitting at edge of bed x5 minutes with Stand-by Assistance.   Rolling to Bilateral with Moderate Assistance. GOAL MET 6/11   REVISED: Rolling to Bilateral with Minimal Assistance GOAL MET 6/15   REVISED: Rolling to Bilateral with CGA  Supine to sit with Moderate Assistance.  Stand pivot transfers with  Moderate Assistance.  Toilet transfer to bedside commode with Moderate Assistance.                          Time Tracking:     OT Date of Treatment: 06/18/18  OT Start Time: 0955  OT Stop Time: 1023  OT Total Time (min): 28 min    Billable Minutes:Self Care/Home Management 15  Therapeutic Exercise 13    Ryann Crawley, OT  6/18/2018

## 2018-06-18 NOTE — PLAN OF CARE
Problem: Patient Care Overview  Goal: Plan of Care Review  Outcome: Ongoing (interventions implemented as appropriate)  No acute distress noted.  No complaints of pain voiced at this time, pt sleeping comfortably.  Q2H rounding in progress.  Call light in reach.  Fall precautions in place.  Vitals stable at this time.  Pain controlled via oral pain med.  Colostomy remains intact.

## 2018-06-18 NOTE — PLAN OF CARE
Problem: Occupational Therapy Goal  Goal: Occupational Therapy Goal  Goals to be met by: 6/22/18     Patient will increase functional independence with ADLs by performing:    Feeding with Set-up Assistance while seated EOB/UIC.  UE Dressing with Minimal Assistance. GOAL MET 6/13   REVISED: UE Dressing with Setup Assistance  Grooming while EOB with Minimal Assistance GOAL MET 6/15  Toileting from bedside commode with Minimal Assistance for hygiene and clothing management.   Sitting at edge of bed x5 minutes with Stand-by Assistance.   Rolling to Bilateral with Moderate Assistance. GOAL MET 6/11   REVISED: Rolling to Bilateral with Minimal Assistance GOAL MET 6/15   REVISED: Rolling to Bilateral with CGA  Supine to sit with Moderate Assistance.  Stand pivot transfers with Moderate Assistance.  Toilet transfer to bedside commode with Moderate Assistance.         Outcome: Ongoing (interventions implemented as appropriate)  Pt progressing toward remaining goals     Comments: Continue OT GOYO Crawley OT  6/18/2018

## 2018-06-18 NOTE — PROGRESS NOTES
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 Villarreal is a 89 woman with PMHx significant for severe PVD, HTN, HLD, hypothyroidism, GERD and chronic constipation who presents to the ED with severe pain, gangrene of the R foot. Patient with recent admission for perforated viscus, s/p ex-lap and ostomy (please see Discharge Summary from 5/31 for further details of that admission.) Patient's daughter is at bedside and provides much of the history. Patient first developed foot pain 3-4 weeks ago. Then around Mother's Day began developing a black eschar on her distal R great toe. Follows with Dr. Garcia in Vascular Surgery. On 5/16, aortogram with run off performed. R AKA recommended; however, patient and family refused at that time. Pain progressed since that time, with ulceration of the dorsum of her foot and heel developing. Daughter brought patient back to ED due to uncontrolled pain.     Hospital Course:  Patient was admitted to hospital medicine for pain control for right foot gangrene and medication management. Vascular Surgery evaluated patient, plan for right AKA on 6/5. Scheduled percocet 5 started with prn oxycodone 10. Pain controlled overnight.  06/04/2018: Pain overnight, night MD called for IV morphine but patient later refused, discussed with patient and family will change pain meds to scheduled oxy IR 5mg q6 with breakthrough. Awaiting AKA tomorrow. Also placed on 2L supplemental O2 via NC but no SOB, suspect poor pulse ox readings from fingers, will wean today.   06/05/2018: Pain well controlled overnight  "on scheduled oxycodone. Plan for AKA today with vascular sx.  06/06/2018: AKA yesterday, denies pain. No subjective complaints. VSS, afebrile.   06/07/2018: ISH. Pain well controlled.   06/08/2018: ISH. Perineural catheter removed, reports some pain but controlled with oral medication.  06/09/2018: Some pain overnight, responded to one dose of oxycodone. VSS, afebrile.   06/10/2018: No events overnight. Patient reports pain is well controlled. She is alert and oriented.  06/11/2018: ISH. Medically stable, ready for transfer to SNF.   06/12/2018: ISH. Some discoloration of skin of left heel, will ask wound care to see.   06/13/2018: ISH. Medically stable, awaiting SNF placement.  06/14/2018: Some pain overnight, responded to oxycodone. Changed scheduled Tylenol to PRN as patient has been received 3g/day for last 6 days. Medically stable, awaiting placement.  06/15/2018: No acute events. Pain well controlled. Had some nausea overnight.  06/16/2018: ISH. Complains of some "prickling and puffiness" of stump, but normal exam. No other issues. Medically stable, awaiting SNF.  06/17/2018: No issues. Stable, waiting for placement.  06/18/2018  No issues. Stable, waiting for placement.        Review of Systems   Constitutional: Positive for activity change, appetite change and fatigue.   HENT: Negative for trouble swallowing.    Eyes: Negative for visual disturbance.   Respiratory: Negative for cough and shortness of breath.    Cardiovascular: Negative for chest pain.   Gastrointestinal: Negative for abdominal distention and abdominal pain.   Endocrine: Negative for cold intolerance, heat intolerance and polyuria.   Genitourinary: Negative for dysuria.   Musculoskeletal: Positive for gait problem.   Skin: Positive for color change and wound.   Neurological: Negative for headaches.   Psychiatric/Behavioral: Negative for confusion.     Objective:     Vital Signs (Most Recent):  Temp: 97.8 °F (36.6 °C) (06/18/18 " 1147)  Pulse: 93 (06/18/18 1147)  Resp: 16 (06/18/18 1147)  BP: (!) 150/67 (06/18/18 1147)  SpO2: (!) 94 % (06/18/18 1147) Vital Signs (24h Range):  Temp:  [97.5 °F (36.4 °C)-98.8 °F (37.1 °C)] 97.8 °F (36.6 °C)  Pulse:  [79-93] 93  Resp:  [14-18] 16  SpO2:  [92 %-99 %] 94 %  BP: (127-150)/(56-67) 150/67     Weight: 49.9 kg (110 lb)  Body mass index is 22.99 kg/m².    Intake/Output Summary (Last 24 hours) at 06/18/18 1424  Last data filed at 06/18/18 1200   Gross per 24 hour   Intake              960 ml   Output              350 ml   Net              610 ml      Physical Exam   Constitutional: She is oriented to person, place, and time. No distress.   Elderly, frail woman   HENT:   Head: Normocephalic and atraumatic.   Eyes: Conjunctivae are normal. No scleral icterus.   Neck: Normal range of motion.   Cardiovascular: Normal rate, regular rhythm and normal heart sounds.    Pulmonary/Chest: Effort normal and breath sounds normal.   Abdominal: Soft. She exhibits no distension. There is no tenderness.   Midline laparotomy scar with some erythema around lower portion of scar   Ostomy bag in place, stool present, no erythema/ signs of infection at site   Musculoskeletal: Normal range of motion. She exhibits no edema.   R AKA surgical wound healing with staples in place   Neurological: She is alert and oriented to person, place, and time.   Skin: Skin is warm and dry. She is not diaphoretic.   DTI of skin over left heel  Skin breakdown over left toes   Psychiatric: She has a normal mood and affect. Her behavior is normal.       Significant Labs and Imaging: No labs or imaging.     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 Garcia in Vascular Surgery clinic in 1 week if still in state  - Perineural catheter  "removed 6/7, reports some pain but controlled with oral meds      - Medically stable ready for discharge to SNF         Intertrigo    - Miconazole powder BID        Pressure injury of deep tissue    - Left heel  - Appreciate wound care recs  - Continue Betadine soaks daily   - Continue boots, improving        Primary adenocarcinoma of transverse colon    - Has not started treatment, will need Oncology follow up and staging scans on discharge      - Family wanting to move patient to [place], may arrange follow up there at [facility]        Delayed surgical wound healing    - Appreciate Gen Surg and wound care consults  - 6/10: Transitioned abx from vanc & Unasyn to doxy and Augmentin  - 6/11: Still some yellow discharge from wound, Gen Surg to reviewed again: no signs of worsening infection, recommended continuing wound care with Medihoney and daily packing changes, continue PO abx, staples removed     - wound care as needed        S/P right hemicolectomy    - Pathology returned: "MODERATELY TO POORLY DIFFERENTIATED ADENOCARCINOMA WITH INFILTRATION THROUGH THE MUSCULARIS INTO PERICOLONIC ADIPOSE TISSUE, WITH MUCH LYMPHOVASCULAR INVASION, AND WITH METASTASES TO 7 OF 22 LYMPH NODES"  - Patient and family aware of cancer diagnosis   - Has not started treatment, will need Oncology follow up and staging scans on discharge  - Family wanting to move patient to [place], may arrange follow up there        PVD (peripheral vascular disease)    - Has ASA allergy  - 6/11: restarted clopidogrel        Hypothyroidism    - Continue home levothyroxine        GERD (gastroesophageal reflux disease)    - Home med Nexium, pantoprazole while inpatient        Essential hypertension    - Continue home amlodipine 10, Toprol 25 and benazepril 20mg    Vitals:    06/17/18 2357 06/18/18 0403 06/18/18 0730 06/18/18 1147   BP: (!) 127/56 136/63 138/65 (!) 150/67   BP Location: Left arm Left arm Left arm    Patient Position: Lying Lying Lying    Pulse: 79 " 80 86 93   Resp: 18 18 14 16   Temp: 97.5 °F (36.4 °C) 97.6 °F (36.4 °C) 98.7 °F (37.1 °C) 97.8 °F (36.6 °C)   TempSrc: Oral Oral Oral    SpO2: (!) 92% 99% (!) 92% (!) 94%   Weight:       Height:                   VTE Risk Mitigation         Ordered     enoxaparin injection 40 mg  Daily      06/09/18 0850     IP VTE HIGH RISK PATIENT  Once      06/02/18 5967          Plan discussed with attending Dr. Walton, further recommendations as per attending addendum. Please feel free to call with any questions or concerns.            Raissa Cruz MD  Department of Hospital Medicine   [facility]

## 2018-06-18 NOTE — ASSESSMENT & PLAN NOTE
- Appreciate Gen Surg and wound care consults  - 6/10: Transitioned abx from vanc & Unasyn to doxy and Augmentin  - 6/11: Still some yellow discharge from wound, Gen Surg to reviewed again: no signs of worsening infection, recommended continuing wound care with Medihoney and daily packing changes, continue PO abx, staples removed     - wound care as needed

## 2018-06-18 NOTE — SUBJECTIVE & OBJECTIVE
Review of Systems   Constitutional: Positive for activity change, appetite change and fatigue.   HENT: Negative for trouble swallowing.    Eyes: Negative for visual disturbance.   Respiratory: Negative for cough and shortness of breath.    Cardiovascular: Negative for chest pain.   Gastrointestinal: Negative for abdominal distention and abdominal pain.   Endocrine: Negative for cold intolerance, heat intolerance and polyuria.   Genitourinary: Negative for dysuria.   Musculoskeletal: Positive for gait problem.   Skin: Positive for color change and wound.   Neurological: Negative for headaches.   Psychiatric/Behavioral: Negative for confusion.     Objective:     Vital Signs (Most Recent):  Temp: 97.8 °F (36.6 °C) (06/18/18 1147)  Pulse: 93 (06/18/18 1147)  Resp: 16 (06/18/18 1147)  BP: (!) 150/67 (06/18/18 1147)  SpO2: (!) 94 % (06/18/18 1147) Vital Signs (24h Range):  Temp:  [97.5 °F (36.4 °C)-98.8 °F (37.1 °C)] 97.8 °F (36.6 °C)  Pulse:  [79-93] 93  Resp:  [14-18] 16  SpO2:  [92 %-99 %] 94 %  BP: (127-150)/(56-67) 150/67     Weight: 49.9 kg (110 lb)  Body mass index is 22.99 kg/m².    Intake/Output Summary (Last 24 hours) at 06/18/18 1424  Last data filed at 06/18/18 1200   Gross per 24 hour   Intake              960 ml   Output              350 ml   Net              610 ml      Physical Exam   Constitutional: She is oriented to person, place, and time. No distress.   Elderly, frail woman   HENT:   Head: Normocephalic and atraumatic.   Eyes: Conjunctivae are normal. No scleral icterus.   Neck: Normal range of motion.   Cardiovascular: Normal rate, regular rhythm and normal heart sounds.    Pulmonary/Chest: Effort normal and breath sounds normal.   Abdominal: Soft. She exhibits no distension. There is no tenderness.   Midline laparotomy scar with some erythema around lower portion of scar   Ostomy bag in place, stool present, no erythema/ signs of infection at site   Musculoskeletal: Normal range of motion. She  exhibits no edema.   R AKA surgical wound healing with staples in place   Neurological: She is alert and oriented to person, place, and time.   Skin: Skin is warm and dry. She is not diaphoretic.   DTI of skin over left heel  Skin breakdown over left toes   Psychiatric: She has a normal mood and affect. Her behavior is normal.       Significant Labs and Imaging: No labs or imaging.

## 2018-06-18 NOTE — ASSESSMENT & PLAN NOTE
- Continue home amlodipine 10, Toprol 25 and benazepril 20mg    Vitals:    06/17/18 2357 06/18/18 0403 06/18/18 0730 06/18/18 1147   BP: (!) 127/56 136/63 138/65 (!) 150/67   BP Location: Left arm Left arm Left arm    Patient Position: Lying Lying Lying    Pulse: 79 80 86 93   Resp: 18 18 14 16   Temp: 97.5 °F (36.4 °C) 97.6 °F (36.4 °C) 98.7 °F (37.1 °C) 97.8 °F (36.6 °C)   TempSrc: Oral Oral Oral    SpO2: (!) 92% 99% (!) 92% (!) 94%   Weight:       Height:

## 2018-06-18 NOTE — PLAN OF CARE
Problem: Patient Care Overview  Goal: Plan of Care Review  Pt plan of care reviewed and updated. Pt frequently assessed for pain and safety issues. Pt receives prn pain meds when needed. Vs as charted. Will continue to monitor

## 2018-06-18 NOTE — ASSESSMENT & PLAN NOTE
- Has not started treatment, will need Oncology follow up and staging scans on discharge      - Family wanting to move patient to [place], may arrange follow up there at [facility]

## 2018-06-18 NOTE — PLAN OF CARE
CM rec'd call from [place] ([phone]) with [facility]; patient has been medically accepted and they will put in for insurance auth now.     ADAM rec'd copy of [place] PASRR to complete. Waiting call back from [place] for facility info.    ADAM spoke with Noreen at [facility] [employer] ([phone]). Transport tentatively scheduled for Wed pm (6/20). [place] [facility] [place]. Daughter, Christen, will fly on transport plane from [place] to here and fly back with patient. Will confirm when insurance auth rec'd. Will follow.

## 2018-06-19 PROBLEM — L03.90 CELLULITIS: Status: ACTIVE | Noted: 2018-06-19

## 2018-06-19 PROCEDURE — 99231 SBSQ HOSP IP/OBS SF/LOW 25: CPT | Mod: ,,, | Performed by: HOSPITALIST

## 2018-06-19 PROCEDURE — 25000003 PHARM REV CODE 250: Performed by: STUDENT IN AN ORGANIZED HEALTH CARE EDUCATION/TRAINING PROGRAM

## 2018-06-19 PROCEDURE — 25000003 PHARM REV CODE 250: Performed by: INTERNAL MEDICINE

## 2018-06-19 PROCEDURE — 25000003 PHARM REV CODE 250: Performed by: ANESTHESIOLOGY

## 2018-06-19 PROCEDURE — 11000001 HC ACUTE MED/SURG PRIVATE ROOM

## 2018-06-19 PROCEDURE — 97110 THERAPEUTIC EXERCISES: CPT

## 2018-06-19 PROCEDURE — 25000003 PHARM REV CODE 250

## 2018-06-19 PROCEDURE — 97530 THERAPEUTIC ACTIVITIES: CPT

## 2018-06-19 PROCEDURE — 63600175 PHARM REV CODE 636 W HCPCS: Performed by: STUDENT IN AN ORGANIZED HEALTH CARE EDUCATION/TRAINING PROGRAM

## 2018-06-19 RX ORDER — MAGNESIUM SULFATE HEPTAHYDRATE 40 MG/ML
2 INJECTION, SOLUTION INTRAVENOUS ONCE
Status: DISCONTINUED | OUTPATIENT
Start: 2018-06-19 | End: 2018-06-19

## 2018-06-19 RX ADMIN — CLOPIDOGREL 75 MG: 75 TABLET, FILM COATED ORAL at 08:06

## 2018-06-19 RX ADMIN — ENOXAPARIN SODIUM 40 MG: 100 INJECTION SUBCUTANEOUS at 04:06

## 2018-06-19 RX ADMIN — BENAZEPRIL HYDROCHLORIDE 20 MG: 20 TABLET, FILM COATED ORAL at 08:06

## 2018-06-19 RX ADMIN — STANDARDIZED SENNA CONCENTRATE AND DOCUSATE SODIUM 1 TABLET: 8.6; 5 TABLET, FILM COATED ORAL at 08:06

## 2018-06-19 RX ADMIN — ACETAMINOPHEN 1000 MG: 500 TABLET ORAL at 08:06

## 2018-06-19 RX ADMIN — AMOXICILLIN AND CLAVULANATE POTASSIUM 1 TABLET: 875; 125 TABLET, FILM COATED ORAL at 08:06

## 2018-06-19 RX ADMIN — MICONAZOLE NITRATE: 20 POWDER TOPICAL at 08:06

## 2018-06-19 RX ADMIN — DOXYCYCLINE HYCLATE 100 MG: 100 TABLET, COATED ORAL at 08:06

## 2018-06-19 RX ADMIN — METOPROLOL SUCCINATE 25 MG: 25 TABLET, EXTENDED RELEASE ORAL at 08:06

## 2018-06-19 RX ADMIN — AMLODIPINE BESYLATE 10 MG: 10 TABLET ORAL at 08:06

## 2018-06-19 RX ADMIN — ONDANSETRON 8 MG: 8 TABLET, ORALLY DISINTEGRATING ORAL at 07:06

## 2018-06-19 RX ADMIN — OXYCODONE HYDROCHLORIDE 5 MG: 5 TABLET ORAL at 08:06

## 2018-06-19 RX ADMIN — ROSUVASTATIN CALCIUM 20 MG: 20 TABLET, FILM COATED ORAL at 08:06

## 2018-06-19 RX ADMIN — OXYCODONE HYDROCHLORIDE 5 MG: 5 TABLET ORAL at 02:06

## 2018-06-19 RX ADMIN — PREGABALIN 75 MG: 75 CAPSULE ORAL at 08:06

## 2018-06-19 RX ADMIN — LEVOTHYROXINE SODIUM 100 MCG: 100 TABLET ORAL at 06:06

## 2018-06-19 RX ADMIN — PANTOPRAZOLE SODIUM 40 MG: 40 TABLET, DELAYED RELEASE ORAL at 08:06

## 2018-06-19 NOTE — PROGRESS NOTES
[facility] Hospital Medicine  Progress Note    Patient Name: Suzan Villarreal  MRN: [id number]  Patient Class: IP- Inpatient   Admission Date: 6/2/2018  Length of Stay: 17 days  Attending Physician: Anjali Archuleta MD  Primary Care Provider: Aly Rivas MD    [facility] Medicine Team: 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 Villarreal is a 89 woman with PMHx significant for severe PVD, HTN, HLD, hypothyroidism, GERD and chronic constipation who presents to the ED with severe pain, gangrene of the R foot. Patient with recent admission for perforated viscus, s/p ex-lap and ostomy (please see Discharge Summary from 5/31 for further details of that admission.) Patient's daughter is at bedside and provides much of the history. Patient first developed foot pain 3-4 weeks ago. Then around Mother's Day began developing a black eschar on her distal R great toe. Follows with Dr. Garcia in Vascular Surgery. On 5/16, aortogram with run off performed. R AKA recommended; however, patient and family refused at that time. Pain progressed since that time, with ulceration of the dorsum of her foot and heel developing. Daughter brought patient back to ED due to uncontrolled pain.     Hospital Course:  Patient was admitted to hospital medicine for pain control for right foot gangrene and medication management. Vascular Surgery evaluated patient, plan for right AKA on 6/5. Scheduled percocet 5 started with prn oxycodone 10. Pain controlled overnight.  06/04/2018: Pain overnight, night MD called for IV morphine but patient later refused, discussed with patient and family will change pain meds to scheduled oxy IR 5mg q6 with breakthrough. Awaiting AKA tomorrow. Also placed on 2L supplemental O2 via NC but no SOB, suspect poor pulse ox readings from fingers, will wean today.   06/05/2018: Pain well controlled overnight on  "scheduled oxycodone. Plan for AKA today with vascular sx.  06/06/2018: AKA yesterday, denies pain. No subjective complaints. VSS, afebrile.   06/07/2018: ISH. Pain well controlled.   06/08/2018: ISH. Perineural catheter removed, reports some pain but controlled with oral medication.  06/09/2018: Some pain overnight, responded to one dose of oxycodone. VSS, afebrile.   06/10/2018: No events overnight. Patient reports pain is well controlled. She is alert and oriented.  06/11/2018: ISH. Medically stable, ready for transfer to SNF.   06/12/2018: ISH. Some discoloration of skin of left heel, will ask wound care to see.   06/13/2018: ISH. Medically stable, awaiting SNF placement.  06/14/2018: Some pain overnight, responded to oxycodone. Changed scheduled Tylenol to PRN as patient has been received 3g/day for last 6 days. Medically stable, awaiting placement.  06/15/2018: No acute events. Pain well controlled. Had some nausea overnight.  06/16/2018: ISH. Complains of some "prickling and puffiness" of stump, but normal exam. No other issues. Medically stable, awaiting SNF.  06/17/2018- 6/19: No issues. Stable, waiting for placement.        Review of Systems   Constitutional: Positive for activity change, appetite change and fatigue.   HENT: Negative for trouble swallowing.    Eyes: Negative for visual disturbance.   Respiratory: Negative for cough and shortness of breath.    Cardiovascular: Negative for chest pain.   Gastrointestinal: Negative for abdominal distention and abdominal pain.   Endocrine: Negative for cold intolerance, heat intolerance and polyuria.   Genitourinary: Negative for dysuria.   Musculoskeletal: Positive for gait problem.   Skin: Positive for color change and wound.   Neurological: Negative for headaches.   Psychiatric/Behavioral: Negative for confusion.     Objective:     Vital Signs (Most Recent):  Temp: 98.6 °F (37 °C) (06/19/18 1140)  Pulse: 81 (06/19/18 1140)  Resp: 16 (06/19/18 " 1140)  BP: (!) 110/56 (06/19/18 1140)  SpO2: 95 % (06/19/18 1140) Vital Signs (24h Range):  Temp:  [96.3 °F (35.7 °C)-99.3 °F (37.4 °C)] 98.6 °F (37 °C)  Pulse:  [72-87] 81  Resp:  [14-17] 16  SpO2:  [92 %-97 %] 95 %  BP: (110-166)/(54-79) 110/56     Weight: 49.9 kg (110 lb)  Body mass index is 22.99 kg/m².    Intake/Output Summary (Last 24 hours) at 06/19/18 1154  Last data filed at 06/19/18 0900   Gross per 24 hour   Intake             1680 ml   Output              350 ml   Net             1330 ml      Physical Exam   Constitutional: She is oriented to person, place, and time. No distress.   Elderly, frail woman   HENT:   Head: Normocephalic and atraumatic.   Eyes: Conjunctivae are normal. No scleral icterus.   Neck: Normal range of motion.   Cardiovascular: Normal rate, regular rhythm and normal heart sounds.    Pulmonary/Chest: Effort normal and breath sounds normal.   Abdominal: Soft. She exhibits no distension. There is no tenderness.   Midline laparotomy scar with some erythema around lower portion of scar   Ostomy bag in place, stool present, no erythema/ signs of infection at site   Musculoskeletal: Normal range of motion. She exhibits no edema.   R AKA surgical wound healing with staples in place   Neurological: She is alert and oriented to person, place, and time.   Skin: Skin is warm and dry. She is not diaphoretic.   DTI of skin over left heel  Skin breakdown over left toes   Psychiatric: She has a normal mood and affect. Her behavior is normal.       Significant Labs and Imaging: No labs or imaging.     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 Garcia in Vascular Surgery clinic in 1 week if still in state  - Perineural catheter removed 6/7, reports some pain but controlled with oral  "meds    - Medically stable ready for discharge to SNF         Cellulitis    - erythema noted to R forearm, improving, denies any pain  - looks like dependent edema but will treat with Augmentin and Doxy for 5 days (day 3/5)          Intertrigo    - Miconazole powder BID        Pressure injury of deep tissue    - Left heel  - Appreciate wound care recs  - Continue Betadine soaks daily   - Continue boots, improving        Primary adenocarcinoma of transverse colon    - Has not started treatment, will need Oncology follow up and staging scans on discharge      - Family wanting to move patient to [place], may arrange follow up there at [facility]        Delayed surgical wound healing    - Appreciate Gen Surg and wound care consults  - 6/10: Transitioned abx from vanc & Unasyn to doxy and Augmentin  - 6/11: Still some yellow discharge from wound, Gen Surg to reviewed again: no signs of worsening infection, recommended continuing wound care with Medihoney and daily packing changes, continue PO abx, staples removed     - wound care as needed        S/P right hemicolectomy    - Pathology returned: "MODERATELY TO POORLY DIFFERENTIATED ADENOCARCINOMA WITH INFILTRATION THROUGH THE MUSCULARIS INTO PERICOLONIC ADIPOSE TISSUE, WITH MUCH LYMPHOVASCULAR INVASION, AND WITH METASTASES TO 7 OF 22 LYMPH NODES"  - Patient and family aware of cancer diagnosis   - Has not started treatment, will need Oncology follow up and staging scans on discharge  - Family wanting to move patient to [place], may arrange follow up there        PVD (peripheral vascular disease)    - Has ASA allergy  - 6/11: restarted clopidogrel        Hypothyroidism    - Continue home levothyroxine        GERD (gastroesophageal reflux disease)    - Home med Nexium, pantoprazole while inpatient        Essential hypertension    - Continue home amlodipine 10, Toprol 25 and benazepril 20mg    Vitals:    06/19/18 0545 06/19/18 0800 06/19/18 0900 06/19/18 1140   BP: 125/67 (!) 166/79  " (!) 110/56   BP Location: Right arm Right arm     Patient Position: Lying Lying  Lying   Pulse: 72 79  81   Resp:   14 16   Temp: 97.4 °F (36.3 °C) 96.3 °F (35.7 °C)  98.6 °F (37 °C)   TempSrc: Oral Oral  Oral   SpO2: 97%   95%   Weight:       Height:                   VTE Risk Mitigation         Ordered     enoxaparin injection 40 mg  Daily      06/09/18 0850     IP VTE HIGH RISK PATIENT  Once      06/02/18 2817          DISPO: plan for d/c to [facility] tomorrow    Carla Hunter MD  Department of Hospital Medicine   [facility]

## 2018-06-19 NOTE — PLAN OF CARE
Problem: Physical Therapy Goal  Goal: Physical Therapy Goal  Goals to be met by: 18     Patient will increase functional independence with mobility by performin. Supine to sit with Maximum Assistance Met   Upgrade: supine to sit with min assist  2. Sit to supine with Maximum Assistance Met    Upgrade: sit to supine with min assist  3. Rolling to Left and Right with Maximum Assistance. Met   Upgrade: rolling L and R with min assist  4. Sit to stand transfer with Maximum Assistance - Met  Update: Sit to stand transfer with Mod Assist using RW.  5. Bed to chair transfer with Maximum Assistance using Rolling Walker  6. Sitting at edge of bed x5 minutes with Moderate Assistance-met       Outcome: Ongoing (interventions implemented as appropriate)  Goals updated to reflect progress.     Roderick Pineda III, DPT, PT  2018

## 2018-06-19 NOTE — PLAN OF CARE
Problem: Patient Care Overview  Goal: Plan of Care Review      Recommendations    Recommendation/Intervention:   1. Continue cardiac diet.   2. Encourage po intake.   3. RD following.     Goals: meet >85% of EEN/EPN  Nutrition Goal Status: progressing towards goal

## 2018-06-19 NOTE — ASSESSMENT & PLAN NOTE
- Continue home amlodipine 10, Toprol 25 and benazepril 20mg    Vitals:    06/19/18 0545 06/19/18 0800 06/19/18 0900 06/19/18 1140   BP: 125/67 (!) 166/79  (!) 110/56   BP Location: Right arm Right arm     Patient Position: Lying Lying  Lying   Pulse: 72 79  81   Resp:   14 16   Temp: 97.4 °F (36.3 °C) 96.3 °F (35.7 °C)  98.6 °F (37 °C)   TempSrc: Oral Oral  Oral   SpO2: 97%   95%   Weight:       Height:

## 2018-06-19 NOTE — PLAN OF CARE
Problem: Patient Care Overview  Goal: Plan of Care Review  Outcome: Ongoing (interventions implemented as appropriate)  Pt AAOx4, VSS, and has pain controlled with prn meds.  Pt able to tolerate po without difficulty, is incontinent, and is able to get OOB with PT.  Verbalized understanding of care plan. Remains free from falls or injuries at this time, will continue to monitor.

## 2018-06-19 NOTE — PLAN OF CARE
CM spoke with Ruben at [facility], no word from [employer] on SNF auth. ADAM spoke with Cindy with "[employer] ([phone]); their planes can all be equipped for ICU level of care. ADAM explained that level of care is not needed; primarily need stretcher transport. Minimum is RN and EMT which is fine. Will still plan on transport on Wed. Will call when insurance auth rec'd. Face sheet, H&P, most recent medicine note faxed to [phone]. Daughter, Christen, updated with above info.     4:04pm - left message for Elda (or covering) requesting update on status of auth.

## 2018-06-19 NOTE — PROGRESS NOTES
" [facility]  Adult Nutrition  Progress Note    SUMMARY       Recommendations    Recommendation/Intervention:   1. Continue cardiac diet.   2. Encourage po intake.   3. RD following.     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 POD7 R AKA. Has not started treatment for colon cancer. Per MD note, pt medically stable for discharge and is awaiting SNF placement. Pt chart review, pt eating 50-75% of meals.   Nutrition Discharge Planning: low Na diet    Nutrition Risk Screen    Nutrition Risk Screen: no indicators present    Nutrition/Diet History    Do you have any cultural, spiritual, Nondenominational conflicts, given your current situation?: none noted  Supplemental Drinks or Food Habits: Ensure Plus  Factors Affecting Nutritional Intake: None identified at this time    Anthropometrics    Temp: 96.3 °F (35.7 °C)  Height Method: Stated  Height: 4' 10" (147.3 cm)  Height (inches): 58 in  Weight Method: Bed Scale  Weight: 49.9 kg (110 lb)  Weight (lb): 110 lb  Ideal Body Weight (IBW), Female: 90 lb  % Ideal Body Weight, Female (lb): 122.22 lb  BMI (Calculated): 23  BMI Grade: 18.5-24.9 - normal  Usual Body Weight (UBW), k kg (per chart 18)  % Usual Body Weight: 84.75  % Weight Change From Usual Weight: -15.43 %  Amputation %: 16  Total Amputation %: 16  Amputation Ideal Body Weight (IBW), Female (lb): 74 lb  Amputee BMI (kg/m2): 27.44 kg/m2     Lab/Procedures/Meds    Pertinent Labs Reviewed: reviewed  Pertinent Labs Comments: BUN 5L, Cr 0.4L, Mg 1.4L, ALT 6L  Pertinent " Medications Reviewed: reviewed  Pertinent Medications Comments: levothyroxine, pantoprazole, statin, docusate    Physical Findings/Assessment    Overall Physical Appearance: nourished  Tubes: other (see comments) (ostomy )  Skin: edema, incision(s), pressure ulcer(s)    Estimated/Assessed Needs    Weight Used For Calorie Calculations: 49.9 kg (110 lb 0.2 oz)  Energy Calorie Requirements (kcal): 1500 kcal/day  Energy Need Method: Kcal/kg (30 kcal/kg)  Protein Requirements: 60-75 gm/kg (1.2-1.5 gm/kg)  Weight Used For Protein Calculations: 49.9 kg (110 lb 0.2 oz)  Fluid Requirements (mL): 1 mL/kcal or per MD  Fluid Need Method: RDA Method  RDA Method (mL): 1500     Nutrition Prescription Ordered    Current Diet Order: cardiac   Oral Nutrition Supplement: boost plus (pt not drinking)    Evaluation of Received Nutrient/Fluid Intake    I/O: +1.43L X 24 hrs, +7.57L since admit  Tolerance: tolerating     % Intake of Estimated Energy Needs: 50 - 75 %  % Meal Intake: 50 - 75 %    Nutrition Risk    Level of Risk/Frequency of Follow-up: low (f/u 1 X wk)     Assessment and Plan    Moderate malnutrition    Malnutrition in the context of Social/Environmental Circumstances    Related to (etiology):  Inadequate Oral Intake    Signs and Symptoms (as evidenced by):  Energy Intake: <75% of estimated energy requirement for 1 year.   Weight Loss: 15% x 3 months  Fluid Accumulation: mild    Nutrition Diagnosis Status:  Continues               Monitor and Evaluation    Food and Nutrient Intake: energy intake, food and beverage intake  Food and Nutrient Adminstration: diet order  Physical Activity and Function: nutrition-related ADLs and IADLs  Anthropometric Measurements: weight, weight change, body mass index  Biochemical Data, Medical Tests and Procedures: electrolyte and renal panel, gastrointestinal profile, glucose/endocrine profile, inflammatory profile, lipid profile  Nutrition-Focused Physical Findings: overall appearance      Nutrition Follow-Up    RD Follow-up?: Yes

## 2018-06-19 NOTE — PLAN OF CARE
Ruben at [facility] advised that completed PASRR should be faxed to her [phone] f [phone] or we can send with patient.  Their process is different than [place]'s.   faxed PASRR.    Jenni Garcia LMSW

## 2018-06-19 NOTE — ASSESSMENT & PLAN NOTE
- erythema noted to R forearm, improving, denies any pain  - looks like dependent edema but will treat with Augmentin and Doxy for 5 days (day 3/5)

## 2018-06-19 NOTE — PT/OT/SLP PROGRESS
Physical Therapy Treatment    Patient Name:  Suzan Villarreal   MRN:  [id number]    Recommendations:     Discharge Recommendations:  nursing facility, skilled   Discharge Equipment Recommendations:  (TBD)   Barriers to discharge: None    Assessment:     Suzan Villarreal is a 89 y.o. 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  She presents with the following impairments/functional limitations:  weakness, impaired endurance, impaired self care skills, gait instability, impaired functional mobilty, impaired balance, decreased lower extremity function, impaired skin limiting overall functional mobility. Good tolerance to PT session. Max Assist required using rolling walker for sit-stand transfer x 3. Demonstrates understanding of LE therex to be performed. To benefit from continued skilled PT intervention to address deficits.    Rehab Prognosis:  Good; patient would benefit from acute skilled PT services to address these deficits and reach maximum level of function.      Recent Surgery: Procedure(s) (LRB):  AMPUTATION, LOWER EXTREMITY, ABOVE KNEE (Right) 14 Days Post-Op    Plan:     During this hospitalization, patient to be seen 3 x/week to address the above listed problems via gait training, therapeutic activities, therapeutic exercises, neuromuscular re-education  · Plan of Care Expires:  06/27/18   Plan of Care Reviewed with: patient, daughter    Subjective     Communicated with RN prior to session.  Patient found supine with daughter present upon PT entry to room, agreeable to treatment.      Chief Complaint: none stated  Pain/Comfort:  · Pain Rating 1: 0/10  · Pain Rating Post-Intervention 1: 0/10    Patients cultural, spiritual, Anabaptist conflicts given the current situation: none stated    Objective:     Patient found with: oxygen, pressure relief boots     General Precautions: Standard, fall   Orthopedic Precautions:N/A   Braces: N/A     Functional Mobility:  · Bed  Mobility:     · Scooting: moderate assistance  · Supine to Sit: moderate assistance  · Transfers:  Sit to Stand:  maximal assistance with rolling walker      AM-PAC 6 CLICK MOBILITY  Turning over in bed (including adjusting bedclothes, sheets and blankets)?: 2  Sitting down on and standing up from a chair with arms (e.g., wheelchair, bedside commode, etc.): 2  Moving from lying on back to sitting on the side of the bed?: 2  Moving to and from a bed to a chair (including a wheelchair)?: 1  Need to walk in hospital room?: 1  Climbing 3-5 steps with a railing?: 1  Total Score: 9       Therapeutic Activities and Exercises:   Patient educated on:   - role of PT/POC    - safety with all functional mobility   - bed mobility training   - transfer training   - importance of participation with therapy services   - safes to transfer with therapy services at this time    Patient/daughter verbalized understanding of all education provided.    Supine LE therex performed x 15 reps each:   - AP   - QS   - GS   - hip abduction   - SLR   - HS    Rest breaks taken and assist provided as needed.    Patient left seated EOB with daughter with all lines intact, call button in reach and RN notified..    GOALS:    Physical Therapy Goals        Problem: Physical Therapy Goal    Goal Priority Disciplines Outcome Goal Variances Interventions   Physical Therapy Goal     PT/OT, PT Ongoing (interventions implemented as appropriate)     Description:  Goals to be met by: 18     Patient will increase functional independence with mobility by performin. Supine to sit with Maximum Assistance Met   Upgrade: supine to sit with min assist  2. Sit to supine with Maximum Assistance Met    Upgrade: sit to supine with min assist  3. Rolling to Left and Right with Maximum Assistance. Met   Upgrade: rolling L and R with min assist  4. Sit to stand transfer with Maximum Assistance - Met  Update: Sit to stand transfer with Mod Assist using RW.  5. Bed  to chair transfer with Maximum Assistance using Rolling Walker  6. Sitting at edge of bed x5 minutes with Moderate Assistance-met                         Time Tracking:     PT Received On: 06/19/18  PT Start Time: 0847     PT Stop Time: 0910  PT Total Time (min): 23 min     Billable Minutes: Therapeutic Activity 15 and Therapeutic Exercise 8    Treatment Type: Treatment  PT/PTA: PT     PTA Visit Number: 2     Roderick Pineda III, PT  06/19/2018

## 2018-06-19 NOTE — SUBJECTIVE & OBJECTIVE
Review of Systems   Constitutional: Positive for activity change, appetite change and fatigue.   HENT: Negative for trouble swallowing.    Eyes: Negative for visual disturbance.   Respiratory: Negative for cough and shortness of breath.    Cardiovascular: Negative for chest pain.   Gastrointestinal: Negative for abdominal distention and abdominal pain.   Endocrine: Negative for cold intolerance, heat intolerance and polyuria.   Genitourinary: Negative for dysuria.   Musculoskeletal: Positive for gait problem.   Skin: Positive for color change and wound.   Neurological: Negative for headaches.   Psychiatric/Behavioral: Negative for confusion.     Objective:     Vital Signs (Most Recent):  Temp: 98.6 °F (37 °C) (06/19/18 1140)  Pulse: 81 (06/19/18 1140)  Resp: 16 (06/19/18 1140)  BP: (!) 110/56 (06/19/18 1140)  SpO2: 95 % (06/19/18 1140) Vital Signs (24h Range):  Temp:  [96.3 °F (35.7 °C)-99.3 °F (37.4 °C)] 98.6 °F (37 °C)  Pulse:  [72-87] 81  Resp:  [14-17] 16  SpO2:  [92 %-97 %] 95 %  BP: (110-166)/(54-79) 110/56     Weight: 49.9 kg (110 lb)  Body mass index is 22.99 kg/m².    Intake/Output Summary (Last 24 hours) at 06/19/18 1154  Last data filed at 06/19/18 0900   Gross per 24 hour   Intake             1680 ml   Output              350 ml   Net             1330 ml      Physical Exam   Constitutional: She is oriented to person, place, and time. No distress.   Elderly, frail woman   HENT:   Head: Normocephalic and atraumatic.   Eyes: Conjunctivae are normal. No scleral icterus.   Neck: Normal range of motion.   Cardiovascular: Normal rate, regular rhythm and normal heart sounds.    Pulmonary/Chest: Effort normal and breath sounds normal.   Abdominal: Soft. She exhibits no distension. There is no tenderness.   Midline laparotomy scar with some erythema around lower portion of scar   Ostomy bag in place, stool present, no erythema/ signs of infection at site   Musculoskeletal: Normal range of motion. She exhibits no  edema.   R AKA surgical wound healing with staples in place   Neurological: She is alert and oriented to person, place, and time.   Skin: Skin is warm and dry. She is not diaphoretic.   DTI of skin over left heel  Skin breakdown over left toes   Psychiatric: She has a normal mood and affect. Her behavior is normal.       Significant Labs and Imaging: No labs or imaging.

## 2018-06-20 VITALS
HEIGHT: 58 IN | DIASTOLIC BLOOD PRESSURE: 64 MMHG | TEMPERATURE: 97 F | SYSTOLIC BLOOD PRESSURE: 144 MMHG | OXYGEN SATURATION: 91 % | HEART RATE: 81 BPM | BODY MASS INDEX: 23.09 KG/M2 | RESPIRATION RATE: 18 BRPM | WEIGHT: 110 LBS

## 2018-06-20 PROCEDURE — 25000003 PHARM REV CODE 250: Performed by: INTERNAL MEDICINE

## 2018-06-20 PROCEDURE — 25000003 PHARM REV CODE 250: Performed by: STUDENT IN AN ORGANIZED HEALTH CARE EDUCATION/TRAINING PROGRAM

## 2018-06-20 PROCEDURE — G8987 SELF CARE CURRENT STATUS: HCPCS | Mod: CK

## 2018-06-20 PROCEDURE — 97110 THERAPEUTIC EXERCISES: CPT

## 2018-06-20 PROCEDURE — 25000003 PHARM REV CODE 250

## 2018-06-20 PROCEDURE — 99239 HOSP IP/OBS DSCHRG MGMT >30: CPT | Mod: ,,, | Performed by: HOSPITALIST

## 2018-06-20 PROCEDURE — 97530 THERAPEUTIC ACTIVITIES: CPT

## 2018-06-20 PROCEDURE — 25000003 PHARM REV CODE 250: Performed by: ANESTHESIOLOGY

## 2018-06-20 PROCEDURE — 97535 SELF CARE MNGMENT TRAINING: CPT

## 2018-06-20 PROCEDURE — G8988 SELF CARE GOAL STATUS: HCPCS | Mod: CJ

## 2018-06-20 RX ORDER — DOXYCYCLINE HYCLATE 100 MG
100 TABLET ORAL EVERY 12 HOURS
Qty: 6 TABLET | Refills: 0 | Status: SHIPPED | OUTPATIENT
Start: 2018-06-20 | End: 2018-06-23

## 2018-06-20 RX ORDER — OXYCODONE HYDROCHLORIDE 5 MG/1
5 TABLET ORAL EVERY 4 HOURS PRN
Qty: 42 TABLET | Refills: 0 | Status: SHIPPED | OUTPATIENT
Start: 2018-06-20 | End: 2018-06-27

## 2018-06-20 RX ORDER — DOXYCYCLINE HYCLATE 100 MG
100 TABLET ORAL EVERY 12 HOURS
Qty: 6 TABLET | Refills: 0 | Status: SHIPPED | OUTPATIENT
Start: 2018-06-20 | End: 2018-06-20

## 2018-06-20 RX ORDER — OXYCODONE HYDROCHLORIDE 5 MG/1
5 TABLET ORAL EVERY 4 HOURS PRN
Qty: 42 TABLET | Refills: 0
Start: 2018-06-20 | End: 2018-06-20

## 2018-06-20 RX ORDER — AMOXICILLIN AND CLAVULANATE POTASSIUM 875; 125 MG/1; MG/1
1 TABLET, FILM COATED ORAL EVERY 12 HOURS
Qty: 6 TABLET | Refills: 0 | Status: SHIPPED | OUTPATIENT
Start: 2018-06-20 | End: 2018-06-20

## 2018-06-20 RX ORDER — MICONAZOLE NITRATE 2 %
POWDER (GRAM) TOPICAL 2 TIMES DAILY
Refills: 0 | COMMUNITY
Start: 2018-06-20

## 2018-06-20 RX ORDER — AMOXICILLIN AND CLAVULANATE POTASSIUM 875; 125 MG/1; MG/1
1 TABLET, FILM COATED ORAL EVERY 12 HOURS
Qty: 6 TABLET | Refills: 0 | Status: SHIPPED | OUTPATIENT
Start: 2018-06-20 | End: 2018-06-23

## 2018-06-20 RX ORDER — OXYCODONE HYDROCHLORIDE 5 MG/1
5 TABLET ORAL EVERY 4 HOURS PRN
Qty: 42 TABLET | Refills: 0 | Status: SHIPPED | OUTPATIENT
Start: 2018-06-20 | End: 2018-06-20

## 2018-06-20 RX ADMIN — BENAZEPRIL HYDROCHLORIDE 20 MG: 20 TABLET, FILM COATED ORAL at 09:06

## 2018-06-20 RX ADMIN — OXYCODONE HYDROCHLORIDE 5 MG: 5 TABLET ORAL at 12:06

## 2018-06-20 RX ADMIN — CLOPIDOGREL 75 MG: 75 TABLET, FILM COATED ORAL at 09:06

## 2018-06-20 RX ADMIN — PANTOPRAZOLE SODIUM 40 MG: 40 TABLET, DELAYED RELEASE ORAL at 09:06

## 2018-06-20 RX ADMIN — LEVOTHYROXINE SODIUM 100 MCG: 100 TABLET ORAL at 06:06

## 2018-06-20 RX ADMIN — METOPROLOL SUCCINATE 25 MG: 25 TABLET, EXTENDED RELEASE ORAL at 09:06

## 2018-06-20 RX ADMIN — DOXYCYCLINE HYCLATE 100 MG: 100 TABLET, COATED ORAL at 09:06

## 2018-06-20 RX ADMIN — OXYCODONE HYDROCHLORIDE 5 MG: 5 TABLET ORAL at 09:06

## 2018-06-20 RX ADMIN — OXYCODONE HYDROCHLORIDE 5 MG: 5 TABLET ORAL at 02:06

## 2018-06-20 RX ADMIN — MICONAZOLE NITRATE: 20 POWDER TOPICAL at 09:06

## 2018-06-20 RX ADMIN — AMOXICILLIN AND CLAVULANATE POTASSIUM 1 TABLET: 875; 125 TABLET, FILM COATED ORAL at 09:06

## 2018-06-20 RX ADMIN — AMLODIPINE BESYLATE 10 MG: 10 TABLET ORAL at 09:06

## 2018-06-20 NOTE — PLAN OF CARE
Problem: Patient Care Overview  Goal: Plan of Care Review  Outcome: Ongoing (interventions implemented as appropriate)  Pt AAOx4, VSS, and has pain well controlled with prn meds.  Pt able to tolerate po, in incontinent and is able to get OOB with max assist x 2.  Verbalizes understanding of plan of care.  Remains free from falls or injuries at this time, will continue to monitor.

## 2018-06-20 NOTE — PLAN OF CARE
Problem: Amputation, Lower Extremity (Adult)  Intervention: Monitor/Manage Pain  Pt verbalize understanding of her Raka and management of care

## 2018-06-20 NOTE — PLAN OF CARE
Problem: Occupational Therapy Goal  Goal: Occupational Therapy Goal  Goals to be met by: 6/22/18     Patient will increase functional independence with ADLs by performing:    Feeding with Set-up Assistance while seated EOB/UIC.  UE Dressing with Minimal Assistance. GOAL MET 6/13   REVISED: UE Dressing with Setup Assistance GOAL MET 6/20  Grooming while EOB with Minimal Assistance GOAL MET 6/15  Toileting from bedside commode with Minimal Assistance for hygiene and clothing management.   Sitting at edge of bed x5 minutes with Stand-by Assistance.   Rolling to Bilateral with Moderate Assistance. GOAL MET 6/11   REVISED: Rolling to Bilateral with Minimal Assistance GOAL MET 6/15   REVISED: Rolling to Bilateral with CGA  Supine to sit with Moderate Assistance.  Stand pivot transfers with Moderate Assistance.  Toilet transfer to bedside commode with Moderate Assistance.         Outcome: Ongoing (interventions implemented as appropriate)  Goal met for UE dressing; pt progressing toward remaining goals    Comments: Continue OT POC    Ryann Crawley OT  6/20/2018

## 2018-06-20 NOTE — NURSING
Report was called and given to nurse Martinez at [facility] in [place]. [phone]. Patients daughter is at [facility] bedside. [facility].

## 2018-06-20 NOTE — DISCHARGE SUMMARY
[facility] Hospital Medicine  Discharge Summary      Patient Name: Suzan Villarreal  MRN: [id number]  Admission Date: 6/2/2018  Hospital Length of Stay: 18 days  Discharge Date and Time:  06/20/2018 11:24 AM  Attending Physician: Anjali Archuleta MD   Discharging Provider: Raissa Cruz MD  Primary Care Provider: Aly Rivas MD  [facility] Medicine Team: [facility] HOSP MED 3 Raissa Cruz MD    HPI:   Ms. Villarreal is a 89 woman with PMHx significant for severe PVD, HTN, HLD, hypothyroidism, GERD and chronic constipation who presents to the ED with severe pain, gangrene of the R foot. Patient with recent admission for perforated viscus, s/p ex-lap and ostomy (please see Discharge Summary from 5/31 for further details of that admission.) Patient's daughter is at bedside and provides much of the history. Patient first developed foot pain 3-4 weeks ago. Then around Mother's Day began developing a black eschar on her distal R great toe. Follows with Dr. Garcia in Vascular Surgery. On 5/16, aortogram with run off performed. R AKA recommended; however, patient and family refused at that time. Pain progressed since that time, with ulceration of the dorsum of her foot and heel developing. Daughter brought patient back to ED due to uncontrolled pain.     Procedure(s) (LRB):  AMPUTATION, LOWER EXTREMITY, ABOVE KNEE (Right)      Hospital Course:   Patient was admitted to hospital medicine for pain control for right foot gangrene and medication management. Vascular Surgery evaluated patient, plan for right AKA on 6/5. Scheduled percocet 5 started with prn oxycodone 10. Pain controlled overnight.  06/04/2018: Pain overnight, night MD called for IV morphine but patient later refused, discussed with patient and family will change pain meds to scheduled oxy IR 5mg q6 with breakthrough. Awaiting AKA tomorrow. Also placed on 2L supplemental O2 via NC but no SOB, suspect poor pulse ox readings from fingers, will  "wean today.   06/05/2018: Pain well controlled overnight on scheduled oxycodone. Plan for AKA today with vascular sx.  06/06/2018: AKA yesterday, denies pain. No subjective complaints. VSS, afebrile.   06/07/2018: ISH. Pain well controlled.   06/08/2018: ISH. Perineural catheter removed, reports some pain but controlled with oral medication.  06/09/2018: Some pain overnight, responded to one dose of oxycodone. VSS, afebrile.   06/10/2018: No events overnight. Patient reports pain is well controlled. She is alert and oriented.  06/11/2018: ISH. Medically stable, ready for transfer to SNF.   06/12/2018: ISH. Some discoloration of skin of left heel, will ask wound care to see.   06/13/2018: ISH. Medically stable, awaiting SNF placement.  06/14/2018: Some pain overnight, responded to oxycodone. Changed scheduled Tylenol to PRN as patient has been received 3g/day for last 6 days. Medically stable, awaiting placement.  06/15/2018: No acute events. Pain well controlled. Had some nausea overnight.  06/16/2018: ISH. Complains of some "prickling and puffiness" of stump, but normal exam. No other issues. Medically stable, awaiting SNF.  06/17/2018- 6/19: No issues. Stable, waiting for placement.  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 Garcia in Vascular Surgery " clinic in 1 week if still in state  - Perineural catheter removed 6/7, reports some pain but controlled with oral meds    - Medically stable ready for discharge to SNF         Cellulitis    - erythema noted to R forearm, improving, denies any pain  - looks like dependent edema but will treat with Augmentin and Doxy for 5 days (day 3/5)          Intertrigo    - Miconazole powder BID        Pressure injury of deep tissue    - Left heel  - Appreciate wound care recs  - Continue Betadine soaks daily   - Continue boots, improving        Delayed surgical wound healing    - Appreciate Gen Surg and wound care consults  - 6/10: Transitioned abx from vanc & Unasyn to doxy and Augmentin  - 6/11: Still some yellow discharge from wound, Gen Surg to reviewed again: no signs of worsening infection, recommended continuing wound care with Medihoney and daily packing changes, continue PO abx, staples removed     - wound care as needed        PVD (peripheral vascular disease)    - Has ASA allergy  - 6/11: restarted clopidogrel        Hypothyroidism    - Continue home levothyroxine        GERD (gastroesophageal reflux disease)    - Home med Nexium, pantoprazole while inpatient        Essential hypertension    - Continue home amlodipine 10, Toprol 25 and benazepril 20mg    Vitals:    06/20/18 0510 06/20/18 0754 06/20/18 0900 06/20/18 1051   BP: (!) 117/55 (!) 118/49  (!) 144/64   BP Location: Left arm Left arm     Patient Position: Lying Lying     Pulse: 73 69  81   Resp: 18 18  18   Temp: 97.3 °F (36.3 °C) (!) 95.8 °F (35.4 °C) 96.3 °F (35.7 °C) 97.1 °F (36.2 °C)   TempSrc: Oral Oral Axillary    SpO2: 98% 95%  (!) 91%   Weight:       Height:                   Final Active Diagnoses:    Diagnosis Date Noted POA    PRINCIPAL PROBLEM:  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, Right Foot [I70.269] 02/16/2018 Yes    Cellulitis [L03.90] 06/19/2018 Yes    Intertrigo [L30.4] 06/14/2018 Yes    Pressure injury of skin, stage 1  [L89.91] 06/14/2018 Yes    Pressure injury of deep tissue [L89.90] 06/13/2018 Yes    Moderate malnutrition [E44.0] 06/05/2018 Unknown    Delayed surgical wound healing [T81.89XA] 06/04/2018 Yes    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] 06/04/2018 Yes     Chronic    S/P right hemicolectomy [Z90.49] 06/03/2018 Not Applicable    Gangrene of toe of right foot [I96] 06/02/2018 Yes    PVD (peripheral vascular disease) [I73.9] 02/16/2018 Yes     Chronic    Hypothyroidism [E03.9] 07/31/2013 Yes     Chronic    Essential hypertension [I10]  Yes    GERD (gastroesophageal reflux disease) [K21.9]  Yes     Chronic      Problems Resolved During this Admission:    Diagnosis Date Noted Date Resolved POA    Hyponatremia [E87.1] 07/31/2013 06/16/2018 Yes       Discharged Condition: good    Disposition: Skilled Nursing Facility    Follow Up:  Follow-up Information     Aly Rivas MD.    Specialty:  Family Medicine  Contact information:  [address] [facility] [address] [phone]             PCP in [place] after SNF stay.               Patient Instructions:     Ambulatory referral to Outpatient Case Management   Referral Priority: Routine Referral Type: Consultation   Referral Reason: Specialty Services Required    Number of Visits Requested: 1      Ambulatory consult to Oncology   Referral Priority: Routine Referral Type: Consultation   Referral Reason: Specialty Services Required    Requested Specialty: Oncology    Number of Visits Requested: 1      Activity as tolerated         Significant Diagnostic Studies: Labs: All labs within the past 24 hours have been reviewed    Pending Diagnostic Studies:     None         Medications:  Reconciled Home Medications:      Medication List      START taking these medications    amoxicillin-clavulanate 875-125mg 875-125 mg per tablet  Commonly known as:  AUGMENTIN  Take 1 tablet by mouth every 12 (twelve) hours.     doxycycline 100 MG tablet  Commonly known  as:  VIBRA-TABS  Take 1 tablet (100 mg total) by mouth every 12 (twelve) hours.     miconazole NITRATE 2 % 2 % top powder  Commonly known as:  MICOTIN  Apply topically 2 (two) times daily.     oxyCODONE 5 MG immediate release tablet  Commonly known as:  ROXICODONE  Take 1 tablet (5 mg total) by mouth every 4 (four) hours as needed.        CONTINUE taking these medications    acetaminophen 325 MG tablet  Commonly known as:  TYLENOL  Take 2 tablets (650 mg total) by mouth every 6 (six) hours as needed.     amLODIPine 10 MG tablet  Commonly known as:  NORVASC  Take 1 tablet (10 mg total) by mouth once daily.     benazepril 20 MG tablet  Commonly known as:  LOTENSIN  Take 1 tablet (20 mg total) by mouth 2 (two) times daily.     calcium carbonate 600 mg calcium (1,500 mg) Tab  Commonly known as:  OS-UZMA  Take 600 mg by mouth 2 (two) times daily with meals.     clopidogrel 75 mg tablet  Commonly known as:  PLAVIX  Take 1 tablet (75 mg total) by mouth once daily.     docusate sodium 100 MG capsule  Commonly known as:  COLACE  Take 1 capsule (100 mg total) by mouth once daily.     esomeprazole 40 MG capsule  Commonly known as:  NEXIUM  Take 1 capsule (40 mg total) by mouth before breakfast.     fluoruracil 0.5 % cream  Commonly known as:  CARAC  Apply topically once daily. Nasal tip     inulin-sorbitol 2 gram Chew  Take 1-2 tablets by mouth 2 (two) times daily.     Lactobacillus acidoph-L.bulgar 1 million cell Chew  Take 4 tablets by mouth 3 (three) times daily with meals.     levothyroxine 100 MCG tablet  Commonly known as:  SYNTHROID  Take 1 tablet (100 mcg total) by mouth every morning.     metoclopramide HCl 5 MG tablet  Commonly known as:  REGLAN  Take 1 tablet (5 mg total) by mouth 2 (two) times daily as needed (nausea/vomiting). Do not take if severe abdominal pain present     metoprolol succinate 25 MG 24 hr tablet  Commonly known as:  TOPROL-XL  Take 1 tablet (25 mg total) by mouth once daily.     multivitamin  capsule  Take 1 capsule by mouth once daily.     rosuvastatin 20 MG tablet  Commonly known as:  CRESTOR  Take 1 tablet (20 mg total) by mouth once daily.     salmeterol 50 mcg/dose diskus inhaler  Commonly known as:  SEREVENT DISKUS  Inhale 1 puff into the lungs 2 (two) times daily. Controller            Indwelling Lines/Drains at time of discharge:   Lines/Drains/Airways     Drain                 Colostomy 05/23/18 1900 RLQ 27 days    Female External Urinary Catheter 06/04/18 2000 15 days          Pressure Ulcer                 Negative Pressure Wound Therapy  14 days         Pressure Injury 06/13/18 1248 Left Heel Deep tissue injury 6 days         Pressure Injury 06/14/18 Sacral spine Stage 1 6 days                Time spent on the discharge of patient: 45 minutes  Patient was seen and examined on the date of discharge and determined to be suitable for discharge.       Plan discussed with attending Dr. Anjali Archuleta MD , further recommendations as per attending addendum. Please feel free to call with any questions or concerns.          Raissa Cruz MD  Department of Hospital Medicine  [facility]

## 2018-06-20 NOTE — PLAN OF CARE
Problem: Physical Therapy Goal  Goal: Physical Therapy Goal  Goals to be met by: 18     Patient will increase functional independence with mobility by performin. Supine to sit with Maximum Assistance Met   Upgrade: supine to sit with min assist - not met  2. Sit to supine with Maximum Assistance Met    Upgrade: sit to supine with min assist - met  3. Rolling to Left and Right with Maximum Assistance. Met   Upgrade: rolling L and R with min assist - not met  4. Sit to stand transfer with Maximum Assistance - Met  Update: Sit to stand transfer with Mod Assist using RW. - not met  5. Bed to chair transfer with Maximum Assistance using Rolling Walker - not met  6. Sitting at edge of bed x5 minutes with Moderate Assistance-met        Pts goals remain appropriate. Continue with POC.  Fany Armando, SPTA   2018  I certify that I was present in the room directing the student in service delivery and guiding them using my skilled judgment. As the co-signing therapist I have reviewed the students documentation and am responsible for the treatment, assessment, and plan. Maverick Guajardo PTA   2018

## 2018-06-20 NOTE — PT/OT/SLP PROGRESS
Physical Therapy Treatment    Patient Name:  Suzan Villarreal   MRN:  [id number]    Recommendations:     Discharge Recommendations:  nursing facility, skilled   Discharge Equipment Recommendations:  (tbd)   Barriers to discharge: None    Assessment:     Suzan Villarreal is a 89 y.o. 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  She presents with the following impairments/functional limitations:  weakness, impaired endurance, impaired self care skills, impaired functional mobilty, gait instability, impaired balance, decreased coordination, decreased lower extremity function . Pt remains motivated for therapy and progressing well. Pt able to tolerate standing on R leg for 10-15 seconds with max x 2 and VCs to stand up straight. Pt would continue to benefit from PT services.    Rehab Prognosis:  good; patient would benefit from acute skilled PT services to address these deficits and reach maximum level of function.      Recent Surgery: Procedure(s) (LRB):  AMPUTATION, LOWER EXTREMITY, ABOVE KNEE (Right) 15 Days Post-Op    Plan:     During this hospitalization, patient to be seen 3 x/week to address the above listed problems via therapeutic activities, therapeutic exercises  · Plan of Care Expires:  06/27/18   Plan of Care Reviewed with: patient    Subjective     Communicated with RN prior to session.  Patient found supine upon PT entry to room, agreeable to treatment.      Chief Complaint: none stated  Patient comments/goals: to gain more stregth  Pain/Comfort:  · Pain Rating 1: 0/10    Patients cultural, spiritual, Quaker conflicts given the current situation: No    Objective:     Patient found with: pressure relief boots , oxygen    General Precautions: Standard, fall   Orthopedic Precautions: R AKA  Braces: N/A     Functional Mobility:  · Bed Mobility:     · Rolling Left:  minimum assistance with use of bed rail  · Supine to Sit: maximal assistance  · Sit to Supine:  minimum assistance  · Transfers:     · Sit to Stand:  maximal assistance of 2 persons with rolling walker.   · Balance: Pt able to tolerate seated EOB for 20 min with SBA to mod assist with VCs for posture.      AM-PAC 6 CLICK MOBILITY  Turning over in bed (including adjusting bedclothes, sheets and blankets)?: 3  Sitting down on and standing up from a chair with arms (e.g., wheelchair, bedside commode, etc.): 2  Moving from lying on back to sitting on the side of the bed?: 2  Moving to and from a bed to a chair (including a wheelchair)?: 2  Need to walk in hospital room?: 1  Climbing 3-5 steps with a railing?: 1  Total Score: 11       Therapeutic Activities and Exercises:  Pt educated on safety with mobility  Pt educated on POC  LE AAROM ther-ex completed seated at EOB:  - L marching x 15  - L LAQ x 15  - L ankle pumps x 15  - R Hip abductions x 15  - R Hip Flexion x 15    Patient left supine with all lines intact and call button in reach..    GOALS:    Physical Therapy Goals        Problem: Physical Therapy Goal    Goal Priority Disciplines Outcome Goal Variances Interventions   Physical Therapy Goal     PT/OT, PT Ongoing (interventions implemented as appropriate)     Description:  Goals to be met by: 18     Patient will increase functional independence with mobility by performin. Supine to sit with Maximum Assistance Met   Upgrade: supine to sit with min assist  2. Sit to supine with Maximum Assistance Met    Upgrade: sit to supine with min assist  3. Rolling to Left and Right with Maximum Assistance. Met   Upgrade: rolling L and R with min assist  4. Sit to stand transfer with Maximum Assistance - Met  Update: Sit to stand transfer with Mod Assist using RW.  5. Bed to chair transfer with Maximum Assistance using Rolling Walker  6. Sitting at edge of bed x5 minutes with Moderate Assistance-met                         Time Tracking:     PT Received On: 18  PT Start Time: 913     PT Stop Time:  0938  PT Total Time (min): 25 min     Billable Minutes: Therapeutic Activity 15 and Therapeutic Exercise 10    Treatment Type: Treatment  PT/PTA: PTA     PTA Visit Number: 3     Fany Armando, SPTA  06/20/2018   I certify that I was present in the room directing the student in service delivery and guiding them using my skilled judgment. As the co-signing therapist I have reviewed the students documentation and am responsible for the treatment, assessment, and plan. Maverick Guajardo PTA   6/20/2018

## 2018-06-20 NOTE — NURSING
Pt awake alert resp even and unlabored. Skin warm and dry to touch. Speech clear able to make her needs known. Bilateral hearing aids in. Safety measures in place

## 2018-06-20 NOTE — PLAN OF CARE
06/20/18 1336   Final Note   Assessment Type Final Discharge Note   Discharge Disposition SNF  ([facility] in [facility])   Hospital Follow Up  Appt(s) scheduled? No

## 2018-06-20 NOTE — ASSESSMENT & PLAN NOTE
- Continue home amlodipine 10, Toprol 25 and benazepril 20mg    Vitals:    06/20/18 0510 06/20/18 0754 06/20/18 0900 06/20/18 1051   BP: (!) 117/55 (!) 118/49  (!) 144/64   BP Location: Left arm Left arm     Patient Position: Lying Lying     Pulse: 73 69  81   Resp: 18 18  18   Temp: 97.3 °F (36.3 °C) (!) 95.8 °F (35.4 °C) 96.3 °F (35.7 °C) 97.1 °F (36.2 °C)   TempSrc: Oral Oral Axillary    SpO2: 98% 95%  (!) 91%   Weight:       Height:

## 2018-06-20 NOTE — PLAN OF CARE
ADAM landon'nat flight itinerary and confirmed with Esdras at [facility]. [place]o will meet flight crew at [facility] for around 1:30pm and will be to [facility] around 2pm. Patient, family and nursing staff aware. Transport packet to .

## 2018-06-20 NOTE — PT/OT/SLP PROGRESS
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  Pain/Comfort:  · Pain Rating 1: 0/10  · Pain Addressed 1: Reposition, Distraction, Cessation of Activity  · Pain Rating Post-Intervention 1: 0/10    Patients cultural, spiritual, Amish conflicts given the current situation: none reported    Objective:     Patient found with: oxygen, pressure relief boots    General Precautions: Standard, fall   Orthopedic Precautions: (R AKA)   Braces: N/A     Occupational Performance:    Bed Mobility:    · Patient completed Rolling/Turning to Left with  minimum assistance and with side rail  · Patient completed Scooting/Bridging with contact guard assistance  · Patient completed Supine to Sit with maximal assistance  · Patient completed Sit to Supine with minimum assistance   · Pt seated EOB for 20 min with SBA-mod A for balance; increased posterior lean when engaged in tasks at EOB    Functional Mobility/Transfers:  · Patient completed Sit <> Stand Transfer with maximal assistance and of 2 persons x 3 trials with standard walker; cues for increased use of BUE and L knee extension  · Functional Mobility: NT    Activities of Daily Living:  · Grooming: setup assistance to brush teeth and wash face while seated EOB  · UB Dressing: setup assistance to doff/don gown while seated EOB    Patient left HOB elevated with all lines intact and call button in reach    AMPA 6 Click:  [facility] Total Score: 15    Treatment & Education:  LUE AROM exercises 10 x 1 for shoulder flex, elbow flex/ext, and chest press while seated EOB  RUE AAROM exercises 10 x 1 for shoulder flex to 90, elbow flex/ext and chest press while seated  EOB  Pt educated on role of OT/POC  White board/communication board updated  Education:    Assessment:     Suzan Villarreal is a 89 y.o. 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  She presents with LE weakness and RUE ROM deficits impairing functional mobility and self care. Pt has progressed with bed mobility, but fluctuates with assist level for EOB balance.  Performance deficits affecting function are weakness, impaired endurance, impaired functional mobilty, impaired self care skills, impaired balance, gait instability, decreased lower extremity function, decreased ROM, pain, edema.      Rehab Prognosis:  Fair; patient would benefit from acute skilled OT services to address these deficits and reach maximum level of function.       Plan:     Patient to be seen 4 x/week to address the above listed problems via self-care/home management, therapeutic activities, therapeutic exercises  · Plan of Care Expires: 07/08/18  · Plan of Care Reviewed with: patient    This Plan of care has been discussed with the patient who was involved in its development and understands and is in agreement with the identified goals and treatment plan    GOALS:    Occupational Therapy Goals        Problem: Occupational Therapy Goal    Goal Priority Disciplines Outcome Interventions   Occupational Therapy Goal     OT, PT/OT Ongoing (interventions implemented as appropriate)    Description:  Goals to be met by: 6/22/18     Patient will increase functional independence with ADLs by performing:    Feeding with Set-up Assistance while seated EOB/UIC.  UE Dressing with Minimal Assistance. GOAL MET 6/13   REVISED: UE Dressing with Setup Assistance GOAL MET 6/20  Grooming while EOB with Minimal Assistance GOAL MET 6/15  Toileting from bedside commode with Minimal Assistance for hygiene and clothing management.   Sitting at edge of bed x5 minutes with Stand-by Assistance.   Rolling to Bilateral with Moderate  Assistance. GOAL MET 6/11   REVISED: Rolling to Bilateral with Minimal Assistance GOAL MET 6/15   REVISED: Rolling to Bilateral with CGA  Supine to sit with Moderate Assistance.  Stand pivot transfers with Moderate Assistance.  Toilet transfer to bedside commode with Moderate Assistance.                           Time Tracking:     OT Date of Treatment: 06/20/18  OT Start Time: 0913  OT Stop Time: 0938  OT Total Time (min): 25 min    Billable Minutes:Self Care/Home Management 13  Therapeutic Exercise 12    Ryann Crawley, OT  6/20/2018

## 2018-06-20 NOTE — PLAN OF CARE
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, Right Foot [I70.269]  Yes    Cellulitis [L03.90]  Yes    Intertrigo [L30.4]  Yes    Pressure injury of skin, stage 1 [L89.91]  Yes    Pressure injury of deep tissue [L89.90]  Yes    Moderate malnutrition [E44.0]  Unknown    Delayed surgical wound healing [T81.89XA]  Yes    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]  Yes     Chronic    S/P right hemicolectomy [Z90.49]  Not Applicable    Gangrene of toe of right foot [I96]  Yes    PVD (peripheral vascular disease) [I73.9]  Yes     Chronic    Hypothyroidism [E03.9]  Yes     Chronic    Essential hypertension [I10]  Yes    GERD (gastroesophageal reflux disease) [K21.9]  Yes     Chronic      Resolved Hospital Problems    Diagnosis Date Resolved POA    Hyponatremia [E87.1] 06/16/2018 Yes       Patient is homebound due to:  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    Allergies:  Review of patient's allergies indicates:   Allergen Reactions    Sulfa (sulfonamide antibiotics) Swelling    Asa [aspirin] Itching       Vitals:         Every shift (Skilled Nursing patients)    Diet: Adult regular, 2000cal    Acitivities:      - Up in a chair each morning as tolerated   - May use walker, cane, or self-propelled wheelchair      LABS:  Per facility protocol     Nursing Precautions:  - Aspiration precautions:             - Total assistance with meals            -  Upright 90 degrees befor during and after meals             -  Suction at bedside          - Fall precautions per nursing home protocol  - Decubitus  precautions:        -  for positioning   - Pressure reducing foam mattress   - Turn patient every two hours. Use wedge pillows to anchor patient    CONSULTS:      Physical Therapy to evaluate and treat     Occupational Therapy to evaluate and treat     Speech Therapy  to evaluate and treat     Nutrition to evaluate and recommend diet     Psychiatry to evaluate and follow patients for delirium    MISCELLANEOUS CARE:      Routine Skin for Bedridden Patients:  Apply moisture barrier cream to all    skin folds and wet areas in perineal area daily and after baths and                           all bowel movements.            Medications: Discontinue all previous medication orders, if any. See new list below.     Suzan Villarreal   Home Medication Instructions KATHRYN:[id number]    Printed on:06/20/18 4689   Medication Information                      acetaminophen (TYLENOL) 325 MG tablet  Take 2 tablets (650 mg total) by mouth every 6 (six) hours as needed.             amLODIPine (NORVASC) 10 MG tablet  Take 1 tablet (10 mg total) by mouth once daily.             amoxicillin-clavulanate 875-125mg (AUGMENTIN) 875-125 mg per tablet  Take 1 tablet by mouth every 12 (twelve) hours.  (end date 6/22/18)           benazepril (LOTENSIN) 20 MG tablet  Take 1 tablet (20 mg total) by mouth 2 (two) times daily.             calcium carbonate (OS-UZMA) 600 mg (1,500 mg) Tab  Take 600 mg by mouth 2 (two) times daily with meals.             clopidogrel (PLAVIX) 75 mg tablet  Take 1 tablet (75 mg total) by mouth once daily.             docusate sodium (COLACE) 100 MG capsule  Take 1 capsule (100 mg total) by mouth once daily.             doxycycline (VIBRA-TABS) 100 MG tablet  Take 1 tablet (100 mg total) by mouth every 12 (twelve) hours.   (end date 6/22/18)           esomeprazole (NEXIUM) 40 MG capsule  Take 1 capsule (40 mg total) by mouth before breakfast.             fluoruracil (CARAC) 0.5 % cream  Apply topically once daily. Nasal  tip             inulin-sorbitol 2 gram Chew  Take 1-2 tablets by mouth 2 (two) times daily.             Lactobacillus acidoph-L.bulgar 1 million cell Chew  Take 4 tablets by mouth 3 (three) times daily with meals.             levothyroxine (SYNTHROID) 100 MCG tablet  Take 1 tablet (100 mcg total) by mouth every morning.             metoclopramide HCl (REGLAN) 5 MG tablet  Take 1 tablet (5 mg total) by mouth 2 (two) times daily as needed (nausea/vomiting). Do not take if severe abdominal pain present             metoprolol succinate (TOPROL-XL) 25 MG 24 hr tablet  Take 1 tablet (25 mg total) by mouth once daily.             miconazole NITRATE 2 % (MICOTIN) 2 % top powder  Apply topically 2 (two) times daily.             multivitamin capsule  Take 1 capsule by mouth once daily.             oxyCODONE (ROXICODONE) 5 MG immediate release tablet  Take 1 tablet (5 mg total) by mouth every 4 (four) hours as needed.             rosuvastatin (CRESTOR) 20 MG tablet  Take 1 tablet (20 mg total) by mouth once daily.             salmeterol (SEREVENT DISKUS) 50 mcg/dose diskus inhaler  Inhale 1 puff into the lungs 2 (two) times daily. Controller                             _________________________________  Raissa Cruz MD  06/20/2018

## 2018-06-20 NOTE — PLAN OF CARE
ADAM rec'd call from Juliana with [facility]; Cruz has given auth for SNF. ADAM called and spoke with Cindy at [facility] ([phone]). They do have availability. Their pilots have 2 hrs to get things ready to go and then it is a one hour flight from [place] to [place]. Probable for 1pm flight. Calixto campos arranged and in will call. Daughter, Christen, updated. Christen plans to leave for [place] now and will fly in to meet her mother.

## 2018-06-21 ENCOUNTER — OUTPATIENT CASE MANAGEMENT (OUTPATIENT)
Dept: ADMINISTRATIVE | Facility: OTHER | Age: 83
End: 2018-06-21

## 2018-06-21 NOTE — PROGRESS NOTES
Please note that this patient was not enrolled in Outpatient Case Management at this time due to being transferred to a SNF facility.     Please contact [facility] at Ext. [phone] with questions.    Thank you,    Lizette Sahu, [facility]  Outpatient Complex Care Mgmt  Ext [phone]

## 2018-07-13 ENCOUNTER — TELEPHONE (OUTPATIENT)
Dept: WOUND CARE | Facility: CLINIC | Age: 83
End: 2018-07-13

## 2018-07-13 NOTE — TELEPHONE ENCOUNTER
Spoke with daughter who is desperate for help with her mom, she is having ostomy issues in NH and no help in sight, we looked together on Internet and found a few people in [place] area she can call, I also advised she call [facility] and ask for convex pouches to be sent to try in meantime.

## 2018-07-13 NOTE — TELEPHONE ENCOUNTER
----- Message from Gurpreet Fields sent at 7/13/2018  1:01 PM CDT -----  Contact: Pt daughter Wiley Whitley:[phone]  .Needs Advice    Reason for call:Pt called and states she would like to speak with the wound care provider in regards to knowing if there was any one at the [facility] that could take care of the pt ostomy.       Communication Preference:Pt daughter Wiley Whitley:[phone]  Additional Information:

## 2018-08-21 ENCOUNTER — PATIENT MESSAGE (OUTPATIENT)
Dept: SURGERY | Facility: CLINIC | Age: 83
End: 2018-08-21

## 2020-05-07 ENCOUNTER — PATIENT OUTREACH (OUTPATIENT)
Dept: ADMINISTRATIVE | Facility: HOSPITAL | Age: 85
End: 2020-05-07

## 2020-05-07 ENCOUNTER — PATIENT MESSAGE (OUTPATIENT)
Dept: ADMINISTRATIVE | Facility: HOSPITAL | Age: 85
End: 2020-05-07

## 2020-05-12 ENCOUNTER — PATIENT MESSAGE (OUTPATIENT)
Dept: ADMINISTRATIVE | Facility: HOSPITAL | Age: 85
End: 2020-05-12

## 2020-10-05 ENCOUNTER — PATIENT MESSAGE (OUTPATIENT)
Dept: ADMINISTRATIVE | Facility: HOSPITAL | Age: 85
End: 2020-10-05

## 2021-01-05 ENCOUNTER — PATIENT MESSAGE (OUTPATIENT)
Dept: ADMINISTRATIVE | Facility: HOSPITAL | Age: 86
End: 2021-01-05

## 2021-04-05 ENCOUNTER — PATIENT MESSAGE (OUTPATIENT)
Dept: ADMINISTRATIVE | Facility: HOSPITAL | Age: 86
End: 2021-04-05

## 2022-03-04 NOTE — SUBJECTIVE & OBJECTIVE
Chief Complaint  F/u for Diabetes Mellitus.    CORINNE Rodriguez is a 67 y.o. female who is here today for f/u of Diabetes Mellitus type 2. The initial diagnosis of diabetes was made approximately 2009.      during visit. Had cereal for breakfast.   Is taking OTC vitamin D.     A1C- 6.5 (3/4/2022), 7.1 (11/8/21), 6.3 (4/19/2021), 6.1 (1/18/2021), 6.3 (9/16/2020), 6.4 (6/15/2020), 7.6 (3/11/2020), 6.6 (12/11/19), 9.9 (9/24/19), 9.5 (6/6/19)    Diabetic complications: peripheral neuropathy- tingling  Eye exam current (within one year): utd 10/2021 Dr Akbar at eye [facility], no retinopathy  Foot care and dental care: discussed    Current diabetic medications include:  Metformin ER 500mg 2 tab BID - tolerating better than immediate release  Glipizide 5mg BID  Januvia 100mg QD   Jardiance 10mg daily  Levemir 45U QHS    Statin: lipitor 20    Past medications: none    Diabetic Monitoring  -   No meter or log for review. Pt reports BG readings 130-150 mostly, did have one episode of near hypoglycemia (76) which she was symptomatic with - forgot to eat     The following portions of the patient's history were reviewed and updated by me as appropriate: allergies, current medications, past family history, past social history, past surgical history and problem list.      Past Medical History:   Diagnosis Date   • Anxiety    • Breast cancer (HCC)    • Depression    • Diabetes mellitus (HCC)    • Glaucoma    • Hypertension    • Myopic degeneration        Medications    Current Outpatient Medications:   •  amLODIPine (NORVASC) 5 MG tablet, Take 1 tablet by mouth Daily., Disp: 90 tablet, Rfl: 1  •  aspirin 81 MG tablet, Take  by mouth daily., Disp: , Rfl:   •  atenolol (TENORMIN) 100 MG tablet, Take 1 tablet by mouth Daily., Disp: 90 tablet, Rfl: 1  •  atorvastatin (LIPITOR) 20 MG tablet, Take 1 tablet by mouth Every Night., Disp: 90 tablet, Rfl: 1  •  CILOXAN 0.3 % ophthalmic ointment, , Disp: , Rfl:   •   Past Medical History:   Diagnosis Date    Allergy     Cholelithiasis without obstruction     Chronic insomnia     COPD (chronic obstructive pulmonary disease)     DJD (degenerative joint disease), lumbar     GERD (gastroesophageal reflux disease)     HLD (hyperlipidemia)     HTN (hypertension)     Hypothyroid     OP (osteoporosis)     Osteoarthritis     S/P hysterectomy        Past Surgical History:   Procedure Laterality Date    APPENDECTOMY      CHOLECYSTECTOMY      HIP SURGERY  8-2013    right    HYSTERECTOMY      KNEE ARTHROSCOPY Right 7-6-15    TK       Review of patient's allergies indicates:   Allergen Reactions    Sulfa (sulfonamide antibiotics) Swelling    Asa [aspirin] Itching       No current facility-administered medications on file prior to encounter.      Current Outpatient Prescriptions on File Prior to Encounter   Medication Sig    acetaminophen (TYLENOL) 325 MG tablet Take 2 tablets (650 mg total) by mouth every 6 (six) hours as needed.    amLODIPine (NORVASC) 10 MG tablet Take 1 tablet (10 mg total) by mouth once daily.    ANTIOX #11/OM3/DHA/EPA/LUT/BARBER (OCUVITE ADULT 50+ ORAL) Take 1 tablet by mouth once daily.    benazepril (LOTENSIN) 20 MG tablet Take 1 tablet (20 mg total) by mouth 2 (two) times daily.    Bifidobacterium animalis 6 mg (5 billion cell) Cap Take 1 capsule by mouth once daily.    calcium carbonate (OS-UZMA) 600 mg (1,500 mg) Tab Take 600 mg by mouth 2 (two) times daily with meals.    esomeprazole (NEXIUM) 40 MG capsule Take 1 capsule (40 mg total) by mouth before breakfast.    fluoruracil (CARAC) 0.5 % cream Apply topically once daily. Nasal tip    levothyroxine (SYNTHROID) 100 MCG tablet Take 1 tablet (100 mcg total) by mouth every morning.    metoprolol succinate (TOPROL-XL) 25 MG 24 hr tablet Take 1 tablet (25 mg total) by mouth once daily.    multivitamin capsule Take 1 capsule by mouth once daily.    salmeterol (SEREVENT DISKUS) 50 mcg/dose  diskus inhaler Inhale 1 puff into the lungs 2 (two) times daily. Controller     Family History     None        Social History Main Topics    Smoking status: Never Smoker    Smokeless tobacco: Never Used    Alcohol use No    Drug use: No    Sexual activity: Not Currently     Review of Systems   Constitutional: Positive for appetite change. Negative for activity change, chills, fatigue, fever and unexpected weight change.   HENT: Negative for congestion, rhinorrhea, sore throat, trouble swallowing and voice change.    Eyes: Negative for visual disturbance.   Respiratory: Negative for cough, choking, chest tightness, shortness of breath and wheezing.    Cardiovascular: Negative for chest pain, palpitations and leg swelling.   Gastrointestinal: Positive for abdominal pain, constipation and vomiting. Negative for abdominal distention, anal bleeding, blood in stool, diarrhea and nausea.   Endocrine: Negative for cold intolerance, heat intolerance, polydipsia and polyuria.   Genitourinary: Negative for dysuria, flank pain, frequency, hematuria and urgency.   Musculoskeletal: Negative for arthralgias, back pain, joint swelling and myalgias.   Skin: Negative for color change and rash.   Neurological: Positive for weakness. Negative for dizziness, seizures, syncope, facial asymmetry, speech difficulty, light-headedness, numbness and headaches.   Hematological: Negative for adenopathy. Does not bruise/bleed easily.   Psychiatric/Behavioral: Negative for agitation, confusion, hallucinations and suicidal ideas.     Objective:     Vital Signs (Most Recent):  Temp: 97.6 °F (36.4 °C) (04/28/18 2128)  Pulse: 76 (04/29/18 0501)  Resp: 17 (04/29/18 0501)  BP: (!) 155/87 (04/29/18 0501)  SpO2: (!) 94 % (04/29/18 0501) Vital Signs (24h Range):  Temp:  [97.6 °F (36.4 °C)] 97.6 °F (36.4 °C)  Pulse:  [74-86] 76  Resp:  [14-20] 17  SpO2:  [93 %-97 %] 94 %  BP: (119-155)/(60-87) 155/87     Weight: 56.2 kg (123 lb 14 oz)  Body mass index  dorzolamide-timolol (COSOPT) 22.3-6.8 MG/ML ophthalmic solution, Apply  to eye 2 (two) times a day., Disp: , Rfl:   •  empagliflozin (Jardiance) 10 MG tablet tablet, Take 1 tablet by mouth Daily., Disp: 90 tablet, Rfl: 1  •  glipizide (GLUCOTROL) 5 MG tablet, Take one tablet by mouth twice daily before food, Disp: 180 tablet, Rfl: 1  •  glucose blood (TRUE METRIX BLOOD GLUCOSE TEST) test strip, Use to test blood glucose once daily and as needed, Disp: 100 each, Rfl: 3  •  hydroCHLOROthiazide (HYDRODIURIL) 25 MG tablet, Take 1 tablet by mouth Daily., Disp: 90 tablet, Rfl: 1  •  insulin detemir (Levemir FlexTouch) 100 UNIT/ML injection, Inject 45 Units under the skin into the appropriate area as directed Every Night., Disp: 45 mL, Rfl: 1  •  Insulin Pen Needle 32G X 4 MM misc, Use daily with insulin, Disp: 50 each, Rfl: 11  •  Lancets misc, USE TO TEST ONCE DAILY AND AS NEEDED, Disp: 100 each, Rfl: 3  •  latanoprost (XALATAN) 0.005 % ophthalmic solution, , Disp: , Rfl:   •  lisinopril (PRINIVIL,ZESTRIL) 40 MG tablet, Take 1 tablet by mouth Daily., Disp: 90 tablet, Rfl: 1  •  metFORMIN ER (GLUCOPHAGE-XR) 500 MG 24 hr tablet, Take 2 tablets by mouth 2 (Two) Times a Day., Disp: 360 tablet, Rfl: 1  •  metroNIDAZOLE (METROGEL) 1 % gel, Apply  topically Daily., Disp: 60 g, Rfl: 3  •  Multiple Vitamins-Minerals (ICAPS AREDS FORMULA PO), Take  by mouth 2 (two) times a day., Disp: , Rfl:   •  PARoxetine (PAXIL) 20 MG tablet, Take 1 tablet by mouth Every Morning., Disp: 90 tablet, Rfl: 1  •  prednisoLONE acetate (PRED FORTE) 1 % ophthalmic suspension, , Disp: , Rfl:   •  SITagliptin (Januvia) 100 MG tablet, Take 1 tablet by mouth Daily., Disp: 90 tablet, Rfl: 1    Review of Systems  Review of Systems   Constitutional: Positive for fatigue. Negative for chills, fever and unexpected weight change.        Feeling poorly   HENT: Negative for ear pain, hearing loss, nosebleeds, rhinorrhea and sore throat.    Eyes: Negative for pain,  "discharge, redness and itching.   Respiratory: Negative for cough, shortness of breath and wheezing.    Cardiovascular: Negative for chest pain, palpitations and leg swelling.   Gastrointestinal: Negative for abdominal pain, blood in stool and constipation.   Endocrine: Negative for cold intolerance, heat intolerance and polydipsia.   Genitourinary: Negative for dysuria, frequency, hematuria, pelvic pain, vaginal bleeding and vaginal discharge.   Musculoskeletal: Positive for arthralgias. Negative for gait problem, joint swelling and myalgias.   Skin: Negative for rash.   Allergic/Immunologic: Negative.    Neurological: Negative for dizziness, syncope, weakness, numbness and headaches.   Hematological: Negative for adenopathy. Does not bruise/bleed easily.   Psychiatric/Behavioral: Negative for sleep disturbance and suicidal ideas. The patient is not nervous/anxious.         Physical Exam    /64   Pulse 64   Ht 154.9 cm (61\")   Wt 69.9 kg (154 lb)   LMP  (LMP Unknown) Comment: Last pap 1/2020 by JEREMIE Watson  SpO2 96%   BMI 29.10 kg/m² Body mass index is 29.1 kg/m².  Physical Exam   Constitutional: She is oriented to person, place, and time. She appears well-developed. No distress.   HENT:   Head: Normocephalic.   Right Ear: External ear normal.   Left Ear: External ear normal.   Mouth/Throat: No oropharyngeal exudate.   Eyes: Conjunctivae and lids are normal. Right eye exhibits no discharge. Left eye exhibits no discharge. Right pupil is reactive. Left pupil is reactive.   Neck: No JVD present. No tracheal deviation present. No thyroid mass and no thyromegaly present.   Cardiovascular: Normal rate, regular rhythm and normal heart sounds.   No murmur heard.  Pulmonary/Chest: Effort normal and breath sounds normal. No respiratory distress. She has no wheezes.   Abdominal: Soft. Bowel sounds are normal. There is no abdominal tenderness.   Musculoskeletal: No tenderness.    Zeinab had a diabetic foot " is 22.66 kg/m².    Physical Exam   Constitutional: She is oriented to person, place, and time. She appears well-developed and well-nourished. No distress.   HENT:   Head: Normocephalic and atraumatic.   Neck: Neck supple. Carotid bruit is not present. No thyromegaly present.   Cardiovascular: Normal rate and regular rhythm.  Exam reveals no gallop.    No murmur heard.  Pulmonary/Chest: Effort normal and breath sounds normal. No respiratory distress. She has no wheezes.   Abdominal: Soft. Bowel sounds are normal. She exhibits distension (Mild). She exhibits no mass. There is no splenomegaly or hepatomegaly. There is no tenderness.   Musculoskeletal: Normal range of motion. She exhibits no edema or deformity.   Neurological: She is alert and oriented to person, place, and time. No cranial nerve deficit or sensory deficit.   Skin: Skin is warm and dry. No rash noted.   Psychiatric: She has a normal mood and affect.           Significant Labs:   CBC:   Recent Labs  Lab 04/28/18  2252   WBC 6.11   HGB 13.4   HCT 38.3        CMP:   Recent Labs  Lab 04/28/18  2252   *   K 4.2   CL 95   CO2 22*      BUN 14   CREATININE 0.8   CALCIUM 8.6*   PROT 5.9*   ALBUMIN 3.2*   BILITOT 0.5   ALKPHOS 54*   AST 20   ALT 10   ANIONGAP 10   EGFRNONAA >60.0     Lactic Acid: No results for input(s): LACTATE in the last 48 hours.  Lipase:   Recent Labs  Lab 04/28/18  2252   LIPASE 31     Urine Studies:   Recent Labs  Lab 04/29/18  0144   COLORU Yellow   APPEARANCEUA Clear   PHUR 5.0   SPECGRAV 1.015   PROTEINUA Negative   GLUCUA Negative   KETONESU Negative   BILIRUBINUA Negative   OCCULTUA 1+*   NITRITE Negative   UROBILINOGEN Negative   LEUKOCYTESUR 1+*   RBCUA 0   WBCUA 2   BACTERIA Occasional       Significant Imaging: I have reviewed all pertinent imaging results/findings within the past 24 hours.   exam performed today.   During the foot exam she had a monofilament test performed.    Neurological Sensory Findings - Unaltered hot/cold right ankle/foot discrimination and unaltered hot/cold left ankle/foot discrimination. Unaltered sharp/dull right ankle/foot discrimination and unaltered sharp/dull left ankle/foot discrimination. No right ankle/foot altered proprioception and no left ankle/foot altered proprioception  Vascular Status -  Her right foot exhibits normal foot vasculature  and no edema. Her left foot exhibits normal foot vasculature  and no edema.  Skin Integrity  -  Her right foot skin is intact.  She has no right foot onychomycosis, no right foot ulcer and non-callous right foot.Her left foot skin is intact. She has no left foot onychomycosis, no left foot ulcer and non-callous left foot..  Lymphadenopathy:     She has no cervical adenopathy.   Neurological: She is alert and oriented to person, place, and time.   Skin: Skin is warm and dry. No rash noted. She is not diaphoretic. No erythema.   Psychiatric: Her speech is normal and behavior is normal. Thought content normal.       Labs and Imaging   Lab Results   Component Value Date    HGBA1C 6.5 03/04/2022    HGBA1C 7.1 11/08/2021    HGBA1C 6.3 04/19/2021     Office Visit on 03/04/2022   Component Date Value Ref Range Status   • Glucose 03/04/2022 226* 70 - 130 mg/dL Final   • Hemoglobin A1C 03/04/2022 6.5  % Final   • Lot Number 03/04/2022 [id number]   Final   • Expiration Date 03/04/2022 09/28/25   Final     No images are attached to the encounter or orders placed in the encounter.  No Images in the past 120 days found..    Assessment / Plan   Diagnoses and all orders for this visit:    1. Controlled type 2 diabetes mellitus with diabetic polyneuropathy, with long-term current use of insulin (HCC) (Primary)  -     POC Glucose, Blood  -     POC Glycosylated Hemoglobin (Hb A1C)  -     insulin detemir (Levemir FlexTouch) 100 UNIT/ML injection;  Inject 45 Units under the skin into the appropriate area as directed Every Night.  Dispense: 45 mL; Refill: 1    2. Mixed hyperlipidemia        Diabetes Mellitus 2 is under good control.  -A1c 6.5, down from 7.1 last visit  -significantly less hypoglycemia compared to last visit  -continue metformin ER 500mg 2 pills 2x/day.  -continue Jardiance 10mg daily.   -continue levemir 45u qHS  -Continue Januvia 100mg daily  -continue glipizide 5mg 2x/day AC  -eye exam was done 5/2021, foot exam updated today, labs updated 11/2021    1.  Diet: 3-4 carb servings per meal for females, 4-5 carb servings per meal for males  Spread carb intake throughout the day  Increase lean protein and vegetable intake  Avoid sugary drinks and processed carbs including crackers, cookies, cakes  2.  Exercise: Recommend at least 30 minutes of exercise daily, at least 5 days per week. Increase exercise gradually.   3.  Blood Glucose Goal: Blood glucose goal <150 fasting, <180 2 hr postprandial  4.  Microalbumin due 11/2022  5.  Education performed during this visit: long term diabetic complications discussed. , annual eye examinations at Ophthalmology discussed, dental hygiene discussed  and foot care reviewed., home glucose monitoring emphasized, all medications, side effects and compliance discussed carefully and Hypoglycemia management and prevention reviewed. Reviewed ‘ABCs’ of diabetes management (respective goals in parentheses):  A1C (<7), blood pressure (<130/80), and cholesterol (LDL <100, if CVD <70).    Hyperlipidemia  -lipids updated 11/2021  -continue lipitor 20 mg qhs        There are no Patient Instructions on file for this visit.    Follow up: Return in about 4 months (around 7/4/2022).    Discussed the nature of the disease including, risks, complications, implications, management, safe and proper use of medications. Encouraged therapeutic lifestyle changes including low calorie diet with plenty of fruits and vegetables, daily  exercise, medication compliance, and keeping scheduled follow up appointments with me and any other providers. Encouraged patient to have appointment for complete physical, fasting labs, appropriate screenings, and immunizations on an annual basis.        Rosario Claudio PA-C

## 2022-05-18 NOTE — ASSESSMENT & PLAN NOTE
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

## 2022-11-13 NOTE — ASSESSMENT & PLAN NOTE
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 flexure    Plan:    Neuro:   -Pain control: fentanyl while sedated  -Sedation with propofol while intubated  -Daily sedation vacations, plan to wean sedation this morning with goal of extubation     Pulmonary:   -Intubated  -Vent settings  Vent Mode: SIMV  Oxygen Concentration (%):  [40-50] 41  Resp Rate Total:  [11.9 br/min-23 br/min] 12 br/min  Vt Set:  [450 mL-500 mL] 450 mL  PEEP/CPAP:  [5 cmH20] 5 cmH20  Pressure Support:  [10 cmH20] 10 cmH20  Mean Airway Pressure:  [8.6 ffP01-34 cmH20] 8.6 cmH20  - ABGs prn  ABG    Recent Labs  Lab 05/24/18  0455   PH 7.596*   PO2 80   PCO2 25.0*   HCO3 24.3   BE 3   -Vent associated PNA ppx: Chlorhexidine   -Daily SBT  -Wean vent as tolerated, plan to extubate this AM if patient passes SBT parameters    Cardiac:  -MAP goal >65  -HDS not requiring pressors  -H/o HTN, Cardene gtt started, titrate for SBT <165, currently off  -Home amlodipine to restart this AM    Renal:   -Valladares in place  -UOP low but adequate overnight, 0.5cc/kg/hr  -Bun/Cr 9/0.5 consistent with 7/0.6 pre-op    Fluids/Electrolytes/Nutrition/GI:   -Nutritional status: NPO  -replace lytes PRN  -LR @ 125  -NG to [facility]    Hematology/Oncology:  - H/H 14.5/41.5 will continue to follow, CBC q8  -Anticoagulation: SQ heparin, holding plavix, will restart per primary    Infectious Disease:   -Afebrile  -WBC 9.39, will continue to trend   -Lactates ordered q8, last 1.4  -Abx: Vanc & zosyn post-op     Endocrine:  -Glucose goal of 120-180  -h/o hypothyrodism, home synthroid via NG today in AM    Dispo:  -Continue care in the ICU setting  -ACS primary     good, to achieve stated therapy goals fair, will monitor progress closely

## 2022-12-19 NOTE — PLAN OF CARE
Pelvic rest. May shower no bath for 6 weeks. Stay nice and hydrated. Call OB if have: fever greater then 100.4, heavy vaginal bleeding(1 pad saturated in 1 hour), chest pain, shortness of breath or blurred vision. Or signs or symptoms of anxiety or depression. Parents support groups:  Cradle talk online (Monday & Friday at 10am)--- support group for any parents of a  in our community-- [employer]-- for any expectant or new mom who may be experiencing anxiety, stress or depression. It is lead by a licensed clinical therapist with the option of in-person or online meetings: In person meets once a month for [employer], registration is required  for either option and online ( and  of the month)  virtual on [employer]. For more information for either Cradle talk or Nurturing Mom or to receive a email invite contact Cher [email or web address]      In person Mom & Baby hour support group is every Wednesday from  am, at [address] at [address], [place] in [address] which is located just inside the front door and to the right. No Registration required just show up, for any new babies, doesn't need to be your first!!! For information for going in person--- Email Cher [email or web address]     Can always see information about in person group on the [address] [place] [address] for breast feeding and parent support, Website: [email or web address]  and for the [address] [place] [address] group and other groups visit [email or web address]  to help find a group, all meetings are virtual.     [address] [place] [address] Rd: [phone]   This service is provided by [facility], has a phone line dedicated women (or anyone worried about a women) who may be experiencing signs or symptoms of postpartum depression.       Facebook groups-  for Problem: Patient Care Overview  Goal: Plan of Care Review  Outcome: Ongoing (interventions implemented as appropriate)  POC reviewed with pt and her daughter, all questions and concerns addressed. VSS on 2L nasal cannula, pt is AAOx4. Pt developed productive cough this am, chest x-ray was ordered and performed. Valladares catheter was removed, pure wick system is in place. Pt is tolerating regular diet well with no NVD. Ileostomy is functioning and intact. House fluids DCd. Pain managed with oxy q6, dilaudid 0.5mg q3. Miconazole cream ordered for yeast development on inner thighs. Pt is resting quietly with call light in reach. Will continue to monitor.        more support when home- [facility] --- you can find mom-to-mom advice and the list of speaker topics for cradle talk program.

## (undated) DEVICE — DEVICE CLOSURE MYNX GRIP 5FR

## (undated) DEVICE — TOWEL OR XRAY WHITE 17X26IN

## (undated) DEVICE — COVERS PROBE NR-48 STERILE

## (undated) DEVICE — DISC DIAMOND MED 25.4MM

## (undated) DEVICE — BLADE 4 INCH EDGE UN-INS

## (undated) DEVICE — RASP ELITE HELICOIDAL

## (undated) DEVICE — SUT SILK 2-0 SH 18IN BLACK

## (undated) DEVICE — SOL NS 1000CC

## (undated) DEVICE — Device

## (undated) DEVICE — SYR MED RAD 150ML

## (undated) DEVICE — TRAY MINOR GEN SURG

## (undated) DEVICE — SEE MEDLINE ITEM 146298

## (undated) DEVICE — SUT VICRYL PLUS 2-0 CT1 18

## (undated) DEVICE — DRAPE INCISE IOBAN 2 23X17IN

## (undated) DEVICE — DRESSING ABSRBNT ISLAND 3.6X8

## (undated) DEVICE — SEE MEDLINE ITEM 146417

## (undated) DEVICE — SPONGE LAP 18X18 PREWASHED

## (undated) DEVICE — SUT VICRYL PLUS 3-0 SH 18IN

## (undated) DEVICE — CATH ULTRAVERSE 035 4X40X130

## (undated) DEVICE — SEE MEDLINE ITEM 152622

## (undated) DEVICE — SEE MEDLINE ITEM 156902

## (undated) DEVICE — SEE MEDLINE ITEM 146347

## (undated) DEVICE — STAPLER SKIN PROXIMATE WIDE

## (undated) DEVICE — DRAPE ABDOMINAL TIBURON 14X11

## (undated) DEVICE — GUIDEWIRE STD .035X180CM ANG

## (undated) DEVICE — VISE RADIFOCUS MULTI TORQUE

## (undated) DEVICE — SUT PROLENE 3-0 SH DA 36 BL

## (undated) DEVICE — CATH BLLN SEEKER .035IN 135CM

## (undated) DEVICE — SUT BONE WAX 2.5 GRMS 12/BX

## (undated) DEVICE — GUIDEWIRE ADVNTG 018X300CM ANG

## (undated) DEVICE — SEE MEDLINE ITEM 157216

## (undated) DEVICE — POWDER ARISTA AH 1GM

## (undated) DEVICE — KIT INTRO MICRO NIT VSI 4FR

## (undated) DEVICE — SET DECANTER MEDICHOICE

## (undated) DEVICE — ELECTRODE REM PLYHSV RETURN 9

## (undated) DEVICE — KIT PREVENA PLUS

## (undated) DEVICE — GUIDEWIRE STF .035X260CM ANG

## (undated) DEVICE — DRESSING TRANS 4X4 TEGADERM

## (undated) DEVICE — CATH STERLING MR 4X30X135

## (undated) DEVICE — COVER INSTR ELASTIC BAND 40X20

## (undated) DEVICE — DRAPE PLASTIC U 60X72

## (undated) DEVICE — CATH SEEKER IV CS .018IN 135CM

## (undated) DEVICE — SUT 2/0 30IN SILK BLK BRAI

## (undated) DEVICE — SUT D SPECIAL VICRYL 2-0

## (undated) DEVICE — CATH ANGIO SOFT VU 5FR 65CM

## (undated) DEVICE — SUT 1 48IN PDS II VIO MONO

## (undated) DEVICE — SUT 3/0 27IN PDS II VIO MO

## (undated) DEVICE — SHEATH FLEXOR ANSEL 5FR 90M

## (undated) DEVICE — DRESSING SPONGE 8PLY 4X4 STRL

## (undated) DEVICE — TUBING HIGH PRESSURE

## (undated) DEVICE — APPLIER LIGACLIP LG 13IN

## (undated) DEVICE — APPLICATOR CHLORAPREP ORN 26ML

## (undated) DEVICE — INFLATION DEVICE ENCORE 26

## (undated) DEVICE — CUTTER PROXIMATE BLUE 55MM

## (undated) DEVICE — INTRODUCER VASC RADPQ 5FRX10CM

## (undated) DEVICE — POUCH OSTOMY 1PC 5/8 - 1 1/4

## (undated) DEVICE — SUT 3-0 12-18IN SILK